# Patient Record
Sex: MALE | Race: OTHER | Employment: OTHER | URBAN - METROPOLITAN AREA
[De-identification: names, ages, dates, MRNs, and addresses within clinical notes are randomized per-mention and may not be internally consistent; named-entity substitution may affect disease eponyms.]

---

## 2017-01-24 ENCOUNTER — HOSPITAL ENCOUNTER (OUTPATIENT)
Dept: LAB | Age: 67
Discharge: HOME | End: 2017-01-24
Payer: COMMERCIAL

## 2017-01-24 DIAGNOSIS — Z00.00 ROUTINE GENERAL MEDICAL EXAMINATION AT A HEALTH CARE FACILITY: ICD-10-CM

## 2017-01-24 DIAGNOSIS — E55.9 HYPOVITAMINOSIS D: ICD-10-CM

## 2017-01-24 DIAGNOSIS — Z11.59 NEED FOR HEPATITIS C SCREENING TEST: ICD-10-CM

## 2017-01-24 DIAGNOSIS — Z12.5 SPECIAL SCREENING FOR MALIGNANT NEOPLASM OF PROSTATE: ICD-10-CM

## 2017-01-24 DIAGNOSIS — E78.5 DYSLIPIDEMIA: ICD-10-CM

## 2017-01-24 LAB
ALBUMIN SERPL BCP-MCNC: 4.1 G/DL (ref 3.4–5)
ALBUMIN/GLOB SERPL: 1.7 {RATIO} (ref 0.8–1.7)
ALP SERPL-CCNC: 75 U/L (ref 45–117)
ALT SERPL-CCNC: 36 U/L (ref 16–61)
ANION GAP BLD CALC-SCNC: 6 MMOL/L (ref 3–18)
AST SERPL W P-5'-P-CCNC: 27 U/L (ref 15–37)
BASOPHILS # BLD AUTO: 0 K/UL (ref 0–0.06)
BASOPHILS # BLD: 0 % (ref 0–2)
BILIRUB SERPL-MCNC: 0.5 MG/DL (ref 0.2–1)
BUN SERPL-MCNC: 25 MG/DL (ref 7–18)
BUN/CREAT SERPL: 24 (ref 12–20)
CALCIUM SERPL-MCNC: 8.3 MG/DL (ref 8.5–10.1)
CHLORIDE SERPL-SCNC: 106 MMOL/L (ref 100–108)
CHOLEST SERPL-MCNC: 144 MG/DL
CO2 SERPL-SCNC: 28 MMOL/L (ref 21–32)
CREAT SERPL-MCNC: 1.04 MG/DL (ref 0.6–1.3)
DIFFERENTIAL METHOD BLD: NORMAL
EOSINOPHIL # BLD: 0.2 K/UL (ref 0–0.4)
EOSINOPHIL NFR BLD: 3 % (ref 0–5)
ERYTHROCYTE [DISTWIDTH] IN BLOOD BY AUTOMATED COUNT: 13.5 % (ref 11.6–14.5)
EST. AVERAGE GLUCOSE BLD GHB EST-MCNC: 117 MG/DL
GLOBULIN SER CALC-MCNC: 2.4 G/DL (ref 2–4)
GLUCOSE SERPL-MCNC: 85 MG/DL (ref 74–99)
HBA1C MFR BLD: 5.7 % (ref 4.2–5.6)
HCT VFR BLD AUTO: 44.7 % (ref 36–48)
HDLC SERPL-MCNC: 56 MG/DL (ref 40–60)
HDLC SERPL: 2.6 {RATIO} (ref 0–5)
HGB BLD-MCNC: 14.7 G/DL (ref 13–16)
LDLC SERPL CALC-MCNC: 74.2 MG/DL (ref 0–100)
LIPID PROFILE,FLP: NORMAL
LYMPHOCYTES # BLD AUTO: 34 % (ref 21–52)
LYMPHOCYTES # BLD: 1.7 K/UL (ref 0.9–3.6)
MCH RBC QN AUTO: 30.5 PG (ref 24–34)
MCHC RBC AUTO-ENTMCNC: 32.9 G/DL (ref 31–37)
MCV RBC AUTO: 92.7 FL (ref 74–97)
MONOCYTES # BLD: 0.5 K/UL (ref 0.05–1.2)
MONOCYTES NFR BLD AUTO: 10 % (ref 3–10)
NEUTS SEG # BLD: 2.6 K/UL (ref 1.8–8)
NEUTS SEG NFR BLD AUTO: 53 % (ref 40–73)
PLATELET # BLD AUTO: 212 K/UL (ref 135–420)
PMV BLD AUTO: 9.9 FL (ref 9.2–11.8)
POTASSIUM SERPL-SCNC: 4.3 MMOL/L (ref 3.5–5.5)
PROT SERPL-MCNC: 6.5 G/DL (ref 6.4–8.2)
PSA SERPL-MCNC: 1.6 NG/ML (ref 0–4)
RBC # BLD AUTO: 4.82 M/UL (ref 4.7–5.5)
SODIUM SERPL-SCNC: 140 MMOL/L (ref 136–145)
TRIGL SERPL-MCNC: 69 MG/DL (ref ?–150)
VLDLC SERPL CALC-MCNC: 13.8 MG/DL
WBC # BLD AUTO: 4.9 K/UL (ref 4.6–13.2)

## 2017-01-24 PROCEDURE — 36415 COLL VENOUS BLD VENIPUNCTURE: CPT | Performed by: INTERNAL MEDICINE

## 2017-01-24 PROCEDURE — 80061 LIPID PANEL: CPT | Performed by: INTERNAL MEDICINE

## 2017-01-24 PROCEDURE — 86803 HEPATITIS C AB TEST: CPT | Performed by: INTERNAL MEDICINE

## 2017-01-24 PROCEDURE — 82306 VITAMIN D 25 HYDROXY: CPT | Performed by: INTERNAL MEDICINE

## 2017-01-24 PROCEDURE — 85025 COMPLETE CBC W/AUTO DIFF WBC: CPT | Performed by: INTERNAL MEDICINE

## 2017-01-24 PROCEDURE — 84153 ASSAY OF PSA TOTAL: CPT | Performed by: INTERNAL MEDICINE

## 2017-01-24 PROCEDURE — 83036 HEMOGLOBIN GLYCOSYLATED A1C: CPT | Performed by: INTERNAL MEDICINE

## 2017-01-24 PROCEDURE — 80053 COMPREHEN METABOLIC PANEL: CPT | Performed by: INTERNAL MEDICINE

## 2017-01-25 LAB
25(OH)D3 SERPL-MCNC: 44.2 NG/ML (ref 30–100)
HCV AB SER IA-ACNC: 0.02 INDEX
HCV AB SERPL QL IA: NEGATIVE
HCV COMMENT,HCGAC: NORMAL

## 2017-01-27 NOTE — PROGRESS NOTES
74 y.o. WHITE OR  male who presents for RPE    He seems to be doing very well. He has not been going to the [facility] as much as previously as he is now doing 3 full days of work weekly. He goes 2x/week doing 40 min on the treadmill or elliptical along with cybex machines. No cardiovascular complaints. No gi issues to report     No urinary complaints. He's been having more ED of late and previously did not respond much to viagra so using homeopathic things from the local drugstore and they seem to work    He continues to see Dr. Madison Barrios regarding the peripheral neuropathy.   He could not tolerate the cpap so he's going to Dr Katy Pérez for an oral appliance    The lexapro and higher dose of wellbutrin has done well for him since we made the adjustment in Nov    Past Medical History   Diagnosis Date    Allergic rhinitis     Basal cell carcinoma 2013     Dr. Lawson Session s/p resection 2 spots    BPH (benign prostatic hyperplasia)      Dr. Hi Marin Depression      anxiety    Dyslipidemia     Erectile dysfunction     FHx: heart disease     GERD (gastroesophageal reflux disease)      s/p dilation 2003 Dr. Paul Hernandez; egd 2015    Heel spur      Dr Connie Gruber Hypovitaminosis D     Normal cardiac stress test 08/12/2009     Neg maximal exercise stress test.  Ex time 15:00.    Peripheral neuropathy (HCC)      and B CTS on EMG Dr. Roshan Gomez 2014    Sleep apnea      intol cpap Dr Madison Barrios 2015; using oral appliance Dr [place]     Past Surgical History   Procedure Laterality Date    Pr cardiac surg procedure unlist  8/09     ETT negative    Hx colonoscopy       Dr. Paul Hernandez mild diverticulosis 2000, 6/12    Hx orthopaedic       B CT release 2007, 2009    Hx orthopaedic       DEXA t score 4.2 spine, 0.2 hip (2012)     Social History     Social History    Marital status:      Spouse name: N/A    Number of children: 2    Years of education: N/A     Occupational History    ret [employer] Social History Main Topics    Smoking status: Never Smoker    Smokeless tobacco: Never Used    Alcohol use Yes      Comment: social    Drug use: No    Sexual activity: Yes     Partners: Female     Birth control/ protection: None     Other Topics Concern    Not on file     Social History Narrative     Family History   Problem Relation Age of Onset    Cancer Mother      leukemia    Heart Disease Father     Psychiatric Disorder Daughter      depression     Immunization History   Administered Date(s) Administered    Influenza Vaccine (Quad) PF 11/02/2015, 11/11/2016    Influenza Vaccine PF 11/05/2013, 10/30/2014    Influenza Vaccine Split 10/21/2011, 10/31/2012    Influenza Vaccine Whole 11/05/2010    Pneumococcal Polysaccharide (PPSV-23) 08/07/2013    TD Vaccine 07/22/2009    Tdap 11/11/2016    Zoster 05/07/2012     Current Outpatient Prescriptions on File Prior to Visit   Medication Sig Dispense Refill    naproxen (NAPROSYN) 375 mg tablet TAKE 1 TABLET BY MOUTH TWICE DAILY WITH MEALS 180 Tab 0    buPROPion SR (WELLBUTRIN SR) 150 mg SR tablet TAKE 1 TABLET BY MOUTH THREE TIMES DAILY 270 Tab 11    simvastatin (ZOCOR) 40 mg tablet TAKE 1 TABLET BY MOUTH EVERY NIGHT AT BEDTIME 90 Tab 2    finasteride (PROSCAR) 5 mg tablet TAKE 1 TABLET DAILY 90 Tab 3    escitalopram oxalate (LEXAPRO) 20 mg tablet TAKE 1 TABLET DAILY 10 Tab 0    fluticasone (FLONASE) 50 mcg/actuation nasal spray USE 2 SPRAYS IN EACH       NOSTRIL DAILY 48 g 3    NEXIUM 40 mg capsule TAKE 1 CAPSULE DAILY 90 Cap 3    PROAIR HFA 90 mcg/actuation inhaler INHALE 2 PUFFS BY MOUTH EVERY 4 HOURS AS NEEDED FOR WHEEZING 1 Inhaler 12    ginkgo biloba 120 mg Tab Take  by mouth.  gabapentin (NEURONTIN) 100 mg capsule Take 300 mg by mouth three (3) times daily.  ascorbic acid (VITAMIN C) 500 mg tablet Take  by mouth.  cyanocobalamin (VITAMIN B-12) 1,000 mcg tablet Take 1,000 mcg by mouth daily.         aspirin delayed-release 81 mg tablet Take  by mouth daily.  cholecalciferol, vitamin d3, (VITAMIN D3) 1,000 unit tablet Take  by mouth daily.  ginseng 100 mg Tab Take  by mouth.  coenzyme q10-vitamin e (COQ10 ) 100-100 mg-unit Cap Take  by mouth.  Gluc-Scar-MSM#9-M-Iypd-Mike-Bor (OSTEO BI-FLEX) 750-625-30 mg Tab Take  by mouth daily.  OMEGA-3 FATTY ACIDS (OMEGA 3 PO) Take  by mouth two (2) times a day. 3 QD       No current facility-administered medications on file prior to visit. Allergies   Allergen Reactions    Cefdinir Nausea and Vomiting    Niacin Other (comments)     Flushing       REVIEW OF SYSTEMS: colo 6/12 Dr Paul Hernandez  Ophtho - no vision change or eye pain  Oral - no mouth pain, tongue or tooth problems  Ears - no hearing loss, ear pain, fullness, no swallowing problems  Cardiac - no CP, PND, orthopnea, edema, palpitations or syncope  Chest - no breast masses  Resp - no wheezing, chronic coughing, dyspnea  GI - no heartburn, nausea, vomiting, change in bowel habits, bleeding, hemorrhoids  Urinary - no dysuria, hematuria, flank pain, urgency, frequency  Genitals - no genital lesions, discharge, masses, ulceration, warts  Ortho - no swelling, dec ROM, myalgias  Derm - no nail abnormalities, rashes, lesions of note, hair loss  Psych - denies any anxiety or depression symptoms, no hallucinations or violent ideation  Constitutional - no wt loss, night sweats, unexplained fevers  Neuro - no focal weakness, incoordination, ataxia, involuntary movements  Endo - no polyuria, polydipsia, nocturia, hot flashes    Visit Vitals    /82 (BP 1 Location: Left arm, BP Patient Position: Sitting)    Pulse 76    Temp 98 °F (36.7 °C) (Oral)    Resp 20    Ht 6' 2.25\" (1.886 m)    Wt 195 lb (88.5 kg)    SpO2 98%    BMI 24.87 kg/m2   Affect is appropriate.   Mood stable  No apparent distress  HEENT --Anicteric sclerae, tympanic membranes normal,  ear canals normal.  PERRL, EOMI, conjunctiva and lids normal.  Disks were sharp  Sinuses were nontender, turbinates normal, hearing normal.  Oropharynx without  erythema, normal tongue, oral mucosa and tonsils. No thyromegaly, JVD, or bruits. Lungs --Clear to auscultation, normal percussion. Heart --Regular rate and rhythm, no murmurs, rubs, gallops, or clicks. Chest wall --Nontender to palpation. PMI normal.  Abdomen -- Soft and nontender, no hepatosplenomegaly or masses.   -- normal penis, no scrotal masses, testicular tenderness or hernias  Prostate  -- no asymmetry, nodularity, tenderness, about 25 gm prostate  Rectal  -- normal tone, guiaiac negative brown stool  Extremities -- Without cyanosis, clubbing, edema. 2+ pulses equally and bilaterally.     LABS  From 11/10 showed gluc 91, cr 1.10,             alt 25, chol 146, tg 74,   hdl 26, ldl-c 95, wbc 6.2, hb 14.3, plt 191, psa 0.60  From 4/12 showed                           alt 26, chol 138, tg 105, hdl 42, ldl-c 75  From 5/12 showed                           vit d 57.9  From 7/13 showed                 alt 25, chol 135, tg 70,   hdl 44, ldl-c 77  From 7/13 showed   gluc 88, cr 1.02, gfr 79,  alt 10,                wbc 5.0, hb 14.3, plt 182, psa 0.60  From 9/14 showed   gluc 95, cr 0.99, gfr>60, alt 13, chol 138, tg 81,   hdl 41, ldl-c 81, wbc 5.6, hb 13.9, plt 187, psa 1.00  From 11/15 showed gluc 84, cr 1.01, gfr>60, alt 12, chol 148, tg 72,   hdl 44, ldl-c 90, wbc 4.8, hb 14.9, plt 193    Results for orders placed or performed during the hospital encounter of 01/24/17   PROSTATE SPECIFIC AG (PSA)   Result Value Ref Range    Prostate Specific Ag 1.6 0.0 - 4.0 ng/mL   CBC WITH AUTOMATED DIFF   Result Value Ref Range    WBC 4.9 4.6 - 13.2 K/uL    RBC 4.82 4.70 - 5.50 M/uL    HGB 14.7 13.0 - 16.0 g/dL    HCT 44.7 36.0 - 48.0 %    MCV 92.7 74.0 - 97.0 FL    MCH 30.5 24.0 - 34.0 PG    MCHC 32.9 31.0 - 37.0 g/dL    RDW 13.5 11.6 - 14.5 %    PLATELET 685 387 - 564 K/uL    MPV 9.9 9.2 - 11.8 FL    NEUTROPHILS 53 40 - 73 %    LYMPHOCYTES 34 21 - 52 %    MONOCYTES 10 3 - 10 %    EOSINOPHILS 3 0 - 5 %    BASOPHILS 0 0 - 2 %    ABS. NEUTROPHILS 2.6 1.8 - 8.0 K/UL    ABS. LYMPHOCYTES 1.7 0.9 - 3.6 K/UL    ABS. MONOCYTES 0.5 0.05 - 1.2 K/UL    ABS. EOSINOPHILS 0.2 0.0 - 0.4 K/UL    ABS. BASOPHILS 0.0 0.0 - 0.06 K/UL    DF AUTOMATED     METABOLIC PANEL, COMPREHENSIVE   Result Value Ref Range    Sodium 140 136 - 145 mmol/L    Potassium 4.3 3.5 - 5.5 mmol/L    Chloride 106 100 - 108 mmol/L    CO2 28 21 - 32 mmol/L    Anion gap 6 3.0 - 18 mmol/L    Glucose 85 74 - 99 mg/dL    BUN 25 (H) 7.0 - 18 MG/DL    Creatinine 1.04 0.6 - 1.3 MG/DL    BUN/Creatinine ratio 24 (H) 12 - 20      GFR est AA >60 >60 ml/min/1.73m2    GFR est non-AA >60 >60 ml/min/1.73m2    Calcium 8.3 (L) 8.5 - 10.1 MG/DL    Bilirubin, total 0.5 0.2 - 1.0 MG/DL    ALT 36 16 - 61 U/L    AST 27 15 - 37 U/L    Alk. phosphatase 75 45 - 117 U/L    Protein, total 6.5 6.4 - 8.2 g/dL    Albumin 4.1 3.4 - 5.0 g/dL    Globulin 2.4 2.0 - 4.0 g/dL    A-G Ratio 1.7 0.8 - 1.7     LIPID PANEL   Result Value Ref Range    LIPID PROFILE          Cholesterol, total 144 <200 MG/DL    Triglyceride 69 <150 MG/DL    HDL Cholesterol 56 40 - 60 MG/DL    LDL, calculated 74.2 0 - 100 MG/DL    VLDL, calculated 13.8 MG/DL    CHOL/HDL Ratio 2.6 0 - 5.0     HEMOGLOBIN A1C WITH EAG   Result Value Ref Range    Hemoglobin A1c 5.7 (H) 4.2 - 5.6 %    Est. average glucose 117 mg/dL   HEPATITIS C AB   Result Value Ref Range    Hepatitis C virus Ab 0.02 <0.80 Index    Hep C  virus Ab Interp.  NEGATIVE  NEG      Hep C  virus Ab comment         VITAMIN D, 25 HYDROXY   Result Value Ref Range    Vitamin D 25-Hydroxy 44.2 30 - 100 ng/mL     Patient Active Problem List   Diagnosis Code    Dyslipidemia E78.5    Hypovitaminosis D E55.9    Depression F32.9    BPH (benign prostatic hyperplasia) Dr. Rosezena Meckel N40.0    Neuropathy Dr. Tracy Sanders G62.9    Erectile dysfunction N52.9    Gastroesophageal reflux disease without esophagitis K21.9    Diverticulosis of large intestine without hemorrhage Dr Gabrielle Reid K57.30    TARUN (obstructive sleep apnea) Dr Mague Bobo using oral appliance G47.33     Assessment and plan:  1. FH CHD. Continue lipid mgmt  2. Dyslipidemia. As above  3. Allergic rhinitis. Continue current  4. GERD. Continue current  5. ED. Prn meds although we did talk about cialis and [place] pharmacies being cheaper option  6. Neuropathy. F/U Dr. Bowser Generous as directed  7. Depression. Continue current regimen  8. Hypovit D. Follow   9. TARUN. Long discussion about long term repercussions of untreated tarun, he is trying to get the oral appliance apparently  10. Prevnar given        RTC 1/18     Above conditions discussed at length and patient vocalized understanding.   All questions answered to patient satifaction

## 2017-01-30 RX ORDER — NAPROXEN 375 MG/1
TABLET ORAL
Qty: 180 TAB | Refills: 0 | Status: SHIPPED | OUTPATIENT
Start: 2017-01-30 | End: 2017-05-06 | Stop reason: SDUPTHER

## 2017-01-31 ENCOUNTER — OFFICE VISIT (OUTPATIENT)
Dept: INTERNAL MEDICINE CLINIC | Age: 67
End: 2017-01-31

## 2017-01-31 VITALS
HEART RATE: 76 BPM | BODY MASS INDEX: 25.03 KG/M2 | DIASTOLIC BLOOD PRESSURE: 82 MMHG | SYSTOLIC BLOOD PRESSURE: 120 MMHG | HEIGHT: 74 IN | WEIGHT: 195 LBS | RESPIRATION RATE: 20 BRPM | OXYGEN SATURATION: 98 % | TEMPERATURE: 98 F

## 2017-01-31 DIAGNOSIS — K21.9 GASTROESOPHAGEAL REFLUX DISEASE WITHOUT ESOPHAGITIS: ICD-10-CM

## 2017-01-31 DIAGNOSIS — Z00.00 PHYSICAL EXAM: Primary | ICD-10-CM

## 2017-01-31 DIAGNOSIS — E78.5 DYSLIPIDEMIA: ICD-10-CM

## 2017-01-31 DIAGNOSIS — F32.A DEPRESSION, UNSPECIFIED DEPRESSION TYPE: ICD-10-CM

## 2017-01-31 DIAGNOSIS — G47.33 OSA (OBSTRUCTIVE SLEEP APNEA): ICD-10-CM

## 2017-01-31 DIAGNOSIS — Z23 ENCOUNTER FOR IMMUNIZATION: ICD-10-CM

## 2017-01-31 DIAGNOSIS — N52.9 ERECTILE DYSFUNCTION, UNSPECIFIED ERECTILE DYSFUNCTION TYPE: ICD-10-CM

## 2017-01-31 NOTE — MR AVS SNAPSHOT
Visit Information Date & Time Provider Department Dept. Phone Encounter #  
 1/31/2017 10:30 AM Clement Gastelum MD [facility]ist of [address] [id number] Your Appointments 7/7/2017  8:00 AM  
Follow Up with Ramandeep Hodges MD  
[facility] ([facility]) Appt Note: 1 year with an ekg [place] [id number] [address] [id number] [phone] [address] [place] [address] [address] Upcoming Health Maintenance Date Due  
 GLAUCOMA SCREENING Q2Y 1/8/2017 Pneumococcal 65+ Low/Medium Risk (1 of 2 - PCV13) 1/8/2017 MEDICARE YEARLY EXAM 1/8/2017 COLONOSCOPY 6/14/2022 DTaP/Tdap/Td series (2 - Td) 11/11/2026 Allergies as of 1/31/2017  Review Complete On: 1/31/2017 By: Aram Zaragoza LPN Severity Noted Reaction Type Reactions Cefdinir  09/12/2011    Nausea and Vomiting Niacin  08/07/2013    Other (comments) [place] Current Immunizations  Reviewed on 11/2/2015 Name Date Influenza Vaccine (Quad) PF 11/11/2016, 11/2/2015 Influenza Vaccine PF 10/30/2014, 11/5/2013 Influenza Vaccine Split 10/31/2012, 10/21/2011 Influenza Vaccine Whole 11/5/2010 Pneumococcal Polysaccharide (PPSV-23) 8/7/2013 TD Vaccine 7/22/2009 Tdap 11/11/2016 Zoster 5/7/2012 Not reviewed this visit Vitals BP Pulse Temp Resp Height(growth percentile) Weight(growth percentile) 120/82 (BP 1 Location: Left arm, BP Patient Position: Sitting) 76 98 °F (36.7 °C) (Oral) 20 6' 2.25\" (1.886 m) 195 lb (88.5 kg) SpO2 BMI Smoking Status 98% 24.87 kg/m2 Never Smoker Vitals History BMI and BSA Data Body Mass Index Body Surface Area  
 24.87 kg/m 2 2.15 m 2 Preferred Pharmacy Pharmacy Name Phone [facility] [address] [place] [address] AT [facility] [phone] Your Updated Medication List  
  
   
This list is accurate as of: 1/31/17 11:08 AM.  Always use your most recent med list.  
  
  
  
  
 aspirin delayed-release 81 mg tablet Take  by mouth daily. buPROPion  mg SR tablet Commonly known as:  WELLBUTRIN SR  
TAKE 1 TABLET BY MOUTH THREE TIMES DAILY  
  
 COQ10  100-100 mg-unit Cap Generic drug:  coenzyme q10-vitamin e Take  by mouth.  
  
 escitalopram oxalate 20 mg tablet Commonly known as:  Sung Hands TAKE 1 TABLET DAILY  
  
 finasteride 5 mg tablet Commonly known as:  PROSCAR  
TAKE 1 TABLET DAILY  
  
 fluticasone 50 mcg/actuation nasal spray Commonly known as:  FLONASE  
USE 2 SPRAYS IN EACH       NOSTRIL DAILY  
  
 gabapentin 100 mg capsule Commonly known as:  NEURONTIN Take 300 mg by mouth three (3) times daily. ginkgo biloba 120 mg Tab Take  by mouth.  
  
 ginseng 100 mg Tab Take  by mouth. naproxen 375 mg tablet Commonly known as:  NAPROSYN  
TAKE 1 TABLET BY MOUTH TWICE DAILY WITH MEALS NexIUM 40 mg capsule Generic drug:  esomeprazole TAKE 1 CAPSULE DAILY  
  
 OMEGA 3 PO Take  by mouth two (2) times a day. 3 QD  
  
 OSTEO BI-FLEX TRIPLE STRENGTH 750-625-30 mg Tab Generic drug:  Gluc-Scar-MSM#4-U-Wbgt-Mike-Bor Take  by mouth daily. PROAIR HFA 90 mcg/actuation inhaler Generic drug:  albuterol INHALE 2 PUFFS BY MOUTH EVERY 4 HOURS AS NEEDED FOR WHEEZING  
  
 simvastatin 40 mg tablet Commonly known as:  ZOCOR  
TAKE 1 TABLET BY MOUTH EVERY NIGHT AT BEDTIME  
  
 VITAMIN B-12 1,000 mcg tablet Generic drug:  cyanocobalamin Take 1,000 mcg by mouth daily. VITAMIN C 500 mg tablet Generic drug:  ascorbic acid (vitamin C) Take  by mouth. VITAMIN D3 1,000 unit tablet Generic drug:  cholecalciferol Take  by mouth daily. Introducing [facility]! Dear Hakeem Mascorro: Thank you for requesting a [employer] account. Our records indicate that you already have an active [employer] account. You can access your account anytime at [email or web address] Did you know that you can access your hospital and ER discharge instructions at any time in [employer]? You can also review all of your test results from your hospital stay or ER visit. Additional Information If you have questions, please visit the Frequently Asked Questions section of the [employer] website at [email or web address] Remember, [employer] is NOT to be used for urgent needs. For medical emergencies, dial 911. Now available from your iPhone and Android! Please provide this summary of care documentation to your next provider. Your primary care clinician is listed as Connor Frye. If you have any questions after today's visit, please call [phone].

## 2017-01-31 NOTE — PROGRESS NOTES
1. Have you been to the ER, urgent care clinic or hospitalized since your last visit? NO.     2. Have you seen or consulted any other health care providers outside of the [address] [place] [address] since your last visit (Include any pap smears or colon screening)? YES       Do you have an Advanced Directive? YES    Would you like information on Advanced Directives?  NO

## 2017-04-14 RX ORDER — ESCITALOPRAM OXALATE 20 MG/1
TABLET ORAL
Qty: 10 TAB | Refills: 0 | Status: CANCELLED | OUTPATIENT
Start: 2017-04-14

## 2017-04-14 RX ORDER — ESCITALOPRAM OXALATE 20 MG/1
20 TABLET ORAL DAILY
Qty: 90 TAB | Refills: 3 | Status: SHIPPED | OUTPATIENT
Start: 2017-04-14 | End: 2018-04-09 | Stop reason: SDUPTHER

## 2017-04-17 RX ORDER — SILDENAFIL 100 MG/1
100 TABLET, FILM COATED ORAL AS NEEDED
Qty: 10 TAB | Status: SHIPPED | OUTPATIENT
Start: 2017-04-17 | End: 2018-08-31 | Stop reason: SDUPTHER

## 2017-04-18 ENCOUNTER — TELEPHONE (OUTPATIENT)
Dept: INTERNAL MEDICINE CLINIC | Age: 67
End: 2017-04-18

## 2017-05-08 RX ORDER — FINASTERIDE 5 MG/1
TABLET, FILM COATED ORAL
Qty: 90 TAB | Refills: 3 | Status: SHIPPED | OUTPATIENT
Start: 2017-05-08 | End: 2018-05-05 | Stop reason: SDUPTHER

## 2017-05-08 RX ORDER — NAPROXEN 375 MG/1
TABLET ORAL
Qty: 180 TAB | Refills: 0 | Status: SHIPPED | OUTPATIENT
Start: 2017-05-08 | End: 2017-08-15 | Stop reason: SDUPTHER

## 2017-06-28 RX ORDER — SIMVASTATIN 40 MG/1
TABLET, FILM COATED ORAL
Qty: 90 TAB | Refills: 2 | Status: SHIPPED | OUTPATIENT
Start: 2017-06-28 | End: 2017-10-16 | Stop reason: SDUPTHER

## 2017-07-07 ENCOUNTER — OFFICE VISIT (OUTPATIENT)
Dept: CARDIOLOGY CLINIC | Age: 67
End: 2017-07-07

## 2017-07-07 VITALS
WEIGHT: 196 LBS | HEART RATE: 66 BPM | OXYGEN SATURATION: 97 % | DIASTOLIC BLOOD PRESSURE: 72 MMHG | BODY MASS INDEX: 25.15 KG/M2 | SYSTOLIC BLOOD PRESSURE: 124 MMHG | HEIGHT: 74 IN

## 2017-07-07 DIAGNOSIS — E78.5 DYSLIPIDEMIA: Primary | ICD-10-CM

## 2017-07-07 DIAGNOSIS — G47.33 OSA (OBSTRUCTIVE SLEEP APNEA): ICD-10-CM

## 2017-07-07 NOTE — MR AVS SNAPSHOT
Visit Information Date & Time Provider Department Dept. Phone Encounter #  
 7/7/2017  8:00 AM Pollo Llanes MD [facility] [address] [id number] Upcoming Health Maintenance Date Due  
 MEDICARE YEARLY EXAM 1/8/2017 INFLUENZA AGE 9 TO ADULT 8/1/2017 Pneumococcal 65+ Low/Medium Risk (2 of 2 - PPSV23) 8/7/2018 GLAUCOMA SCREENING Q2Y 1/31/2019 COLONOSCOPY 6/14/2022 DTaP/Tdap/Td series (2 - Td) 11/11/2026 Allergies as of 7/7/2017  Review Complete On: 1/31/2017 By: Annalisa Arroyo MD  
  
 Severity Noted Reaction Type Reactions Cefdinir  09/12/2011    Nausea and Vomiting Niacin  08/07/2013    Other (comments) [place] Current Immunizations  Reviewed on 1/31/2017 Name Date Influenza Vaccine (Quad) PF 11/11/2016, 11/2/2015 Influenza Vaccine PF 10/30/2014, 11/5/2013 Influenza Vaccine Split 10/31/2012, 10/21/2011 Influenza Vaccine Whole 11/5/2010 Pneumococcal Conjugate (PCV-13) 1/31/2017 Pneumococcal Polysaccharide (PPSV-23) 8/7/2013 TD Vaccine 7/22/2009 Tdap 11/11/2016 Zoster 5/7/2012 Not reviewed this visit You Were Diagnosed With   
  
 Codes Comments Dyslipidemia    -  Primary ICD-10-CM: E78.5 ICD-9-CM: 272.4 Vitals BP Pulse Height(growth percentile) Weight(growth percentile) SpO2 BMI  
 124/72 66 6' 2.25\" (1.886 m) 196 lb (88.9 kg) 97% 25 kg/m2 Smoking Status Never Smoker Vitals History BMI and BSA Data Body Mass Index Body Surface Area  
 25 kg/m 2 2.16 m 2 Preferred Pharmacy Pharmacy Name Phone  [facility] [phone] Your Updated Medication List  
  
   
This list is accurate as of: 7/7/17  8:44 AM.  Always use your most recent med list.  
  
  
  
  
 aspirin delayed-release 81 mg tablet Take  by mouth daily. buPROPion  mg SR tablet Commonly known as:  WELLBUTRIN SR  
TAKE 1 TABLET BY MOUTH THREE TIMES DAILY  
  
 COQ10  100-100 mg-unit Cap Generic drug:  coenzyme q10-vitamin e Take  by mouth. * escitalopram oxalate 20 mg tablet Commonly known as:  Shauna Bread TAKE 1 TABLET DAILY  
  
 * escitalopram oxalate 20 mg tablet Commonly known as:  Shauna Bread Take 1 Tab by mouth daily. finasteride 5 mg tablet Commonly known as:  PROSCAR  
TAKE 1 TABLET DAILY  
  
 fluticasone 50 mcg/actuation nasal spray Commonly known as:  FLONASE  
USE 2 SPRAYS IN EACH       NOSTRIL DAILY  
  
 gabapentin 100 mg capsule Commonly known as:  NEURONTIN Take 300 mg by mouth three (3) times daily. ginkgo biloba 120 mg Tab Take  by mouth.  
  
 ginseng 100 mg Tab Take  by mouth. naproxen 375 mg tablet Commonly known as:  NAPROSYN  
TAKE 1 TABLET BY MOUTH TWICE DAILY WITH MEALS NexIUM 40 mg capsule Generic drug:  esomeprazole TAKE 1 CAPSULE DAILY  
  
 OMEGA 3 PO Take  by mouth two (2) times a day. 3 QD  
  
 OSTEO BI-FLEX TRIPLE STRENGTH 750-625-30 mg Tab Generic drug:  Gluc-Scar-MSM#2-P-Vjtb-Mike-Bor Take  by mouth daily. PROAIR HFA 90 mcg/actuation inhaler Generic drug:  albuterol INHALE 2 PUFFS BY MOUTH EVERY 4 HOURS AS NEEDED FOR WHEEZING  
  
 sildenafil citrate 100 mg tablet Commonly known as:  VIAGRA Take 1 Tab by mouth as needed. simvastatin 40 mg tablet Commonly known as:  ZOCOR  
TAKE 1 TABLET BY MOUTH EVERY NIGHT AT BEDTIME  
  
 VITAMIN B-12 1,000 mcg tablet Generic drug:  cyanocobalamin Take 1,000 mcg by mouth daily. VITAMIN C 500 mg tablet Generic drug:  ascorbic acid (vitamin C) Take  by mouth. VITAMIN D3 1,000 unit tablet Generic drug:  cholecalciferol Take  by mouth daily. * Notice: This list has 2 medication(s) that are the same as other medications prescribed for you. Read the directions carefully, and ask your doctor or other care provider to review them with you. We Performed the Following AMB POC EKG ROUTINE W/ 12 LEADS, INTER & REP [63728 CPT(R)] Introducing [facility]! Dear Beverly Fabian: Thank you for requesting a [employer] account. Our records indicate that you already have an active [employer] account. You can access your account anytime at [email or web address] Did you know that you can access your hospital and ER discharge instructions at any time in [employer]? You can also review all of your test results from your hospital stay or ER visit. Additional Information If you have questions, please visit the Frequently Asked Questions section of the [employer] website at [email or web address] Remember, [employer] is NOT to be used for urgent needs. For medical emergencies, dial 911. Now available from your iPhone and Android! Please provide this summary of care documentation to your next provider. Your primary care clinician is listed as Liliana Mora. If you have any questions after today's visit, please call [phone].

## 2017-07-07 NOTE — PROGRESS NOTES
HISTORY OF PRESENT ILLNESS  Beba Hodges is a 74 y.o. male. Cholesterol Problem   Pertinent negatives include no chest pain, no abdominal pain, no headaches and no shortness of breath. Patient presents for a scheduled followup visit. Patient has a past medical history significant for dyslipidemia and a very strong family for early coronary disease. Patient had been taking simvastatin and fish oil for his lipids. His last stress test was in August 2009, where he exercised for a total of 15 minutes, without symptoms or EKG abnormalities. His lipids have been historically followed by his PCP. The patient was last seen in the office 1 year ago. Since last visit, he continues to feel well. He still is exercising 2 times a week at the [facility] doing 20 minutes of cardiovascular exercise and 20 minutes of light weight lifting. He has not noted any change in his activity tolerance. No new chest pain or shortness of breath, no leg swelling, orthopnea, PND. No palpitations, dizziness nor syncope.   He states he lost about 5 pounds in weight since last year which was intentional.    Past Medical History:   Diagnosis Date    Allergic rhinitis     Basal cell carcinoma 2013    Dr. Kyle South s/p resection 2 spots    BPH (benign prostatic hyperplasia)     Dr. Stacy Simpson Cardiac stress test 08/12/2009    Neg maximal exercise stress test.  Ex time 15:00.    Depression     anxiety    Dyslipidemia     Erectile dysfunction     FHx: heart disease     GERD (gastroesophageal reflux disease)     s/p dilation 2003 Dr. Lonne Kussmaul; egd 2015    Heel spur     Dr Mary Ram    Hypovitaminosis D     Peripheral neuropathy ([address]     and B CTS on EMG Dr. Socorro Mayers 2014    Sleep apnea     intol cpap Dr Radha Simon 2015; using oral appliance Dr Oscar Walton      Current Outpatient Prescriptions   Medication Sig Dispense Refill    simvastatin (ZOCOR) 40 mg tablet TAKE 1 TABLET BY MOUTH EVERY NIGHT AT BEDTIME 90 Tab 2  naproxen (NAPROSYN) 375 mg tablet TAKE 1 TABLET BY MOUTH TWICE DAILY WITH MEALS 180 Tab 0    finasteride (PROSCAR) 5 mg tablet TAKE 1 TABLET DAILY 90 Tab 3    sildenafil citrate (VIAGRA) 100 mg tablet Take 1 Tab by mouth as needed. 10 Tab 1Patient    escitalopram oxalate (LEXAPRO) 20 mg tablet Take 1 Tab by mouth daily. 90 Tab 3    buPROPion SR (WELLBUTRIN SR) 150 mg SR tablet TAKE 1 TABLET BY MOUTH THREE TIMES DAILY 270 Tab 11    escitalopram oxalate (LEXAPRO) 20 mg tablet TAKE 1 TABLET DAILY 10 Tab 0    fluticasone (FLONASE) 50 mcg/actuation nasal spray USE 2 SPRAYS IN EACH       NOSTRIL DAILY 48 g 3    NEXIUM 40 mg capsule TAKE 1 CAPSULE DAILY 90 Cap 3    PROAIR HFA 90 mcg/actuation inhaler INHALE 2 PUFFS BY MOUTH EVERY 4 HOURS AS NEEDED FOR WHEEZING 1 Inhaler 12    ginkgo biloba 120 mg Tab Take  by mouth.  gabapentin (NEURONTIN) 100 mg capsule Take 300 mg by mouth three (3) times daily.  ascorbic acid (VITAMIN C) 500 mg tablet Take  by mouth.  cyanocobalamin (VITAMIN B-12) 1,000 mcg tablet Take 1,000 mcg by mouth daily.  aspirin delayed-release 81 mg tablet Take  by mouth daily.  cholecalciferol, vitamin d3, (VITAMIN D3) 1,000 unit tablet Take  by mouth daily.  ginseng 100 mg Tab Take  by mouth.  coenzyme q10-vitamin e (COQ10 ) 100-100 mg-unit Cap Take  by mouth.  Gluc-Scar-MSM#8-I-Ennk-Mike-Bor (OSTEO BI-FLEX) 750-625-30 mg Tab Take  by mouth daily.  OMEGA-3 FATTY ACIDS (OMEGA 3 PO) Take  by mouth two (2) times a day. 3 QD         Allergies   Allergen Reactions    Cefdinir Nausea and Vomiting    Niacin Other (comments)     Flushing        Review of Systems   Constitutional: Negative for chills, fever and weight loss. HENT: Negative for nosebleeds. Eyes: Negative for blurred vision and double vision. Respiratory: Negative for cough, shortness of breath and wheezing.     Cardiovascular: Negative for chest pain, palpitations, orthopnea, claudication, leg swelling and PND. Gastrointestinal: Negative for abdominal pain, heartburn, nausea and vomiting. Genitourinary: Negative for dysuria and hematuria. Musculoskeletal: Negative for falls and myalgias. Skin: Negative for rash. Neurological: Negative for dizziness, focal weakness and headaches. Endo/Heme/Allergies: Does not bruise/bleed easily. Psychiatric/Behavioral: Negative for substance abuse. Visit Vitals    /72    Pulse 66    Ht 6' 2.25\" (1.886 m)    Wt 88.9 kg (196 lb)    SpO2 97%    BMI 25 kg/m2      Physical Exam   Constitutional: He is oriented to person, place, and time. He appears well-developed and well-nourished. HENT:   Head: Normocephalic and atraumatic. Eyes: Conjunctivae are normal.   Neck: Neck supple. No JVD present. Carotid bruit is not present. Cardiovascular: Normal rate, regular rhythm, S1 normal, S2 normal and normal pulses. Exam reveals no gallop and no S3. No murmur heard. Pulmonary/Chest: Breath sounds normal. He has no wheezes. He has no rales. Abdominal: Soft. Bowel sounds are normal. There is no tenderness. Musculoskeletal: He exhibits no edema. Neurological: He is alert and oriented to person, place, and time. Skin: Skin is warm and dry. EKG:  Normal sinus rhythm. Normal axis, normal QTc interval.  No ST/T wave abnormalities. Normal ECG. No change compared to previous EKG      ASSESSMENT and PLAN    Dyslipidemia. The patient is currently managed with simvastatin 40 mg daily and fish oil supplements. His most recent lipid panel from January 2017 was excellent. This is being followed by his PCP. Family history for coronary disease: Because of this we have discussed the importance of risk factor modification. Patient again, was encouraged to exercise much as possible. Obstructive sleep apnea.   Unknown severity, however, patient has declined CPAP in the past.  He does not have any overly concerning symptoms of daytime somnolence. Followup in 12 months, sooner if needed.

## 2017-07-07 NOTE — PROGRESS NOTES
1. Have you been to the ER, urgent care clinic since your last visit? Hospitalized since your last visit? No     2. Have you seen or consulted any other health care providers outside of the [address] [place] [address] since your last visit? Include any pap smears or colon screening.  No

## 2017-08-16 RX ORDER — NAPROXEN 375 MG/1
TABLET ORAL
Qty: 180 TAB | Refills: 0 | Status: SHIPPED | OUTPATIENT
Start: 2017-08-16 | End: 2017-11-19 | Stop reason: SDUPTHER

## 2017-10-16 RX ORDER — SIMVASTATIN 40 MG/1
TABLET, FILM COATED ORAL
Qty: 90 TAB | Refills: 2 | Status: SHIPPED | OUTPATIENT
Start: 2017-10-16 | End: 2018-07-23 | Stop reason: SDUPTHER

## 2017-10-17 ENCOUNTER — OFFICE VISIT (OUTPATIENT)
Dept: INTERNAL MEDICINE CLINIC | Age: 67
End: 2017-10-17

## 2017-10-17 VITALS
HEART RATE: 75 BPM | BODY MASS INDEX: 25.18 KG/M2 | TEMPERATURE: 98.2 F | WEIGHT: 196.2 LBS | SYSTOLIC BLOOD PRESSURE: 130 MMHG | OXYGEN SATURATION: 96 % | RESPIRATION RATE: 14 BRPM | DIASTOLIC BLOOD PRESSURE: 82 MMHG | HEIGHT: 74 IN

## 2017-10-17 DIAGNOSIS — M54.16 LUMBAR RADICULOPATHY: Primary | ICD-10-CM

## 2017-10-17 RX ORDER — METHYLPREDNISOLONE 4 MG/1
TABLET ORAL
Qty: 1 DOSE PACK | Refills: 0 | Status: SHIPPED | OUTPATIENT
Start: 2017-10-17 | End: 2017-11-06 | Stop reason: ALTCHOICE

## 2017-10-17 RX ORDER — HYDROCODONE BITARTRATE AND ACETAMINOPHEN 5; 325 MG/1; MG/1
1 TABLET ORAL
Qty: 50 TAB | Refills: 0 | Status: SHIPPED | OUTPATIENT
Start: 2017-10-17 | End: 2018-02-12

## 2017-10-17 RX ORDER — HYDROCODONE BITARTRATE AND ACETAMINOPHEN 5; 325 MG/1; MG/1
TABLET ORAL
COMMUNITY
End: 2017-10-17 | Stop reason: ALTCHOICE

## 2017-10-17 NOTE — PROGRESS NOTES
1. Have you been to the ER, urgent care clinic or hospitalized since your last visit? YES. [facility] 10/15/17    2. Have you seen or consulted any other health care providers outside of the [address] [place] [address] since your last visit (Include any pap smears or colon screening)? NO      Do you have an Advanced Directive? YES    Would you like information on Advanced Directives?  NO

## 2017-10-17 NOTE — MR AVS SNAPSHOT
Visit Information Date & Time Provider Department Dept. Phone Encounter #  
 10/17/2017 10:00 AM He Moore MD [facility] [address] [place] [address] Road [phone] [id number] Your Appointments 7/13/2018  8:00 AM  
Follow Up with Sofya Villalba MD  
[facility] ([address]) Appt Note: 1 year follow up [facility] [address] [address] [address] [phone] [address] [place] [address] [address] Upcoming Health Maintenance Date Due  
 MEDICARE YEARLY EXAM 1/8/2017 INFLUENZA AGE 9 TO ADULT 8/1/2017 Pneumococcal 65+ Low/Medium Risk (2 of 2 - PPSV23) 8/7/2018 GLAUCOMA SCREENING Q2Y 1/31/2019 COLONOSCOPY 6/14/2022 DTaP/Tdap/Td series (2 - Td) 11/11/2026 Allergies as of 10/17/2017  Review Complete On: 10/17/2017 By: Jennifer Harley Severity Noted Reaction Type Reactions Cefdinir  09/12/2011    Nausea and Vomiting Niacin  08/07/2013    Other (comments) [place]ushing Current Immunizations  Reviewed on 1/31/2017 Name Date Influenza Vaccine (Quad) PF 11/11/2016, 11/2/2015 Influenza Vaccine PF 10/30/2014, 11/5/2013 Influenza Vaccine Split 10/31/2012, 10/21/2011 Influenza Vaccine Whole 11/5/2010 Pneumococcal Conjugate (PCV-13) 1/31/2017 Pneumococcal Polysaccharide (PPSV-23) 8/7/2013 TD Vaccine 7/22/2009 Tdap 11/11/2016 Zoster 5/7/2012 Not reviewed this visit You Were Diagnosed With   
  
 Codes Comments Lumbar radiculopathy    -  Primary ICD-10-CM: M54.16 
ICD-9-CM: 724.4 Vitals BP Pulse Temp Resp Height(growth percentile) Weight(growth percentile) 130/82 (BP 1 Location: Left arm, BP Patient Position: Sitting) 75 98.2 °F (36.8 °C) (Oral) 14 6' 2.25\" (1.886 m) 196 lb 3.2 oz (89 kg) SpO2 BMI Smoking Status 96% 25.02 kg/m2 Never Smoker Vitals History BMI and BSA Data Body Mass Index Body Surface Area 25.02 kg/m 2 2.16 m 2 Preferred Pharmacy Pharmacy Name Phone [facility] [address] [place] [address], [facility] [phone] Your Updated Medication List  
  
   
This list is accurate as of: 10/17/17 11:37 AM.  Always use your most recent med list.  
  
  
  
  
 aspirin delayed-release 81 mg tablet Take  by mouth daily. buPROPion  mg SR tablet Commonly known as:  WELLBUTRIN SR  
TAKE 1 TABLET BY MOUTH THREE TIMES DAILY  
  
 COQ10  100-100 mg-unit Cap Generic drug:  coenzyme q10-vitamin e Take  by mouth. cyclobenzaprine 5 mg tablet Commonly known as:  FLEXERIL Take 1 Tab by mouth two (2) times daily as needed for Muscle Spasm(s) for up to 5 days. * escitalopram oxalate 20 mg tablet Commonly known as:  Gely Nicole TAKE 1 TABLET DAILY  
  
 * escitalopram oxalate 20 mg tablet Commonly known as:  Gely Nicloe Take 1 Tab by mouth daily. finasteride 5 mg tablet Commonly known as:  PROSCAR  
TAKE 1 TABLET DAILY  
  
 fluticasone 50 mcg/actuation nasal spray Commonly known as:  FLONASE  
USE 2 SPRAYS IN EACH       NOSTRIL DAILY  
  
 gabapentin 100 mg capsule Commonly known as:  NEURONTIN Take 300 mg by mouth three (3) times daily. ginkgo biloba 120 mg Tab Take  by mouth.  
  
 ginseng 100 mg Tab Take  by mouth. HYDROcodone-acetaminophen 5-325 mg per tablet Commonly known as:  Mariah Du Take 1 Tab by mouth every six (6) hours as needed for Pain. Max Daily Amount: 4 Tabs. methylPREDNISolone 4 mg tablet Commonly known as:  Wale Gull Take as directed  
  
 naproxen 375 mg tablet Commonly known as:  NAPROSYN  
TAKE 1 TABLET BY MOUTH TWICE DAILY WITH MEALS NexIUM 40 mg capsule Generic drug:  esomeprazole TAKE 1 CAPSULE DAILY  
  
 OMEGA 3 PO Take  by mouth two (2) times a day.  3 QD  
  
 OSTEO BI-FLEX TRIPLE STRENGTH 750-625-30 mg Tab Generic drug:  Gluc-Scar-MSM#0-U-Athm-Mike-Bor Take  by mouth daily. PROAIR HFA 90 mcg/actuation inhaler Generic drug:  albuterol INHALE 2 PUFFS BY MOUTH EVERY 4 HOURS AS NEEDED FOR WHEEZING  
  
 sildenafil citrate 100 mg tablet Commonly known as:  VIAGRA Take 1 Tab by mouth as needed. simvastatin 40 mg tablet Commonly known as:  ZOCOR  
TAKE 1 TABLET BY MOUTH EVERY NIGHT AT BEDTIME  
  
 VITAMIN B-12 1,000 mcg tablet Generic drug:  cyanocobalamin Take 1,000 mcg by mouth daily. VITAMIN C 500 mg tablet Generic drug:  ascorbic acid (vitamin C) Take  by mouth. VITAMIN D3 1,000 unit tablet Generic drug:  cholecalciferol Take  by mouth daily. * Notice: This list has 2 medication(s) that are the same as other medications prescribed for you. Read the directions carefully, and ask your doctor or other care provider to review them with you. Prescriptions Printed Refills  
 methylPREDNISolone (MEDROL DOSEPACK) 4 mg tablet 0 Sig: Take as directed Class: Print HYDROcodone-acetaminophen (NORCO) 5-325 mg per tablet 0 Sig: Take 1 Tab by mouth every six (6) hours as needed for Pain. Max Daily Amount: 4 Tabs. Class: Print Route: Oral  
  
Introducing [facility]! Dear Tay Cramer: Thank you for requesting a Savv[employer] account. Our records indicate that you already have an active Savvify account. You can access your account anytime at [email or web address] Did you know that you can access your hospital and ER discharge instructions at any time in Sav[employer]ify? You can also review all of your test results from your hospital stay or ER visit. Additional Information If you have questions, please visit the Frequently Asked Questions section of the [employer] website at [email or web address] Remember, [employer] is NOT to be used for urgent needs. For medical emergencies, dial 911. Now available from your iPhone and Android! Please provide this summary of care documentation to your next provider. Your primary care clinician is listed as Felicia Karie. If you have any questions after today's visit, please call [phone].
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
## 2017-10-23 ENCOUNTER — HOSPITAL ENCOUNTER (OUTPATIENT)
Age: 67
Discharge: HOME | End: 2017-10-23
Attending: INTERNAL MEDICINE
Payer: COMMERCIAL

## 2017-10-23 DIAGNOSIS — M54.16 LUMBAR RADICULOPATHY: ICD-10-CM

## 2017-10-23 PROCEDURE — 72148 MRI LUMBAR SPINE W/O DYE: CPT

## 2017-10-25 ENCOUNTER — TELEPHONE (OUTPATIENT)
Dept: INTERNAL MEDICINE CLINIC | Age: 67
End: 2017-10-25

## 2017-10-26 NOTE — TELEPHONE ENCOUNTER
pls call    IMPRESSION:      Scoliosis and advanced multilevel degenerative changes. Severe central canal  stenosis at L3-L4. Degenerative changes have progressed in comparison to MR  lumbar spine August 2014.     F/u spine as scheduled

## 2017-11-06 ENCOUNTER — OFFICE VISIT (OUTPATIENT)
Dept: ORTHOPEDIC SURGERY | Age: 67
End: 2017-11-06

## 2017-11-06 VITALS
HEIGHT: 74 IN | RESPIRATION RATE: 16 BRPM | DIASTOLIC BLOOD PRESSURE: 73 MMHG | SYSTOLIC BLOOD PRESSURE: 105 MMHG | HEART RATE: 70 BPM | WEIGHT: 192.8 LBS | BODY MASS INDEX: 24.74 KG/M2

## 2017-11-06 DIAGNOSIS — M51.36 OTHER INTERVERTEBRAL DISC DEGENERATION, LUMBAR REGION: ICD-10-CM

## 2017-11-06 DIAGNOSIS — M47.816 SPONDYLOSIS OF LUMBAR REGION WITHOUT MYELOPATHY OR RADICULOPATHY: ICD-10-CM

## 2017-11-06 DIAGNOSIS — M48.062 SPINAL STENOSIS, LUMBAR REGION WITH NEUROGENIC CLAUDICATION: Primary | ICD-10-CM

## 2017-11-06 DIAGNOSIS — M54.16 LUMBAR RADICULOPATHY: ICD-10-CM

## 2017-11-06 RX ORDER — GABAPENTIN 600 MG/1
600 TABLET ORAL 3 TIMES DAILY
Qty: 90 TAB | Refills: 1 | Status: SHIPPED | OUTPATIENT
Start: 2017-11-06 | End: 2018-03-05 | Stop reason: SDUPTHER

## 2017-11-06 RX ORDER — MONTELUKAST SODIUM 10 MG/1
TABLET ORAL
Refills: 3 | COMMUNITY
Start: 2017-10-26 | End: 2018-05-16 | Stop reason: SDUPTHER

## 2017-11-06 NOTE — MR AVS SNAPSHOT
Visit Information Date & Time Provider Department Dept. Phone Encounter #  
 11/6/2017 10:00 AM Jessee Childers MD [facility] [place] [phone] [id number] Follow-up Instructions Return in about 4 weeks (around 12/4/2017). Your Appointments 7/13/2018  8:00 AM  
Follow Up with Kaitlin Ogden MD  
[facility] ([address]) Appt Note: 1 year follow up [place] [address] [address] [id number] [phone] [address] Upcoming Health Maintenance Date Due  
 MEDICARE YEARLY EXAM 1/8/2017 INFLUENZA AGE 9 TO ADULT 8/1/2017 Pneumococcal 65+ Low/Medium Risk (2 of 2 - PPSV23) 8/7/2018 GLAUCOMA SCREENING Q2Y 1/31/2019 COLONOSCOPY 6/14/2022 DTaP/Tdap/Td series (2 - Td) 11/11/2026 Allergies as of 11/6/2017  Review Complete On: 11/6/2017 By: Jesese Childers MD  
  
 Severity Noted Reaction Type Reactions Cefdinir  09/12/2011    Nausea and Vomiting Niacin  08/07/2013    Other (comments) [place] Current Immunizations  Reviewed on 1/31/2017 Name Date Influenza Vaccine (Quad) PF 11/11/2016, 11/2/2015 Influenza Vaccine PF 10/30/2014, 11/5/2013 Influenza Vaccine Split 10/31/2012, 10/21/2011 Influenza Vaccine Whole 11/5/2010 Pneumococcal Conjugate (PCV-13) 1/31/2017 Pneumococcal Polysaccharide (PPSV-23) 8/7/2013 TD Vaccine 7/22/2009 Tdap 11/11/2016 Zoster 5/7/2012 Not reviewed this visit You Were Diagnosed With   
  
 Codes Comments Spinal stenosis, lumbar region with neurogenic claudication    -  Primary ICD-10-CM: Z46.024 
ICD-9-CM: 724.03 Spondylosis of lumbar region without myelopathy or radiculopathy     ICD-10-CM: M47.816 ICD-9-CM: 721.3 Lumbar radiculopathy     ICD-10-CM: M54.16 
ICD-9-CM: 724.4 Other intervertebral disc degeneration, lumbar region     ICD-10-CM: M51.36 
ICD-9-CM: 722.52 Vitals BP Pulse Resp Height(growth percentile) Weight(growth percentile) BMI  
 105/73 70 16 6' 2\" (1.88 m) 192 lb 12.8 oz (87.5 kg) 24.75 kg/m2 Smoking Status Never Smoker Vitals History BMI and BSA Data Body Mass Index Body Surface Area 24.75 kg/m 2 2.14 m 2 Preferred Pharmacy Pharmacy Name Phone [facility] [address] [place] [address] AT [facility] [phone] Your Updated Medication List  
  
   
This list is accurate as of: 11/6/17 12:28 PM.  Always use your most recent med list.  
  
  
  
  
 aspirin delayed-release 81 mg tablet Take  by mouth daily. buPROPion  mg SR tablet Commonly known as:  WELLBUTRIN SR  
TAKE 1 TABLET BY MOUTH THREE TIMES DAILY  
  
 COQ10  100-100 mg-unit Cap Generic drug:  coenzyme q10-vitamin e Take  by mouth.  
  
 escitalopram oxalate 20 mg tablet Commonly known as:  Christophe Legacy Take 1 Tab by mouth daily. finasteride 5 mg tablet Commonly known as:  PROSCAR  
TAKE 1 TABLET DAILY  
  
 fluticasone 50 mcg/actuation nasal spray Commonly known as:  FLONASE  
USE 2 SPRAYS IN EACH       NOSTRIL DAILY * gabapentin 600 mg tablet Commonly known as:  NEURONTIN Take 1 Tab by mouth three (3) times daily. * gabapentin 100 mg capsule Commonly known as:  NEURONTIN Take 300 mg by mouth three (3) times daily. ginkgo biloba 120 mg Tab Take  by mouth.  
  
 ginseng 100 mg Tab Take  by mouth. HYDROcodone-acetaminophen 5-325 mg per tablet Commonly known as:  Julaine Kava Take 1 Tab by mouth every six (6) hours as needed for Pain. Max Daily Amount: 4 Tabs. montelukast 10 mg tablet Commonly known as:  SINGULAIR TK 1 T PO QPM FOR ALLERGIES  
  
 naproxen 375 mg tablet Commonly known as:  NAPROSYN  
 TAKE 1 TABLET BY MOUTH TWICE DAILY WITH MEALS NexIUM 40 mg capsule Generic drug:  esomeprazole TAKE 1 CAPSULE DAILY  
  
 OMEGA 3 PO Take  by mouth two (2) times a day. 3 QD  
  
 OSTEO BI-FLEX TRIPLE STRENGTH 750-625-30 mg Tab Generic drug:  Gluc-Scar-MSM#6-R-Slru-Mike-Bor Take  by mouth daily. PROAIR HFA 90 mcg/actuation inhaler Generic drug:  albuterol INHALE 2 PUFFS BY MOUTH EVERY 4 HOURS AS NEEDED FOR WHEEZING  
  
 sildenafil citrate 100 mg tablet Commonly known as:  VIAGRA Take 1 Tab by mouth as needed. simvastatin 40 mg tablet Commonly known as:  ZOCOR  
TAKE 1 TABLET BY MOUTH EVERY NIGHT AT BEDTIME  
  
 VITAMIN B-12 1,000 mcg tablet Generic drug:  cyanocobalamin Take 1,000 mcg by mouth daily. VITAMIN C 500 mg tablet Generic drug:  ascorbic acid (vitamin C) Take  by mouth. VITAMIN D3 1,000 unit tablet Generic drug:  cholecalciferol Take  by mouth daily. * Notice: This list has 2 medication(s) that are the same as other medications prescribed for you. Read the directions carefully, and ask your doctor or other care provider to review them with you. Prescriptions Sent to Pharmacy Refills  
 gabapentin (NEURONTIN) 600 mg tablet 1 Sig: Take 1 Tab by mouth three (3) times daily. Class: Normal  
 [facility] [address] [place] [address] #: [phone] Route: Oral  
  
We Performed the Following REFERRAL TO PHYSICAL THERAPY [UWU85 Custom] Comments:  
 DX:LBP to RLE 
HEP 
LOCATION:[facility] 
2-3 visits/ 2-3 weeks Follow-up Instructions Return in about 4 weeks (around 12/4/2017). Referral Information Referral ID Referred By Referred To  
  
 [id number] KRISTINE CASILLAS III Not Available Visits Status Start Date End Date 1 New Request 11/6/17 11/6/18 If your referral has a status of pending review or denied, additional information will be sent to support the outcome of this decision. Introducing \[facility]\"" & [facility] SERVICES! Dear Shiloh Lopes: Thank you for requesting a [employer] account. Our records indicate that you already have an active [employer] account. You can access your account anytime at [email or web address] Did you know that you can access your hospital and ER discharge instructions at any time in [employer]? You can also review all of your test results from your hospital stay or ER visit. Additional Information If you have questions, please visit the Frequently Asked Questions section of the [employer] website at [email or web address] Remember, [employer] is NOT to be used for urgent needs. For medical emergencies, dial 911. Now available from your iPhone and Android! Please provide this summary of care documentation to your next provider. Your primary care clinician is listed as Ramila Ferrell. If you have any questions after today's visit, please call [phone].
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
## 2017-11-17 ENCOUNTER — CLINICAL SUPPORT (OUTPATIENT)
Dept: INTERNAL MEDICINE CLINIC | Age: 67
End: 2017-11-17

## 2017-11-17 DIAGNOSIS — Z23 ENCOUNTER FOR IMMUNIZATION: ICD-10-CM

## 2017-11-17 NOTE — PROGRESS NOTES
Patient denies any symptoms , reactions or allergies that would exclude them from being immunized today. Risks and adverse reactions were discussed and the VIS was given to them. All questions were addressed. She was observed for 15 min post injection. There were no reactions observed.

## 2017-11-19 RX ORDER — NAPROXEN 375 MG/1
TABLET ORAL
Qty: 180 TAB | Refills: 0 | Status: SHIPPED | OUTPATIENT
Start: 2017-11-19 | End: 2018-03-12 | Stop reason: SDUPTHER

## 2017-11-20 DIAGNOSIS — F33.8 SEASONAL AFFECTIVE DISORDER (HCC): ICD-10-CM

## 2017-11-20 DIAGNOSIS — F32.A DEPRESSION, UNSPECIFIED DEPRESSION TYPE: ICD-10-CM

## 2017-11-20 RX ORDER — BUPROPION HYDROCHLORIDE 150 MG/1
TABLET, EXTENDED RELEASE ORAL
Qty: 270 TAB | Refills: 0 | Status: SHIPPED | OUTPATIENT
Start: 2017-11-20 | End: 2018-02-17 | Stop reason: SDUPTHER

## 2017-12-04 ENCOUNTER — OFFICE VISIT (OUTPATIENT)
Dept: ORTHOPEDIC SURGERY | Age: 67
End: 2017-12-04

## 2017-12-04 VITALS
WEIGHT: 199 LBS | BODY MASS INDEX: 25.54 KG/M2 | HEART RATE: 69 BPM | SYSTOLIC BLOOD PRESSURE: 123 MMHG | HEIGHT: 74 IN | DIASTOLIC BLOOD PRESSURE: 78 MMHG

## 2017-12-04 DIAGNOSIS — M48.062 SPINAL STENOSIS, LUMBAR REGION WITH NEUROGENIC CLAUDICATION: Primary | ICD-10-CM

## 2017-12-04 DIAGNOSIS — M51.36 OTHER INTERVERTEBRAL DISC DEGENERATION, LUMBAR REGION: ICD-10-CM

## 2017-12-04 DIAGNOSIS — M54.16 LUMBAR RADICULOPATHY: ICD-10-CM

## 2017-12-04 DIAGNOSIS — M47.816 SPONDYLOSIS OF LUMBAR REGION WITHOUT MYELOPATHY OR RADICULOPATHY: ICD-10-CM

## 2017-12-04 RX ORDER — GABAPENTIN 600 MG/1
600 TABLET ORAL 3 TIMES DAILY
Qty: 270 TAB | Refills: 0 | Status: SHIPPED | OUTPATIENT
Start: 2017-12-04 | End: 2018-02-12 | Stop reason: SDUPTHER
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
## 2017-12-04 NOTE — MR AVS SNAPSHOT
Visit Information Date & Time Provider Department Dept. Phone Encounter #  
 12/4/2017  1:05 PM Jarad Quiñones MD [facility] [place] [id number] Follow-up Instructions Return in about 3 months (around 3/4/2018). Your Appointments 7/13/2018  8:00 AM  
Follow Up with Karyle Fairly, MD  
[facility] ([facility]) Appt Note: 1 year follow up [place] [address] [address] [id number] [phone] [address] [place] [address] [address] Upcoming Health Maintenance Date Due  
 MEDICARE YEARLY EXAM 1/8/2017 Pneumococcal 65+ Low/Medium Risk (2 of 2 - PPSV23) 8/7/2018 GLAUCOMA SCREENING Q2Y 1/31/2019 COLONOSCOPY 6/14/2022 DTaP/Tdap/Td series (2 - Td) 11/11/2026 Allergies as of 12/4/2017  Review Complete On: 12/4/2017 By: Jarad Quiñones MD  
  
 Severity Noted Reaction Type Reactions Cefdinir  09/12/2011    Nausea and Vomiting Niacin  08/07/2013    Other (comments) [place] Current Immunizations  Reviewed on 1/31/2017 Name Date Influenza High Dose Vaccine PF 11/17/2017 Influenza Vaccine (Quad) PF 11/11/2016, 11/2/2015 Influenza Vaccine PF 10/30/2014, 11/5/2013 Influenza Vaccine Split 10/31/2012, 10/21/2011 Influenza Vaccine Whole 11/5/2010 Pneumococcal Conjugate (PCV-13) 1/31/2017 Pneumococcal Polysaccharide (PPSV-23) 8/7/2013 TD Vaccine 7/22/2009 Tdap 11/11/2016 Zoster 5/7/2012 Not reviewed this visit You Were Diagnosed With   
  
 Codes Comments Spinal stenosis, lumbar region with neurogenic claudication    -  Primary ICD-10-CM: B26.448 
ICD-9-CM: 724.03 Spondylosis of lumbar region without myelopathy or radiculopathy     ICD-10-CM: M47.816 ICD-9-CM: 721.3 Lumbar radiculopathy     ICD-10-CM: M54.16 
ICD-9-CM: 724.4 Other intervertebral disc degeneration, lumbar region     ICD-10-CM: M51.36 
ICD-9-CM: 722.52 Vitals BP Pulse Height(growth percentile) Weight(growth percentile) BMI Smoking Status 123/78 69 6' 2\" (1.88 m) 199 lb (90.3 kg) 25.55 kg/m2 Never Smoker Vitals History BMI and BSA Data Body Mass Index Body Surface Area 25.55 kg/m 2 2.17 m 2 Preferred Pharmacy Pharmacy Name Phone [facility] [address] [place] [address] AT [facility] [phone] Your Updated Medication List  
  
   
This list is accurate as of: 12/4/17  2:14 PM.  Always use your most recent med list.  
  
  
  
  
 aspirin delayed-release 81 mg tablet Take  by mouth daily. POSADAS'S YEAST PO Take  by mouth. buPROPion  mg SR tablet Commonly known as:  WELLBUTRIN SR  
TAKE 1 TABLET BY MOUTH THREE TIMES DAILY  
  
 COQ10  100-100 mg-unit Cap Generic drug:  coenzyme q10-vitamin e Take  by mouth.  
  
 escitalopram oxalate 20 mg tablet Commonly known as:  Darcus Skillern Take 1 Tab by mouth daily. finasteride 5 mg tablet Commonly known as:  PROSCAR  
TAKE 1 TABLET DAILY  
  
 fluticasone 50 mcg/actuation nasal spray Commonly known as:  FLONASE  
USE 2 SPRAYS IN EACH       NOSTRIL DAILY * gabapentin 600 mg tablet Commonly known as:  NEURONTIN Take 1 Tab by mouth three (3) times daily. * gabapentin 600 mg tablet Commonly known as:  NEURONTIN Take 1 Tab by mouth three (3) times daily. ginkgo biloba 120 mg Tab Take  by mouth.  
  
 ginseng 100 mg Tab Take  by mouth. HYDROcodone-acetaminophen 5-325 mg per tablet Commonly known as:  March Castles Take 1 Tab by mouth every six (6) hours as needed for Pain. Max Daily Amount: 4 Tabs. montelukast 10 mg tablet Commonly known as:  SINGULAIR TK 1 T PO QPM FOR ALLERGIES  
  
 naproxen 375 mg tablet Commonly known as:  NAPROSYN  
 TAKE 1 TABLET BY MOUTH TWICE DAILY WITH MEALS NexIUM 40 mg capsule Generic drug:  esomeprazole TAKE 1 CAPSULE DAILY  
  
 OMEGA 3 PO Take  by mouth two (2) times a day. 3 QD  
  
 OSTEO BI-FLEX TRIPLE STRENGTH 750-625-30 mg Tab Generic drug:  Gluc-Scar-MSM#6-G-Iiyf-Mike-Bor Take  by mouth daily. PROAIR HFA 90 mcg/actuation inhaler Generic drug:  albuterol INHALE 2 PUFFS BY MOUTH EVERY 4 HOURS AS NEEDED FOR WHEEZING  
  
 sildenafil citrate 100 mg tablet Commonly known as:  VIAGRA Take 1 Tab by mouth as needed. simvastatin 40 mg tablet Commonly known as:  ZOCOR  
TAKE 1 TABLET BY MOUTH EVERY NIGHT AT BEDTIME  
  
 VITAMIN B-12 1,000 mcg tablet Generic drug:  cyanocobalamin Take 1,000 mcg by mouth daily. VITAMIN C 500 mg tablet Generic drug:  ascorbic acid (vitamin C) Take  by mouth. VITAMIN D3 1,000 unit tablet Generic drug:  cholecalciferol Take  by mouth daily. * Notice: This list has 2 medication(s) that are the same as other medications prescribed for you. Read the directions carefully, and ask your doctor or other care provider to review them with you. Prescriptions Sent to Pharmacy Refills  
 gabapentin (NEURONTIN) 600 mg tablet 0 Sig: Take 1 Tab by mouth three (3) times daily. Class: Normal  
 Pharmacy: [facility] [address] [place] [address] [facility]  #: [phone] Route: Oral  
  
Follow-up Instructions Return in about 3 months (around 3/4/2018). Introducing [facility] & HEALTH SERVICES! Dear Dwight Ramirez: Thank you for requesting a Ready [employer] account. Our records indicate that you already have an active [employer] account. You can access your account anytime at [email or web address] outdoors Did you know that you can access your hospital and ER discharge instructions at any time in [email or web address]? You can also review all of your test results from your hospital stay or ER visit. Additional Information If you have questions, please visit the Frequently Asked Questions section of the [email or web address] website at [email or web address] Remember, [email or web address] is NOT to be used for urgent needs. For medical emergencies, dial 911. Now available from your iPhone and Android! Please provide this summary of care documentation to your next provider. Your primary care clinician is listed as Charles Faye. If you have any questions after today's visit, please call [phone].

## 2018-02-05 ENCOUNTER — TELEPHONE (OUTPATIENT)
Dept: INTERNAL MEDICINE CLINIC | Age: 68
End: 2018-02-05

## 2018-02-05 ENCOUNTER — HOSPITAL ENCOUNTER (OUTPATIENT)
Dept: LAB | Age: 68
Discharge: HOME | End: 2018-02-05
Payer: COMMERCIAL

## 2018-02-05 DIAGNOSIS — Z00.00 ROUTINE GENERAL MEDICAL EXAMINATION AT A HEALTH CARE FACILITY: Primary | ICD-10-CM

## 2018-02-05 DIAGNOSIS — Z00.00 ROUTINE GENERAL MEDICAL EXAMINATION AT A HEALTH CARE FACILITY: ICD-10-CM

## 2018-02-05 LAB
ALBUMIN SERPL-MCNC: 3.7 G/DL (ref 3.4–5)
ALBUMIN/GLOB SERPL: 1.4 {RATIO} (ref 0.8–1.7)
ALP SERPL-CCNC: 65 U/L (ref 45–117)
ALT SERPL-CCNC: 35 U/L (ref 16–61)
ANION GAP SERPL CALC-SCNC: 8 MMOL/L (ref 3–18)
AST SERPL-CCNC: 26 U/L (ref 15–37)
BILIRUB SERPL-MCNC: 0.4 MG/DL (ref 0.2–1)
BUN SERPL-MCNC: 25 MG/DL (ref 7–18)
BUN/CREAT SERPL: 24 (ref 12–20)
CALCIUM SERPL-MCNC: 8.5 MG/DL (ref 8.5–10.1)
CHLORIDE SERPL-SCNC: 106 MMOL/L (ref 100–108)
CHOLEST SERPL-MCNC: 142 MG/DL
CO2 SERPL-SCNC: 30 MMOL/L (ref 21–32)
CREAT SERPL-MCNC: 1.04 MG/DL (ref 0.6–1.3)
ERYTHROCYTE [DISTWIDTH] IN BLOOD BY AUTOMATED COUNT: 12.8 % (ref 11.6–14.5)
GLOBULIN SER CALC-MCNC: 2.6 G/DL (ref 2–4)
GLUCOSE SERPL-MCNC: 89 MG/DL (ref 74–99)
HCT VFR BLD AUTO: 42.4 % (ref 36–48)
HDLC SERPL-MCNC: 53 MG/DL (ref 40–60)
HDLC SERPL: 2.7 {RATIO} (ref 0–5)
HGB BLD-MCNC: 14.5 G/DL (ref 13–16)
LDLC SERPL CALC-MCNC: 76.2 MG/DL (ref 0–100)
LIPID PROFILE,FLP: NORMAL
MCH RBC QN AUTO: 30.8 PG (ref 24–34)
MCHC RBC AUTO-ENTMCNC: 34.2 G/DL (ref 31–37)
MCV RBC AUTO: 90 FL (ref 74–97)
PLATELET # BLD AUTO: 208 K/UL (ref 135–420)
PMV BLD AUTO: 10 FL (ref 9.2–11.8)
POTASSIUM SERPL-SCNC: 4.9 MMOL/L (ref 3.5–5.5)
PROT SERPL-MCNC: 6.3 G/DL (ref 6.4–8.2)
RBC # BLD AUTO: 4.71 M/UL (ref 4.7–5.5)
SODIUM SERPL-SCNC: 144 MMOL/L (ref 136–145)
TRIGL SERPL-MCNC: 64 MG/DL (ref ?–150)
VLDLC SERPL CALC-MCNC: 12.8 MG/DL
WBC # BLD AUTO: 4.5 K/UL (ref 4.6–13.2)

## 2018-02-05 PROCEDURE — 80053 COMPREHEN METABOLIC PANEL: CPT | Performed by: INTERNAL MEDICINE

## 2018-02-05 PROCEDURE — 36415 COLL VENOUS BLD VENIPUNCTURE: CPT | Performed by: INTERNAL MEDICINE

## 2018-02-05 PROCEDURE — 85027 COMPLETE CBC AUTOMATED: CPT | Performed by: INTERNAL MEDICINE

## 2018-02-05 PROCEDURE — 80061 LIPID PANEL: CPT | Performed by: INTERNAL MEDICINE

## 2018-02-08 NOTE — PROGRESS NOTES
79 y.o. WHITE OR  male who presents for RPE    He had that episode of spinal stenosis flaring up late last year but that seems to be much better. He's 'back to what he was' from a year ago. Trying to get back to the [facility] around 2x/week doing 40 min on the treadmill or elliptical along with cybex machines. No cardiovascular complaints. He's not sure if the inc dose of gabapentin is interfering with his depression meds. He's been having intermittent feeling of just feeling down, no energy, etc.  No VI or hallucinations, they come in discrete episodes. Been on treatment for a long time and the current regimen was working well until the last few weeks/months    No gi issues to report     No urinary complaints. He's using the viagra full time now but it seems to work very well    He continues to see Dr. Maria M Yarbrough regarding the peripheral neuropathy.   Using the oral appliance as intol cpap    LAST MEDICARE WELLNESS EXAM: never as not medicare b    Past Medical History:   Diagnosis Date    Allergic rhinitis     Basal cell carcinoma 2013    Dr. London Coppola s/p resection 2 spots    BPH (benign prostatic hyperplasia)     Dr. Cyndi Gan Cardiac stress test 08/12/2009    Neg maximal exercise stress test.  Ex time 15:00.    Depression     anxiety    Dyslipidemia     Erectile dysfunction     FHx: heart disease     GERD (gastroesophageal reflux disease)     s/p dilation 2003 Dr. Ariel Mann; egd 2015    Heel spur     Dr Oscar Monson    Hypovitaminosis D     Overweight (BMI 25.0-29.9) 2/12/2018    Peripheral neuropathy     and B CTS on EMG Dr. Lyn Higuera 2014    Sleep apnea     intol cpap Dr Maria M Yarbrough 2015; using oral appliance Dr Tim Baron; AHI 17.8 min desats 83    Spinal stenosis 03/2014    MRI (3/14) showed scoliosis w multilevel degen findings, mod L3-4, L4-5 central stenosis, mod L5-S1 foraminal stenosis;  (10/17) severe L3-4 central stenosis     Past Surgical History:   Procedure Laterality Date  CARDIAC SURG PROCEDURE UNLIST  8/09    ETT negative    HX COLONOSCOPY      Dr. Hans Treadwell mild diverticulosis 2000, 6/12    HX ORTHOPAEDIC      B CT release 2007, 2009    HX ORTHOPAEDIC      DEXA t score 4.2 spine, 0.2 hip (2012)     Social History     Social History    Marital status:      Spouse name: N/A    Number of children: 2    Years of education: N/A     Occupational History    ret [employer]      Social History Main Topics    Smoking status: Never Smoker    Smokeless tobacco: Never Used    Alcohol use Yes      Comment: social    Drug use: No    Sexual activity: Yes     Partners: Female     Birth control/ protection: None     Other Topics Concern    Not on file     Social History Narrative     Family History   Problem Relation Age of Onset    Cancer Mother      leukemia    Heart Disease Father     Psychiatric Disorder Daughter      depression     Immunization History   Administered Date(s) Administered    Influenza High Dose Vaccine PF 11/17/2017    Influenza Vaccine (Quad) PF 11/02/2015, 11/11/2016    Influenza Vaccine PF 11/05/2013, 10/30/2014    Influenza Vaccine Split 10/21/2011, 10/31/2012    Influenza Vaccine Whole 11/05/2010    Pneumococcal Conjugate (PCV-13) 01/31/2017    Pneumococcal Polysaccharide (PPSV-23) 08/07/2013    TD Vaccine 07/22/2009    Tdap 11/11/2016    Zoster 05/07/2012     Current Outpatient Prescriptions   Medication Sig    YEAST,DRIED, S. CEREVISIAE, (POSADAS'S YEAST PO) Take  by mouth.  buPROPion SR (WELLBUTRIN SR) 150 mg SR tablet TAKE 1 TABLET BY MOUTH THREE TIMES DAILY    naproxen (NAPROSYN) 375 mg tablet TAKE 1 TABLET BY MOUTH TWICE DAILY WITH MEALS    montelukast (SINGULAIR) 10 mg tablet TK 1 T PO QPM FOR ALLERGIES    gabapentin (NEURONTIN) 600 mg tablet Take 1 Tab by mouth three (3) times daily.     simvastatin (ZOCOR) 40 mg tablet TAKE 1 TABLET BY MOUTH EVERY NIGHT AT BEDTIME    finasteride (PROSCAR) 5 mg tablet TAKE 1 TABLET DAILY    sildenafil citrate (VIAGRA) 100 mg tablet Take 1 Tab by mouth as needed.  escitalopram oxalate (LEXAPRO) 20 mg tablet Take 1 Tab by mouth daily.  fluticasone (FLONASE) 50 mcg/actuation nasal spray USE 2 SPRAYS IN EACH       NOSTRIL DAILY    NEXIUM 40 mg capsule TAKE 1 CAPSULE DAILY    PROAIR HFA 90 mcg/actuation inhaler INHALE 2 PUFFS BY MOUTH EVERY 4 HOURS AS NEEDED FOR WHEEZING    ginkgo biloba 120 mg Tab Take  by mouth.  ascorbic acid (VITAMIN C) 500 mg tablet Take  by mouth.  cyanocobalamin (VITAMIN B-12) 1,000 mcg tablet Take 1,000 mcg by mouth daily.  aspirin delayed-release 81 mg tablet Take  by mouth daily.  cholecalciferol, vitamin d3, (VITAMIN D3) 1,000 unit tablet Take  by mouth daily.  ginseng 100 mg Tab Take  by mouth.  coenzyme q10-vitamin e (COQ10 ) 100-100 mg-unit Cap Take  by mouth.  Gluc-Scar-MSM#5-U-Lmui-Mike-Bor (OSTEO BI-FLEX) 750-625-30 mg Tab Take  by mouth daily.  OMEGA-3 FATTY ACIDS (OMEGA 3 PO) Take  by mouth two (2) times a day. 3 QD     No current facility-administered medications for this visit.       Allergies   Allergen Reactions    Cefdinir Nausea and Vomiting    Niacin Other (comments)     Flushing       REVIEW OF SYSTEMS: colo 6/12 Dr Stacy Devine  Ophtho  no vision change or eye pain  Oral  no mouth pain, tongue or tooth problems  Ears  no hearing loss, ear pain, fullness, no swallowing problems  Cardiac  no CP, PND, orthopnea, edema, palpitations or syncope  Chest  no breast masses  Resp  no wheezing, chronic coughing, dyspnea  GI  no heartburn, nausea, vomiting, change in bowel habits, bleeding, hemorrhoids  Urinary  no dysuria, hematuria, flank pain, urgency, frequency  Genitals  no genital lesions, discharge, masses, ulceration, warts  Ortho  no swelling, dec ROM, myalgias  Derm  no nail abnormalities, rashes, lesions of note, hair loss  Psych  denies any anxiety or depression symptoms, no hallucinations or violent ideation  Constitutional  no wt loss, night sweats, unexplained fevers  Neuro  no focal weakness, incoordination, ataxia, involuntary movements  Endo - no polyuria, polydipsia, nocturia, hot flashes    Visit Vitals    /76 (BP 1 Location: Left arm, BP Patient Position: Sitting)    Pulse 89    Temp 98.1 °F (36.7 °C) (Oral)    Resp 14    Ht 6' 2\" (1.88 m)    Wt 195 lb (88.5 kg)    SpO2 97%    BMI 25.04 kg/m2   Affect is appropriate. Mood stable  No apparent distress  HEENT --Anicteric sclerae, tympanic membranes normal,  ear canals normal.  PERRL, EOMI, conjunctiva and lids normal.  Disks were sharp  Sinuses were nontender, turbinates normal, hearing normal.  Oropharynx without  erythema, normal tongue, oral mucosa and tonsils. No thyromegaly, JVD, or bruits. Lungs --Clear to auscultation, normal percussion. Heart --Regular rate and rhythm, no murmurs, rubs, gallops, or clicks. Chest wall --Nontender to palpation. PMI normal.  Abdomen -- Soft and nontender, no hepatosplenomegaly or masses. Prostate  -- no asymmetry, nodularity, tenderness, about 25 gm prostate  Rectal  -- normal tone, guiaiac negative brown stool  Extremities -- Without cyanosis, clubbing, edema. 2+ pulses equally and bilaterally.     LABS  From 11/10 showed gluc 91, cr 1.10,             alt 25, chol 146, tg 74,   hdl 26, ldl-c 95, wbc 6.2, hb 14.3, plt 191, psa 0.60  From 4/12 showed                           alt 26, chol 138, tg 105, hdl 42, ldl-c 75  From 5/12 showed                                      vit d 57.9  From 7/13 showed                 alt 25, chol 135, tg 70,   hdl 44, ldl-c 77  From 7/13 showed   gluc 88, cr 1.02, gfr 79,  alt 10,                wbc 5.0, hb 14.3, plt 182, psa 0.60  From 9/14 showed   gluc 95, cr 0.99, gfr>60, alt 13, chol 138, tg 81,   hdl 41, ldl-c 81, wbc 5.6, hb 13.9, plt 187, psa 1.00  From 11/15 showed gluc 84, cr 1.01, gfr>60, alt 12, chol 148, tg 72,   hdl 44, ldl-c 90, wbc 4.8, hb 14.9, plt 193  From 1/17 showed   gluc 85, cr 1.05, gfr>60, alt 36, chol 144, tg 69,   hdl 56, ldl-c 74, wbc 4.9, hb 14.7, plt 212, psa 1.60, hba1c 5.7, vit d 44.2, hep c neg    Results for orders placed or performed during the hospital encounter of 02/05/18   LIPID PANEL   Result Value Ref Range    LIPID PROFILE          Cholesterol, total 142 <200 MG/DL    Triglyceride 64 <150 MG/DL    HDL Cholesterol 53 40 - 60 MG/DL    LDL, calculated 76.2 0 - 100 MG/DL    VLDL, calculated 12.8 MG/DL    CHOL/HDL Ratio 2.7 0 - 5.0     METABOLIC PANEL, COMPREHENSIVE   Result Value Ref Range    Sodium 144 136 - 145 mmol/L    Potassium 4.9 3.5 - 5.5 mmol/L    Chloride 106 100 - 108 mmol/L    CO2 30 21 - 32 mmol/L    Anion gap 8 3.0 - 18 mmol/L    Glucose 89 74 - 99 mg/dL    BUN 25 (H) 7.0 - 18 MG/DL    Creatinine 1.04 0.6 - 1.3 MG/DL    BUN/Creatinine ratio 24 (H) 12 - 20      GFR est AA >60 >60 ml/min/1.73m2    GFR est non-AA >60 >60 ml/min/1.73m2    Calcium 8.5 8.5 - 10.1 MG/DL    Bilirubin, total 0.4 0.2 - 1.0 MG/DL    ALT (SGPT) 35 16 - 61 U/L    AST (SGOT) 26 15 - 37 U/L    Alk.  phosphatase 65 45 - 117 U/L    Protein, total 6.3 (L) 6.4 - 8.2 g/dL    Albumin 3.7 3.4 - 5.0 g/dL    Globulin 2.6 2.0 - 4.0 g/dL    A-G Ratio 1.4 0.8 - 1.7     CBC W/O DIFF   Result Value Ref Range    WBC 4.5 (L) 4.6 - 13.2 K/uL    RBC 4.71 4.70 - 5.50 M/uL    HGB 14.5 13.0 - 16.0 g/dL    HCT 42.4 36.0 - 48.0 %    MCV 90.0 74.0 - 97.0 FL    MCH 30.8 24.0 - 34.0 PG    MCHC 34.2 31.0 - 37.0 g/dL    RDW 12.8 11.6 - 14.5 %    PLATELET 534 743 - 215 K/uL    MPV 10.0 9.2 - 11.8 FL     Patient Active Problem List   Diagnosis Code    Dyslipidemia E78.5    Hypovitaminosis D E55.9    BPH (benign prostatic hyperplasia) Dr. Eder Garcia N40.0    Neuropathy Dr. [place] G62.9    Erectile dysfunction N52.9    Gastroesophageal reflux disease without esophagitis K21.9    Diverticulosis of large intestine without hemorrhage Dr Jacinda Beltran K57.30    GALILEA (obstructive sleep apnea)  Evaristo using oral appliance AHI 17.8 G47.33    Spinal stenosis, lumbar region with neurogenic claudication M48.062    Spondylosis of lumbar region without myelopathy or radiculopathy M47.816    Lumbar radiculopathy M54.16    Other intervertebral disc degeneration, lumbar region M51.36    Moderate episode of recurrent major depressive disorder (HCC) F33.1    Overweight (BMI 25.0-29. 9) E66.3     Assessment and plan:  1. FH CHD. Continue lipid mgmt  2. Dyslipidemia. As above  3. Allergic rhinitis. Continue current  4. GERD. Continue current  5. ED. Prn viagra   6. Neuropathy. F/U Dr. Malachi Del Real as directed  7. Depression. Long discussion  Will check thyroid and b12 levels. suggested he go see psych for opinion, will set up with Dr Sharon Fowler  8. Hypovit D. Follow   9. GALILEA. Per neurology  10. Spinal stenosis. Will try to cut dosing to bid and see how it affects him        RTC 1/18     Above conditions discussed at length and patient vocalized understanding.   All questions answered to patient satifaction

## 2018-02-12 ENCOUNTER — HOSPITAL ENCOUNTER (OUTPATIENT)
Dept: LAB | Age: 68
Discharge: HOME | End: 2018-02-12
Payer: COMMERCIAL

## 2018-02-12 ENCOUNTER — OFFICE VISIT (OUTPATIENT)
Dept: INTERNAL MEDICINE CLINIC | Age: 68
End: 2018-02-12

## 2018-02-12 VITALS
OXYGEN SATURATION: 97 % | HEART RATE: 89 BPM | WEIGHT: 195 LBS | TEMPERATURE: 98.1 F | BODY MASS INDEX: 25.03 KG/M2 | DIASTOLIC BLOOD PRESSURE: 76 MMHG | RESPIRATION RATE: 14 BRPM | HEIGHT: 74 IN | SYSTOLIC BLOOD PRESSURE: 124 MMHG

## 2018-02-12 DIAGNOSIS — E55.9 HYPOVITAMINOSIS D: ICD-10-CM

## 2018-02-12 DIAGNOSIS — E78.5 DYSLIPIDEMIA: ICD-10-CM

## 2018-02-12 DIAGNOSIS — N40.0 BENIGN PROSTATIC HYPERPLASIA, UNSPECIFIED WHETHER LOWER URINARY TRACT SYMPTOMS PRESENT: ICD-10-CM

## 2018-02-12 DIAGNOSIS — K21.9 GASTROESOPHAGEAL REFLUX DISEASE WITHOUT ESOPHAGITIS: ICD-10-CM

## 2018-02-12 DIAGNOSIS — R73.09 ABNORMAL GLUCOSE: ICD-10-CM

## 2018-02-12 DIAGNOSIS — Z12.5 SCREENING FOR MALIGNANT NEOPLASM OF PROSTATE: ICD-10-CM

## 2018-02-12 DIAGNOSIS — Z00.00 ROUTINE GENERAL MEDICAL EXAMINATION AT A HEALTH CARE FACILITY: Primary | ICD-10-CM

## 2018-02-12 DIAGNOSIS — G47.33 OSA (OBSTRUCTIVE SLEEP APNEA): ICD-10-CM

## 2018-02-12 DIAGNOSIS — K57.30 DIVERTICULOSIS OF LARGE INTESTINE WITHOUT HEMORRHAGE: ICD-10-CM

## 2018-02-12 DIAGNOSIS — M48.062 SPINAL STENOSIS, LUMBAR REGION WITH NEUROGENIC CLAUDICATION: ICD-10-CM

## 2018-02-12 DIAGNOSIS — Z00.00 ROUTINE GENERAL MEDICAL EXAMINATION AT A HEALTH CARE FACILITY: ICD-10-CM

## 2018-02-12 DIAGNOSIS — E66.3 OVERWEIGHT (BMI 25.0-29.9): ICD-10-CM

## 2018-02-12 DIAGNOSIS — G62.9 NEUROPATHY: ICD-10-CM

## 2018-02-12 DIAGNOSIS — F33.1 MODERATE EPISODE OF RECURRENT MAJOR DEPRESSIVE DISORDER (HCC): ICD-10-CM

## 2018-02-12 DIAGNOSIS — N52.9 ERECTILE DYSFUNCTION, UNSPECIFIED ERECTILE DYSFUNCTION TYPE: ICD-10-CM

## 2018-02-12 LAB — T4 FREE SERPL-MCNC: 0.9 NG/DL (ref 0.7–1.5)

## 2018-02-12 PROCEDURE — 36415 COLL VENOUS BLD VENIPUNCTURE: CPT | Performed by: INTERNAL MEDICINE

## 2018-02-12 PROCEDURE — 84439 ASSAY OF FREE THYROXINE: CPT | Performed by: INTERNAL MEDICINE

## 2018-02-12 PROCEDURE — 82607 VITAMIN B-12: CPT | Performed by: INTERNAL MEDICINE

## 2018-02-12 PROCEDURE — 84443 ASSAY THYROID STIM HORMONE: CPT | Performed by: INTERNAL MEDICINE

## 2018-02-12 PROCEDURE — 83921 ORGANIC ACID SINGLE QUANT: CPT | Performed by: INTERNAL MEDICINE

## 2018-02-12 PROCEDURE — 83036 HEMOGLOBIN GLYCOSYLATED A1C: CPT | Performed by: INTERNAL MEDICINE

## 2018-02-12 PROCEDURE — 84153 ASSAY OF PSA TOTAL: CPT | Performed by: INTERNAL MEDICINE

## 2018-02-12 NOTE — PROGRESS NOTES
1. Have you been to the ER, urgent care clinic or hospitalized since your last visit? NO.     2. Have you seen or consulted any other health care providers outside of the [address] [place] [address] since your last visit (Include any pap smears or colon screening)? NO      Do you have an Advanced Directive?  YES

## 2018-02-12 NOTE — MR AVS SNAPSHOT
[address] [facility] 
 
 
 [address] [facility], [facility] [address] [facility] 
[phone] Patient: Trena Gibbs MRN: [id number] Visit Information Date & Time Provider Department Dept. Phone Encounter #  
 2/12/2018  3:00 PM Barbara Sandy MD [facility] [phone] Your Appointments 3/5/2018 10:30 AM  
Follow Up with Clary Jeffery NP  
[facility] Appt Note: lumbar 3 mo fu  
 [address] [place] [address]  
[address] 7/13/2018  8:00 AM  
Follow Up with Nico Gómez MD  
[facility] ([facility] Appt Note: 1 year follow up [facility] [address] [address] [address] [phone] [address]  
  
    
 2/18/2019  8:20 AM  
PHYSICAL with Barbara Sandy MD  
[facility] Appt Note: physical labs at [facility] [address] [facility], [address] [place] [address] [address] [place]  
  
   
 [address], [address] Upcoming Health Maintenance Date Due  
 MEDICARE YEARLY EXAM 1/8/2017 Pneumococcal 65+ Low/Medium Risk (2 of 2 - PPSV23) 8/7/2018 GLAUCOMA SCREENING Q2Y 1/31/2019 COLONOSCOPY 6/14/2022 DTaP/Tdap/Td series (2 - Td) 11/11/2026 Allergies as of 2/12/2018  Review Complete On: 2/12/2018 By: Arthur Raphael Severity Noted Reaction Type Reactions Cefdinir  09/12/2011    Nausea and Vomiting Niacin  08/07/2013    Other (comments) [place] Current Immunizations  Reviewed on 1/31/2017 Name Date Influenza High Dose Vaccine PF 11/17/2017 Influenza Vaccine (Quad) PF 11/11/2016, 11/2/2015 Influenza Vaccine PF 10/30/2014, 11/5/2013 Influenza Vaccine Split 10/31/2012, 10/21/2011 Influenza Vaccine Whole 11/5/2010 Pneumococcal Conjugate (PCV-13) 1/31/2017 Pneumococcal Polysaccharide (PPSV-23) 8/7/2013 TD Vaccine 7/22/2009 Tdap 11/11/2016 Zoster 5/7/2012 Not reviewed this visit You Were Diagnosed With   
  
 Codes Comments Routine general medical examination at a health care facility    -  Primary ICD-10-CM: Z00.00 ICD-9-CM: V70.0 Benign prostatic hyperplasia, unspecified whether lower urinary tract symptoms present     ICD-10-CM: N40.0 ICD-9-CM: 600.00 Dyslipidemia     ICD-10-CM: E78.5 ICD-9-CM: 272.4 Abnormal glucose     ICD-10-CM: R73.09 
ICD-9-CM: 790.29 Screening for malignant neoplasm of prostate     ICD-10-CM: Z12.5 ICD-9-CM: V76.44 Vitals BP Pulse Temp Resp Height(growth percentile) Weight(growth percentile) 124/76 (BP 1 Location: Left arm, BP Patient Position: Sitting) 89 98.1 °F (36.7 °C) (Oral) 14 6' 2\" (1.88 m) 195 lb (88.5 kg) SpO2 BMI Smoking Status 97% 25.04 kg/m2 Never Smoker Vitals History BMI and BSA Data Body Mass Index Body Surface Area 25.04 kg/m 2 2.15 m 2 Preferred Pharmacy Pharmacy Name Phone [facility] [address] [place] [address] [phone] Your Updated Medication List  
  
   
This list is accurate as of: 2/12/18  4:29 PM.  Always use your most recent med list.  
  
  
  
  
 aspirin delayed-release 81 mg tablet Take  by mouth daily. POSADAS'S YEAST PO Take  by mouth. buPROPion  mg SR tablet Commonly known as:  WELLBUTRIN SR  
TAKE 1 TABLET BY MOUTH THREE TIMES DAILY  
  
 COQ10  100-100 mg-unit Cap Generic drug:  coenzyme q10-vitamin e Take  by mouth.  
  
 escitalopram oxalate 20 mg tablet Commonly known as:  [place] Clap Take 1 Tab by mouth daily. finasteride 5 mg tablet Commonly known as:  PROSCAR  
 TAKE 1 TABLET DAILY  
  
 fluticasone 50 mcg/actuation nasal spray Commonly known as:  FLONASE  
USE 2 SPRAYS IN EACH       NOSTRIL DAILY  
  
 gabapentin 600 mg tablet Commonly known as:  NEURONTIN Take 1 Tab by mouth three (3) times daily. ginkgo biloba 120 mg Tab Take  by mouth.  
  
 ginseng 100 mg Tab Take  by mouth. HYDROcodone-acetaminophen 5-325 mg per tablet Commonly known as:  [place] Take 1 Tab by mouth every six (6) hours as needed for Pain. Max Daily Amount: 4 Tabs. montelukast 10 mg tablet Commonly known as:  SINGULAIR TK 1 T PO QPM FOR ALLERGIES  
  
 naproxen 375 mg tablet Commonly known as:  NAPROSYN  
TAKE 1 TABLET BY MOUTH TWICE DAILY WITH MEALS NexIUM 40 mg capsule Generic drug:  esomeprazole TAKE 1 CAPSULE DAILY  
  
 OMEGA 3 PO Take  by mouth two (2) times a day. 3 QD  
  
 OSTEO BI-FLEX TRIPLE STRENGTH 750-625-30 mg Tab Generic drug:  Gluc-Scar-MSM#6-R-Fyrb-Mike-Bor Take  by mouth daily. PROAIR HFA 90 mcg/actuation inhaler Generic drug:  albuterol INHALE 2 PUFFS BY MOUTH EVERY 4 HOURS AS NEEDED FOR WHEEZING  
  
 sildenafil citrate 100 mg tablet Commonly known as:  VIAGRA Take 1 Tab by mouth as needed. simvastatin 40 mg tablet Commonly known as:  ZOCOR  
TAKE 1 TABLET BY MOUTH EVERY NIGHT AT BEDTIME  
  
 VITAMIN B-12 1,000 mcg tablet Generic drug:  cyanocobalamin Take 1,000 mcg by mouth daily. VITAMIN C 500 mg tablet Generic drug:  ascorbic acid (vitamin C) Take  by mouth. VITAMIN D3 1,000 unit tablet Generic drug:  cholecalciferol Take  by mouth daily. Introducing hospitals & HEALTH SERVICES! Dear Truong Scripture: Thank you for requesting a [employer] account. Our records indicate that you already have an active [employer] account. You can access your account anytime at [email or web address] Did you know that you can access your hospital and ER discharge instructions at any time in Amigos y Amigos? You can also review all of your test results from your hospital stay or ER visit. Additional Information If you have questions, please visit the Frequently Asked Questions section of the [employer] website at [email or web address] Remember, Amigos [employer] Amigos is NOT to be used for urgent needs. For medical emergencies, dial 911. Now available from your iPhone and Android! Please provide this summary of care documentation to your next provider. Your primary care clinician is listed as Anais Cartagena. If you have any questions after today's visit, please call [phone].

## 2018-02-13 ENCOUNTER — TELEPHONE (OUTPATIENT)
Dept: INTERNAL MEDICINE CLINIC | Age: 68
End: 2018-02-13

## 2018-02-13 LAB
EST. AVERAGE GLUCOSE BLD GHB EST-MCNC: 114 MG/DL
HBA1C MFR BLD: 5.6 % (ref 4.2–5.6)
PSA SERPL-MCNC: 2.3 NG/ML (ref 0–4)
TSH SERPL DL<=0.05 MIU/L-ACNC: 1.78 UIU/ML (ref 0.36–3.74)
VIT B12 SERPL-MCNC: 967 PG/ML (ref 211–911)

## 2018-02-14 NOTE — TELEPHONE ENCOUNTER
pls call    Results for orders placed or performed during the hospital encounter of 02/12/18   VITAMIN B12   Result Value Ref Range    Vitamin B12 967 (H) 211 - 911 pg/mL   TSH 3RD GENERATION   Result Value Ref Range    TSH 1.78 0.36 - 3.74 uIU/mL   T4, FREE   Result Value Ref Range    T4, Free 0.9 0.7 - 1.5 NG/DL   PSA, DIAGNOSTIC (PROSTATE SPECIFIC AG)   Result Value Ref Range    Prostate Specific Ag 2.3 0.0 - 4.0 ng/mL   HEMOGLOBIN A1C WITH EAG   Result Value Ref Range    Hemoglobin A1c 5.6 4.2 - 5.6 %    Est. average glucose 114 mg/dL     Labs ok

## 2018-02-15 LAB — METHYLMALONATE SERPL-SCNC: 88 NMOL/L (ref 0–378)

## 2018-02-19 ENCOUNTER — TELEPHONE (OUTPATIENT)
Dept: INTERNAL MEDICINE CLINIC | Age: 68
End: 2018-02-19

## 2018-02-19 NOTE — TELEPHONE ENCOUNTER
I have been prescribed ProAir HFA (occasional situational asthma), Montelukast 10 mg tablet (daily), and Fluticasone Propionate (daily) by Dr. Beatriz Stover since 2012 for allergies and recurrent post nasal drip.  Dr. Breanna Milton has left the [facility] An[facility]ell Zarate continue prescribing these for me as needed, or does he want me to go to a new ENT to do so?  If the latter, who does he recommend? Eduardo Davies

## 2018-03-05 ENCOUNTER — OFFICE VISIT (OUTPATIENT)
Dept: ORTHOPEDIC SURGERY | Age: 68
End: 2018-03-05

## 2018-03-05 VITALS
HEART RATE: 69 BPM | WEIGHT: 196 LBS | BODY MASS INDEX: 25.15 KG/M2 | SYSTOLIC BLOOD PRESSURE: 126 MMHG | DIASTOLIC BLOOD PRESSURE: 85 MMHG | HEIGHT: 74 IN | OXYGEN SATURATION: 97 %

## 2018-03-05 DIAGNOSIS — M54.16 LUMBAR RADICULOPATHY: ICD-10-CM

## 2018-03-05 DIAGNOSIS — M54.2 CERVICAL PAIN: ICD-10-CM

## 2018-03-05 DIAGNOSIS — M48.062 SPINAL STENOSIS, LUMBAR REGION WITH NEUROGENIC CLAUDICATION: Primary | ICD-10-CM

## 2018-03-05 RX ORDER — GABAPENTIN 600 MG/1
600 TABLET ORAL 3 TIMES DAILY
Qty: 90 TAB | Refills: 2 | Status: SHIPPED | OUTPATIENT
Start: 2018-03-05 | End: 2018-06-06 | Stop reason: SDUPTHER

## 2018-03-05 NOTE — MR AVS SNAPSHOT
[address] [facility] 
 
 
 [address] [facility] 
[phone] Patient: Daphne Ogden MRN: [id number] Visit Information Date & Time Provider Department Dept. Phone Encounter #  
 3/5/2018 10:30 AM Layla Ramírez NP [facility] [place] [phone] [id number] Follow-up Instructions Return in about 3 months (around 6/5/2018) for ofelia . Follow-up and Disposition History Your Appointments 4/5/2018  8:30 AM  
CONSULT with Charlie Recinos MD  
[address] [facility] ([address]) Appt Note: depressive disorder [address], [facility] [address] [address]  
[phone]  
  
   
 [address], [address] [address]  
  
    
 6/6/2018  9:30 AM  
Follow Up with Kristine Agee NP  
[facility] [place] ([facility]) Appt Note: 3 mo follow up for lower back and neck 2012 [place] [facility] [address]  
[address] 7/13/2018  8:00 AM  
Follow Up with Saintclair Hires, MD  
[facility] ([address]) Appt Note: 1 year follow up [facility] [address] [address] [address] [phone] [address]  
  
    
 2/18/2019  8:20 AM  
PHYSICAL with Anne Powers MD  
[facility] [address]) Appt Note: physical labs at [facility] [address], [address] [place] [address] [address] [place]  
  
   
 [address], [address] Upcoming Health Maintenance Date Due Pneumococcal 65+ Low/Medium Risk (2 of 2 - PPSV23) 8/7/2018 GLAUCOMA SCREENING Q2Y 1/31/2019 MEDICARE YEARLY EXAM 2/13/2019 COLONOSCOPY 6/14/2022 DTaP/Tdap/Td series (2 - Td) 11/11/2026 Allergies as of 3/5/2018  Review Complete On: 3/5/2018 By: Igor Amador Severity Noted Reaction Type Reactions Cefdinir  09/12/2011    Nausea and Vomiting Niacin  08/07/2013    Other (comments) [place] Current Immunizations  Reviewed on 1/31/2017 Name Date Influenza High Dose Vaccine PF 11/17/2017 Influenza Vaccine (Quad) PF 11/11/2016, 11/2/2015 Influenza Vaccine PF 10/30/2014, 11/5/2013 Influenza Vaccine Split 10/31/2012, 10/21/2011 Influenza Vaccine Whole 11/5/2010 Pneumococcal Conjugate (PCV-13) 1/31/2017 Pneumococcal Polysaccharide (PPSV-23) 8/7/2013 TD Vaccine 7/22/2009 Tdap 11/11/2016 Zoster 5/7/2012 Not reviewed this visit You Were Diagnosed With   
  
 Codes Comments Spinal stenosis, lumbar region with neurogenic claudication    -  Primary ICD-10-CM: V36.300 
ICD-9-CM: 724.03 Lumbar radiculopathy     ICD-10-CM: M54.16 
ICD-9-CM: 724.4 Cervical pain     ICD-10-CM: M54.2 ICD-9-CM: 723.1 Vitals BP Pulse Height(growth percentile) Weight(growth percentile) SpO2 BMI  
 126/85 69 6' 2\" (1.88 m) 196 lb (88.9 kg) 97% 25.16 kg/m2 Smoking Status Never Smoker BMI and BSA Data Body Mass Index Body Surface Area  
 25.16 kg/m 2 2.15 m 2 Preferred Pharmacy Pharmacy Name Phone [facility] [address] [place] [address] [phone] Your Updated Medication List  
  
   
This list is accurate as of 3/5/18 10:59 AM.  Always use your most recent med list.  
  
  
  
  
 aspirin delayed-release 81 mg tablet Take  by mouth daily. POSADAS'S YEAST PO Take  by mouth. buPROPion  mg SR tablet Commonly known as:  WELLBUTRIN SR  
TAKE 1 TABLET BY MOUTH THREE TIMES DAILY  
  
 COQ10  100-100 mg-unit Cap Generic drug:  coenzyme q10-vitamin e Take  by mouth.  
  
 escitalopram oxalate 20 mg tablet Commonly known as:  Sergio Talia Take 1 Tab by mouth daily. finasteride 5 mg tablet Commonly known as:  PROSCAR  
TAKE 1 TABLET DAILY  
  
 fluticasone 50 mcg/actuation nasal spray Commonly known as:  FLONASE  
USE 2 SPRAYS IN EACH       NOSTRIL DAILY  
  
 gabapentin 600 mg tablet Commonly known as:  NEURONTIN Take 1 Tab by mouth three (3) times daily. Indications: NEUROPATHIC PAIN  
  
 ginkgo biloba 120 mg Tab Take  by mouth.  
  
 ginseng 100 mg Tab Take  by mouth.  
  
 montelukast 10 mg tablet Commonly known as:  SINGULAIR TK 1 T PO QPM FOR ALLERGIES  
  
 naproxen 375 mg tablet Commonly known as:  NAPROSYN  
TAKE 1 TABLET BY MOUTH TWICE DAILY WITH MEALS NexIUM 40 mg capsule Generic drug:  esomeprazole TAKE 1 CAPSULE DAILY  
  
 OMEGA 3 PO Take  by mouth two (2) times a day. 3 QD  
  
 OSTEO BI-FLEX TRIPLE STRENGTH 750-625-30 mg Tab Generic drug:  Gluc-Scar-MSM#6-J-Drqc-Mike-Bor Take  by mouth daily. PROAIR HFA 90 mcg/actuation inhaler Generic drug:  albuterol INHALE 2 PUFFS BY MOUTH EVERY 4 HOURS AS NEEDED FOR WHEEZING  
  
 sildenafil citrate 100 mg tablet Commonly known as:  VIAGRA Take 1 Tab by mouth as needed. simvastatin 40 mg tablet Commonly known as:  ZOCOR  
TAKE 1 TABLET BY MOUTH EVERY NIGHT AT BEDTIME  
  
 VITAMIN B-12 1,000 mcg tablet Generic drug:  cyanocobalamin Take 1,000 mcg by mouth daily. VITAMIN C 500 mg tablet Generic drug:  ascorbic acid (vitamin C) Take  by mouth. VITAMIN D3 1,000 unit tablet Generic drug:  cholecalciferol Take  by mouth daily. Prescriptions Sent to Pharmacy Refills  
 gabapentin (NEURONTIN) 600 mg tablet 2 Sig: Take 1 Tab by mouth three (3) times daily. Indications: NEUROPATHIC PAIN Class: Normal  
 Pharmacy: [facility] [address] [place] [address] [address] Ph #: [phone]  Route: Oral  
  
 Follow-up Instructions Return in about 3 months (around 6/5/2018) for ofelia . Patient Instructions Neck: Exercises Your Care Instructions Here are some examples of typical rehabilitation exercises for your condition. Start each exercise slowly. Ease off the exercise if you start to have pain. Your doctor or physical therapist will tell you when you can start these exercises and which ones will work best for you. How to do the exercises Neck stretch 1. This stretch works best if you keep your shoulder down as you lean away from it. To help you remember to do this, start by relaxing your shoulders and lightly holding on to your thighs or your chair. 2. Tilt your head toward your shoulder and hold for 15 to 30 seconds. Let the weight of your head stretch your muscles. 3. If you would like a little added stretch, use your hand to gently and steadily pull your head toward your shoulder. For example, keeping your right shoulder down, lean your head to the left. 4. Repeat 2 to 4 times toward each shoulder. Diagonal neck stretch 1. Turn your head slightly toward the direction you will be stretching, and tilt your head diagonally toward your chest and hold for 15 to 30 seconds. 2. If you would like a little added stretch, use your hand to gently and steadily pull your head forward on the diagonal. 
3. Repeat 2 to 4 times toward each side. Dorsal glide stretch The dorsal glide stretches the back of the neck. If you feel pain, do not glide so far back. Some people find this exercise easier to do while lying on their backs with an ice pack on the neck. 1. Sit or stand tall and look straight ahead. 2. Slowly tuck your chin as you glide your head backward over your body 3. Hold for a count of 6, and then relax for up to 10 seconds. 4. Repeat 8 to 12 times. Chest and shoulder stretch 1. Sit or stand tall and glide your head backward as in the dorsal glide stretch. 2. Raise both arms so that your hands are next to your ears. 3. Take a deep breath, and as you breathe out, lower your elbows down and behind your back. You will feel your shoulder blades slide down and together, and at the same time you will feel a stretch across your chest and the front of your shoulders. 4. Hold for about 6 seconds, and then relax for up to 10 seconds. 5. Repeat 8 to 12 times. Strengthening: Hands on head 1. Move your head backward, forward, and side to side against gentle pressure from your hands, holding each position for about 6 seconds. 2. Repeat 8 to 12 times. Follow-up care is a key part of your treatment and safety. Be sure to make and go to all appointments, and call your doctor if you are having problems. It's also a good idea to know your test results and keep a list of the medicines you take. Where can you learn more? Go to http://estephanie-haile.info/. Enter P975 in the search box to learn more about \"Neck: Exercises. \" Current as of: March 21, 2017 Content Version: 11.4 © 6408-3380 [employer]. Care instructions adapted under license by [employer] (which disclaims liability or warranty for this information). If you have questions about a medical condition or this instruction, always ask your healthcare professional. [address] any warranty or liability for your use of this information. Introducing [facility]! Dear Santino Vásquez: Thank you for requesting a [email or web address] account. Our records indicate that you already have an active [email or web address] account. You can access your account anytime at [email or web address] Fire Did you know that you can access your hospital and ER discharge instructions at any time in [email or web address]? You can also review all of your test results from your hospital stay or ER visit. Additional Information If you have questions, please visit the Frequently Asked Questions section of the Medical Depothart website at [email or web address] Remember, [employer] is NOT to be used for urgent needs. For medical emergencies, dial 911. Now available from your iPhone and Android! Please provide this summary of care documentation to your next provider. Your primary care clinician is listed as Ammy Rowan. If you have any questions after today's visit, please call [phone].

## 2018-03-05 NOTE — PROGRESS NOTES
[address]  [place]  Phone: [phone]  Fax: [phone]    Deaxavier Pineda  : 1950  PCP: Sheryle Constant, MD    PROGRESS NOTE    HISTORY OF PRESENT ILLNESS:  Chief Complaint   Patient presents with    Back Pain     lower back pain     Jeyson Prado is a 79 y.o.  male with history of low back pain extending into the BLE with paraesthesias. he has limitations with standing and walking. He takes Neurontin 600 mg TID with benefit. He is not a candidate for Cymbalta. He has had lumbar blocks in the past with benefit. No history of surgical Sx. Today, he is doing well. The Neurontin manage his pain well. He does have some lumbar pain which will radiate down his posterior thigh. He says the only new pain he has is \"soreness\" to his left hip. [facility] and [facility] new exercises. has noticed his right foot will occasionally catch while he is walking. He states his entire left arm will ache intermittently. He  He has noticed his balance may not be as steady as it once was. Denies bladder/bowel dysfunction, saddle paresthesia, weakness, gait disturbance, or other neurological deficit. Pt at this time desires to continue with current care/proceed with medication evaluation. Hx of CTS and CT releases bilaterally. Scoliosis. He has a left trigger middle finger. Peripheral neuropathy. Lumbar spine MRI dated 10/23/17 Per report, scoliosis and advanced multilevel degenerative changes. Severe central canal stenosis at L3-L4. Degenerative changes have progressed in comparison to MRI lumbar spine 2014. ASSESSMENT  79 y.o. male with lumbar and cervical.  Diagnoses and all orders for this visit:    1. Spinal stenosis, lumbar region with neurogenic claudication  -     gabapentin (NEURONTIN) 600 mg tablet; Take 1 Tab by mouth three (3) times daily.  Indications: NEUROPATHIC PAIN    2. Lumbar radiculopathy  -     gabapentin (NEURONTIN) 600 mg tablet; Take 1 Tab by mouth three (3) times daily. Indications: NEUROPATHIC PAIN    3. Cervical pain  -     gabapentin (NEURONTIN) 600 mg tablet; Take 1 Tab by mouth three (3) times daily. Indications: NEUROPATHIC PAIN       IMPRESSION/PLAN    1) Pt was given information on cervical exercises. He will start  A HEP cervical routine. We may consider an C MRI at the following visit for increased cervical and left arm pain. 2) Continue gabapentin 600 mg TID. 3) continue HEP for lumbar spine. 4) Mr. Vincent Nesbitt has a reminder for a \"due or due soon\" health maintenance. I have asked that he contact his primary care provider, Bre Russo MD, for follow-up on this health maintenance. 5) We have informed patient to notify us for immediate appointment if he has any worsening neurogical symptoms or if an emergency situation presents, then call 911  6) Pt will follow-up in 3 months for med fu. No pain behaviors. Denies thoughts of harming self or others. Pt has a good risk to benefit ratio which allows the pt to function in a home environment without side effects.          PAST MEDICAL HISTORY  Past Medical History:   Diagnosis Date    Allergic rhinitis     Basal cell carcinoma 2013    Dr. Tristian Reardon s/p resection 2 spots    BPH (benign prostatic hyperplasia)     Dr. Meza Pump Cardiac stress test 08/12/2009    Neg maximal exercise stress test.  Ex time 15:00.    Depression     anxiety    Dyslipidemia     Erectile dysfunction     FHx: heart disease     GERD (gastroesophageal reflux disease)     s/p dilation 2003 Dr. Mcarthur Bosworth; egd 2015    Heel spur     Dr Svetlana Richards    Hypovitaminosis D     Overweight (BMI 25.0-29.9) 2/12/2018    Peripheral neuropathy     and B CTS on EMG Dr. Johnathan Rice 2014    Sleep apnea     intol cpap Dr Vignesh Styles 2015; using oral appliance Dr Ramy Oconnor; AHI 17.8 min desats 83    Spinal stenosis 03/2014 MRI (3/14) showed scoliosis w multilevel degen findings, mod L3-4, L4-5 central stenosis, mod L5-S1 foraminal stenosis;  (10/17) severe L3-4 central stenosis        MEDICATIONS  Current Outpatient Prescriptions   Medication Sig Dispense Refill    gabapentin (NEURONTIN) 600 mg tablet Take 1 Tab by mouth three (3) times daily. Indications: NEUROPATHIC PAIN 90 Tab 2    buPROPion SR (WELLBUTRIN SR) 150 mg SR tablet TAKE 1 TABLET BY MOUTH THREE TIMES DAILY 270 Tab 3    YEAST,DRIED, S. CEREVISIAE, (POSADAS'S YEAST PO) Take  by mouth.  naproxen (NAPROSYN) 375 mg tablet TAKE 1 TABLET BY MOUTH TWICE DAILY WITH MEALS 180 Tab 0    montelukast (SINGULAIR) 10 mg tablet TK 1 T PO QPM FOR ALLERGIES  3    simvastatin (ZOCOR) 40 mg tablet TAKE 1 TABLET BY MOUTH EVERY NIGHT AT BEDTIME 90 Tab 2    finasteride (PROSCAR) 5 mg tablet TAKE 1 TABLET DAILY 90 Tab 3    sildenafil citrate (VIAGRA) 100 mg tablet Take 1 Tab by mouth as needed. 10 Tab 1Patient    escitalopram oxalate (LEXAPRO) 20 mg tablet Take 1 Tab by mouth daily. 90 Tab 3    fluticasone (FLONASE) 50 mcg/actuation nasal spray USE 2 SPRAYS IN EACH       NOSTRIL DAILY 48 g 3    NEXIUM 40 mg capsule TAKE 1 CAPSULE DAILY 90 Cap 3    PROAIR HFA 90 mcg/actuation inhaler INHALE 2 PUFFS BY MOUTH EVERY 4 HOURS AS NEEDED FOR WHEEZING 1 Inhaler 12    ginkgo biloba 120 mg Tab Take  by mouth.  ascorbic acid (VITAMIN C) 500 mg tablet Take  by mouth.  cyanocobalamin (VITAMIN B-12) 1,000 mcg tablet Take 1,000 mcg by mouth daily.  aspirin delayed-release 81 mg tablet Take  by mouth daily.  cholecalciferol, vitamin d3, (VITAMIN D3) 1,000 unit tablet Take  by mouth daily.  ginseng 100 mg Tab Take  by mouth.  coenzyme q10-vitamin e (COQ10 ) 100-100 mg-unit Cap Take  by mouth.  Gluc-Scar-MSM#6-X-Sjvs-Mike-Bor (OSTEO BI-FLEX) 750-625-30 mg Tab Take  by mouth daily.       OMEGA-3 FATTY ACIDS (OMEGA 3 PO) Take  by mouth two (2) times a day. 3 QD         ALLERGIES  Allergies   Allergen Reactions    Cefdinir Nausea and Vomiting    Niacin Other (comments)     Flushing         SOCIAL HISTORY    Social History     Social History    Marital status:      Spouse name: N/A    Number of children: 2    Years of education: N/A     Occupational History    ret [employer]      Social History Main Topics    Smoking status: Never Smoker    Smokeless tobacco: Never Used    Alcohol use Yes      Comment: social    Drug use: No    Sexual activity: Yes     Partners: Female     Birth control/ protection: None     Other Topics Concern    Not on file     Social History Narrative       SUBJECTIVE    Work should be retiring in the next few months       Pain Scale: /10    Pain Assessment  3/5/2018   Location of Pain Back   Location Modifiers -   Severity of Pain 4   Quality of Pain Aching   Quality of Pain Comment -   Duration of Pain Persistent   Frequency of Pain Constant   Aggravating Factors Bending;Walking   Aggravating Factors Comment -   Limiting Behavior Some   Relieving Factors Exercises;NSAID   Relieving Factors Comment -   Result of Injury No       Accompanied by self. REVIEW OF SYSTEMS  ROS    Constitutional: Negative for fever, chills, or weight change. Respiratory: Negative for cough or shortness of breath. Cardiovascular: Negative for chest pain or palpitations. Gastrointestinal: Negative for acid reflux, change in bowel habits, or constipation. Genitourinary: Negative for incontinence, dysuria and flank pain. Musculoskeletal: Positive for lumbar and cervical pain. Skin: Negative for rash. Neurological: Negative for headaches, dizziness, or numbness. Endo/Heme/Allergies: Negative . Psychiatric/Behavioral: Negative.        PHYSICAL EXAMINATION  Visit Vitals    /85    Pulse 69    Ht 6' 2\" (1.88 m)    Wt 196 lb (88.9 kg)    SpO2 97%    BMI 25.16 kg/m2       Constitutional: Well developed,  well nourished,  awake, alert, and in no acute distress. Neurological:  Sensation to light touch is intact. Psychiatric: Affect and mood are appropriate. Integumentary: No rashes or abrasions noted on exposed areas,  warm, dry and intact. Cardiovascular/Peripheral Vascular:  No peripheral edema is noted. Lymphatic:  No evidence of lymphedema. No cervical lymphadenopathy. SPINE/MUSCULOSKELETAL EXAM    Cervical spine:  Neck is midline. Normal muscle tone. No focal atrophy is noted. Shoulder ROM intact. No Tenderness to palpation. Negative Spurling's sign. Negative Tinel's sign. Negative Prabhakar's sign. Lumbar spine:  No rash, ecchymosis, or gross obliquity. No fasciculations. No focal atrophy is noted. Range of motion is intact. No Tenderness to palpation . SI joints non-tender. Trochanters non tender. Musculoskeletal:  No pain with extension, axial loading, or forward flexion. No pain with internal or external rotation of his hips. MOTOR    Biceps  Triceps Deltoids Wrist Ext Wrist Flex Hand Intrin   Right +4/5 +4/5 +4/5 +4/5 +4/5 +4/5   Left +4/5 +4/5 +4/5 +4/5 +4/5 +4/5      Hip Flex  Quads Hamstrings Ankle DF EHL Ankle PF   Right +4/5 +4/5 +4/5 +4/5 +4/5 +4/5   Left +4/5 +4/5 +4/5 +4/5 +4/5 +4/5        Straight Leg raise negative bilaterally. normal gait and station, except POOR tandem gait. Ambulation without assistive device. full weight bearing, non-antalgic gait.     Krupa Stratton NP

## 2018-03-05 NOTE — PATIENT INSTRUCTIONS

## 2018-03-12 RX ORDER — NAPROXEN 375 MG/1
375 TABLET ORAL 2 TIMES DAILY WITH MEALS
Qty: 180 TAB | Refills: 0 | Status: SHIPPED | OUTPATIENT
Start: 2018-03-12 | End: 2018-06-16 | Stop reason: SDUPTHER

## 2018-03-12 RX ORDER — NAPROXEN 375 MG/1
TABLET ORAL
Qty: 180 TAB | Refills: 0 | Status: SHIPPED | OUTPATIENT
Start: 2018-03-12 | End: 2018-05-08

## 2018-03-12 NOTE — TELEPHONE ENCOUNTER
Last Visit: 02/12/2018 with MD Padilla Macdonald    Next Appointment: 02/18/2019 with MD Padilla Macdonald   Previous Refill Encounters: 11/19/2017 per MD Padilla Guardian #[id number]     Requested Prescriptions     Pending Prescriptions Disp Refills    naproxen (NAPROSYN) 375 mg tablet 180 Tab 0     Sig: Take 1 Tab by mouth two (2) times daily (with meals).

## 2018-04-05 ENCOUNTER — OFFICE VISIT (OUTPATIENT)
Dept: BEHAVIORAL/MENTAL HEALTH CLINIC | Age: 68
End: 2018-04-05

## 2018-04-05 VITALS
HEART RATE: 79 BPM | RESPIRATION RATE: 18 BRPM | BODY MASS INDEX: 25.93 KG/M2 | SYSTOLIC BLOOD PRESSURE: 119 MMHG | WEIGHT: 202 LBS | TEMPERATURE: 96.7 F | DIASTOLIC BLOOD PRESSURE: 71 MMHG | HEIGHT: 74 IN | OXYGEN SATURATION: 95 %

## 2018-04-05 DIAGNOSIS — Z86.59 HISTORY OF OCD (OBSESSIVE COMPULSIVE DISORDER): ICD-10-CM

## 2018-04-05 DIAGNOSIS — F33.0 MDD (MAJOR DEPRESSIVE DISORDER), RECURRENT EPISODE, MILD (HCC): Primary | ICD-10-CM

## 2018-04-05 DIAGNOSIS — F41.1 GAD (GENERALIZED ANXIETY DISORDER): ICD-10-CM

## 2018-04-05 PROBLEM — F33.1 MODERATE EPISODE OF RECURRENT MAJOR DEPRESSIVE DISORDER (HCC): Status: RESOLVED | Noted: 2018-02-12 | Resolved: 2018-04-05

## 2018-04-05 RX ORDER — BUPROPION HYDROCHLORIDE 300 MG/1
300 TABLET ORAL
Qty: 30 TAB | Refills: 1 | Status: SHIPPED | OUTPATIENT
Start: 2018-04-05 | End: 2018-04-05 | Stop reason: SDUPTHER

## 2018-04-05 NOTE — MR AVS SNAPSHOT
[facility] 
 
 
 [address] [facility] [place] [address] [address] [address] 
[phone] Patient: Everardo Truong MRN: [id number] CZZ:[id number] Visit Information Date & Time Provider Department Dept. Phone Encounter #  
 4/5/2018  8:30 AM Betty Dennis  [address] [id number] Follow-up Instructions Return in about 3 weeks (around 4/26/2018), or if symptoms worsen or fail to improve, for MOOD, ANXIETY. Your Appointments 6/6/2018  9:30 AM  
Follow Up with Shey Lynne NP  
[facility] Appt Note: 3 mo follow up for lower back and neck 2012 SPECIALTY [facility] [id number]  
[address] [facility] 7/13/2018  8:00 AM  
Follow Up with Skylar Singh MD  
[facility] Appt Note: 1 year follow up [place] [id number]  
[phone] [address]  
  
    
 2/18/2019  8:20 AM  
PHYSICAL with John Paul Upton MD  
[facility] [address] [place] [address]) Appt Note: physical labs at [facility] [address], [address] [place] [place] [address] [place]  
  
   
 [address], [address] Upcoming Health Maintenance Date Due Pneumococcal 65+ Low/Medium Risk (2 of 2 - PPSV23) 8/7/2018 GLAUCOMA SCREENING Q2Y 1/31/2019 MEDICARE YEARLY EXAM 2/13/2019 COLONOSCOPY 6/14/2022 DTaP/Tdap/Td series (2 - Td) 11/11/2026 Allergies as of 4/5/2018  Review Complete On: 4/5/2018 By: Betty Dennis MD  
  
 Severity Noted Reaction Type Reactions Cefdinir  09/12/2011    Nausea and Vomiting Niacin  08/07/2013    Other (comments) [place] Current Immunizations  Reviewed on 1/31/2017 Name Date Influenza High Dose Vaccine PF 11/17/2017 Influenza Vaccine (Quad) PF 11/11/2016, 11/2/2015 Influenza Vaccine PF 10/30/2014, 11/5/2013 Influenza Vaccine Split 10/31/2012, 10/21/2011 Influenza Vaccine Whole 11/5/2010 Pneumococcal Conjugate (PCV-13) 1/31/2017 Pneumococcal Polysaccharide (PPSV-23) 8/7/2013 TD Vaccine 7/22/2009 Tdap 11/11/2016 Zoster 5/7/2012 Not reviewed this visit You Were Diagnosed With   
  
 Codes Comments MDD (major depressive disorder), recurrent episode, mild (RUSTca 75.)    -  Primary ICD-10-CM: F33.0 ICD-9-CM: 296.31   
 CHANCE (generalized anxiety disorder)     ICD-10-CM: F41.1 ICD-9-CM: 300.02 History of OCD (obsessive compulsive disorder)     ICD-10-CM: Z86.59 
ICD-9-CM: V11.2 Vitals BP Pulse Temp Resp Height(growth percentile) Weight(growth percentile) 119/71 79 96.7 °F (35.9 °C) (Oral) 18 6' 2\" (1.88 m) 202 lb (91.6 kg) SpO2 BMI Smoking Status 95% 25.94 kg/m2 Never Smoker BMI and BSA Data Body Mass Index Body Surface Area  
 25.94 kg/m 2 2.19 m 2 Preferred Pharmacy Pharmacy Name Phone [facility] [address] [place] [address] AT [facility] [phone] Your Updated Medication List  
  
   
This list is accurate as of 4/5/18  9:44 AM.  Always use your most recent med list.  
  
  
  
  
 aspirin delayed-release 81 mg tablet Take  by mouth daily. POSADAS'S YEAST PO Take  by mouth. buPROPion  mg XL tablet Commonly known as:  Dixon Maldonado Take 1 Tab by mouth every morning for 60 days. COQ10  100-100 mg-unit Cap Generic drug:  coenzyme q10-vitamin e Take  by mouth.  
  
 escitalopram oxalate 20 mg tablet Commonly known as:  Ella Bors Take 1 Tab by mouth daily. finasteride 5 mg tablet Commonly known as:  PROSCAR  
TAKE 1 TABLET DAILY  
  
 fluticasone 50 mcg/actuation nasal spray Commonly known as:  FLONASE  
USE 2 SPRAYS IN EACH       NOSTRIL DAILY  
  
 gabapentin 600 mg tablet Commonly known as:  NEURONTIN Take 1 Tab by mouth three (3) times daily. Indications: NEUROPATHIC PAIN  
  
 ginkgo biloba 120 mg Tab Take  by mouth.  
  
 ginseng 100 mg Tab Take  by mouth.  
  
 montelukast 10 mg tablet Commonly known as:  SINGULAIR TK 1 T PO QPM FOR ALLERGIES  
  
 * naproxen 375 mg tablet Commonly known as:  NAPROSYN Take 1 Tab by mouth two (2) times daily (with meals). * naproxen 375 mg tablet Commonly known as:  NAPROSYN  
TAKE 1 TABLET BY MOUTH TWICE DAILY WITH MEALS NexIUM 40 mg capsule Generic drug:  esomeprazole TAKE 1 CAPSULE DAILY  
  
 OMEGA 3 PO Take  by mouth two (2) times a day. 3 QD  
  
 OSTEO BI-FLEX TRIPLE STRENGTH 750-625-30 mg Tab Generic drug:  Gluc-Scar-MSM#5-O-Rslu-Mike-Bor Take  by mouth daily. PROAIR HFA 90 mcg/actuation inhaler Generic drug:  albuterol INHALE 2 PUFFS BY MOUTH EVERY 4 HOURS AS NEEDED FOR WHEEZING  
  
 sildenafil citrate 100 mg tablet Commonly known as:  VIAGRA Take 1 Tab by mouth as needed. simvastatin 40 mg tablet Commonly known as:  ZOCOR  
TAKE 1 TABLET BY MOUTH EVERY NIGHT AT BEDTIME  
  
 VITAMIN B-12 1,000 mcg tablet Generic drug:  cyanocobalamin Take 1,000 mcg by mouth daily. VITAMIN C 500 mg tablet Generic drug:  ascorbic acid (vitamin C) Take  by mouth. VITAMIN D3 1,000 unit tablet Generic drug:  cholecalciferol Take  by mouth daily. * Notice: This list has 2 medication(s) that are the same as other medications prescribed for you. Read the directions carefully, and ask your doctor or other care provider to review them with you. Prescriptions Sent to Pharmacy Refills buPROPion XL (WELLBUTRIN XL) 300 mg XL tablet 1 Sig: Take 1 Tab by mouth every morning for 60 days.   
 Class: Normal  
 Pharmacy: [facility] [address] #: [phone] Route: Oral  
  
Follow-up Instructions Return in about 3 weeks (around 4/26/2018), or if symptoms worsen or fail to improve, for MOOD, ANXIETY. Patient Instructions MEDICATIONS: 
Start taking wellbutrin  mg qAM, stop wellbutrin SR 150mg Continue lexapro 20mg daily BEHAVIOR: 
CONTINUE EATING THREE MEALS A DAY STARTING WITH BREAKFAST 
START DRINKING 2 LITERS OF WATER PER DAY (65 OUNCES) EXERCISE 30 MINUTES 4-5 DAYS PER WEEK 
WORK ON MEDITATION BEFORE BED, OUTLINED BELOW 
GET BOOK \"FEELING GOOD\" BY DR. FIELD AS INTRODUCTION TO COGNITIVE BEHAVIORAL THERAPY If you are having thoughts of harming yourself or others please report to local hospital for evaluation or call suicide hotline [phone] RETURN IN 3 WEEKS FOR FOLLOW UP Anxiety Disorder: Care Instructions Your Care Instructions Anxiety is a normal reaction to stress. Difficult situations can cause you to have symptoms such as sweaty palms and a nervous feeling. In an anxiety disorder, the symptoms are far more severe. Constant worry, muscle tension, trouble sleeping, nausea and diarrhea, and other symptoms can make normal daily activities difficult or impossible. These symptoms may occur for no reason, and they can affect your work, school, or social life. Medicines, counseling, and self-care can all help. Follow-up care is a key part of your treatment and safety. Be sure to make and go to all appointments, and call your doctor if you are having problems. It's also a good idea to know your test results and keep a list of the medicines you take. How can you care for yourself at home? · Take medicines exactly as directed. Call your doctor if you think you are having a problem with your medicine. · Go to your counseling sessions and follow-up appointments. · Recognize and accept your anxiety. Then, when you are in a situation that makes you anxious, say to yourself, \"This is not an emergency. I feel uncomfortable, but I am not in danger. I can keep going even if I feel anxious. \" · Be kind to your body: ¨ Relieve tension with exercise or a massage. ¨ Get enough rest. 
¨ Avoid alcohol, caffeine, nicotine, and illegal drugs. They can increase your anxiety level and cause sleep problems. ¨ Learn and do relaxation techniques. See below for more about these techniques. · Engage your mind. Get out and do something you enjoy. Go to a vpod.tv movie, or take a walk or hike. Plan your day. Having too much or too little to do can make you anxious. · Keep a record of your symptoms. Discuss your fears with a good friend or family member, or join a support group for people with similar problems. Talking to others sometimes relieves stress. · Get involved in social groups, or volunteer to help others. Being alone sometimes makes things seem worse than they are. · Get at least 30 minutes of exercise on most days of the week to relieve stress. Walking is a good choice. You also may want to do other activities, such as running, swimming, cycling, or playing tennis or team sports. Relaxation techniques Do relaxation exercises 10 to 20 minutes a day. You can play soothing, relaxing music while you do them, if you wish. · Tell others in your house that you are going to do your relaxation exercises. Ask them not to disturb you. · Find a comfortable place, away from all distractions and noise. · Lie down on your back, or sit with your back straight. · Focus on your breathing. Make it slow and steady. · Breathe in through your nose. Breathe out through either your nose or mouth. · Breathe deeply, filling up the area between your navel and your rib cage. Breathe so that your belly goes up and down. · Do not hold your breath. · Breathe like this for 5 to 10 minutes. Notice the feeling of calmness throughout your whole body. As you continue to breathe slowly and deeply, relax by doing the following for another 5 to 10 minutes: · Tighten and relax each muscle group in your body. You can begin at your toes and work your way up to your head. · Imagine your muscle groups relaxing and becoming heavy. · Empty your mind of all thoughts. · Let yourself relax more and more deeply. · Become aware of the state of calmness that surrounds you. · When your relaxation time is over, you can bring yourself back to alertness by moving your fingers and toes and then your hands and feet and then stretching and moving your entire body. Sometimes people fall asleep during relaxation, but they usually wake up shortly afterward. · Always give yourself time to return to full alertness before you drive a car or do anything that might cause an accident if you are not fully alert. Never play a relaxation tape while you drive a car. When should you call for help? Call 911 anytime you think you may need emergency care. For example, call if: 
? · You feel you cannot stop from hurting yourself or someone else. ? Keep the numbers for these national suicide hotlines: 1-498-192-TALK (2-909.442.5228) and 6-517-OSEWSRS (6-627.537.9200). If you or someone you know talks about suicide or feeling hopeless, get help right away. ? Watch closely for changes in your health, and be sure to contact your doctor if: 
? · You have anxiety or fear that affects your life. ? · You have symptoms of anxiety that are new or different from those you had before. Where can you learn more? Go to http://estephanie-hiale.info/. Enter P754 in the search box to learn more about \"Anxiety Disorder: Care Instructions. \" Current as of: May 12, 2017 Content Version: 11.4 © 3481-8537 [employer]wise[employer]. Care instructions adapted under license by [employer] (which disclaims liability or warranty for this information). If you have questions about a medical condition or this instruction, always ask your healthcare professional. [address] any warranty or liability for your use of this information. Introducing [facility]! Dear Max Patiño: Thank you for requesting a [employer] account. Our records indicate that you already have an active [employer] account. You can access your account anytime at [email or web address]. [email or web address] Did you know that you can access your hospital and ER discharge instructions at any time in [employer]? You can also review all of your test results from your hospital stay or ER visit. Additional Information If you have questions, please visit the Frequently Asked Questions section of the [employer] website at [email or web address] Remember, [employer] is NOT to be used for urgent needs. For medical emergencies, dial 911. Now available from your iPhone and Android! Please provide this summary of care documentation to your next provider. Your primary care clinician is listed as [facility]. If you have any questions after today's visit, please call [phone].

## 2018-04-05 NOTE — PROGRESS NOTES
Adult Psychiatry Initial Evaluation    Assessment, and Plan:      ASSESSMENT:   Mr. Alyssa Cervantes is a 78 y/o man with medical history of GALILEA, Chronic pain, neuropathy who presents with sx consistent with MDD recurrent mild, CHANCE r/o OCD. Risk factors include h/o chronic depression relapsing, limited social supports, history of loss at early age (mother passed when patient <12years of age) and family genetics. Protective factors include use of social supports, positive coping skills, help seeking and engagement in treatment. Will target depressive sx with wellbutrin XL 300mg, continue lexapro 20mg daily. Discussed r/b/se of medications proposed. Referral for talk therapy recommended, patient declines. Discussed healthy diet, exercise and meditation. Low risk for SI, see assessment in HPI. Will follow closely. PLAN:   Problem Diagnosis: MDD recurrent mild, CHANCE r/o OCD  Prognosis: fair  Goals: decrease sx to improve social functioning  Biologic: N/A  Medication: Start wellbutrin XL 300mg, continue lexapro 20mg daily. Psychosocial: encouraged increase social supports  Therapy: defer pt preference, encouraged to obtain \"feeling good\" by Dr. Yani Alonzo as intro to CBT  Behavioral: diet, exercise, meditation  Follow up: 3-4 weeks    Note follows below  Today's Date:  4/5/2018      Patient Identifying Information     Patel Guerra is a 79 y.o. male presenting with anxiety and depression. Patient came to psychiatry on a voluntarily basis. Chief Complaint:  Other and Depression      Stated Chief Complaint: \"I asked Dr. David Velázquez if there was anything that he could do that would help the depression more\"    History of Presenting Illness:      Paxil for 10 years and SE of sexual dysfunction. Lexapro started 2011-13 SE sexual dysfunction/ED. Started wellbutrin ED resolved. 2016 fall worsening depression, lexapro \"low feeling\" \"constant ache\" started \"third wellbutrin\" 150 MG SR. Fall 2017 worsening depression again. Typically in fall/winter has cycles of worsening depression including when he was in college. Current symptoms of depression for past few months during part of a day, for a few days, \"bad feeling in chest\" and will \"lift some\" later in the day and goes a way a few days a week, recurrent \"low feeling. \" Sometimes finds it hard to \"think anything positive. \" No current social stressors. No worsening of anxiety after increase in wellbutrin. Chronic worry \"all my life. \" For past 2 weeks reports nearly daily feeling nervous, not being able to control worry, worrying too much, several days of feeling afraid, anhedonia, feeling down, sleeping too much, feeling tired, overeating, feeling bad about self. Eating 3 meals per day, water intake 3 glasses per day, exercise 2 x per week. Feels \"vulnerable\" about \"how many bad things can happen to you. \" Working part time Tuesday, Wednesday, Thursday. \"Deep level of suicide is a sin\" and \"wouldn't want to leave my wife. \"    Obsessive with \"certain things wants to do a certain number of time\" and \"re-arrange desk\" impulsively. Feels able to control now. In past stepping on cracks. Touching a heater. ROS: Denies fausto, PSTD, OCD sx currently, ADHD, Panic, Psychosis AVH/PD    Psychosocial Impact: Relationships, employment, Spiritual       Past Psychiatric History:     Previous diagnoses: Anxiety, Depression, OCD as younger  Previous medication trials: Vallium in college. Ativan at 45 y/o. Valerian root was helpful in past. Paxil for 10 years and SE of sexual dysfunction. Lexapro started 2011-13 SE sexual dysfunction/ED. Started wellbutrin ED resolved. 2016 fall worsening depression, lexapro \"low feeling\" \"constant ache\" started \"third wellbutrin\" unknown dose. Fall 2017 worsening depression. Previous therapy: positive in college weekly for a few months  Previous psychiatric hospitalizations: No  Currently in treatment with: PCP.   Other pertinent history: No h/o self harm to release stress    Violence History/Risk:     History of violence: No  Current access to firearm: Yes  Recent or current violent ideation: No    Suicidal Ideation and Behavior History/Risk:     Current suicidal thoughts: No  Previous suicide attempts: No  Previous self-injurious behavior/risky behavior: No  Previous suicidal ideation: No  Access to stockpile pills: No  Impulsivity: No    Substance Abuse History   Recreational Drugs: No  Use of Alcohol: once a month  Tobacco Use: No  Abuse of medications: No  Legal Consequences of chemical use: No    History    Past Medical Hx, Surgical Hx, Family Medical Hx: Reviewed and updated in EMR as appropriate:    Family Psych HX:  Son with intermittent explosive disorder, OCD personality disorder, MDD    Medications and Allergies: Reviewed and updated in EMR as appropriate    Current Outpatient Prescriptions   Medication Sig    naproxen (NAPROSYN) 375 mg tablet Take 1 Tab by mouth two (2) times daily (with meals).  naproxen (NAPROSYN) 375 mg tablet TAKE 1 TABLET BY MOUTH TWICE DAILY WITH MEALS    gabapentin (NEURONTIN) 600 mg tablet Take 1 Tab by mouth three (3) times daily. Indications: NEUROPATHIC PAIN    buPROPion SR (WELLBUTRIN SR) 150 mg SR tablet TAKE 1 TABLET BY MOUTH THREE TIMES DAILY    YEAST,DRIED, S. CEREVISIAE, (POSADAS'S YEAST PO) Take  by mouth.  montelukast (SINGULAIR) 10 mg tablet TK 1 T PO QPM FOR ALLERGIES    simvastatin (ZOCOR) 40 mg tablet TAKE 1 TABLET BY MOUTH EVERY NIGHT AT BEDTIME    finasteride (PROSCAR) 5 mg tablet TAKE 1 TABLET DAILY    sildenafil citrate (VIAGRA) 100 mg tablet Take 1 Tab by mouth as needed.  escitalopram oxalate (LEXAPRO) 20 mg tablet Take 1 Tab by mouth daily.     fluticasone (FLONASE) 50 mcg/actuation nasal spray USE 2 SPRAYS IN EACH       NOSTRIL DAILY    NEXIUM 40 mg capsule TAKE 1 CAPSULE DAILY    PROAIR HFA 90 mcg/actuation inhaler INHALE 2 PUFFS BY MOUTH EVERY 4 HOURS AS NEEDED FOR WHEEZING    ginkgo biloba 120 mg Tab Take  by mouth.  ascorbic acid (VITAMIN C) 500 mg tablet Take  by mouth.  cyanocobalamin (VITAMIN B-12) 1,000 mcg tablet Take 1,000 mcg by mouth daily.  aspirin delayed-release 81 mg tablet Take  by mouth daily.  cholecalciferol, vitamin d3, (VITAMIN D3) 1,000 unit tablet Take  by mouth daily.  ginseng 100 mg Tab Take  by mouth.  coenzyme q10-vitamin e (COQ10 ) 100-100 mg-unit Cap Take  by mouth.  Gluc-Scar-MSM#5-N-Uomx-Mike-Bor (OSTEO BI-FLEX) 750-625-30 mg Tab Take  by mouth daily.  OMEGA-3 FATTY ACIDS (OMEGA 3 PO) Take  by mouth two (2) times a day. 3 QD     No current facility-administered medications for this visit. Most Recent Vitals:       Visit Vitals    /71    Pulse 79    Temp 96.7 °F (35.9 °C) (Oral)    Resp 18    Ht 6' 2\" (1.88 m)    Wt 202 lb (91.6 kg)    SpO2 95%    BMI 25.94 kg/m2       Current Active Medical Issues     Patient Active Problem List   Diagnosis Code    Dyslipidemia E78.5    Hypovitaminosis D E55.9    BPH (benign prostatic hyperplasia) Dr. Lazaro Sarah N40.0    Neuropathy Dr. Jerald Washington G62.9    Erectile dysfunction N52.9    Gastroesophageal reflux disease without esophagitis K21.9    Diverticulosis of large intestine without hemorrhage Dr Myriam Girgsby K57.30    GALILEA (obstructive sleep apnea) Dr Jerald Washington using oral appliance AHI 17.8 G47.33    Spinal stenosis, lumbar region with neurogenic claudication M48.062    Spondylosis of lumbar region without myelopathy or radiculopathy M47.816    Lumbar radiculopathy M54.16    Other intervertebral disc degeneration, lumbar region M51.36    Moderate episode of recurrent major depressive disorder (HCC) F33.1    Overweight (BMI 25.0-29. 9) E66.3         Pertinent Labs and Studies     Results for Nehemias Urena (MRN [id number]) as of 4/5/2018 13:00   Ref.  Range 2/5/2018 10:10 2/12/2018 16:28   WBC Latest Ref Range: 4.6 - 13.2 K/uL 4.5 (L)    RBC Latest Ref Range: 4.70 - 5.50 M/uL 4.71    HGB Latest Ref Range: 13.0 - 16.0 g/dL 14.5    HCT Latest Ref Range: 36.0 - 48.0 % 42.4    MCV Latest Ref Range: 74.0 - 97.0 FL 90.0    MCH Latest Ref Range: 24.0 - 34.0 PG 30.8    MCHC Latest Ref Range: 31.0 - 37.0 g/dL 34.2    RDW Latest Ref Range: 11.6 - 14.5 % 12.8    PLATELET Latest Ref Range: 135 - 420 K/uL 208    MPV Latest Ref Range: 9.2 - 11.8 FL 10.0    Sodium Latest Ref Range: 136 - 145 mmol/L 144    Potassium Latest Ref Range: 3.5 - 5.5 mmol/L 4.9    Chloride Latest Ref Range: 100 - 108 mmol/L 106    CO2 Latest Ref Range: 21 - 32 mmol/L 30    Anion gap Latest Ref Range: 3.0 - 18 mmol/L 8    Glucose Latest Ref Range: 74 - 99 mg/dL 89    BUN Latest Ref Range: 7.0 - 18 MG/DL 25 (H)    Creatinine Latest Ref Range: 0.6 - 1.3 MG/DL 1.04    BUN/Creatinine ratio Latest Ref Range: 12 - 20   24 (H)    Calcium Latest Ref Range: 8.5 - 10.1 MG/DL 8.5    GFR est non-AA Latest Ref Range: >60 ml/min/1.73m2 >60    GFR est AA Latest Ref Range: >60 ml/min/1.73m2 >60    Bilirubin, total Latest Ref Range: 0.2 - 1.0 MG/DL 0.4    Protein, total Latest Ref Range: 6.4 - 8.2 g/dL 6.3 (L)    Albumin Latest Ref Range: 3.4 - 5.0 g/dL 3.7    Globulin Latest Ref Range: 2.0 - 4.0 g/dL 2.6    A-G Ratio Latest Ref Range: 0.8 - 1.7   1.4    ALT (SGPT) Latest Ref Range: 16 - 61 U/L 35    AST Latest Ref Range: 15 - 37 U/L 26    Alk.  phosphatase Latest Ref Range: 45 - 117 U/L 65    Triglyceride Latest Ref Range: <150 MG/DL 64    Cholesterol, total Latest Ref Range: <200 MG/    HDL Cholesterol Latest Ref Range: 40 - 60 MG/DL 53    CHOL/HDL Ratio Latest Ref Range: 0 - 5.0   2.7    LDL, calculated Latest Ref Range: 0 - 100 MG/DL 76.2    VLDL, calculated Latest Units: MG/DL 12.8    Vitamin B12 Latest Ref Range: 211 - 911 pg/mL  967 (H)   Hemoglobin A1c, (calculated) Latest Ref Range: 4.2 - 5.6 %  5.6   Est. average glucose Latest Units: mg/dL  114   T4, Free Latest Ref Range: 0.7 - 1.5 NG/DL  0.9   TSH Latest Ref Range: 0.36 - 3.74 uIU/mL 1.78       Psychosocial History (Legal, Education, Occupation, Living Situation, etc.):     Education level: Bachelors  Developmental Milestones: No delays  Childhood described as \"worrying\"  Relationship with family members: close with wife, son and grandchildren seeing every day, son in [place]-strained relation with wife of son, step mother great relationships  Significant other relationships:   Children: 2 sons  Support system: Wife,   Employment history: [employer], past 11 years 1/2 time part time same job   Legal history: No  H/o abuse: no  Hobbies/Coping: \"likes reading, in my head, plays instruments\" reaching out to wife. Mental Status Evaluation:     Appearance Elderly medium build man dressed business casually, good GH, good EC   Attitude Cooperative, engaged   Behavior:  No PMA/R   Speech:  Normal, spontaneous   Mood:  Euthymic with underlying anxiety   Affect:  Congruent and reactive   Thought Process:  circumstantial   Thought Content:  As per [facility]   Fund of Knowledge Good   SI/HI/Psychosis Denies   Sensorium:  Alert   Cognition:  Grossly intact, not formally assessed   Insight:  fair   Judgment: fair                                  Impulse Control:  good     4/5/2018 PHQ-9: 6, not difficult at all  4/5/2018 CHANCE-7: 10, somewhat difficult    Acute risk for:    Danger to self:  low  Danger to others: low  Grave disability:  low  Diagnosis:     Axis I: MDD recurrent mild, CAHNCE r/o OCD  Axis II: deferred  Medication Reconciliation:   Medication reconciliation completed: yes  Includes discharge medication reconciliation: yes          More than 50% of this 45 min visit was spent face to face counseling the patient about the etiology and treatment options for the above health conditions outlined in assessment and plan    Ofelia Grey M.D.   [address], [address] [place] [address] [phone]  [place] [phone]

## 2018-04-05 NOTE — PATIENT INSTRUCTIONS
MEDICATIONS:  Start taking wellbutrin  mg qAM, stop wellbutrin SR 150mg   Continue lexapro 20mg daily    BEHAVIOR:  CONTINUE EATING THREE MEALS A DAY STARTING WITH BREAKFAST  START DRINKING 2 LITERS OF WATER PER DAY (65 OUNCES)  EXERCISE 30 MINUTES 4-5 DAYS PER WEEK  WORK ON MEDITATION BEFORE BED, OUTLINED BELOW  GET BOOK \"FEELING GOOD\" BY DR. FIELD AS INTRODUCTION TO COGNITIVE BEHAVIORAL THERAPY  If you are having thoughts of harming yourself or others please report to local hospital for evaluation or call suicide hotline 1     RETURN IN 3 WEEKS FOR FOLLOW UP       Anxiety Disorder: Care Instructions  Your Care Instructions    Anxiety is a normal reaction to stress. Difficult situations can cause you to have symptoms such as sweaty palms and a nervous feeling. In an anxiety disorder, the symptoms are far more severe. Constant worry, muscle tension, trouble sleeping, nausea and diarrhea, and other symptoms can make normal daily activities difficult or impossible. These symptoms may occur for no reason, and they can affect your work, school, or social life. Medicines, counseling, and self-care can all help. Follow-up care is a key part of your treatment and safety. Be sure to make and go to all appointments, and call your doctor if you are having problems. It's also a good idea to know your test results and keep a list of the medicines you take. How can you care for yourself at home? · Take medicines exactly as directed. Call your doctor if you think you are having a problem with your medicine. · Go to your counseling sessions and follow-up appointments. · Recognize and accept your anxiety. Then, when you are in a situation that makes you anxious, say to yourself, \"This is not an emergency. I feel uncomfortable, but I am not in danger. I can keep going even if I feel anxious. \"  · Be kind to your body:  ¨ Relieve tension with exercise or a massage.   ¨ Get enough rest.  ¨ Avoid alcohol, caffeine, nicotine, and illegal drugs. They can increase your anxiety level and cause sleep problems. ¨ Learn and do relaxation techniques. See below for more about these techniques. · Engage your mind. Get out and do something you enjoy. Go to a funny movie, or take a walk or hike. Plan your day. Having too much or too little to do can make you anxious. · Keep a record of your symptoms. Discuss your fears with a good friend or family member, or join a support group for people with similar problems. Talking to others sometimes relieves stress. · Get involved in social groups, or volunteer to help others. Being alone sometimes makes things seem worse than they are. · Get at least 30 minutes of exercise on most days of the week to relieve stress. Walking is a good choice. You also may want to do other activities, such as running, swimming, cycling, or playing tennis or team sports. Relaxation techniques  Do relaxation exercises 10 to 20 minutes a day. You can play soothing, relaxing music while you do them, if you wish. · Tell others in your house that you are going to do your relaxation exercises. Ask them not to disturb you. · Find a comfortable place, away from all distractions and noise. · Lie down on your back, or sit with your back straight. · Focus on your breathing. Make it slow and steady. · Breathe in through your nose. Breathe out through either your nose or mouth. · Breathe deeply, filling up the area between your navel and your rib cage. Breathe so that your belly goes up and down. · Do not hold your breath. · Breathe like this for 5 to 10 minutes. Notice the feeling of calmness throughout your whole body. As you continue to breathe slowly and deeply, relax by doing the following for another 5 to 10 minutes:  · Tighten and relax each muscle group in your body. You can begin at your toes and work your way up to your head. · Imagine your muscle groups relaxing and becoming heavy.   · Empty your mind of all thoughts. · Let yourself relax more and more deeply. · Become aware of the state of calmness that surrounds you. · When your relaxation time is over, you can bring yourself back to alertness by moving your fingers and toes and then your hands and feet and then stretching and moving your entire body. Sometimes people fall asleep during relaxation, but they usually wake up shortly afterward. · Always give yourself time to return to full alertness before you drive a car or do anything that might cause an accident if you are not fully alert. Never play a relaxation tape while you drive a car. When should you call for help? Call 911 anytime you think you may need emergency care. For example, call if:  ? · You feel you cannot stop from hurting yourself or someone else. ? Keep the numbers for these national suicide hotlines: 9-019-513-TALK (7-100.262.5685) and 9-239-IMFYEDU (3-820.113.5301). If you or someone you know talks about suicide or feeling hopeless, get help right away. ? Watch closely for changes in your health, and be sure to contact your doctor if:  ? · You have anxiety or fear that affects your life. ? · You have symptoms of anxiety that are new or different from those you had before. Where can you learn more? Go to http://estephanie-haile.info/. Enter P754 in the search box to learn more about \"Anxiety Disorder: Care Instructions. \"  Current as of: May 12, 2017  Content Version: 11.4  © 1483-1889 [employer]. Care instructions adapted under license by "[employer]" (which disclaims liability or warranty for this information). If you have questions about a medical condition or this instruction, always ask your healthcare professional. [address] any warranty or liability for your use of this information.

## 2018-04-05 NOTE — PROGRESS NOTES
Chief Complaint   Patient presents with    Other    Depression     1. Have you been to the ER, urgent care clinic since your last visit? Hospitalized since your last visit? No    2. Have you seen or consulted any other health care providers outside of the [facility] since your last visit? Include any pap smears or colon screening.  No

## 2018-04-05 NOTE — Clinical Note
Thank you for allowing me to contribute to the care of this patient. Please see attached note. Will follow closely.

## 2018-04-11 RX ORDER — ESCITALOPRAM OXALATE 20 MG/1
TABLET ORAL
Qty: 90 TAB | Refills: 0 | Status: SHIPPED | OUTPATIENT
Start: 2018-04-11 | End: 2018-05-08 | Stop reason: SDUPTHER

## 2018-05-01 RX ORDER — FLUTICASONE PROPIONATE 50 MCG
SPRAY, SUSPENSION (ML) NASAL
Qty: 48 G | Refills: 3 | Status: SHIPPED | OUTPATIENT
Start: 2018-05-01 | End: 2019-07-23 | Stop reason: SDUPTHER

## 2018-05-03 ENCOUNTER — TELEPHONE (OUTPATIENT)
Dept: CARDIOLOGY CLINIC | Age: 68
End: 2018-05-03

## 2018-05-03 NOTE — TELEPHONE ENCOUNTER
[facility] sent over records, stating patient is scheduled for colonoscopy. Verbal order and read back per Piyush Shah MD  [facility] for procedure from a cardiac standpoint in case we need to clear him.    No medications to hold

## 2018-05-07 RX ORDER — FINASTERIDE 5 MG/1
TABLET, FILM COATED ORAL
Qty: 90 TAB | Refills: 3 | Status: SHIPPED | OUTPATIENT
Start: 2018-05-07 | End: 2019-05-15 | Stop reason: SDUPTHER

## 2018-05-08 ENCOUNTER — OFFICE VISIT (OUTPATIENT)
Dept: BEHAVIORAL/MENTAL HEALTH CLINIC | Age: 68
End: 2018-05-08

## 2018-05-08 VITALS
OXYGEN SATURATION: 100 % | HEIGHT: 74 IN | SYSTOLIC BLOOD PRESSURE: 119 MMHG | WEIGHT: 198 LBS | RESPIRATION RATE: 18 BRPM | TEMPERATURE: 97 F | HEART RATE: 64 BPM | BODY MASS INDEX: 25.41 KG/M2 | DIASTOLIC BLOOD PRESSURE: 74 MMHG

## 2018-05-08 DIAGNOSIS — F41.1 GAD (GENERALIZED ANXIETY DISORDER): ICD-10-CM

## 2018-05-08 DIAGNOSIS — Z86.59 HISTORY OF OCD (OBSESSIVE COMPULSIVE DISORDER): ICD-10-CM

## 2018-05-08 DIAGNOSIS — F33.0 MDD (MAJOR DEPRESSIVE DISORDER), RECURRENT EPISODE, MILD (HCC): Primary | ICD-10-CM

## 2018-05-08 RX ORDER — BUPROPION HYDROCHLORIDE 300 MG/1
300 TABLET ORAL
Qty: 90 TAB | Refills: 1 | Status: SHIPPED | OUTPATIENT
Start: 2018-05-08 | End: 2018-11-04

## 2018-05-08 RX ORDER — ESCITALOPRAM OXALATE 20 MG/1
20 TABLET ORAL DAILY
Qty: 90 TAB | Refills: 1 | Status: SHIPPED | OUTPATIENT
Start: 2018-05-08 | End: 2018-11-04

## 2018-05-08 NOTE — PATIENT INSTRUCTIONS
MEDICATIONS:   Continue Wellbutrin  mg qAM and lexapro 20mg daily     BEHAVIOR:  CONTINUE EATING THREE MEALS A DAY STARTING WITH BREAKFAST  START DRINKING 2 LITERS OF WATER PER DAY (65 OUNCES)  EXERCISE 30 MINUTES 4-5 DAYS PER WEEK  WORK ON MEDITATION BEFORE BED, OUTLINED BELOW    HOW TO MEDITATE: SIMPLE MEDITATION FOR BEGINNERS  This meditation exercise is an excellent introduction to meditation techniques. Sit comfortably. You may even want to invest in a meditation chair. Close your eyes. Make no effort to control the breath; simply breathe naturally. Focus your attention on the breath  (think \"im breathing in\" when breathing in, think \"im breathing out\" when breathing out) and on how the body moves with each inhalation and exhalation. Notice the movement of your body as you breathe. Observe your chest, shoulders, rib cage, and belly. Simply focus your attention on your breath without controlling its pace or intensity. If your mind wanders, return your focus back to your breath. Focus on breathing in as you take in a breath and focus on breathing out as you exhale  Maintain this meditation practice for two to three minutes to start, and then try it for longer periods gradually extended to 5 to 10 minutes daily. Bright Light Therapy --   The parameters that are used to describe light therapy include intensity (lux), wavelength, time of day for exposure, and duration of daily exposure [12]. The effect of bright light therapy is probably mediated through the eyes rather than the skin [85]. Administration -- Bright light therapy is administered on a daily basis according to the following protocol   ? Device - The standard and best studied devices for administering bright light therapy are 10,000 lux light boxes that use fluorescent bulbs emitting white light.  (Incandescent light poses risks to the cornea and retina.) Although light boxes emitting less than 10,000 lux can be used, longer exposures are required. Commercially available fixtures are recommended over homemade devices, due to difficulty in measuring light intensity, and to reduce electrical hazards and other risks (eg, corneal and eyelid burns) associated with poor-quality construction. In addition, patients are advised to seek light boxes designed to protect the eyes with features such as light dispersion and screens that filter out ultraviolet rays. Ultraviolet light is not necessary for the therapeutic effect of bright light therapy and should be avoided to reduce potential risks to the skin or eyes.      ?Positioning and distance - The light box should be positioned at a distance that enables patients to receive 10,000 lux while seated and facing the box, with the light projected downward to minimize aversive glare. Commercial light box instructions should give the distance at which 10,000 lux is achieved, which is typically approximately 40 to 80 cm (16 to 31 inches). The distance from the eyes to the light box is important because light intensity follows the inverse square law; if the distance between the eye and the light source is doubled, the intensity of light that is received drops to one-quarter of the original intensity. ?Time of day - Bright light therapy generally commences in the early morning, soon after awakening (eg, 7:00 AM). Patients should administer light therapy at approximately the same time each day, including weekends, holidays, and vacations. Most light therapy studies required a regular time for light treatment to start and thus stipulated a regular wake-up time for the subjects. A regular wake-up time may be important for optimal effectiveness of the bright light treatment. The effectiveness of variable timing of the bright light therapy is unknown. If morning bright light treatment alone is not fully effective after two to four weeks of treatment, adding evening (eg, 8:00 PM) bright light treatment may be helpful. A minority of patients with SAD may benefit from bright light in the evening rather than morning bright light [81]. ?Duration of exposure - The duration of early morning exposure to standard light boxes emitting 10,000 lux is generally 30 minutes/day, but some studies have used 45 or 60 minutes per session. However, no head-to-head trials have compared the efficacy of different lengths of exposure. For patients who do not respond to initial treatment with 30 minutes/day, some studies have increased the duration of exposure to 45 minutes/day, and if nonresponse persists, to 60 minutes/day. However, no studies have compared the efficacy of a fixed dose of 30 minutes/day with an increasing dose, and the potential benefit of increasing exposure must be weighed against the added time burden. If evening bright light therapy is added, the duration of evening exposure is generally 30 to 60 minutes. Patients who switch from morning to evening exposure continue the same length of exposure. Bright light boxes emitting light that is less than 10,000 lux require longer exposures. As an example, morning light therapy with a 2500 lux light box requires an exposure of two hours to achieve the same benefit of 10,000 lux for 30 minutes. ? Looking at the device - The eyes are open during bright light therapy, with light visible at least in the peripheral vision. Patients can glance at the box but should avoid staring directly at the light. ?Patient activity - During bright light therapy, patients can engage in any activity, such as reading, eating, watching television, or working on a computer. Although patients are typically seated, it is reasonable to place the light box on a stand so that patients can engage in other activities, such as riding a stationary bicycle.

## 2018-05-08 NOTE — MR AVS SNAPSHOT
Concha Tamez 
 
 
 [address] [address] [address] [facility] [address] 
[phone] Patient: Mario Jones MRN: [id number]:[id number] Visit Information Date & Time Provider Department Dept. Phone Encounter #  
 5/8/2018  8:30 AM Hugo Germain MD [facility] [address] [id number] Your Appointments 6/6/2018  9:30 AM  
Follow Up with Thomas Arriaza NP  
[facility] - SPECIALTY [facility] Appt Note: 3 mo follow up for lower back and neck 2012 SPECIALTY [facility] [address]  
[address] [facility] 7/13/2018  8:00 AM  
Follow Up with Margot Vaughan MD  
[facility] [place] ([facility] [place]) Appt Note: 1 year follow up [facility] [address] [address] [address] [phone] [facility] [address]  
  
    
 2/18/2019  8:20 AM  
PHYSICAL with Tonia Lance MD  
[facility]) Appt Note: physical labs at [facility] [address] [facility], [address] [place] [address] [address] [place]  
  
   
 [address], [address] Upcoming Health Maintenance Date Due Influenza Age 5 to Adult 8/1/2018 Pneumococcal 65+ Low/Medium Risk (2 of 2 - PPSV23) 8/7/2018 GLAUCOMA SCREENING Q2Y 1/31/2019 MEDICARE YEARLY EXAM 2/13/2019 COLONOSCOPY 6/14/2022 DTaP/Tdap/Td series (2 - Td) 11/11/2026 Allergies as of 5/8/2018  Review Complete On: 4/5/2018 By: Hugo Germain MD  
  
 Severity Noted Reaction Type Reactions Cefdinir  09/12/2011    Nausea and Vomiting Niacin  08/07/2013    Other (comments) [place] Current Immunizations  Reviewed on 1/31/2017 Name Date Influenza High Dose Vaccine PF 11/17/2017 Influenza Vaccine (Quad) PF 11/11/2016, 11/2/2015 Influenza Vaccine PF 10/30/2014, 11/5/2013 Influenza Vaccine Split 10/31/2012, 10/21/2011 Influenza Vaccine Whole 11/5/2010 Pneumococcal Conjugate (PCV-13) 1/31/2017 Pneumococcal Polysaccharide (PPSV-23) 8/7/2013 TD Vaccine 7/22/2009 Tdap 11/11/2016 Zoster 5/7/2012 Not reviewed this visit You Were Diagnosed With   
  
 Codes Comments MDD (major depressive disorder), recurrent episode, mild ([facility]    -  Primary ICD-10-CM: F33.0 ICD-9-CM: 296.31   
 CHANCE (generalized anxiety disorder)     ICD-10-CM: F41.1 ICD-9-CM: 300.02 History of OCD (obsessive compulsive disorder)     ICD-10-CM: Z86.59 
ICD-9-CM: V11.2 Vitals BP Pulse Temp Resp Height(growth percentile) Weight(growth percentile) 119/74 64 97 °F (36.1 °C) (Oral) 18 6' 2\" (1.88 m) 198 lb (89.8 kg) SpO2 BMI Smoking Status 100% 25.42 kg/m2 Never Smoker BMI and BSA Data Body Mass Index Body Surface Area  
 25.42 kg/m 2 2.17 m 2 Preferred Pharmacy Pharmacy Name Phone  [facility] [phone] Your Updated Medication List  
  
   
This list is accurate as of 5/8/18  9:22 AM.  Always use your most recent med list.  
  
  
  
  
 aspirin delayed-release 81 mg tablet Take  by mouth daily. POSADAS'S YEAST PO Take  by mouth. buPROPion  mg XL tablet Commonly known as:  Vernestine Salk Take 1 Tab by mouth every morning for 180 days. COQ10  100-100 mg-unit Cap Generic drug:  coenzyme q10-vitamin e Take  by mouth.  
  
 escitalopram oxalate 20 mg tablet Commonly known as:  Franklin Snow Take 1 Tab by mouth daily for 180 days. finasteride 5 mg tablet Commonly known as:  PROSCAR  
TAKE 1 TABLET DAILY  
  
 fluticasone 50 mcg/actuation nasal spray Commonly known as:  FLONASE  
USE 2 SPRAYS IN EACH       NOSTRIL DAILY  
  
 gabapentin 600 mg tablet Commonly known as:  NEURONTIN  
 Take 1 Tab by mouth three (3) times daily. Indications: NEUROPATHIC PAIN  
  
 ginkgo biloba 120 mg Tab Take  by mouth.  
  
 ginseng 100 mg Tab Take  by mouth.  
  
 montelukast 10 mg tablet Commonly known as:  SINGULAIR TK 1 T PO QPM FOR ALLERGIES  
  
 naproxen 375 mg tablet Commonly known as:  NAPROSYN Take 1 Tab by mouth two (2) times daily (with meals). NexIUM 40 mg capsule Generic drug:  esomeprazole TAKE 1 CAPSULE DAILY  
  
 OMEGA 3 PO Take  by mouth two (2) times a day. 3 QD  
  
 OSTEO BI-FLEX TRIPLE STRENGTH 750-625-30 mg Tab Generic drug:  Gluc-Scar-MSM#1-B-Rcth-Mike-Bor Take  by mouth daily. PROAIR HFA 90 mcg/actuation inhaler Generic drug:  albuterol INHALE 2 PUFFS BY MOUTH EVERY 4 HOURS AS NEEDED FOR WHEEZING  
  
 sildenafil citrate 100 mg tablet Commonly known as:  VIAGRA Take 1 Tab by mouth as needed. simvastatin 40 mg tablet Commonly known as:  ZOCOR  
TAKE 1 TABLET BY MOUTH EVERY NIGHT AT BEDTIME  
  
 VITAMIN B-12 1,000 mcg tablet Generic drug:  cyanocobalamin Take 1,000 mcg by mouth daily. VITAMIN C 500 mg tablet Generic drug:  ascorbic acid (vitamin C) Take  by mouth. VITAMIN D3 1,000 unit tablet Generic drug:  cholecalciferol Take  by mouth daily. Prescriptions Sent to Pharmacy Refills buPROPion XL (WELLBUTRIN XL) 300 mg XL tablet 1 Sig: Take 1 Tab by mouth every morning for 180 days. Class: Normal  
 Pharmacy: [place] [address] Ph #: [phone] Route: Oral  
 escitalopram oxalate (LEXAPRO) 20 mg tablet 1 Sig: Take 1 Tab by mouth daily for 180 days. Class: Normal  
 Pharmacy: [place] [address] Ph #: [phone] Route: Oral  
  
Patient Instructions MEDICATIONS:  
Continue Wellbutrin  mg qAM and lexapro 20mg daily 
  
BEHAVIOR: 
CONTINUE EATING THREE MEALS A DAY STARTING WITH BREAKFAST START DRINKING 2 LITERS OF WATER PER DAY (65 OUNCES) EXERCISE 30 MINUTES 4-5 DAYS PER WEEK 
WORK ON MEDITATION BEFORE BED, OUTLINED BELOW 
 
HOW TO MEDITATE: SIMPLE MEDITATION FOR BEGINNERS This meditation exercise is an excellent introduction to meditation techniques. Sit comfortably. You may even want to invest in a meditation chair. Close your eyes. Make no effort to control the breath; simply breathe naturally. Focus your attention on the breath  (think \"im breathing in\" when breathing in, think \"im breathing out\" when breathing out) and on how the body moves with each inhalation and exhalation. Notice the movement of your body as you breathe. Observe your chest, shoulders, rib cage, and belly. Simply focus your attention on your breath without controlling its pace or intensity. If your mind wanders, return your focus back to your breath. Focus on breathing in as you take in a breath and focus on breathing out as you exhale Maintain this meditation practice for two to three minutes to start, and then try it for longer periods gradually extended to 5 to 10 minutes daily. Bright Light Therapy   
The parameters that are used to describe light therapy include intensity (lux), wavelength, time of day for exposure, and duration of daily exposure [12]. The effect of bright light therapy is probably mediated through the eyes rather than the skin [85]. Administration  Bright light therapy is administered on a daily basis according to the following protocol ?Device  The standard and best studied devices for administering bright light therapy are 10,000 lux light boxes that use fluorescent bulbs emitting white light. (Incandescent light poses risks to the cornea and retina.) Although light boxes emitting less than 10,000 lux can be used, longer exposures are required. Commercially available fixtures are recommended over homemade devices, due to difficulty in measuring light intensity, and to reduce electrical hazards and other risks (eg, corneal and eyelid burns) associated with poor-quality construction. In addition, patients are advised to seek light boxes designed to protect the eyes with features such as light dispersion and screens that filter out ultraviolet rays. Ultraviolet light is not necessary for the therapeutic effect of bright light therapy and should be avoided to reduce potential risks to the skin or eyes.     ?Positioning and distance  The light box should be positioned at a distance that enables patients to receive 10,000 lux while seated and facing the box, with the light projected downward to minimize aversive glare. Commercial light box instructions should give the distance at which 10,000 lux is achieved, which is typically approximately 40 to 80 cm (16 to 31 inches). The distance from the eyes to the light box is important because light intensity follows the inverse square law; if the distance between the eye and the light source is doubled, the intensity of light that is received drops to one-quarter of the original intensity. ?Time of day  Bright light therapy generally commences in the early morning, soon after awakening (eg, 7:00 AM). Patients should administer light therapy at approximately the same time each day, including weekends, holidays, and vacations. Most light therapy studies required a regular time for light treatment to start and thus stipulated a regular wake-up time for the subjects. A regular wake-up time may be important for optimal effectiveness of the bright light treatment. The effectiveness of variable timing of the bright light therapy is unknown. If morning bright light treatment alone is not fully effective after two to four weeks of treatment, adding evening (eg, 8:00 PM) bright light treatment may be helpful. A minority of patients with SAD may benefit from bright light in the evening rather than morning bright light [81]. ?Duration of exposure  The duration of early morning exposure to standard light boxes emitting 10,000 lux is generally 30 minutes/day, but some studies have used 45 or 60 minutes per session. However, no head-to-head trials have compared the efficacy of different lengths of exposure. For patients who do not respond to initial treatment with 30 minutes/day, some studies have increased the duration of exposure to 45 minutes/day, and if nonresponse persists, to 60 minutes/day. However, no studies have compared the efficacy of a fixed dose of 30 minutes/day with an increasing dose, and the potential benefit of increasing exposure must be weighed against the added time burden. If evening bright light therapy is added, the duration of evening exposure is generally 30 to 60 minutes. Patients who switch from morning to evening exposure continue the same length of exposure. Bright light boxes emitting light that is less than 10,000 lux require longer exposures. As an example, morning light therapy with a 2500 lux light box requires an exposure of two hours to achieve the same benefit of 10,000 lux for 30 minutes. ? Looking at the device  The eyes are open during bright light therapy, with light visible at least in the peripheral vision. Patients can glance at the box but should avoid staring directly at the light. ?Patient activity  During bright light therapy, patients can engage in any activity, such as reading, eating, watching television, or working on a computer. Although patients are typically seated, it is reasonable to place the light box on a stand so that patients can engage in other activities, such as riding a stationary bicycle. Introducing [facility] SERVICES! Dear Kristine Medel: Thank you for requesting a [employer] account. Our records indicate that you already have an active [employer] account. You can access your account anytime at [email or web address] Did you know that you can access your hospital and ER discharge instructions at any time in [employer]? You can also review all of your test results from your hospital stay or ER visit. Additional Information If you have questions, please visit the Frequently Asked Questions section of the [employer] website at [email or web address] Remember, [employer] is NOT to be used for urgent needs. For medical emergencies, dial 911. Now available from your iPhone and Android! Please provide this summary of care documentation to your next provider. Your primary care clinician is listed as Buck Cruz. If you have any questions after today's visit, please call [phone].

## 2018-05-08 NOTE — PROGRESS NOTES
Adult Psychiatry Progress Note    Assessment, and Plan:      ASSESSMENT:   Mr. Nunu Santo is a 80 y/o man sx consistent with MDD recurrent mild, CHANCE r/o OCD r/o seasonal affective disorder here for med mgmt follow up. Currently reports doing about the same to slightly better, reports sx improvement in anxiety since last visit. Continues to take wellbutrin  mg and lexapro 20 mg, some effect, no SE. Discussed r/b/se of med proposed. Still not interested in talk therapy. Recent life stressors include full shelter transition about 1 week prior. Will continue to encourage healthy life style changes and behavioral mgmt for mood including exercise, healthy diet, increased water and meditation, given info on light therapy. Recommend consideration of volunteer work. Discussed writers transition out of psychiatry practice. Pt voiced desire to follow up with PCP for continued med mgmt but will re-visit new psychiatry referral should symptoms worsen. Will provide 6 months supply of medicine during transition. Pt voiced understanding of plan. Low risk for SI, see assessment in HPI. PLAN:   Problem Diagnosis:MDD recurrent mild, CHANCE r/o OCD r/o seasonal affective disorder  Prognosis: fair  Goals: decrease sx to improve social functioning  Biologic: N/A  Medication: Continue wellbutrin XL 300mg, continue lexapro 20mg daily. Psychosocial: encouraged increase social supports  Therapy: defer pt preference  Behavioral: diet, exercise, meditation, light therapy  Follow up: N/A    Assessment and plan outlined above discussed with patient who voiced understanding and agreement. Author: Li Aceves MD as of: 5/8/2018 at 8:39 AM.    Note follows below:  Patient Delaney Acosta is a 79 y.o. male presenting with anxiety and depression. Patient came to psychiatry on a voluntarily basis.     Chief Complaint:  Other (mood)      Stated Chief Complaint: follow up    History of Presenting Illness:      Last seen 4/4/2018 for initial consult. First week or 2 felt better, lately questioning that. Since then reports depressive sx are \"the same maybe a little better\" and anxiety sx are \"varys day to day. \" Reports feeling \"okay\" today. Started taking wellbutril  mg with possible \"help\" no SE. Continues lexapro 20 mg daily. Eating 2-3 meals per day, drinking more water, feel off the past couple of days, does feel better with more water. Exercise not much recently. Has not tried meditation. Finished working part time 4/26/2018, fully retired. Since then reports first week \"was a lot of up\" and then second week \"less. \" Yesterday had more anxiety and was feeling down, feeling better today, some physical symptoms including stomach discomfort and dizziness. ROS: Denies Lou, SI/HI, Psychosis    Therapy: not interested    Violence History/Risk:      History of violence: No  Current access to firearm: Yes  Recent or current violent ideation: No     Suicidal Ideation and Behavior History/Risk:      Current suicidal thoughts: No  Previous suicide attempts: No  Previous self-injurious behavior/risky behavior: No  Previous suicidal ideation: No  Access to stockpile pills: No  Impulsivity: No     Substance Abuse History   Recreational Drugs: No  Use of Alcohol: once a month  Tobacco Use: No  Abuse of medications: No  Legal Consequences of chemical use: No    Past Psychiatric and Medical History, Social History, Past Surgical History and Family History: reviewed and updated in EMR as appropriate. Medications: Reviewed and updated in EMR as appropriate. Allergies: Reviewed and updated in EMR as appropriate.      Current Outpatient Prescriptions   Medication Sig    finasteride (PROSCAR) 5 mg tablet TAKE 1 TABLET DAILY    fluticasone (FLONASE) 50 mcg/actuation nasal spray USE 2 SPRAYS IN EACH       NOSTRIL DAILY    escitalopram oxalate (LEXAPRO) 20 mg tablet TAKE 1 TABLET BY MOUTH DAILY    buPROPion XL (WELLBUTRIN XL) 300 mg XL tablet TAKE 1 TABLET BY MOUTH EVERY MORNING    naproxen (NAPROSYN) 375 mg tablet Take 1 Tab by mouth two (2) times daily (with meals).  naproxen (NAPROSYN) 375 mg tablet TAKE 1 TABLET BY MOUTH TWICE DAILY WITH MEALS    gabapentin (NEURONTIN) 600 mg tablet Take 1 Tab by mouth three (3) times daily. Indications: NEUROPATHIC PAIN    YEAST,DRIED, S. CEREVISIAE, (POSADAS'S YEAST PO) Take  by mouth.  montelukast (SINGULAIR) 10 mg tablet TK 1 T PO QPM FOR ALLERGIES    simvastatin (ZOCOR) 40 mg tablet TAKE 1 TABLET BY MOUTH EVERY NIGHT AT BEDTIME    sildenafil citrate (VIAGRA) 100 mg tablet Take 1 Tab by mouth as needed.  NEXIUM 40 mg capsule TAKE 1 CAPSULE DAILY    PROAIR HFA 90 mcg/actuation inhaler INHALE 2 PUFFS BY MOUTH EVERY 4 HOURS AS NEEDED FOR WHEEZING    ginkgo biloba 120 mg Tab Take  by mouth.  ascorbic acid (VITAMIN C) 500 mg tablet Take  by mouth.  cyanocobalamin (VITAMIN B-12) 1,000 mcg tablet Take 1,000 mcg by mouth daily.  aspirin delayed-release 81 mg tablet Take  by mouth daily.  cholecalciferol, vitamin d3, (VITAMIN D3) 1,000 unit tablet Take  by mouth daily.  ginseng 100 mg Tab Take  by mouth.  coenzyme q10-vitamin e (COQ10 ) 100-100 mg-unit Cap Take  by mouth.  Gluc-Scar-MSM#2-K-Yhcf-Mike-Bor (OSTEO BI-FLEX) 750-625-30 mg Tab Take  by mouth daily.  OMEGA-3 FATTY ACIDS (OMEGA 3 PO) Take  by mouth two (2) times a day. 3 QD     No current facility-administered medications for this visit.           Objective/Exam:       Visit Vitals    /74    Pulse 64    Temp 97 °F (36.1 °C) (Oral)    Resp 18    Ht 6' 2\" (1.88 m)    Wt 198 lb (89.8 kg)    SpO2 100%    BMI 25.42 kg/m2       General: sitting comfortably in no acute distress    Mental Status Evaluation:      Appearance Elderly medium build man dressed business casually, good GH, good EC   Attitude Cooperative, engaged   Behavior:  No PMA/R   Speech:  Normal, spontaneous   Mood:  Euthymic with underlying anxiety   Affect:  Congruent and reactive   Thought Process:  Less circumstantial   Thought Content:  As per HPI   Fund of Knowledge Good   SI/HI/Psychosis Denies   Sensorium:  Alert   Cognition:  Grossly intact, not formally assessed   Insight:  fair   Judgment: fair                                  Impulse Control:  good        PHQ-9: 6, somewhat diffiucult  CHANCE-7: 5, somewhat difficult    4/5/2018 PHQ-9: 6, not difficult at all  4/5/2018 CHANCE-7: 10, somewhat difficult      Pertinent Labs and Studies   N/A    Acute risk for:    Danger to self:  low  Danger to others: low  Grave disability:  low  Diagnosis:     Axis I: MDD recurrent mild, CHANCE r/o OCD r/o seasonal affective disorder  Axis II: deferred  Medication Reconciliation:   Medication reconciliation completed: yes  Includes discharge medication reconciliation: yes        More than 50% of this 25 min visit was spent face to face counseling the patient about the etiology and treatment options for the above health conditions outlined in assessment and plan      Jose Jiménez M.D.   [id number] [place], [place] [id number] [phone]

## 2018-05-08 NOTE — PROGRESS NOTES
3 week f/u for mood. 1. Have you been to the ER, urgent care clinic since your last visit? Hospitalized since your last visit?no    2. Have you seen or consulted any other health care providers outside of the [address] [place] [address] since your last visit? Include any pap smears or colon screening.  no

## 2018-05-16 ENCOUNTER — TELEPHONE (OUTPATIENT)
Dept: INTERNAL MEDICINE CLINIC | Age: 68
End: 2018-05-16

## 2018-05-16 NOTE — TELEPHONE ENCOUNTER
Please advise. Last Visit: 02/12/2018 with MD Dar Townsend    Next Appointment: 02/18/2019 with MD Dar Townsend     Requested Prescriptions     Pending Prescriptions Disp Refills    montelukast (SINGULAIR) 10 mg tablet 90 Tab 3     Sig: Take 1 Tab by mouth every evening. Indications:  Allergic Rhinitis

## 2018-05-17 RX ORDER — MONTELUKAST SODIUM 10 MG/1
10 TABLET ORAL EVERY EVENING
Qty: 90 TAB | Refills: 3 | Status: SHIPPED | OUTPATIENT
Start: 2018-05-17 | End: 2019-07-03

## 2018-06-01 NOTE — TELEPHONE ENCOUNTER
Insurance requires a minimum fill for 90 days. If appropriate, please sign pended medication order. If not, please notify me. Last Visit: 02/12/2018 with MD Guero Amaro    Next Appointment: 02/18/2018 with MD Guero Amaro     Requested Prescriptions     Pending Prescriptions Disp Refills    albuterol (PROAIR HFA) 90 mcg/actuation inhaler 3 Inhaler 3     Sig: Take 2 Puffs by inhalation every four (4) hours as needed for Wheezing.

## 2018-06-03 RX ORDER — ALBUTEROL SULFATE 90 UG/1
2 AEROSOL, METERED RESPIRATORY (INHALATION)
Qty: 3 INHALER | Refills: 3 | Status: SHIPPED | OUTPATIENT
Start: 2018-06-03

## 2018-06-06 ENCOUNTER — OFFICE VISIT (OUTPATIENT)
Dept: ORTHOPEDIC SURGERY | Age: 68
End: 2018-06-06

## 2018-06-06 VITALS
WEIGHT: 198 LBS | DIASTOLIC BLOOD PRESSURE: 80 MMHG | RESPIRATION RATE: 16 BRPM | HEART RATE: 62 BPM | HEIGHT: 74 IN | SYSTOLIC BLOOD PRESSURE: 122 MMHG | BODY MASS INDEX: 25.41 KG/M2

## 2018-06-06 DIAGNOSIS — M47.816 SPONDYLOSIS OF LUMBAR REGION WITHOUT MYELOPATHY OR RADICULOPATHY: ICD-10-CM

## 2018-06-06 DIAGNOSIS — M48.062 SPINAL STENOSIS, LUMBAR REGION WITH NEUROGENIC CLAUDICATION: Primary | ICD-10-CM

## 2018-06-06 RX ORDER — SODIUM PICOSULFATE, MAGNESIUM OXIDE, AND ANHYDROUS CITRIC ACID 10; 3.5; 12 MG/160ML; G/160ML; G/160ML
LIQUID ORAL
Refills: 0 | COMMUNITY
Start: 2018-04-26 | End: 2018-06-06 | Stop reason: ALTCHOICE

## 2018-06-06 RX ORDER — DOXYCYCLINE 100 MG/1
CAPSULE ORAL
Refills: 0 | COMMUNITY
Start: 2018-05-29 | End: 2018-08-06 | Stop reason: ALTCHOICE

## 2018-06-06 RX ORDER — BROMPHENIRAMINE MALEATE, PSEUDOEPHEDRINE HYDROCHLORIDE, AND DEXTROMETHORPHAN HYDROBROMIDE 2; 30; 10 MG/5ML; MG/5ML; MG/5ML
SYRUP ORAL
Refills: 0 | COMMUNITY
Start: 2018-05-29 | End: 2018-08-06 | Stop reason: ALTCHOICE

## 2018-06-06 RX ORDER — GABAPENTIN 600 MG/1
600 TABLET ORAL 3 TIMES DAILY
Qty: 90 TAB | Refills: 5 | Status: SHIPPED | OUTPATIENT
Start: 2018-06-06 | End: 2018-12-19 | Stop reason: SDUPTHER

## 2018-06-06 RX ORDER — LATANOPROST 50 UG/ML
SOLUTION/ DROPS OPHTHALMIC
Refills: 3 | COMMUNITY
Start: 2018-05-25

## 2018-06-06 NOTE — PATIENT INSTRUCTIONS
Learning About How to Have a Healthy Back  What causes back pain? Back pain is often caused by overuse, strain, or injury. For example, people often hurt their backs playing sports or working in the yard, being jolted in a car accident, or lifting something too heavy. Aging plays a part too. Your bones and muscles tend to lose strength as you age, which makes injury more likely. The spongy discs between the bones of the spine (vertebrae) may suffer from wear and tear and no longer provide enough cushion between the bones. A disc that bulges or breaks open (herniated disc) can press on nerves, causing back pain. In some people, back pain is the result of arthritis, broken vertebrae caused by bone loss (osteoporosis), illness, or a spine problem. Although most people have back pain at one time or another, there are steps you can take to make it less likely. How can you have a healthy back? Reduce stress on your back through good posture  Slumping or slouching alone may not cause low back pain. But after the back has been strained or injured, bad posture can make pain worse. · Sleep in a position that maintains your back's normal curves and on a mattress that feels comfortable. Sleep on your side with a pillow between your knees, or sleep on your back with a pillow under your knees. These positions can reduce strain on your back. · Stand and sit up straight. \"Good posture\" generally means your ears, shoulders, and hips are in a straight line. · If you must stand for a long time, put one foot on a stool, ledge, or box. Switch feet every now and then. · Sit in a chair that is low enough to let you place both feet flat on the floor with both knees nearly level with your hips. If your chair or desk is too high, use a footrest to raise your knees. Place a small pillow, a rolled-up towel, or a lumbar roll in the curve of your back if you need extra support.   · Try a kneeling chair, which helps tilt your hips forward. This takes pressure off your lower back. · Try sitting on an exercise ball. It can rock from side to side, which helps keep your back loose. · When driving, keep your knees nearly level with your hips. Sit straight, and drive with both hands on the steering wheel. Your arms should be in a slightly bent position. Reduce stress on your back through careful lifting  · Squat down, bending at the hips and knees only. If you need to, put one knee to the floor and extend your other knee in front of you, bent at a right angle (half kneeling). · Press your chest straight forward. This helps keep your upper back straight while keeping a slight arch in your low back. · Hold the load as close to your body as possible, at the level of your belly button (navel). · Use your feet to change direction, taking small steps. · Lead with your hips as you change direction. Keep your shoulders in line with your hips as you move. · Set down your load carefully, squatting with your knees and hips only. Exercise and stretch your back  · Do some exercise on most days of the week, if your doctor says it is okay. You can walk, run, swim, or cycle. · Stretch your back muscles. Here are a few exercises to try:  Blima Naegeli on your back, and gently pull one bent knee to your chest. Put that foot back on the floor, and then pull the other knee to your chest.  ¨ Do pelvic tilts. Lie on your back with your knees bent. Tighten your stomach muscles. Pull your belly button (navel) in and up toward your ribs. You should feel like your back is pressing to the floor and your hips and pelvis are slightly lifting off the floor. Hold for 6 seconds while breathing smoothly. ¨ Sit with your back flat against a wall. · Keep your core muscles strong. The muscles of your back, belly (abdomen), and buttocks support your spine. ¨ Pull in your belly and imagine pulling your navel toward your spine. Hold this for 6 seconds, then relax.  Remember to keep breathing normally as you tense your muscles. ¨ Do curl-ups. Always do them with your knees bent. Keep your low back on the floor, and curl your shoulders toward your knees using a smooth, slow motion. Keep your arms folded across your chest. If this bothers your neck, try putting your hands behind your neck (not your head), with your elbows spread apart. ¨ Lie on your back with your knees bent and your feet flat on the floor. Tighten your belly muscles, and then push with your feet and raise your buttocks up a few inches. Hold this position 6 seconds as you continue to breathe normally, then lower yourself slowly to the floor. Repeat 8 to 12 times. ¨ If you like group exercise, try Pilates or yoga. These classes have poses that strengthen the core muscles. Lead a healthy lifestyle  · Stay at a healthy weight to avoid strain on your back. · Do not smoke. Smoking increases the risk of osteoporosis, which weakens the spine. If you need help quitting, talk to your doctor about stop-smoking programs and medicines. These can increase your chances of quitting for good. Where can you learn more? Go to http://estephanie-haile.info/. Enter L315 in the search box to learn more about \"Learning About How to Have a Healthy Back. \"  Current as of: March 21, 2017  Content Version: 11.4  © 4475-8021 Healthwise, [employer]. Care instructions adapted under license by [employer] (which disclaims liability or warranty for this information). If you have questions about a medical condition or this instruction, always ask your healthcare professional. [place] [address] any warranty or liability for your use of this information.

## 2018-06-06 NOTE — PROGRESS NOTES
Chief complaint/History of Present Illness:  Chief Complaint   Patient presents with    Back Pain     3 month follow up and med refill    Leg Pain     BLE     SAQIB Dockery is a  79 y.o.  male      HISTORY OF PRESENT ILLNESS:  The patient comes in today for follow-up of his low back pain. He states he does not have any radiating leg pain. He did initially, but Neurontin 600 mg t.i.d does seem to help. He does not have side effects. He has always had some back pain but in 10/2017 he thought he had a kidney stone, went to the ER. They did a CT scan which showed spinal stenosis. He went to his PCP who referred him to Dr. Corin Diamond. He did physical therapy, increased his gabapentin to 600 mg t.i.d. Additionally,  he takes naproxen b.i.d through his PCP. Since then he has done well. He has had, in the past, some right SI joint dysfunction. He had injections by Dr. Olman Kellogg and that really does not bother him very much at all now. He does work out at [employer]. He denies any new medical issues. He is retired and he is a nonsmoker. PHYSICAL EXAMINATION:  The patient is a 32-year-old male. He is alert and oriented, normal mood and affect. He has 5/5 strength in bilateral upper extremities, negative Gwendolyn's. Strength, 4/5, bilateral lower extremities. Negative straight raise. Full weightbearing, nonantalgic gait. No assistive device. A mildly poor tandem gait. ASSESSMENT/PLAN:  This is a patient with lumbar spinal stenosis/spondylosis, some  sacroiliac (SI) joint dysfunction. Last visit, he had complained of some neck pain with a little bit of left arm issues. He states that is pretty much resolved. It only happens if he drives a long distance or has been on the computer a lot, so that has settled down. We will give him a refill of his Neurontin and we will see him back in 6 months, sooner if needed.           Review of systems:    Past Medical History:   Diagnosis Date    Allergic rhinitis     Basal cell carcinoma 2013    Dr. Conrado Chapman s/p resection 2 spots    BPH (benign prostatic hyperplasia)     Dr. Idania Porter Cardiac stress test 08/12/2009    Neg maximal exercise stress test.  Ex time 15:00.    Depression     anxiety    Dyslipidemia     Erectile dysfunction     FHx: heart disease     GERD (gastroesophageal reflux disease)     s/p dilation 2003 Dr. Ursula Wells; egd 2015    Heel spur     Dr Verito Heck Hypovitaminosis D     Overweight (BMI 25.0-29.9) 2/12/2018    Peripheral neuropathy     and B CTS on EMG Dr. Jessie Guevara 2014    Sleep apnea     intol cpap Dr Chris Singh 2015; using oral appliance Dr Meredith Byrd; AHI 17.8 min desats 83    Spinal stenosis 03/2014    MRI (3/14) showed scoliosis w multilevel degen findings, mod L3-4, L4-5 central stenosis, mod L5-S1 foraminal stenosis;  (10/17) severe L3-4 central stenosis     Past Surgical History:   Procedure Laterality Date    CARDIAC SURG PROCEDURE UNLIST  8/09    ETT negative    HX COLONOSCOPY      Dr. Ursula Wells mild diverticulosis 2000, 6/12    HX ORTHOPAEDIC      B CT release 2007, 2009    HX ORTHOPAEDIC      DEXA t score 4.2 spine, 0.2 hip (2012)     Social History     Social History    Marital status:      Spouse name: N/A    Number of children: 2    Years of education: N/A     Occupational History    ret [employer]      Social History Main Topics    Smoking status: Never Smoker    Smokeless tobacco: Never Used    Alcohol use Yes      Comment: social    Drug use: No    Sexual activity: Yes     Partners: Female     Birth control/ protection: None     Other Topics Concern    Not on file     Social History Narrative     Family History   Problem Relation Age of Onset    Cancer Mother      leukemia    Heart Disease Father     Psychiatric Disorder Daughter      depression       Physical Exam:  Visit Vitals    /80    Pulse 62    Resp 16    Ht 6' 2\" (1.88 m)    Wt 198 lb (89.8 kg)    BMI 25.42 kg/m2     Pain Scale: 0 - No pain/10       has been . reviewed and is appropriate          Diagnoses and all orders for this visit:    1. Spinal stenosis, lumbar region with neurogenic claudication  -     gabapentin (NEURONTIN) 600 mg tablet; Take 1 Tab by mouth three (3) times daily. Indications: NEUROPATHIC PAIN    2. Spondylosis of lumbar region without myelopathy or radiculopathy            Follow-up Disposition:  Return in about 6 months (around 12/6/2018) for with NP.         We have informed Ryne Long to notify us for immediate appointment if he has any worsening neurogical symptoms or if an emergency situation presents, then call [phone]

## 2018-06-18 RX ORDER — NAPROXEN 375 MG/1
TABLET ORAL
Qty: 180 TAB | Refills: 0 | Status: SHIPPED | OUTPATIENT
Start: 2018-06-18 | End: 2018-09-19 | Stop reason: SDUPTHER

## 2018-07-05 ENCOUNTER — TELEPHONE (OUTPATIENT)
Dept: INTERNAL MEDICINE CLINIC | Age: 68
End: 2018-07-05

## 2018-07-05 ENCOUNTER — OFFICE VISIT (OUTPATIENT)
Dept: INTERNAL MEDICINE CLINIC | Age: 68
End: 2018-07-05

## 2018-07-05 VITALS
BODY MASS INDEX: 25.28 KG/M2 | SYSTOLIC BLOOD PRESSURE: 116 MMHG | HEIGHT: 74 IN | RESPIRATION RATE: 14 BRPM | TEMPERATURE: 98.3 F | HEART RATE: 69 BPM | OXYGEN SATURATION: 98 % | DIASTOLIC BLOOD PRESSURE: 84 MMHG | WEIGHT: 197 LBS

## 2018-07-05 DIAGNOSIS — R05.3 CHRONIC COUGH: ICD-10-CM

## 2018-07-05 DIAGNOSIS — J98.01 BRONCHOSPASM: Primary | ICD-10-CM

## 2018-07-05 RX ORDER — PREDNISONE 20 MG/1
TABLET ORAL
COMMUNITY
End: 2018-08-06 | Stop reason: ALTCHOICE

## 2018-07-05 RX ORDER — BUDESONIDE AND FORMOTEROL FUMARATE DIHYDRATE 160; 4.5 UG/1; UG/1
2 AEROSOL RESPIRATORY (INHALATION) 2 TIMES DAILY
Qty: 1 INHALER | Refills: 12 | Status: SHIPPED | OUTPATIENT
Start: 2018-07-05 | End: 2019-03-22 | Stop reason: SDUPTHER

## 2018-07-05 NOTE — PROGRESS NOTES
1. Have you been to the ER, urgent care clinic or hospitalized since your last visit? YES. Urgent Care in [place] on June 30,2018    2. Have you seen or consulted any other health care providers outside of the [facility] since your last visit (Include any pap smears or colon screening)? NO        Do you have an Advanced Directive? NO    Would you like information on Advanced Directives? NO    Chief Complaint   Patient presents with    Breathing Problem     [facility] read that he has possible copd.  pt has had difficulty breathing last few months

## 2018-07-05 NOTE — MR AVS SNAPSHOT
[address] [facility] 
 
 
 [address], [address] [facility] 
[phone] Patient: Lina Moore MRN: [id number] DYY:[birth year] Visit Information Date & Time Provider Department Dept. Phone Encounter #  
 7/5/2018 10:20 AM Candelario Hernandez MD [facility] [phone] Your Appointments 12/5/2018  9:10 AM  
Follow Up with Josef Arthur NP  
[facility] Appt Note: neck/lower back 6 mo fu  
 [address]  
  
    
 2/18/2019  8:20 AM  
PHYSICAL with Candelario Hernandez MD  
[facility] Appt Note: physical labs at [facility] [address], [address] [address] [address] [place]  
  
   
 [address], [address] Upcoming Health Maintenance Date Due Influenza Age 5 to Adult 8/1/2018 Pneumococcal 65+ Low/Medium Risk (2 of 2 - PPSV23) 8/7/2018 GLAUCOMA SCREENING Q2Y 1/31/2019 COLONOSCOPY 6/14/2022 DTaP/Tdap/Td series (2 - Td) 11/11/2026 Allergies as of 7/5/2018  Review Complete On: 7/5/2018 By: Pipo Sexton LPN Severity Noted Reaction Type Reactions Cefdinir  09/12/2011    Nausea and Vomiting Niacin  08/07/2013    Other (comments) [place] Current Immunizations  Reviewed on 1/31/2017 Name Date Influenza High Dose Vaccine PF 11/17/2017 Influenza Vaccine (Quad) PF 11/11/2016, 11/2/2015 Influenza Vaccine PF 10/30/2014, 11/5/2013 Influenza Vaccine Split 10/31/2012, 10/21/2011 Influenza Vaccine Whole 11/5/2010 Pneumococcal Conjugate (PCV-13) 1/31/2017 Pneumococcal Polysaccharide (PPSV-23) 8/7/2013 TD Vaccine 7/22/2009 Tdap 11/11/2016 Zoster 5/7/2012 Not reviewed this visit Vitals BP Pulse Temp Resp Height(growth percentile) Weight(growth percentile) 116/84 69 98.3 °F (36.8 °C) (Oral) 14 6' 2\" (1.88 m) 197 lb (89.4 kg) SpO2 BMI Smoking Status 98% 25.29 kg/m2 Never Smoker Vitals History BMI and BSA Data Body Mass Index Body Surface Area  
 25.29 kg/m 2 2.16 m 2 Preferred Pharmacy Pharmacy Name Phone [facility] [address] [place] [address] AT [facility] [phone] Your Updated Medication List  
  
   
This list is accurate as of 7/5/18 11:16 AM.  Always use your most recent med list.  
  
  
  
  
 albuterol 90 mcg/actuation inhaler Commonly known as:  PROAIR HFA Take 2 Puffs by inhalation every four (4) hours as needed for Wheezing. aspirin delayed-release 81 mg tablet Take  by mouth daily. POSADAS'S YEAST PO Take  by mouth. Brompheniramine-Pseudoeph-DM 2-30-10 mg/5 mL syrup Commonly known as:  Tauna Gopi TK 1 TO 2 TEA  PO Q 4 TO 6 HOURS PRF  COUGH AND SINUS CONGESTION  
  
 buPROPion  mg XL tablet Commonly known as:  Chelsi Bannister Take 1 Tab by mouth every morning for 180 days. COQ10  100-100 mg-unit Cap Generic drug:  coenzyme q10-vitamin e Take  by mouth. doxycycline 100 mg capsule Commonly known as:  VIBRAMYCIN  
TK 1 C PO BID WF FOR 10 DAYS  
  
 escitalopram oxalate 20 mg tablet Commonly known as:  Zalma Araujo Take 1 Tab by mouth daily for 180 days. finasteride 5 mg tablet Commonly known as:  PROSCAR  
TAKE 1 TABLET DAILY  
  
 fluticasone 50 mcg/actuation nasal spray Commonly known as:  FLONASE  
USE 2 SPRAYS IN EACH       NOSTRIL DAILY  
  
 gabapentin 600 mg tablet Commonly known as:  NEURONTIN Take 1 Tab by mouth three (3) times daily. Indications: NEUROPATHIC PAIN  
  
 ginkgo biloba 120 mg Tab Take  by mouth.  
  
 ginseng 100 mg Tab Take  by mouth.  
  
 latanoprost 0.005 % ophthalmic solution Commonly known as:  XALATAN  
OMAR 1 GTT INTO RIGHT EYE HS  
  
 montelukast 10 mg tablet Commonly known as:  SINGULAIR Take 1 Tab by mouth every evening. Indications: Allergic Rhinitis  
  
 naproxen 375 mg tablet Commonly known as:  NAPROSYN  
TAKE 1 TABLET BY MOUTH TWICE DAILY WITH MEALS NexIUM 40 mg capsule Generic drug:  esomeprazole TAKE 1 CAPSULE DAILY  
  
 OMEGA 3 PO Take  by mouth two (2) times a day. 3 QD  
  
 OSTEO BI-FLEX TRIPLE STRENGTH 750-625-30 mg Tab Generic drug:  Gluc-Scar-MSM#4-Q-Lthj-Mike-Bor Take  by mouth daily. predniSONE 20 mg tablet Commonly known as:  Eric Razor Take  by mouth daily (with breakfast). sildenafil citrate 100 mg tablet Commonly known as:  VIAGRA Take 1 Tab by mouth as needed. simvastatin 40 mg tablet Commonly known as:  ZOCOR  
TAKE 1 TABLET BY MOUTH EVERY NIGHT AT BEDTIME  
  
 VITAMIN B-12 1,000 mcg tablet Generic drug:  cyanocobalamin Take 1,000 mcg by mouth daily. VITAMIN C 500 mg tablet Generic drug:  ascorbic acid (vitamin C) Take  by mouth. VITAMIN D3 1,000 unit tablet Generic drug:  cholecalciferol Take  by mouth daily. Introducing \[facility]\"" & HEALTH SERVICES! Dear Kinsey Fierro: Thank you for requesting a Sidewalk account. Our records indicate that you already have an active Sidewalk account. You can access your account anytime at [email or web address] Did you know that you can access your hospital and ER discharge instructions at any time in Sidewalk? You can also review all of your test results from your hospital stay or ER visit. Additional Information If you have questions, please visit the Frequently Asked Questions section of the Sidewalk website at [email or web address] Remember, Sidewalk is NOT to be used for urgent needs. For medical emergencies, dial 911. Now available from your iPhone and Android! Please provide this summary of care documentation to your next provider. Your primary care clinician is listed as Shawnee Gates. If you have any questions after today's visit, please call [phone].

## 2018-07-05 NOTE — PROGRESS NOTES
79 y.o. WHITE OR  male who presents for evaluation. He is here to f/u after his urgent care visit. In early may, he went there and was dx'ed w bronchitis. cxr was initially told to him to be ok. He went back a couple weeks ago for similar sx and they told him then that the initial xray was officially read by radiology as being c/w copd. He has been having some mild sob, chronic coughing, occasional wheezing. He has alb hfa initially given to him some years back by Dr Sivan Nagel. It does help with the resp sx. He continues to treat the allergies a flonase, singulair, antihistamines.   He is able to do his exercise program without issues, no cp, pnd, orthopnea, edema, unilat swelling or pleurisy    Past Medical History:   Diagnosis Date    Allergic rhinitis     Dr Sivan Nagel; never took immunotherapy    Basal cell carcinoma 2013    Dr. Lawson Session s/p resection 2 spots    BPH (benign prostatic hyperplasia)     Dr. Hi Marin Cardiac stress test 08/12/2009    Neg maximal exercise stress test.  Ex time 15:00.    Depression     anxiety    Dyslipidemia     Erectile dysfunction     FHx: heart disease     GERD (gastroesophageal reflux disease)     s/p dilation 2003 Dr. Paul Hernandez; egd 2015    Heel spur     Dr Connie Gruber Hypovitaminosis D     Overweight (BMI 25.0-29.9) 2/12/2018    Peripheral neuropathy     and B CTS on EMG Dr. Roshan Gomez 2014    Sleep apnea     intol cpap Dr Madison Barrios 2015; using oral appliance Dr Mignon Sommer; AHI 17.8 min desats 83    Spinal stenosis 03/2014    MRI (3/14) showed scoliosis w multilevel degen findings, mod L3-4, L4-5 central stenosis, mod L5-S1 foraminal stenosis;  (10/17) severe L3-4 central stenosis     Past Surgical History:   Procedure Laterality Date    CARDIAC SURG PROCEDURE UNLIST  8/09    ETT negative    HX COLONOSCOPY      Dr. Paul Hernandez mild diverticulosis 2000, 6/12    HX ORTHOPAEDIC      B CT release 2007, 2009    HX ORTHOPAEDIC      DEXA t score 4.2 spine, 0.2 hip (2012)     Social History     Social History    Marital status:      Spouse name: N/A    Number of children: 2    Years of education: N/A     Occupational History    ret [employer]      Social History Main Topics    Smoking status: Never Smoker    Smokeless tobacco: Never Used    Alcohol use Yes      Comment: social    Drug use: No    Sexual activity: Yes     Partners: Female     Birth control/ protection: None     Other Topics Concern    Not on file     Social History Narrative     Current Outpatient Prescriptions   Medication Sig    predniSONE (DELTASONE) 20 mg tablet Take  by mouth daily (with breakfast).  budesonide-formoterol (SYMBICORT) 160-4.5 mcg/actuation HFAA Take 2 Puffs by inhalation two (2) times a day.  naproxen (NAPROSYN) 375 mg tablet TAKE 1 TABLET BY MOUTH TWICE DAILY WITH MEALS    doxycycline (VIBRAMYCIN) 100 mg capsule TK 1 C PO BID WF FOR 10 DAYS    Brompheniramine-Pseudoeph-DM (DIMETAPP) 2-30-10 mg/5 mL syrup TK 1 TO 2 TEA  PO Q 4 TO 6 HOURS PRF  COUGH AND SINUS CONGESTION    latanoprost (XALATAN) 0.005 % ophthalmic solution OMAR 1 GTT INTO RIGHT EYE HS    gabapentin (NEURONTIN) 600 mg tablet Take 1 Tab by mouth three (3) times daily. Indications: NEUROPATHIC PAIN    albuterol (PROAIR HFA) 90 mcg/actuation inhaler Take 2 Puffs by inhalation every four (4) hours as needed for Wheezing.  montelukast (SINGULAIR) 10 mg tablet Take 1 Tab by mouth every evening. Indications: Allergic Rhinitis    buPROPion XL (WELLBUTRIN XL) 300 mg XL tablet Take 1 Tab by mouth every morning for 180 days.  escitalopram oxalate (LEXAPRO) 20 mg tablet Take 1 Tab by mouth daily for 180 days.  finasteride (PROSCAR) 5 mg tablet TAKE 1 TABLET DAILY    fluticasone (FLONASE) 50 mcg/actuation nasal spray USE 2 SPRAYS IN EACH       NOSTRIL DAILY    YEAST,DRIED, S. CEREVISIAE, (POSADAS'S YEAST PO) Take  by mouth.     simvastatin (ZOCOR) 40 mg tablet TAKE 1 TABLET BY MOUTH EVERY NIGHT AT BEDTIME    sildenafil citrate (VIAGRA) 100 mg tablet Take 1 Tab by mouth as needed.  NEXIUM 40 mg capsule TAKE 1 CAPSULE DAILY    ginkgo biloba 120 mg Tab Take  by mouth.  ascorbic acid (VITAMIN C) 500 mg tablet Take  by mouth.  cyanocobalamin (VITAMIN B-12) 1,000 mcg tablet Take 1,000 mcg by mouth daily.  aspirin delayed-release 81 mg tablet Take  by mouth daily.  cholecalciferol, vitamin d3, (VITAMIN D3) 1,000 unit tablet Take  by mouth daily.  ginseng 100 mg Tab Take  by mouth.  coenzyme q10-vitamin e (COQ10 ) 100-100 mg-unit Cap Take  by mouth.  Gluc-Scar-MSM#6-H-Fwab-Mike-Bor (OSTEO BI-FLEX) 750-625-30 mg Tab Take  by mouth daily.  OMEGA-3 FATTY ACIDS (OMEGA 3 PO) Take  by mouth two (2) times a day. 3 QD     No current facility-administered medications for this visit. Allergies   Allergen Reactions    Cefdinir Nausea and Vomiting    Niacin Other (comments)     [place]       REVIEW OF SYSTEMS:   Ophtho  no vision change or eye pain  Oral  no mouth pain, tongue or tooth problems  Ears  no hearing loss, ear pain, fullness, no swallowing problems  Cardiac  no CP, PND, orthopnea, edema, palpitations or syncope  Chest  no breast masses  GI  no heartburn, nausea, vomiting, change in bowel habits, bleeding, hemorrhoids  Urinary  no dysuria, hematuria, flank pain, urgency, frequency  Genitals  no genital lesions, discharge, masses, ulceration, warts  Ortho  no swelling, dec ROM, myalgias    Visit Vitals    /84    Pulse 69    Temp 98.3 °F (36.8 °C) (Oral)    Resp 14    Ht 6' 2\" (1.88 m)    Wt 197 lb (89.4 kg)    SpO2 98%    BMI 25.29 kg/m2   A&O x3  Affect is appropriate. Mood stable  No apparent distress  Anicteric, no JVD, adenopathy or thyromegaly. No carotid bruits or radiated murmur  Lungs clear to auscultation, no wheezes or rales  Heart showed regular rate and rhythm.  No murmur, rubs, gallops  Abdomen soft nontender, no hepatosplenomegaly or masses. Extremities without edema. Pulses 1-2+ symmetrically    Assessment and plan:  1. R/O asthma or copd. Will schedule pfts w volumes, trial of symbicort. sfx discussed and he will gargle postinhalation. Call w update  2. Allergies. Continue current regimen. Above conditions discussed at length and patient vocalized understanding.   All questions answered to patient satisfaction

## 2018-07-13 ENCOUNTER — HOSPITAL ENCOUNTER (OUTPATIENT)
Dept: RESPIRATORY THERAPY | Age: 68
Discharge: HOME | End: 2018-07-13
Attending: INTERNAL MEDICINE
Payer: MEDICARE

## 2018-07-13 DIAGNOSIS — R05.3 CHRONIC COUGH: ICD-10-CM

## 2018-07-13 DIAGNOSIS — J98.01 BRONCHOSPASM: ICD-10-CM

## 2018-07-13 PROCEDURE — 94729 DIFFUSING CAPACITY: CPT

## 2018-07-13 PROCEDURE — 94060 EVALUATION OF WHEEZING: CPT

## 2018-07-13 PROCEDURE — 94726 PLETHYSMOGRAPHY LUNG VOLUMES: CPT

## 2018-07-18 ENCOUNTER — TELEPHONE (OUTPATIENT)
Dept: INTERNAL MEDICINE CLINIC | Age: 68
End: 2018-07-18

## 2018-07-18 NOTE — TELEPHONE ENCOUNTER
pls call    Impression:  Spirometry, lung volumes, and diffusion capacity are all normal.  No significant bronchodilator response, however failure to respond to inhaled bronchodilator should not preclude a clinical trial of therapy    No overt copd on pfts  Is he improved on symbicort so far?

## 2018-07-19 NOTE — TELEPHONE ENCOUNTER
Pt returned call, he said he wanted to ask if he should continue taking the symbicort since the PFT's are normal? If so, he wanted to know the diagnosis he would be taking it for

## 2018-07-19 NOTE — TELEPHONE ENCOUNTER
You can have bronchospasm/ reversible airways disease and have normal pfts  If the inhaler is helping, then would continue and we can talk about dec dosing/weaning off in 1-2 visits (several months from now)

## 2018-07-23 RX ORDER — SIMVASTATIN 40 MG/1
TABLET, FILM COATED ORAL
Qty: 90 TAB | Refills: 3 | Status: SHIPPED | OUTPATIENT
Start: 2018-07-23 | End: 2019-02-22 | Stop reason: ALTCHOICE

## 2018-08-06 ENCOUNTER — OFFICE VISIT (OUTPATIENT)
Dept: CARDIOLOGY CLINIC | Age: 68
End: 2018-08-06

## 2018-08-06 VITALS
OXYGEN SATURATION: 97 % | HEIGHT: 74 IN | WEIGHT: 193 LBS | BODY MASS INDEX: 24.77 KG/M2 | DIASTOLIC BLOOD PRESSURE: 76 MMHG | SYSTOLIC BLOOD PRESSURE: 110 MMHG | HEART RATE: 71 BPM

## 2018-08-06 DIAGNOSIS — G47.33 OSA (OBSTRUCTIVE SLEEP APNEA): ICD-10-CM

## 2018-08-06 DIAGNOSIS — J40 BRONCHITIS: ICD-10-CM

## 2018-08-06 DIAGNOSIS — E78.5 DYSLIPIDEMIA: Primary | ICD-10-CM

## 2018-08-06 NOTE — PROGRESS NOTES
HISTORY OF PRESENT ILLNESS  Theleonidas Summers is a 79 y.o. male. Cholesterol Problem   Associated symptoms include shortness of breath. Pertinent negatives include no chest pain, no abdominal pain and no headaches. Shortness of Breath   Pertinent negatives include no fever, no headaches, no cough, no wheezing, no PND, no orthopnea, no chest pain, no vomiting, no abdominal pain, no rash, no leg swelling and no claudication. Patient presents for a scheduled followup visit. Patient has a past medical history significant for dyslipidemia and a very strong family for early coronary disease. Patient had been taking simvastatin and fish oil for his lipids. His last stress test was in August 2009, where he exercised for a total of 15 minutes, without symptoms or EKG abnormalities. His lipids have been historically followed by his PCP. The patient was last seen in the office 1 year ago. Since last visit, he states his been feeling well. He did suffer a bad bout of acute bronchitis earlier this winter and as result has been needing to use an inhaler. He states his shortness of breath has slowly improved to baseline. He denies any exertional chest pain. He has not been as active as he had been in the past because of the bout of bronchitis. He denies any palpitations, dizziness or syncope.     Past Medical History:   Diagnosis Date    Allergic rhinitis     Dr Kacey Hooper; never took immunotherapy    Basal cell carcinoma 2013    Dr. Rylee Goyal s/p resection 2 spots    BPH (benign prostatic hyperplasia)     Dr. Elda Alexander Cardiac stress test 08/12/2009    Neg maximal exercise stress test.  Ex time 15:00.    Depression     anxiety    Dyslipidemia     Erectile dysfunction     FHx: heart disease     GERD (gastroesophageal reflux disease)     s/p dilation 2003 Dr. Mindy Zabala; egd 2015    Heel spur     Dr Geraldo Charles Hypovitaminosis D     Overweight (BMI 25.0-29.9) 2/12/2018    Peripheral neuropathy     and B CTS on EMG Dr. Twyla Schroeder 2014    Sleep apnea     intol cpap Dr Gilma Marx 2015; using oral appliance Dr Renuka Faustin; AHI 17.8 min desats 83    Spinal stenosis 03/2014    MRI (3/14) showed scoliosis w multilevel degen findings, mod L3-4, L4-5 central stenosis, mod L5-S1 foraminal stenosis;  (10/17) severe L3-4 central stenosis      Current Outpatient Prescriptions   Medication Sig Dispense Refill    simvastatin (ZOCOR) 40 mg tablet TAKE 1 TABLET BY MOUTH EVERY NIGHT AT BEDTIME 90 Tab 3    budesonide-formoterol (SYMBICORT) 160-4.5 mcg/actuation HFAA Take 2 Puffs by inhalation two (2) times a day. 1 Inhaler 12    naproxen (NAPROSYN) 375 mg tablet TAKE 1 TABLET BY MOUTH TWICE DAILY WITH MEALS 180 Tab 0    latanoprost (XALATAN) 0.005 % ophthalmic solution OMAR 1 GTT INTO RIGHT EYE HS  3    gabapentin (NEURONTIN) 600 mg tablet Take 1 Tab by mouth three (3) times daily. Indications: NEUROPATHIC PAIN 90 Tab 5    albuterol (PROAIR HFA) 90 mcg/actuation inhaler Take 2 Puffs by inhalation every four (4) hours as needed for Wheezing. 3 Inhaler 3    montelukast (SINGULAIR) 10 mg tablet Take 1 Tab by mouth every evening. Indications: Allergic Rhinitis 90 Tab 3    buPROPion XL (WELLBUTRIN XL) 300 mg XL tablet Take 1 Tab by mouth every morning for 180 days. 90 Tab 1    escitalopram oxalate (LEXAPRO) 20 mg tablet Take 1 Tab by mouth daily for 180 days. 90 Tab 1    finasteride (PROSCAR) 5 mg tablet TAKE 1 TABLET DAILY 90 Tab 3    fluticasone (FLONASE) 50 mcg/actuation nasal spray USE 2 SPRAYS IN EACH       NOSTRIL DAILY 48 g 3    YEAST,DRIED, S. CEREVISIAE, (POSADAS'S YEAST PO) Take  by mouth.  sildenafil citrate (VIAGRA) 100 mg tablet Take 1 Tab by mouth as needed. 10 Tab 1Patient    NEXIUM 40 mg capsule TAKE 1 CAPSULE DAILY 90 Cap 3    ginkgo biloba 120 mg Tab Take  by mouth.  ascorbic acid (VITAMIN C) 500 mg tablet Take  by mouth.         cyanocobalamin (VITAMIN B-12) 1,000 mcg tablet Take 1,000 mcg by mouth daily.  aspirin delayed-release 81 mg tablet Take  by mouth daily.  cholecalciferol, vitamin d3, (VITAMIN D3) 1,000 unit tablet Take  by mouth daily.  ginseng 100 mg Tab Take  by mouth.  coenzyme q10-vitamin e (COQ10 ) 100-100 mg-unit Cap Take  by mouth.  Gluc-Scar-MSM#6-M-Mzry-Mkie-Bor (OSTEO BI-FLEX) 750-625-30 mg Tab Take  by mouth daily.  OMEGA-3 FATTY ACIDS (OMEGA 3 PO) Take  by mouth two (2) times a day. 3 QD         Allergies   Allergen Reactions    Cefdinir Nausea and Vomiting    Niacin Other (comments)     Flushing        Review of Systems   Constitutional: Negative for chills, fever and weight loss. HENT: Negative for nosebleeds. Eyes: Negative for blurred vision and double vision. Respiratory: Positive for shortness of breath. Negative for cough and wheezing. Cardiovascular: Negative for chest pain, palpitations, orthopnea, claudication, leg swelling and PND. Gastrointestinal: Negative for abdominal pain, heartburn, nausea and vomiting. Genitourinary: Negative for dysuria and hematuria. Musculoskeletal: Negative for falls and myalgias. Skin: Negative for rash. Neurological: Negative for dizziness, focal weakness and headaches. Endo/Heme/Allergies: Does not bruise/bleed easily. Psychiatric/Behavioral: Negative for substance abuse. Visit Vitals    /76    Pulse 71    Ht 6' 2\" (1.88 m)    Wt 87.5 kg (193 lb)    SpO2 97%    BMI 24.78 kg/m2      Physical Exam   Constitutional: He is oriented to person, place, and time. He appears well-developed and well-nourished. HENT:   Head: Normocephalic and atraumatic. Eyes: Conjunctivae are normal.   Neck: Neck supple. No JVD present. Carotid bruit is not present. Cardiovascular: Normal rate, regular rhythm, S1 normal, S2 normal and normal pulses. Exam reveals no gallop and no S3. No murmur heard. Pulmonary/Chest: Breath sounds normal. He has no wheezes.  He has no rales.   Abdominal: Soft. Bowel sounds are normal. There is no tenderness. Musculoskeletal: He exhibits no edema. Neurological: He is alert and oriented to person, place, and time. Skin: Skin is warm and dry. EKG:  Normal sinus rhythm. Normal axis, normal QTc interval.  No ST/T wave abnormalities. Normal ECG. No change compared to previous EKG      ASSESSMENT and PLAN    Shortness of breath. This is likely due to his bad bout of bronchitis which occurred earlier this year. He is now taking an inhaler and states his breathing is getting close to baseline. Dyslipidemia. The patient is currently managed with simvastatin 40 mg daily and fish oil supplements. His most recent lipid panel from February 2018 was excellent. This is being followed by his PCP. Family history for coronary disease: Because of this we have discussed the importance of risk factor modification. Obstructive sleep apnea. Unknown severity, however, patient has declined CPAP in the past.  He does not have any overly concerning symptoms of daytime somnolence. Followup in 12 months, sooner if needed.

## 2018-08-06 NOTE — PROGRESS NOTES
1. Have you been to the ER, urgent care clinic since your last visit? Hospitalized since your last visit? No     2. Have you seen or consulted any other health care providers outside of the [facility] since your last visit? Include any pap smears or colon screening.  No

## 2018-08-06 NOTE — MR AVS SNAPSHOT
[address] [place] [address]  [place] [address] 
[phone] Patient: Wesly Katz MRN: [id number] MITCH:7[birth year]0284 Visit Information Date & Time Provider Department Dept. Phone Encounter #  
 8/6/2018  4:20 PM Fabian Cruz MD [facility] [id number] Your Appointments 12/5/2018  9:10 AM  
Follow Up with Scott Wells NP  
[facility] Appt Note: neck/lower back 6 mo fu  
 [facility] [address] [facility]  
  
    
 2/18/2019  8:20 AM  
PHYSICAL with Edilson Melgar MD  
[facility] Appt Note: physical labs at [facility] [address] [facility], [facility]  [place] [address] [facility]  
  
   
 [address] [facility], [facility] Upcoming Health Maintenance Date Due Influenza Age 5 to Adult 8/1/2018 Pneumococcal 65+ Low/Medium Risk (2 of 2 - PPSV23) 8/7/2018 GLAUCOMA SCREENING Q2Y 1/31/2019 MEDICARE YEARLY EXAM 2/13/2019 COLONOSCOPY 6/14/2022 DTaP/Tdap/Td series (2 - Td) 11/11/2026 Allergies as of 8/6/2018  Review Complete On: 7/5/2018 By: Edilson Melgar MD  
  
 Severity Noted Reaction Type Reactions Cefdinir  09/12/2011    Nausea and Vomiting Niacin  08/07/2013    Other (comments) Flushing Current Immunizations  Reviewed on 1/31/2017 Name Date Influenza High Dose Vaccine PF 11/17/2017 Influenza Vaccine (Quad) PF 11/11/2016, 11/2/2015 Influenza Vaccine PF 10/30/2014, 11/5/2013 Influenza Vaccine Split 10/31/2012, 10/21/2011 Influenza Vaccine Whole 11/5/2010 Pneumococcal Conjugate (PCV-13) 1/31/2017 Pneumococcal Polysaccharide (PPSV-23) 8/7/2013 TD Vaccine 7/22/2009 Tdap 11/11/2016 Zoster 5/7/2012 Not reviewed this visit You Were Diagnosed With   
  
 Codes Comments Dyslipidemia    -  Primary ICD-10-CM: E78.5 ICD-9-CM: 272.4 Vitals BP Pulse Height(growth percentile) Weight(growth percentile) SpO2 BMI  
 110/76 71 6' 2\" (1.88 m) 193 lb (87.5 kg) 97% 24.78 kg/m2 Smoking Status Never Smoker Vitals History BMI and BSA Data Body Mass Index Body Surface Area 24.78 kg/m 2 2.14 m 2 Preferred Pharmacy Pharmacy Name Phone [facility] [address] [place] [address] AT [facility] [phone] Your Updated Medication List  
  
   
This list is accurate as of 8/6/18  4:26 PM.  Always use your most recent med list.  
  
  
  
  
 albuterol 90 mcg/actuation inhaler Commonly known as:  PROAIR HFA Take 2 Puffs by inhalation every four (4) hours as needed for Wheezing. aspirin delayed-release 81 mg tablet Take  by mouth daily. POSADAS'S YEAST PO Take  by mouth.  
  
 budesonide-formoterol 160-4.5 mcg/actuation Hfaa Commonly known as:  SYMBICORT Take 2 Puffs by inhalation two (2) times a day. buPROPion  mg XL tablet Commonly known as:  Leela Sizer Take 1 Tab by mouth every morning for 180 days. COQ10  100-100 mg-unit Cap Generic drug:  coenzyme q10-vitamin e Take  by mouth.  
  
 escitalopram oxalate 20 mg tablet Commonly known as:  Shabbir Meng Take 1 Tab by mouth daily for 180 days. finasteride 5 mg tablet Commonly known as:  PROSCAR  
TAKE 1 TABLET DAILY  
  
 fluticasone 50 mcg/actuation nasal spray Commonly known as:  FLONASE  
USE 2 SPRAYS IN EACH       NOSTRIL DAILY  
  
 gabapentin 600 mg tablet Commonly known as:  NEURONTIN Take 1 Tab by mouth three (3) times daily. Indications: NEUROPATHIC PAIN  
  
 ginkgo biloba 120 mg Tab Take  by mouth.  
  
 ginseng 100 mg Tab Take  by mouth. latanoprost 0.005 % ophthalmic solution Commonly known as:  XALATAN  
OMAR 1 GTT INTO RIGHT EYE HS  
  
 montelukast 10 mg tablet Commonly known as:  SINGULAIR Take 1 Tab by mouth every evening. Indications: Allergic Rhinitis  
  
 naproxen 375 mg tablet Commonly known as:  NAPROSYN  
TAKE 1 TABLET BY MOUTH TWICE DAILY WITH MEALS NexIUM 40 mg capsule Generic drug:  esomeprazole TAKE 1 CAPSULE DAILY  
  
 OMEGA 3 PO Take  by mouth two (2) times a day. 3 QD  
  
 OSTEO BI-FLEX TRIPLE STRENGTH 750-625-30 mg Tab Generic drug:  Gluc-Scar-MSM#1-X-Vqpc-Mike-Bor Take  by mouth daily. sildenafil citrate 100 mg tablet Commonly known as:  VIAGRA Take 1 Tab by mouth as needed. simvastatin 40 mg tablet Commonly known as:  ZOCOR  
TAKE 1 TABLET BY MOUTH EVERY NIGHT AT BEDTIME  
  
 VITAMIN B-12 1,000 mcg tablet Generic drug:  cyanocobalamin Take 1,000 mcg by mouth daily. VITAMIN C 500 mg tablet Generic drug:  ascorbic acid (vitamin C) Take  by mouth. VITAMIN D3 1,000 unit tablet Generic drug:  cholecalciferol Take  by mouth daily. We Performed the Following AMB POC EKG ROUTINE W/ 12 LEADS, INTER & REP [29206 CPT(R)] Introducing [facility]! Dear Madyson Tomlinson: Thank you for requesting a [employer] account. Our records indicate that you already have an active [employer]ine account. You can access your account anytime at [email or web address] Did you know that you can access your hospital and ER discharge instructions at any time in [employer]ine? You can also review all of your test results from your hospital stay or ER visit. Additional Information If you have questions, please visit the Frequently Asked Questions section of the Immun[employer]ine website at [email or web address] Remember, Immunovaccine is NOT to be used for urgent needs. For medical emergencies, dial 911. Now available from your iPhone and Android! Please provide this summary of care documentation to your next provider. Your primary care clinician is listed as Tarik Ripple. If you have any questions after today's visit, please call [phone].

## 2018-08-14 ENCOUNTER — TELEPHONE (OUTPATIENT)
Dept: INTERNAL MEDICINE CLINIC | Age: 68
End: 2018-08-14

## 2018-08-14 NOTE — TELEPHONE ENCOUNTER
Request guidance:  current medications of Bupropium  and Lexipro generic are not sufficiently effective  no RX has had long term positive effects since Paxil years ago -- Recommendation?  Should I stop taking these?  If so, how do I go about that?  KAMILLEX/R Sergo Guerrero

## 2018-08-16 NOTE — TELEPHONE ENCOUNTER
As of the last note by Dr Mireille Prieto (psych), she said he was doing ok on laxapro and bupropion  Suggestion at this point would be to try effexor in place of the lexapro.    If interested, we can call in

## 2018-08-21 ENCOUNTER — TELEPHONE (OUTPATIENT)
Dept: INTERNAL MEDICINE CLINIC | Age: 68
End: 2018-08-21

## 2018-08-21 NOTE — TELEPHONE ENCOUNTER
----- Message from Ronnie Pittman sent at 8/20/2018 11:29 PM EDT -----  Regarding: RE:(No subject)  Contact: [phone]  I see there is no generic for Effexor --- do you have an idea of the cost?  Also, if I took it, would I still take he bupropion with it? Thank you/PASQUALE Solis Senters  ----- Message -----  From: Hazel Terrell LPN  Sent: 5/98/4615  9:01 AM EDT  To: Ronnie Pittman  Subject: (No subject)    Thanks for your response Mr. Carlos Hsu! Dr Dino Shultz suggests the following:    As of the last note by Dr Jazmin Merchant (psych), she said he was doing ok on lexapro and bupropion  Suggestion at this point would be to try effexor in place of the lexapro. If interested, we can call in      Are you interested in trying Effexor? If so, we would replace the lexapro with the Effexor.     Thanks    Tico Portillo negative

## 2018-08-23 ENCOUNTER — TELEPHONE (OUTPATIENT)
Dept: INTERNAL MEDICINE CLINIC | Age: 68
End: 2018-08-23

## 2018-08-23 NOTE — TELEPHONE ENCOUNTER
After reading over the side effects, I believe I would not fare well wit Effexor -- also, my wife took it years ago and had some side effects . if there is no other medication option Dr. Britt Carmichael recommends (?), I will stick with lexipro and continue the light treatment which seems to have a positive effect so far . Suni Francois .. Amador hogan/R Terressa Goodell

## 2018-08-23 NOTE — TELEPHONE ENCOUNTER
There are always options    zoloft  prozac  cymbalta  remeron    Ask pt to look up the above and see if he's willing to try them

## 2018-08-31 ENCOUNTER — TELEPHONE (OUTPATIENT)
Dept: INTERNAL MEDICINE CLINIC | Age: 68
End: 2018-08-31

## 2018-09-04 ENCOUNTER — TELEPHONE (OUTPATIENT)
Dept: INTERNAL MEDICINE CLINIC | Age: 68
End: 2018-09-04

## 2018-09-04 RX ORDER — SILDENAFIL 100 MG/1
100 TABLET, FILM COATED ORAL AS NEEDED
Qty: 10 TAB | Refills: 1 | Status: SHIPPED | OUTPATIENT
Start: 2018-09-04 | End: 2020-01-23 | Stop reason: ALTCHOICE

## 2018-09-04 NOTE — TELEPHONE ENCOUNTER
----- Message from Ruby Orta sent at 9/3/2018  6:20 PM EDT -----  Regarding: RE:Medication  Contact: [phone]  I am not comfortable selecting my own medications  I will just stick with what I have and rely on light therapy for improvement. R/Martin Blanco  ----- Message -----  From: Anahy Norman LPN  Sent: 6/89/9882  8:16 AM EDT  To: Ruby Orta  Subject: Medication    Good morning     I have a message for you from [address]. As follows:     There are always options     zoloft  prozac  cymbalta  remeron     Ask pt to look up the above and see if he's willing to try them

## 2018-09-04 NOTE — TELEPHONE ENCOUNTER
The following message from [address]    I am not comfortable selecting my own medications  I will just stick with what I have and rely on light therapy for improvement.  R/Martin Ramirez

## 2018-09-05 NOTE — TELEPHONE ENCOUNTER
Pt called again re: viagra- it was to be sent to  [facility] #[phone] phone- its a new pharmacy- please resend- was not supposed to go to [facility]

## 2018-09-19 RX ORDER — NAPROXEN 375 MG/1
TABLET ORAL
Qty: 180 TAB | Refills: 0 | Status: SHIPPED | OUTPATIENT
Start: 2018-09-19 | End: 2018-12-22 | Stop reason: SDUPTHER

## 2018-10-26 ENCOUNTER — CLINICAL SUPPORT (OUTPATIENT)
Dept: INTERNAL MEDICINE CLINIC | Age: 68
End: 2018-10-26

## 2018-10-26 DIAGNOSIS — Z23 ENCOUNTER FOR IMMUNIZATION: Primary | ICD-10-CM

## 2018-11-15 ENCOUNTER — TELEPHONE (OUTPATIENT)
Dept: INTERNAL MEDICINE CLINIC | Age: 68
End: 2018-11-15

## 2018-11-15 DIAGNOSIS — F33.0 MDD (MAJOR DEPRESSIVE DISORDER), RECURRENT EPISODE, MILD (HCC): ICD-10-CM

## 2018-11-15 DIAGNOSIS — F33.0 MDD (MAJOR DEPRESSIVE DISORDER), RECURRENT EPISODE, MILD (HCC): Primary | ICD-10-CM

## 2018-11-15 RX ORDER — FLUOXETINE HYDROCHLORIDE 20 MG/1
20 CAPSULE ORAL DAILY
Qty: 30 CAP | Refills: 11 | Status: SHIPPED | OUTPATIENT
Start: 2018-11-15 | End: 2018-11-15 | Stop reason: SDUPTHER

## 2018-11-15 NOTE — TELEPHONE ENCOUNTER
Months back I noted diminished effectiveness of Lexipro generic and Wellbutrin generic --- nurse responded with names of 5 or so medications Dr. Millie Villagran could consider, asking which I  would prefer --- I did not pursue as I did/do not feel qualified to make such a call  however, regular cycles of depression - as tangible to me as a headache - in morning with some easing off mid afternoon have increased - I am willing to try one of the other meds if the doctor would designate which.  Pleases advise. Sarah Kittredge you Jenelle Ho

## 2018-11-16 RX ORDER — FLUOXETINE HYDROCHLORIDE 20 MG/1
CAPSULE ORAL
Qty: 90 CAP | Refills: 11 | Status: SHIPPED | OUTPATIENT
Start: 2018-11-16 | End: 2019-01-03 | Stop reason: ALTCHOICE

## 2018-12-11 ENCOUNTER — OFFICE VISIT (OUTPATIENT)
Dept: INTERNAL MEDICINE CLINIC | Age: 68
End: 2018-12-11

## 2018-12-11 VITALS
DIASTOLIC BLOOD PRESSURE: 86 MMHG | TEMPERATURE: 98.1 F | HEART RATE: 72 BPM | SYSTOLIC BLOOD PRESSURE: 130 MMHG | WEIGHT: 193 LBS | OXYGEN SATURATION: 98 % | BODY MASS INDEX: 24.77 KG/M2 | HEIGHT: 74 IN | RESPIRATION RATE: 14 BRPM

## 2018-12-11 DIAGNOSIS — R06.2 WHEEZING: Primary | ICD-10-CM

## 2018-12-11 NOTE — PROGRESS NOTES
1. Have you been to the ER, urgent care clinic or hospitalized since your last visit? NO.  
 
2. Have you seen or consulted any other health care providers outside of the [address] [place] [address] since your last visit (Include any pap smears or colon screening)? NO Do you have an Advanced Directive? YES Chief Complaint Patient presents with  
 Other  
  pt requesting a referral to pulmonary

## 2018-12-12 ENCOUNTER — TELEPHONE (OUTPATIENT)
Dept: INTERNAL MEDICINE CLINIC | Age: 68
End: 2018-12-12

## 2018-12-12 NOTE — TELEPHONE ENCOUNTER
Needs copy of pulmonary test graphs and charts.     Faxed to [phone] - this fax not working faxed to the second number of [phone] 2:08 pm

## 2018-12-19 ENCOUNTER — OFFICE VISIT (OUTPATIENT)
Dept: ORTHOPEDIC SURGERY | Age: 68
End: 2018-12-19

## 2018-12-19 VITALS
TEMPERATURE: 96.6 F | HEART RATE: 65 BPM | HEIGHT: 74 IN | SYSTOLIC BLOOD PRESSURE: 143 MMHG | OXYGEN SATURATION: 97 % | WEIGHT: 192.6 LBS | RESPIRATION RATE: 14 BRPM | DIASTOLIC BLOOD PRESSURE: 94 MMHG | BODY MASS INDEX: 24.72 KG/M2

## 2018-12-19 DIAGNOSIS — M54.16 LUMBAR RADICULOPATHY: ICD-10-CM

## 2018-12-19 DIAGNOSIS — M48.062 SPINAL STENOSIS, LUMBAR REGION WITH NEUROGENIC CLAUDICATION: Primary | ICD-10-CM

## 2018-12-19 DIAGNOSIS — M47.816 SPONDYLOSIS OF LUMBAR REGION WITHOUT MYELOPATHY OR RADICULOPATHY: ICD-10-CM

## 2018-12-19 RX ORDER — BUPROPION HYDROCHLORIDE 150 MG/1
300 TABLET, EXTENDED RELEASE ORAL DAILY
COMMUNITY
End: 2019-01-04 | Stop reason: SDUPTHER

## 2018-12-19 RX ORDER — GABAPENTIN 600 MG/1
600 TABLET ORAL 3 TIMES DAILY
Qty: 90 TAB | Refills: 5 | Status: SHIPPED | OUTPATIENT
Start: 2018-12-19 | End: 2019-06-26 | Stop reason: SDUPTHER

## 2018-12-19 NOTE — PATIENT INSTRUCTIONS
Back Pain: Care Instructions  Your Care Instructions    Back pain has many possible causes. It is often related to problems with muscles and ligaments of the back. It may also be related to problems with the nerves, discs, or bones of the back. Moving, lifting, standing, sitting, or sleeping in an awkward way can strain the back. Sometimes you don't notice the injury until later. Arthritis is another common cause of back pain. Although it may hurt a lot, back pain usually improves on its own within several weeks. Most people recover in 12 weeks or less. Using good home treatment and being careful not to stress your back can help you feel better sooner. Follow-up care is a key part of your treatment and safety. Be sure to make and go to all appointments, and call your doctor if you are having problems. It's also a good idea to know your test results and keep a list of the medicines you take. How can you care for yourself at home? · Sit or lie in positions that are most comfortable and reduce your pain. Try one of these positions when you lie down:  ? Lie on your back with your knees bent and supported by large pillows. ? Lie on the floor with your legs on the seat of a sofa or chair. ? Lie on your side with your knees and hips bent and a pillow between your legs. ? Lie on your stomach if it does not make pain worse. · Do not sit up in bed, and avoid soft couches and twisted positions. Bed rest can help relieve pain at first, but it delays healing. Avoid bed rest after the first day of back pain. · Change positions every 30 minutes. If you must sit for long periods of time, take breaks from sitting. Get up and walk around, or lie in a comfortable position. · Try using a heating pad on a low or medium setting for 15 to 20 minutes every 2 or 3 hours. Try a warm shower in place of one session with the heating pad. · You can also try an ice pack for 10 to 15 minutes every 2 to 3 hours.  Put a thin cloth between the ice pack and your skin. · Take pain medicines exactly as directed. ? If the doctor gave you a prescription medicine for pain, take it as prescribed. ? If you are not taking a prescription pain medicine, ask your doctor if you can take an over-the-counter medicine. · Take short walks several times a day. You can start with 5 to 10 minutes, 3 or 4 times a day, and work up to longer walks. Walk on level surfaces and avoid hills and stairs until your back is better. · Return to work and other activities as soon as you can. Continued rest without activity is usually not good for your back. · To prevent future back pain, do exercises to stretch and strengthen your back and stomach. Learn how to use good posture, safe lifting techniques, and proper body mechanics. When should you call for help? Call your doctor now or seek immediate medical care if:    · You have new or worsening numbness in your legs.     · You have new or worsening weakness in your legs. (This could make it hard to stand up.)     · You lose control of your bladder or bowels.    Watch closely for changes in your health, and be sure to contact your doctor if:    · You have a fever, lose weight, or don't feel well.     · You do not get better as expected. Where can you learn more? Go to http://estephanie-haile.info/. Enter K732 in the search box to learn more about \"Back Pain: Care Instructions. \"  Current as of: November 29, 2017  Content Version: 11.8  © 6480-8419 [employer]. Care instructions adapted under license by [employer] (which disclaims liability or warranty for this information). If you have questions about a medical condition or this instruction, always ask your healthcare professional. [place] [address] any warranty or liability for your use of this information.

## 2018-12-19 NOTE — PROGRESS NOTES
Chief complaint/History of Present Illness:  Chief Complaint   Patient presents with    Back Pain     6 month follow up     Leg Pain     LEONIDES Saunders is a  79 y.o.  male      HISTORY OF PRESENT ILLNESS:  The patient comes in today for follow-up of his neck pain and back pain. He has greater back pain than neck pain without any leg pain. He takes Neurontin 600 mg t.i.d. He does continue to work out at [employer] 3 to 4 times a week. About 3 weeks ago, he hurt his left shoulder doing that. Since we last saw him, he had a bout of bronchitis and went to the  [facility]. He had some x-rays that showed some COPD in which his PCP had pulmonary functions tests. He is now on Symbicort. He is still having some issues with his lungs. He is going to see Dr. Sheyla Benitez tomorrow, pulmonologist.  He also changed his Lexapro to Prozac. He does have bilateral carpal tunnel syndrome that has gotten better since he has retired. He is not on computers as much. He is a nonsmoker. He still gets left-sided joint pain from time to time. PHYSICAL EXAMINATION:  Mr. Estephanie Dickinson is a 61-year-old male. He is alert and oriented with normal mood and affect. He has a full weightbearing nonantalgic gait. No assistive device. He has 5/5 strength bilateral lower extremities. Negative straight leg raise. He has pain in that left shoulder at about 90 degrees. ASSESSMENT/PLAN:  This is a patient with lumbar spinal stenosis and spondylosis. Some SI joint dysfunction. He has some left shoulder pain with a positive impingement sign. He wants to give it a little more time for his shoulder to recover. If it is not better in a month, he will call up here and we will have him see him Dr. Ansley Carvalho. I have refilled his Neurontin 600 mg t.i.d. which works well for him. We will see him back in 6 months, sooner if needed.           Review of systems:    Past Medical History:   Diagnosis Date    Allergic rhinitis      Marilyna; never took immunotherapy    Basal cell carcinoma 2013    Dr. Randel Spurling s/p resection 2 spots    BPH (benign prostatic hyperplasia)     Dr. Marco Graham Cardiac stress test 08/12/2009    Neg maximal exercise stress test.  Ex time 15:00.    Depression     anxiety    Dyslipidemia     Erectile dysfunction     FHx: heart disease     GERD (gastroesophageal reflux disease)     s/p dilation 2003 Dr. Lola Beltran; egd 2015    Heel spur     Dr Eliseo Fernandes Hypovitaminosis D     Overweight (BMI 25.0-29.9) 2/12/2018    Peripheral neuropathy     and B CTS on EMG Dr. Jessie Habermann Dr Loree Pro 2014    Sleep apnea     intol cpap Dr Yuan Cotter 2015; using oral appliance Dr Best Herbert; AHI 17.8 min desats 83    Spinal stenosis 03/2014    MRI (3/14) showed scoliosis w multilevel degen findings, mod L3-4, L4-5 central stenosis, mod L5-S1 foraminal stenosis;  (10/17) severe L3-4 central stenosis     Past Surgical History:   Procedure Laterality Date    CARDIAC SURG PROCEDURE UNLIST  8/09    ETT negative    CHEST SURGERY PROCEDURE UNLISTED  07/2018    Dr Shaina Rubio; FEV1 96%, 5% improvement postbd, ratio 104, , , DLCO 82    HX COLONOSCOPY      Dr. Lola Beltran mild diverticulosis 2000, 6/12    HX ORTHOPAEDIC      B CT release 2007, 2009    HX ORTHOPAEDIC      DEXA t score 4.2 spine, 0.2 hip (2012)     Social History     Socioeconomic History    Marital status:      Spouse name: Not on file    Number of children: 2    Years of education: Not on file    Highest education level: Not on file   Social Needs    Financial resource strain: Not on file    Food insecurity - worry: Not on file    Food insecurity - inability: Not on file   [employer] needs - medical: Not on file   [employer] needs - non-medical: Not on file   Occupational History    Occupation: ret [employer]   Tobacco Use    Smoking status: Never Smoker    Smokeless tobacco: Never Used   Substance and Sexual Activity    Alcohol use: Yes     Comment: social    Drug use: No    Sexual activity: Yes     Partners: Female     Birth control/protection: None   Other Topics Concern    Not on file   Social History Narrative    Not on file     Family History   Problem Relation Age of Onset    Cancer Mother         leukemia    Heart Disease Father     Psychiatric Disorder Daughter         depression       Physical Exam:  Visit Vitals  BP (!) 143/94   Pulse 65   Temp 96.6 °F (35.9 °C) (Oral)   Resp 14   Ht 6' 2\" (1.88 m)   Wt 192 lb 9.6 oz (87.4 kg)   SpO2 97%   BMI 24.73 kg/m²     Pain Scale: 1/10       has been . reviewed and is appropriate          Diagnoses and all orders for this visit:    1. Spinal stenosis, lumbar region with neurogenic claudication  -     gabapentin (NEURONTIN) 600 mg tablet; Take 1 Tab by mouth three (3) times daily. 2. Spondylosis of lumbar region without myelopathy or radiculopathy    3. Lumbar radiculopathy            Follow-up Disposition:  Return in about 6 months (around 6/19/2019) for with NP.         We have informed Daria Theodore to notify us for immediate appointment if he has any worsening neurogical symptoms or if an emergency situation presents, then call [phone]

## 2018-12-20 ENCOUNTER — TELEPHONE (OUTPATIENT)
Dept: INTERNAL MEDICINE CLINIC | Age: 68
End: 2018-12-20

## 2018-12-20 NOTE — TELEPHONE ENCOUNTER
Please schedule with Geronimo Nelson or Nika Wells tomorrow. His BP was 143/94 at Ortho yesterday and we can't treat over the phone and Dr. Donato Jaimes is out of the office and doesn't have any appts soon. We will need to see him before adjusting any medications.   He has not had an OV here since 7/5/18

## 2018-12-20 NOTE — TELEPHONE ENCOUNTER
Ptt recently changed meds 3 weeks ago and has noticed his BP elevated and not feeling himself please advise

## 2018-12-21 ENCOUNTER — OFFICE VISIT (OUTPATIENT)
Dept: INTERNAL MEDICINE CLINIC | Age: 68
End: 2018-12-21

## 2018-12-21 VITALS
OXYGEN SATURATION: 98 % | RESPIRATION RATE: 14 BRPM | SYSTOLIC BLOOD PRESSURE: 128 MMHG | TEMPERATURE: 97.6 F | HEART RATE: 72 BPM | BODY MASS INDEX: 24.56 KG/M2 | DIASTOLIC BLOOD PRESSURE: 84 MMHG | WEIGHT: 191.4 LBS | HEIGHT: 74 IN

## 2018-12-21 DIAGNOSIS — F41.1 GAD (GENERALIZED ANXIETY DISORDER): ICD-10-CM

## 2018-12-21 DIAGNOSIS — F33.0 MDD (MAJOR DEPRESSIVE DISORDER), RECURRENT EPISODE, MILD (HCC): ICD-10-CM

## 2018-12-21 DIAGNOSIS — Z86.59 HISTORY OF OCD (OBSESSIVE COMPULSIVE DISORDER): ICD-10-CM

## 2018-12-21 DIAGNOSIS — R11.0 NAUSEA: ICD-10-CM

## 2018-12-21 DIAGNOSIS — R03.0 ELEVATED BLOOD PRESSURE READING: Primary | ICD-10-CM

## 2018-12-21 NOTE — PROGRESS NOTES
HPI/History  Renetta Drake is a 79 y.o.  male who presents for evaluation. Accompanied by wife. Pt has noticed about 4 elevated BP readings (relative to his baseline) over the last few weeks. BP usually acceptable. 2 readings were within preHTN range, 1 with 726J systolic, and 1 with 445T systolic. No associated cardiopulmonary sxs. Potential factors include switching from lexapro to prozac ~11/25, use of prn albuterol, use of prn OTC meds, intermittent nausea, and other possible transient/self-limited causes/contributors. Nausea could be due to change to prozac or from the underlying mood disorders. No other GI sxs. No other sxs/complaints. Patient Active Problem List   Diagnosis Code    Dyslipidemia E78.5    Hypovitaminosis D E55.9    BPH (benign prostatic hyperplasia) Dr. Jorge Horton N40.0    Neuropathy Dr. James Newell G62.9    Erectile dysfunction N52.9    Gastroesophageal reflux disease without esophagitis K21.9    Diverticulosis of large intestine without hemorrhage Dr Aleksandra Antunez K57.30    GALILEA (obstructive sleep apnea) Dr James Newell using oral appliance AHI 17.8 G47.33    Spinal stenosis, lumbar region with neurogenic claudication M48.062    Spondylosis of lumbar region without myelopathy or radiculopathy M47.816    Lumbar radiculopathy M54.16    Other intervertebral disc degeneration, lumbar region M51.36    Overweight (BMI 25.0-29. 9) E66.3    MDD (major depressive disorder), recurrent episode, mild (HCC) F33.0    CHANCE (generalized anxiety disorder) F41.1    History of OCD (obsessive compulsive disorder) Z86.59     Past Medical History:   Diagnosis Date    Allergic rhinitis     Dr Margaux Agrawal; never took immunotherapy    Basal cell carcinoma 2013    Dr. Demi Rogel s/p resection 2 spots    BPH (benign prostatic hyperplasia)     Dr. Timoteo Marin Cardiac stress test 08/12/2009    Neg maximal exercise stress test.  Ex time 15:00.    Depression     anxiety    Dyslipidemia     Erectile dysfunction     FHx: heart disease     GERD (gastroesophageal reflux disease)     s/p dilation 2003 Dr. Brielle Love; egd 2015    Heel spur     Dr Judy Quintana Hypovitaminosis D     Overweight (BMI 25.0-29.9) 2/12/2018    Peripheral neuropathy     and B CTS on EMG Dr. Cristy Veloz 2014    Sleep apnea     intol cpap Dr Alfredo Green 2015; using oral appliance Dr Flor Samuels; AHI 17.8 min desats 83    Spinal stenosis 03/2014    MRI (3/14) showed scoliosis w multilevel degen findings, mod L3-4, L4-5 central stenosis, mod L5-S1 foraminal stenosis;  (10/17) severe L3-4 central stenosis     Past Surgical History:   Procedure Laterality Date    CARDIAC SURG PROCEDURE UNLIST  8/09    ETT negative    CHEST SURGERY PROCEDURE UNLISTED  07/2018    Dr Mindi Gipson; FEV1 96%, 5% improvement postbd, ratio 104, , , DLCO 82    HX COLONOSCOPY      Dr. Brielle Love mild diverticulosis 2000, 6/12    HX ORTHOPAEDIC      B CT release 2007, 2009    HX ORTHOPAEDIC      DEXA t score 4.2 spine, 0.2 hip (2012)     Social History     Socioeconomic History    Marital status:      Spouse name: Not on file    Number of children: 2    Years of education: Not on file    Highest education level: Not on file   Social Needs    Financial resource strain: Not on file    Food insecurity - worry: Not on file    Food insecurity - inability: Not on file   [employer] needs - medical: Not on file   [employer] needs - non-medical: Not on file   Occupational History    Occupation: ret [employer]   Tobacco Use    Smoking status: Never Smoker    Smokeless tobacco: Never Used   Substance and Sexual Activity    Alcohol use: Yes     Comment: social    Drug use: No    Sexual activity: Yes     Partners: Female     Birth control/protection: None   Other Topics Concern    Not on file   Social History Narrative    Not on file     Family History   Problem Relation Age of Onset    Cancer Mother         leukemia    Heart Disease Father    Luis Padilla Psychiatric Disorder Daughter         depression     Current Outpatient Medications   Medication Sig    buPROPion SR (WELLBUTRIN SR) 150 mg SR tablet 300 mg daily.  gabapentin (NEURONTIN) 600 mg tablet Take 1 Tab by mouth three (3) times daily.  FLUoxetine (PROZAC) 20 mg capsule TAKE 1 CAPSULE BY MOUTH DAILY    naproxen (NAPROSYN) 375 mg tablet TAKE 1 TABLET BY MOUTH TWICE DAILY WITH MEALS    sildenafil citrate (VIAGRA) 100 mg tablet Take 1 Tab by mouth as needed.  simvastatin (ZOCOR) 40 mg tablet TAKE 1 TABLET BY MOUTH EVERY NIGHT AT BEDTIME    budesonide-formoterol (SYMBICORT) 160-4.5 mcg/actuation HFAA Take 2 Puffs by inhalation two (2) times a day.  latanoprost (XALATAN) 0.005 % ophthalmic solution OMAR 1 GTT INTO RIGHT EYE HS    albuterol (PROAIR HFA) 90 mcg/actuation inhaler Take 2 Puffs by inhalation every four (4) hours as needed for Wheezing.  montelukast (SINGULAIR) 10 mg tablet Take 1 Tab by mouth every evening. Indications: Allergic Rhinitis    finasteride (PROSCAR) 5 mg tablet TAKE 1 TABLET DAILY    fluticasone (FLONASE) 50 mcg/actuation nasal spray USE 2 SPRAYS IN EACH       NOSTRIL DAILY    NEXIUM 40 mg capsule TAKE 1 CAPSULE DAILY    ginkgo biloba 120 mg Tab Take  by mouth.  ascorbic acid (VITAMIN C) 500 mg tablet Take  by mouth.  cyanocobalamin (VITAMIN B-12) 1,000 mcg tablet Take 1,000 mcg by mouth daily.  aspirin delayed-release 81 mg tablet Take  by mouth daily.  cholecalciferol, vitamin d3, (VITAMIN D3) 1,000 unit tablet Take  by mouth daily.  ginseng 100 mg Tab Take  by mouth.  coenzyme q10-vitamin e (COQ10 ) 100-100 mg-unit Cap Take  by mouth.  Gluc-Scar-MSM#1-O-Ehar-Mike-Bor (OSTEO BI-FLEX) 750-625-30 mg Tab Take  by mouth daily.  OMEGA-3 FATTY ACIDS (OMEGA 3 PO) Take  by mouth two (2) times a day. 3 QD    YEAST,DRIED, S. CEREVISIAE, (POSADAS'S YEAST PO) Take  by mouth.      No current facility-administered medications for this visit. Allergies   Allergen Reactions    Cefdinir Nausea and Vomiting    Niacin Other (comments)     Flushing         Review of Systems  Aside from those included in HPI, remainder of complete ROS negative. Physical Examination  Visit Vitals  /84 (BP 1 Location: Right arm, BP Patient Position: Sitting)   Pulse 72   Temp 97.6 °F (36.4 °C) (Oral)   Resp 14   Ht 6' 2\" (1.88 m)   Wt 191 lb 6.4 oz (86.8 kg)   SpO2 98%   BMI 24.57 kg/m²       General - Alert and in no acute distress. Pt appears well, comfortable, and in good spirits. Pleasant, engaging. Nontoxic. Not anxious, non-diaphoretic. Mental status - Appropriate mood, behavior, speech content, dress, and thought processes. Eyes - Pupils equal and reactive, extraocular movements intact. No erythema or discharge. Pulm - No tachypnea, retractions, or cyanosis. Good respiratory effort. Clear to auscultation bilat. Cardiovascular - Normal rate, regular rhythm. No appreciable murmurs or gallops. Assessment and Plan  1. Few elevated BP readings (see HPI for details) - Discussed potential causes/contributing factors at length. Strong possibility of transient/self-limited nature and potentially medication related (prozac vs prn/OTC vs other). We opted to observe for a while longer while keeping a BP log and also a diary of the nausea episodes. Will continue current med regimen for now. Pt will return/call if needed. Any changes in regimen (particularly antihypertensives if needed or adjusting psych regimen) pending his progression or at Dr. Kylie Coronado discretion. Next appt currently in February. Further planning as warranted. Pt and wife happily agree with plan. More than 40 mins spent during visit with more than 50% discussing above issues, potential causes/contributing factors, eval/tx, plan, and questions. PLEASE NOTE:   This document has been produced using voice recognition software.  Unrecognized errors in transcription may be present.     Valeria Tapia [employer] [facility]  [phone]  12/21/2018

## 2018-12-21 NOTE — PROGRESS NOTES
1. Have you been to the ER, urgent care clinic or hospitalized since your last visit? NO.     2. Have you seen or consulted any other health care providers outside of the [address] [place] [address] since your last visit (Include any pap smears or colon screening)? Yes [facility]      Do you have an Advanced Directive? YES

## 2018-12-23 RX ORDER — NAPROXEN 375 MG/1
TABLET ORAL
Qty: 180 TAB | Refills: 0 | Status: SHIPPED | OUTPATIENT
Start: 2018-12-23 | End: 2019-06-26 | Stop reason: ALTCHOICE

## 2018-12-26 ENCOUNTER — TELEPHONE (OUTPATIENT)
Dept: INTERNAL MEDICINE CLINIC | Age: 68
End: 2018-12-26

## 2018-12-26 DIAGNOSIS — J45.20 MILD INTERMITTENT ASTHMA WITHOUT COMPLICATION: ICD-10-CM

## 2018-12-26 NOTE — TELEPHONE ENCOUNTER
NEED TO STOP MEDICATION. Apologize for being such a 'pest' - Visit Friday 12/21 to Mr. Yovani Elizabeth (commendably conscientious) after blood pressure peaked at 150/83 on 12/20 and nausea/dizziness issues - He recommended I track blood pressure and nausea/dizziness a while longer -- However, though yesterday's blood pressure was less (137/83), continued and increased nausea/dizziness lead me to belief I must stop Prozac now (started taking 16 November) - So questions: (1) can I just stop taking Prozac \"cold\"? (I did take it this morning, after which strong wave of nausea/dizziness followed); and (2) what can I substitute now?    Thank you for all you do - R/Martin Spring

## 2018-12-27 NOTE — TELEPHONE ENCOUNTER
Regarding: Prescription Question  Contact: [phone]  ----- Message from [address] [place] [address] 951, Generic sent at 12/27/2018  2:02 PM EST -----    Tuesday I emailed 2 questions re problems with Prozac side effects - 1st question was whether this could be stopped \"cold\" - the second had to do with what a substitute would be --- the first question is moot: I stopped taking Prozac after Tuesday morning - so only the 2nd question remains.  R/Martin Sarkar

## 2019-01-03 ENCOUNTER — OFFICE VISIT (OUTPATIENT)
Dept: INTERNAL MEDICINE CLINIC | Age: 69
End: 2019-01-03

## 2019-01-03 VITALS
TEMPERATURE: 98.3 F | HEART RATE: 80 BPM | RESPIRATION RATE: 14 BRPM | HEIGHT: 74 IN | WEIGHT: 194 LBS | DIASTOLIC BLOOD PRESSURE: 78 MMHG | SYSTOLIC BLOOD PRESSURE: 122 MMHG | OXYGEN SATURATION: 99 % | BODY MASS INDEX: 24.9 KG/M2

## 2019-01-03 DIAGNOSIS — F41.1 GAD (GENERALIZED ANXIETY DISORDER): Primary | ICD-10-CM

## 2019-01-03 DIAGNOSIS — I10 ELEVATED BLOOD PRESSURE READING WITH DIAGNOSIS OF HYPERTENSION: ICD-10-CM

## 2019-01-03 DIAGNOSIS — F33.0 MDD (MAJOR DEPRESSIVE DISORDER), RECURRENT EPISODE, MILD (HCC): ICD-10-CM

## 2019-01-03 RX ORDER — PAROXETINE HYDROCHLORIDE 20 MG/1
20 TABLET, FILM COATED ORAL DAILY
Qty: 30 TAB | Refills: 11 | Status: SHIPPED | OUTPATIENT
Start: 2019-01-03 | End: 2019-01-04 | Stop reason: SDUPTHER

## 2019-01-03 NOTE — PROGRESS NOTES
1. Have you been to the ER, urgent care clinic or hospitalized since your last visit? NO.  
 
2. Have you seen or consulted any other health care providers outside of the [address] [place] [address] since your last visit (Include any pap smears or colon screening)? NO Do you have an Advanced Directive? YES Chief Complaint Patient presents with  Medication Evaluation  
  prozac. pt has been experceing nausea,vomiting and elvated BP reading

## 2019-01-04 DIAGNOSIS — F33.0 MDD (MAJOR DEPRESSIVE DISORDER), RECURRENT EPISODE, MILD (HCC): ICD-10-CM

## 2019-01-04 DIAGNOSIS — F41.1 GAD (GENERALIZED ANXIETY DISORDER): ICD-10-CM

## 2019-01-04 RX ORDER — PAROXETINE HYDROCHLORIDE 20 MG/1
TABLET, FILM COATED ORAL
Qty: 90 TAB | Refills: 11 | Status: SHIPPED | OUTPATIENT
Start: 2019-01-04 | End: 2019-09-11 | Stop reason: ALTCHOICE

## 2019-01-04 RX ORDER — BUPROPION HYDROCHLORIDE 150 MG/1
150 TABLET, EXTENDED RELEASE ORAL 2 TIMES DAILY
Qty: 180 TAB | Refills: 3 | Status: SHIPPED | OUTPATIENT
Start: 2019-01-04 | End: 2022-04-14

## 2019-01-04 NOTE — TELEPHONE ENCOUNTER
Pt says he had gotten it from Dr. Lopez in the past but needs RD to call in to [facility] on [address] because he is out of the med. Dr. Gregg Hernandez was giving 150 mg 3 times a day but if RD wants him to be on 300 mg a day that is fine with him. What ever RD decides.

## 2019-01-04 NOTE — PROGRESS NOTES
79 y.o. WHITE OR  male who presents for evaluation. He wants to discuss his meds. He's had depression and anxiety almost his entire adult life. He was not started on ssri until around 1998, paxil, which apparently was a revelation to him in terms of controlling the sx. Around 2010 or so, he started having some sexual issues and the depression was slightly worse and we decided to add wellbutrin. About 2 years later, the paxil was changed to lexapro. Late 2017, he started having worsening sx again and we ended up sending him to Dr Damon Santa mid 2018. She suggested cognitive behavioral therapy, asked him to read some books. He was more interested in changing meds so in November. He start prozac in place of the lexapro and he had 'tremendous' response with the depression sx. However, a few weeks later, his bp was noted to be elev when he saw 2 diff specialists for unrelated reasons. Of note, he is followed by cardiology and has not been noted to be elev. He was also having some nausea although this seemed to start a month after changing, and it dec the appetite with resulting roughly 10 lb wt loss on his scale. The ED issues have responded well to pde5i. He stopped the prozac a couple weeks ago but remains on wellbutrin Past Medical History:  
Diagnosis Date  Allergic rhinitis Dr Clare Camara; never took immunotherapy  Anxiety   
 saw Dr Damon Santa 9848  Asthma Dr [address] Found; pfts show no obstruction but high RV  Basal cell carcinoma 2013 Dr. J Carlos Keith s/p resection 2 spots  BPH (benign prostatic hyperplasia) Dr. Karson Antunez  Cardiac stress test 08/12/2009 Neg maximal exercise stress test.  Ex time 15:00.  
 Depression   
 and anxiety; paxil 2008? lexapro and wellbutrin til 2018; tried prozac  Dyslipidemia  Erectile dysfunction  FHx: heart disease  GERD (gastroesophageal reflux disease) s/p dilation 2003 Dr. Norbert Ferguson; egd 2015  Heel spur Dr Albert Patterson  Hypovitaminosis D   
 Overweight (BMI 25.0-29.9) 2/12/2018  Peripheral neuropathy   
 and B CTS on EMG Dr. Glen Haider  Scoliosis Dr Lenora Alvarez 2014  Sleep apnea   
 intol cpap Dr Glen Haider 2015; using oral appliance Dr Raghav Adames; AHI 17.8 min desats 83  Spinal stenosis 03/2014 MRI (3/14) showed scoliosis w multilevel degen findings, mod L3-4, L4-5 central stenosis, mod L5-S1 foraminal stenosis;  (10/17) severe L3-4 central stenosis Current Outpatient Medications Medication Sig  PARoxetine (PAXIL) 20 mg tablet Take 1 Tab by mouth daily.  naproxen (NAPROSYN) 375 mg tablet TAKE 1 TABLET BY MOUTH TWICE DAILY WITH MEALS  
 buPROPion SR (WELLBUTRIN SR) 150 mg SR tablet 300 mg daily.  gabapentin (NEURONTIN) 600 mg tablet Take 1 Tab by mouth three (3) times daily.  sildenafil citrate (VIAGRA) 100 mg tablet Take 1 Tab by mouth as needed.  simvastatin (ZOCOR) 40 mg tablet TAKE 1 TABLET BY MOUTH EVERY NIGHT AT BEDTIME  budesonide-formoterol (SYMBICORT) 160-4.5 mcg/actuation HFAA Take 2 Puffs by inhalation two (2) times a day.  latanoprost (XALATAN) 0.005 % ophthalmic solution OMAR 1 GTT INTO RIGHT EYE HS  
 albuterol (PROAIR HFA) 90 mcg/actuation inhaler Take 2 Puffs by inhalation every four (4) hours as needed for Wheezing.  montelukast (SINGULAIR) 10 mg tablet Take 1 Tab by mouth every evening. Indications: Allergic Rhinitis  finasteride (PROSCAR) 5 mg tablet TAKE 1 TABLET DAILY  fluticasone (FLONASE) 50 mcg/actuation nasal spray USE 2 SPRAYS IN EACH       NOSTRIL DAILY  NEXIUM 40 mg capsule TAKE 1 CAPSULE DAILY  ginkgo biloba 120 mg Tab Take  by mouth.  ascorbic acid (VITAMIN C) 500 mg tablet Take  by mouth.  cyanocobalamin (VITAMIN B-12) 1,000 mcg tablet Take 1,000 mcg by mouth daily.  aspirin delayed-release 81 mg tablet Take  by mouth daily.  cholecalciferol, vitamin d3, (VITAMIN D3) 1,000 unit tablet Take  by mouth daily.  ginseng 100 mg Tab Take  by mouth.  coenzyme q10-vitamin e (COQ10 ) 100-100 mg-unit Cap Take  by mouth.  Gluc-Scar-MSM#3-W-Ywvu-Mike-Bor (OSTEO BI-FLEX) 750-625-30 mg Tab Take  by mouth daily.  OMEGA-3 FATTY ACIDS (OMEGA 3 PO) Take  by mouth two (2) times a day. 3 QD  
 YEAST,DRIED, S. CEREVISIAE, (POSADAS'S YEAST PO) Take  by mouth. No current facility-administered medications for this visit. Allergies Allergen Reactions  Cefdinir Nausea and Vomiting  Niacin Other (comments) Flushing Visit Vitals /78 Pulse 80 Temp 98.3 °F (36.8 °C) (Oral) Resp 14 Ht 6' 2\" (1.88 m) Wt 194 lb (88 kg) SpO2 99% BMI 24.91 kg/m² A&O x3 Affect is appropriate. Mood stable No apparent distress Anicteric, no JVD, adenopathy or thyromegaly. No carotid bruits or radiated murmur Lungs clear to auscultation, no wheezes or rales Heart showed regular rate and rhythm. No murmur, rubs, gallops Abdomen soft nontender, no hepatosplenomegaly or masses. Extremities without edema. Pulses 1-2+ symmetrically Assessment and plan: 1. Psychiatry. Very long discussion. Suggested psych consult and declined. Reluctant to restart prozac due to his concerns. Suggested trying the paxil again as it seemed to work well in the past 
2. Elev bp. Will schedule ambulatory monitoring to try to curtis definitive answer 3. ED. Prn viagra Above conditions discussed at length and patient vocalized understanding. All questions answered to patient satisfaction

## 2019-01-11 ENCOUNTER — TELEPHONE (OUTPATIENT)
Dept: INTERNAL MEDICINE CLINIC | Age: 69
End: 2019-01-11

## 2019-01-11 DIAGNOSIS — I10 ELEVATED BLOOD PRESSURE READING WITH DIAGNOSIS OF HYPERTENSION: Primary | ICD-10-CM

## 2019-01-11 NOTE — TELEPHONE ENCOUNTER
Pt calling, says he saw RD and he was going to contact his cardiologist and order a 24 hour blood pressure check. Says he really didn't understand what RD was talking about but he hasn't heard anything from cardiology. Please call.

## 2019-01-15 NOTE — TELEPHONE ENCOUNTER
Cardiology does not do this  I am not sure where he can have this done  Bruce Sorrel do you know where he can do this by chance?

## 2019-01-15 NOTE — TELEPHONE ENCOUNTER
I called [facility] office but they said they do not do these.  (I thought they did as well)  I will try to speak with one of their nurses tomorrow if [facility] does not have any insight on them

## 2019-01-16 ENCOUNTER — PATIENT OUTREACH (OUTPATIENT)
Dept: INTERNAL MEDICINE CLINIC | Age: 69
End: 2019-01-16

## 2019-01-16 NOTE — PROGRESS NOTES
Spoke with [facility] is the only office in [place] that offers this service. Spoke with David Swann at [facility] ([phone]; [place] office). Requested order and diagnosis code be faxed to [phone] and they will contact patient to schedule. Writer provided 2 patient identifiers. David Swann verified they have patient's insurance information. Will notify PCP.

## 2019-01-16 NOTE — TELEPHONE ENCOUNTER
[facility] at [phone] (along with OV, demographics) per Camila Smith RN note  They are the only place in this area in [place] that does it

## 2019-01-20 NOTE — PROGRESS NOTES
Nicki Sood presents today for evaluation of complaints of high blood pressure and headaches. He reports that he saw his PCP recently for the same complaints and that he (Luli Walker) requested 24 hour ambulatory blood pressure monitoring which unfortunately, our office no longer performs. According to conversation threads in his chart, arrangements have been made with [facility] in [place] for the monitoring. He was unaware of this. He is a 81year old male with history of dyslipidemia, GERD, obstructive sleep apnea (declined CPAP), and strong family history of CAD. He was last seen by Dr. Kendra Mark on 87/6/18. His last stress test was done in Feb. 2009 during which he exercised 15 minutes without symptoms or EKG changes. He reports occasional dizziness, nausea, and headache occurring since early December 2018. At around the same time, he noticed higher blood pressure readings (above his normal 014 systolic). At first, he thought it was related to Prozac, which he has been taking for some time. He was then switched to Paxil. He has still noticed blood pressures in the 442'M to 782 systolic. The symptom complex of mild dizziness, nausea, and headache reminds him of the feeling he would get if he \"got off of an amusement park ride. \"  He has experienced \"motion sickness\" in the past. 
 
He was seen by orthopedics and pulmonology before [place]. Denies chest pain, tightness, heaviness, and palpitations. Denies shortness of breath at rest, dyspnea on exertion, orthopnea and PND. Denies abdominal bloating. Denies lightheadedness, dizziness, and syncope. Denies lower extremity edema and claudication. Denies nausea, vomiting, diarrhea, melena, hematochezia. Denies hematuria, urgency, frequency. Denies fever, chills. PMH: 
Past Medical History:  
Diagnosis Date  Allergic rhinitis Dr Katey Fan; never took immunotherapy  Anxiety   
 saw Dr Conrado Weston [id number]  Asthma Dr Simón Orta; pfts show no obstruction but high RV  Basal cell carcinoma 2013 Dr. Zuleyma Johnston s/p resection 2 spots  BPH (benign prostatic hyperplasia) Dr. Salinas Blind  Cardiac stress test 08/12/2009 Neg maximal exercise stress test.  Ex time 15:00.  
 Depression   
 and anxiety; paxil 2008? lexapro and wellbutrin til 2018; tried prozac  Dyslipidemia  Erectile dysfunction  FHx: heart disease  GERD (gastroesophageal reflux disease) s/p dilation 2003 Dr. Kristy Puentes; egd 2015  Heel spur Dr Morgan Nuñez  Hypovitaminosis D   
 Overweight (BMI 25.0-29.9) 2/12/2018  Peripheral neuropathy   
 and B CTS on EMG Dr. Wyona Holter  Scoliosis Dr Rosa Walters 2014  Sleep apnea   
 intol cpap Dr Wyona Holter 2015; using oral appliance Dr Laura Dean; AHI 17.8 min desats 83  Spinal stenosis 03/2014 MRI (3/14) showed scoliosis w multilevel degen findings, mod L3-4, L4-5 central stenosis, mod L5-S1 foraminal stenosis;  (10/17) severe L3-4 central stenosis PSH: 
Past Surgical History:  
Procedure Laterality Date  CARDIAC SURG PROCEDURE UNLIST  8/09 ETT negative  CHEST SURGERY PROCEDURE UNLISTED  07/2018 Dr Erickson Rios; FEV1 96%, 5% improvement postbd, ratio 104, , , DLCO 82  
 HX COLONOSCOPY Dr. Kristy Puentes mild diverticulosis 2000, 6/12  HX ORTHOPAEDIC B CT release 2007, 2009  HX ORTHOPAEDIC    
 DEXA t score 4.2 spine, 0.2 hip (2012) MEDS: 
Current Outpatient Medications Medication Sig  PARoxetine (PAXIL) 20 mg tablet TAKE 1 TABLET BY MOUTH DAILY  buPROPion SR (WELLBUTRIN SR) 150 mg SR tablet Take 1 Tab by mouth two (2) times a day.  naproxen (NAPROSYN) 375 mg tablet TAKE 1 TABLET BY MOUTH TWICE DAILY WITH MEALS  gabapentin (NEURONTIN) 600 mg tablet Take 1 Tab by mouth three (3) times daily.  sildenafil citrate (VIAGRA) 100 mg tablet Take 1 Tab by mouth as needed.  simvastatin (ZOCOR) 40 mg tablet TAKE 1 TABLET BY MOUTH EVERY NIGHT AT BEDTIME  budesonide-formoterol (SYMBICORT) 160-4.5 mcg/actuation HFAA Take 2 Puffs by inhalation two (2) times a day.  latanoprost (XALATAN) 0.005 % ophthalmic solution OMAR 1 GTT INTO RIGHT EYE HS  
 albuterol (PROAIR HFA) 90 mcg/actuation inhaler Take 2 Puffs by inhalation every four (4) hours as needed for Wheezing.  montelukast (SINGULAIR) 10 mg tablet Take 1 Tab by mouth every evening. Indications: Allergic Rhinitis  finasteride (PROSCAR) 5 mg tablet TAKE 1 TABLET DAILY  fluticasone (FLONASE) 50 mcg/actuation nasal spray USE 2 SPRAYS IN EACH       NOSTRIL DAILY  YEAST,DRIED, S. CEREVISIAE, (POSADAS'S YEAST PO) Take  by mouth.  NEXIUM 40 mg capsule TAKE 1 CAPSULE DAILY  ginkgo biloba 120 mg Tab Take  by mouth.  ascorbic acid (VITAMIN C) 500 mg tablet Take  by mouth.  cyanocobalamin (VITAMIN B-12) 1,000 mcg tablet Take 1,000 mcg by mouth daily.  aspirin delayed-release 81 mg tablet Take  by mouth daily.  cholecalciferol, vitamin d3, (VITAMIN D3) 1,000 unit tablet Take  by mouth daily.  ginseng 100 mg Tab Take  by mouth.  coenzyme q10-vitamin e (COQ10 ) 100-100 mg-unit Cap Take  by mouth.  Gluc-Scar-MSM#6-O-Hhgd-Mike-Bor (OSTEO BI-FLEX) 750-625-30 mg Tab Take  by mouth daily.  OMEGA-3 FATTY ACIDS (OMEGA 3 PO) Take  by mouth two (2) times a day. 3 QD No current facility-administered medications for this visit. Allergies and Sensitivities: 
Allergies Allergen Reactions  Cefdinir Nausea and Vomiting  Niacin Other (comments) [place] Family History: 
Family History Problem Relation Age of Onset  Cancer Mother   
     leukemia  Heart Disease Father  Psychiatric Disorder Daughter   
     depression Social History: He  reports that  has never smoked. he has never used smokeless tobacco.  He  reports that he drinks alcohol. Physical: Visit Vitals /80 Pulse 68 Ht 6' 2\" (1.88 m) Wt 88 kg (194 lb) SpO2 98% BMI 24.91 kg/m² Exam: 
Neck:  Supple, no JVD, no carotid bruits CV:  Normal S1 and  S2, no murmurs, rubs, or gallops noted Lungs:  Clear to ausculation throughout, no wheezes or rales Abd:  Soft, non-tender, non-distended with good bowel sounds. No hepatosplenomegaly Extremities:  No edema Data: 
EKG:   Read by Ayanna Miller MD - Sinus rhythm.  Within normal limits LABS: 
Lab Results Component Value Date/Time Sodium 144 02/05/2018 10:10 AM  
 Potassium 4.9 02/05/2018 10:10 AM  
 Chloride 106 02/05/2018 10:10 AM  
 CO2 30 02/05/2018 10:10 AM  
 Glucose 89 02/05/2018 10:10 AM  
 BUN 25 (H) 02/05/2018 10:10 AM  
 Creatinine 1.04 02/05/2018 10:10 AM  
 
Lab Results Component Value Date/Time Cholesterol, total 142 02/05/2018 10:10 AM  
 HDL Cholesterol 53 02/05/2018 10:10 AM  
 LDL, calculated 76.2 02/05/2018 10:10 AM  
 Triglyceride 64 02/05/2018 10:10 AM  
 CHOL/HDL Ratio 2.7 02/05/2018 10:10 AM  
 
Lab Results Component Value Date/Time ALT (SGPT) 35 02/05/2018 10:10 AM  
 
 
 
Impression/Plan: 1. Dyslipidemia, on simvastatin 40mg 2. Elevated blood pressure, above his \"normal 110mm Hg\" 3. GALILEA, declined CPAP 4. Strong family history of CAD 5.  ? vertigo Mr. Ronna Spring was seen today for evaluation of complaints of elevated blood pressure, occasional dizziness with nausea and headaches. He has been evaluated by his PCP and a 24 hour blood pressure monitor was ordered. Because our office no longer performs this service, Dr. Belle Haile office has made arrangements with [facility]) for Mr. Ronna Spring to wear the 24 hour ambulatory blood pressure monitor. They have faxed over pertinent information. He was unaware that this was set up with them so I made him aware. He states that he will call them to confirm. He reports that his symptom complex of mild dizziness, headache, and nausea occurs intermittently. He describes it as similar to the sensation he gets after getting \"off of an amusement park ride. \"  He has experienced motion sickness in the past. 
 
His blood pressure today is 140/80 and slightly higher than during his previous office visit. He offers no complaints of chest pain, pressure, palpitations, or shortness of breath. His symptoms do not sound anginal in nature. He is concerned about the occasional headaches as his daughter-in-law had surgery for a brain tumor and he is worried about this as being a possible cause. He has a neurologist who sees him for his neuropathy but he states that he will request follow-up with him to report these symptoms. He has also been evaluated by ENT in the past, but not recently. He will try to get established with them again. He will follow-up with Dr. Treva Teresa as scheduled and as needed. Teetee Thorpe MSN, FNP-BC Please note:  Portions of this chart were created with Dragon medical speech to text program.  Unrecognized errors may be present.

## 2019-01-22 ENCOUNTER — HOSPITAL ENCOUNTER (OUTPATIENT)
Dept: LAB | Age: 69
Discharge: HOME | End: 2019-01-22
Payer: MEDICARE

## 2019-01-22 ENCOUNTER — OFFICE VISIT (OUTPATIENT)
Dept: CARDIOLOGY CLINIC | Age: 69
End: 2019-01-22

## 2019-01-22 VITALS
BODY MASS INDEX: 24.9 KG/M2 | WEIGHT: 194 LBS | HEIGHT: 74 IN | DIASTOLIC BLOOD PRESSURE: 80 MMHG | OXYGEN SATURATION: 98 % | HEART RATE: 68 BPM | SYSTOLIC BLOOD PRESSURE: 140 MMHG

## 2019-01-22 DIAGNOSIS — E78.5 DYSLIPIDEMIA: ICD-10-CM

## 2019-01-22 DIAGNOSIS — R00.2 PALPITATION: Primary | ICD-10-CM

## 2019-01-22 DIAGNOSIS — R03.0 ELEVATED BLOOD PRESSURE READING: ICD-10-CM

## 2019-01-22 LAB
ERYTHROCYTE [SEDIMENTATION RATE] IN BLOOD: 4 MM/HR (ref 0–20)
FERRITIN SERPL-MCNC: 58 NG/ML (ref 8–388)
FOLATE SERPL-MCNC: >20 NG/ML (ref 3.1–17.5)
T4 FREE SERPL-MCNC: 0.8 NG/DL (ref 0.7–1.5)
TSH SERPL DL<=0.05 MIU/L-ACNC: 1.1 UIU/ML (ref 0.36–3.74)
VIT B12 SERPL-MCNC: 1006 PG/ML (ref 211–911)

## 2019-01-22 PROCEDURE — 84439 ASSAY OF FREE THYROXINE: CPT

## 2019-01-22 PROCEDURE — 84207 ASSAY OF VITAMIN B-6: CPT

## 2019-01-22 PROCEDURE — 36415 COLL VENOUS BLD VENIPUNCTURE: CPT

## 2019-01-22 PROCEDURE — 86038 ANTINUCLEAR ANTIBODIES: CPT

## 2019-01-22 PROCEDURE — 82525 ASSAY OF COPPER: CPT

## 2019-01-22 PROCEDURE — 82607 VITAMIN B-12: CPT

## 2019-01-22 PROCEDURE — 85652 RBC SED RATE AUTOMATED: CPT

## 2019-01-22 PROCEDURE — 82728 ASSAY OF FERRITIN: CPT

## 2019-01-23 ENCOUNTER — TELEPHONE (OUTPATIENT)
Dept: ORTHOPEDIC SURGERY | Age: 69
End: 2019-01-23

## 2019-01-23 LAB — ANA TITR SER IF: NEGATIVE {TITER}

## 2019-01-24 LAB
COPPER SERPL-MCNC: 88 UG/DL (ref 72–166)
VIT B6 SERPL-MCNC: 6.6 UG/L (ref 5.3–46.7)

## 2019-01-25 NOTE — TELEPHONE ENCOUNTER
Patient called in states that he did not receive his refill for his gabapentin (NEURONTIN) 600 mg tablet     Please advise patient what happened at [phone].

## 2019-01-25 NOTE — TELEPHONE ENCOUNTER
Looks like Aisha sent a Rx to [facility] back in December w/ 5 refills.  Call pharmacy to see if they got it, if not ok to give verbal.

## 2019-01-31 NOTE — TELEPHONE ENCOUNTER
Spoke with pharmacy technician and she informed me that patient has picked up Rx. No further action needed at this time.

## 2019-02-07 ENCOUNTER — LAB ONLY (OUTPATIENT)
Dept: INTERNAL MEDICINE CLINIC | Age: 69
End: 2019-02-07

## 2019-02-07 ENCOUNTER — HOSPITAL ENCOUNTER (OUTPATIENT)
Dept: LAB | Age: 69
Discharge: HOME | End: 2019-02-07
Payer: COMMERCIAL

## 2019-02-07 DIAGNOSIS — Z00.00 ROUTINE GENERAL MEDICAL EXAMINATION AT A HEALTH CARE FACILITY: ICD-10-CM

## 2019-02-07 DIAGNOSIS — Z12.5 SCREENING FOR MALIGNANT NEOPLASM OF PROSTATE: ICD-10-CM

## 2019-02-07 DIAGNOSIS — E78.5 DYSLIPIDEMIA: ICD-10-CM

## 2019-02-07 DIAGNOSIS — Z00.00 ROUTINE GENERAL MEDICAL EXAMINATION AT A HEALTH CARE FACILITY: Primary | ICD-10-CM

## 2019-02-07 LAB
ALBUMIN SERPL-MCNC: 4 G/DL (ref 3.4–5)
ALBUMIN/GLOB SERPL: 1.6 {RATIO} (ref 0.8–1.7)
ALP SERPL-CCNC: 79 U/L (ref 45–117)
ALT SERPL-CCNC: 32 U/L (ref 16–61)
ANION GAP SERPL CALC-SCNC: 7 MMOL/L (ref 3–18)
AST SERPL-CCNC: 28 U/L (ref 15–37)
BASOPHILS # BLD: 0 K/UL (ref 0–0.1)
BASOPHILS NFR BLD: 1 % (ref 0–2)
BILIRUB SERPL-MCNC: 0.4 MG/DL (ref 0.2–1)
BUN SERPL-MCNC: 28 MG/DL (ref 7–18)
BUN/CREAT SERPL: 21 (ref 12–20)
CALCIUM SERPL-MCNC: 9.3 MG/DL (ref 8.5–10.1)
CHLORIDE SERPL-SCNC: 105 MMOL/L (ref 100–108)
CHOLEST SERPL-MCNC: 151 MG/DL
CO2 SERPL-SCNC: 29 MMOL/L (ref 21–32)
CREAT SERPL-MCNC: 1.35 MG/DL (ref 0.6–1.3)
DIFFERENTIAL METHOD BLD: ABNORMAL
EOSINOPHIL # BLD: 0.2 K/UL (ref 0–0.4)
EOSINOPHIL NFR BLD: 4 % (ref 0–5)
ERYTHROCYTE [DISTWIDTH] IN BLOOD BY AUTOMATED COUNT: 13.4 % (ref 11.6–14.5)
GLOBULIN SER CALC-MCNC: 2.5 G/DL (ref 2–4)
GLUCOSE SERPL-MCNC: 95 MG/DL (ref 74–99)
HCT VFR BLD AUTO: 43.6 % (ref 36–48)
HDLC SERPL-MCNC: 46 MG/DL (ref 40–60)
HDLC SERPL: 3.3 {RATIO} (ref 0–5)
HGB BLD-MCNC: 14.1 G/DL (ref 13–16)
LDLC SERPL CALC-MCNC: 78.6 MG/DL (ref 0–100)
LIPID PROFILE,FLP: NORMAL
LYMPHOCYTES # BLD: 1.7 K/UL (ref 0.9–3.6)
LYMPHOCYTES NFR BLD: 30 % (ref 21–52)
MCH RBC QN AUTO: 30.9 PG (ref 24–34)
MCHC RBC AUTO-ENTMCNC: 32.3 G/DL (ref 31–37)
MCV RBC AUTO: 95.4 FL (ref 74–97)
MONOCYTES # BLD: 0.6 K/UL (ref 0.05–1.2)
MONOCYTES NFR BLD: 10 % (ref 3–10)
NEUTS SEG # BLD: 3.1 K/UL (ref 1.8–8)
NEUTS SEG NFR BLD: 55 % (ref 40–73)
PLATELET # BLD AUTO: 194 K/UL (ref 135–420)
PMV BLD AUTO: 9.5 FL (ref 9.2–11.8)
POTASSIUM SERPL-SCNC: 4.5 MMOL/L (ref 3.5–5.5)
PROT SERPL-MCNC: 6.5 G/DL (ref 6.4–8.2)
PSA SERPL-MCNC: 2.8 NG/ML (ref 0–4)
RBC # BLD AUTO: 4.57 M/UL (ref 4.7–5.5)
SODIUM SERPL-SCNC: 141 MMOL/L (ref 136–145)
TRIGL SERPL-MCNC: 132 MG/DL (ref ?–150)
VLDLC SERPL CALC-MCNC: 26.4 MG/DL
WBC # BLD AUTO: 5.7 K/UL (ref 4.6–13.2)

## 2019-02-07 PROCEDURE — 84153 ASSAY OF PSA TOTAL: CPT

## 2019-02-07 PROCEDURE — 80061 LIPID PANEL: CPT

## 2019-02-07 PROCEDURE — 85025 COMPLETE CBC W/AUTO DIFF WBC: CPT

## 2019-02-07 PROCEDURE — 36415 COLL VENOUS BLD VENIPUNCTURE: CPT

## 2019-02-07 PROCEDURE — 80053 COMPREHEN METABOLIC PANEL: CPT

## 2019-02-14 NOTE — PROGRESS NOTES
81 y.o. WHITE OR  male who presents for RPE He continues to see Dr Siddhartha Moran for the spinal stenosis issues. He continues to go to the [facility] around 2x/week doing 40 min on the treadmill or elliptical along with cybex machines when the back is not flaring. No cardiovascular complaints. His bp has been up and down, he recently finished ambulatory [facility] but no results available for review as yet. Measurements in our office and cardiology show normal readings The depression is better since he started back on the paxil. The prozac did better but the nausea showed up along with variable bp so he decided to come off that although in retrospect, not clear if related at this time He has been having recurring nausea not helped by increasing  the nexium to bid dosing. No vomiting, diarrhea, bleeding No urinary complaints. He's using the viagra full time. He is not seeing urology He continues to see Dr. Domenica Davies regarding the peripheral neuropathy which is apparently progressing. Having some gait issues now, he stubs his right toe periodically. Also, he saw ENT for opinion regarding inner ear issues causing the imbalance but that w/u was negative. As above, he is also spine and they are considering possibility of contribution from that also Using the oral appliance as intol cpap LAST MEDICARE WELLNESS EXAM: never as not medicare b Past Medical History:  
Diagnosis Date  Allergic rhinitis Dr Roth Smoke; never took immunotherapy  Anxiety   
 saw Dr Dionicio Ramey 9841  Asthma Dr Tim Roman; pfts show no obstruction but high RV  Basal cell carcinoma 2013 Dr. Justino Boas s/p resection 2 spots  BPH (benign prostatic hyperplasia) Dr. Griselda Pickett  Cardiac stress test 08/12/2009 Neg maximal exercise stress test.  Ex time 15:00.  
 Depression   
 and anxiety; paxil 2008? lexapro and wellbutrin til 2018; tried prozac  Dyslipidemia  Erectile dysfunction  FHx: heart disease  GERD (gastroesophageal reflux disease) s/p dilation 2003 Dr. Anay Cohen; egd 2015  Heel spur Dr Michael Jesus  Hypovitaminosis D   
 Overweight (BMI 25.0-29.9) 2/12/2018  Peripheral neuropathy   
 and B CTS on EMG Dr. Blanco Parson  Scoliosis Dr Chela Blanco 2014  Sleep apnea   
 intol cpap Dr Blanco Parson 2015; using oral appliance Dr Sonia Beaver; AHI 17.8 min desats 83  Spinal stenosis 03/2014 MRI (3/14) showed scoliosis w multilevel degen findings, mod L3-4, L4-5 central stenosis, mod L5-S1 foraminal stenosis;  (10/17) severe L3-4 central stenosis Past Surgical History:  
Procedure Laterality Date  CARDIAC SURG PROCEDURE UNLIST  8/09 ETT negative  CHEST SURGERY PROCEDURE UNLISTED  07/2018 Dr Howard Douglas; FEV1 96%, 5% improvement postbd, ratio 104, , , DLCO 82  
 HX COLONOSCOPY Dr. Anay Cohen mild diverticulosis 2000, 6/12  HX ORTHOPAEDIC B CT release 2007, 2009  HX ORTHOPAEDIC    
 DEXA t score 4.2 spine, 0.2 hip (2012) Social History Socioeconomic History  Marital status:  Spouse name: Not on file  Number of children: 2  
 Years of education: Not on file  Highest education level: Not on file Social Needs  Financial resource strain: Not on file  Food insecurity - worry: Not on file  Food insecurity - inability: Not on file  Transportation needs - medical: Not on file  Transportation needs - non-medical: Not on file Occupational History  Occupation: ret [employer] [address] Tobacco Use  Smoking status: Former Smoker  Smokeless tobacco: Never Used  Tobacco comment: teenager Substance and Sexual Activity  Alcohol use: Yes Alcohol/week: 0.6 oz Types: 1 Glasses of wine per week Comment: 1-2 glasses of wine a month  Drug use: No  
 Sexual activity: Yes  
  Partners: Female Birth control/protection: None Other Topics Concern  Not on file Social History Narrative  Not on file Family History Problem Relation Age of Onset  Cancer Mother   
     leukemia  Heart Disease Father  Psychiatric Disorder Daughter   
     depression Immunization History Administered Date(s) Administered  Influenza High Dose Vaccine PF 11/17/2017  Influenza Vaccine (Quad) PF 11/02/2015, 11/11/2016  Influenza Vaccine (Tri) Adjuvanted 10/26/2018  Influenza Vaccine PF 11/05/2013, 10/30/2014  Influenza Vaccine Split 10/21/2011, 10/31/2012  Influenza Vaccine Whole 11/05/2010  Pneumococcal Conjugate (PCV-13) 01/31/2017  Pneumococcal Polysaccharide (PPSV-23) 08/07/2013, 02/18/2019  TD Vaccine 07/22/2009  Tdap 11/11/2016  Zoster 05/07/2012 Current Outpatient Medications Medication Sig  
 magnesium 250 mg tab Take 250 mg by mouth two (2) times a day.  PARoxetine (PAXIL) 20 mg tablet TAKE 1 TABLET BY MOUTH DAILY  buPROPion SR (WELLBUTRIN SR) 150 mg SR tablet Take 1 Tab by mouth two (2) times a day.  naproxen (NAPROSYN) 375 mg tablet TAKE 1 TABLET BY MOUTH TWICE DAILY WITH MEALS  gabapentin (NEURONTIN) 600 mg tablet Take 1 Tab by mouth three (3) times daily.  sildenafil citrate (VIAGRA) 100 mg tablet Take 1 Tab by mouth as needed.  simvastatin (ZOCOR) 40 mg tablet TAKE 1 TABLET BY MOUTH EVERY NIGHT AT BEDTIME  budesonide-formoterol (SYMBICORT) 160-4.5 mcg/actuation HFAA Take 2 Puffs by inhalation two (2) times a day.  latanoprost (XALATAN) 0.005 % ophthalmic solution OMAR 1 GTT INTO RIGHT EYE HS  
 albuterol (PROAIR HFA) 90 mcg/actuation inhaler Take 2 Puffs by inhalation every four (4) hours as needed for Wheezing.  montelukast (SINGULAIR) 10 mg tablet Take 1 Tab by mouth every evening. Indications: Allergic Rhinitis  finasteride (PROSCAR) 5 mg tablet TAKE 1 TABLET DAILY  fluticasone (FLONASE) 50 mcg/actuation nasal spray USE 2 SPRAYS IN EACH       NOSTRIL DAILY  NEXIUM 40 mg capsule TAKE 1 CAPSULE DAILY  ascorbic acid (VITAMIN C) 500 mg tablet Take  by mouth.  cyanocobalamin (VITAMIN B-12) 1,000 mcg tablet Take 1,000 mcg by mouth daily.  aspirin delayed-release 81 mg tablet Take  by mouth daily.  cholecalciferol, vitamin d3, (VITAMIN D3) 1,000 unit tablet Take  by mouth daily.  ginseng 100 mg Tab Take  by mouth.  coenzyme q10-vitamin e (COQ10 ) 100-100 mg-unit Cap Take  by mouth.  Gluc-Scar-MSM#9-X-Jomb-Mike-Bor (OSTEO BI-FLEX) 750-625-30 mg Tab Take  by mouth daily.  OMEGA-3 FATTY ACIDS (OMEGA 3 PO) Take  by mouth two (2) times a day. 3 QD No current facility-administered medications for this visit. Allergies Allergen Reactions  Cefdinir Nausea and Vomiting  Niacin Other (comments) [place] REVIEW OF SYSTEMS: colo 6/12 Dr Holly Alston Ophtho  no vision change or eye pain Oral  no mouth pain, tongue or tooth problems Ears  no hearing loss, ear pain, fullness, no swallowing problems Cardiac  no CP, PND, orthopnea, edema, palpitations or syncope Chest  no breast masses Resp  no wheezing, chronic coughing, dyspnea GI  no heartburn, nausea, vomiting, change in bowel habits, bleeding, hemorrhoids Urinary  no dysuria, hematuria, flank pain, urgency, frequency Genitals  no genital lesions, discharge, masses, ulceration, warts Ortho  no swelling, dec ROM, myalgias Derm  no nail abnormalities, rashes, lesions of note, hair loss Visit Vitals /62 (BP 1 Location: Left arm, BP Patient Position: Sitting) Pulse 78 Temp 98.2 °F (36.8 °C) (Oral) Resp 14 Ht 6' 1.5\" (1.867 m) Wt 195 lb (88.5 kg) SpO2 98% BMI 25.38 kg/m² I manually got 116/72 Affect is appropriate. Mood stable No apparent distress HEENT --Anicteric sclerae, tympanic membranes normal,  ear canals normal. 
PERRL, EOMI, conjunctiva and lids normal.  Disks were sharp Sinuses were nontender, turbinates normal, hearing normal.  Oropharynx without 
erythema, normal tongue, oral mucosa and tonsils. No thyromegaly, JVD, or bruits. Lungs --Clear to auscultation, normal percussion. Heart --Regular rate and rhythm, no murmurs, rubs, gallops, or clicks. Chest wall --Nontender to palpation. PMI normal. 
Abdomen -- Soft and nontender, no hepatosplenomegaly or masses. Prostate  -- no asymmetry, nodularity, tenderness, about 30 gm prostate Rectal  -- normal tone, guaiac negative brown stool Extremities -- Without cyanosis, clubbing, edema. 2+ pulses equally and bilaterally. LABS From 11/10 showed gluc 91, cr 1.10,             alt 25, chol 146, tg 74,   hdl 26, ldl-c 95, wbc 6.2, hb 14.3, plt 191, psa 0.60 From 4/12 showed                           alt 26, chol 138, tg 105, hdl 42, ldl-c 75 From 5/12 showed                                      vit d 57.9 From 7/13 showed                 alt 25, chol 135, tg 70,   hdl 44, ldl-c 77 From 7/13 showed   gluc 88, cr 1.02, gfr 79,  alt 10,                wbc 5.0, hb 14.3, plt 182, psa 0.60 From 9/14 showed   gluc 95, cr 0.99, gfr>60, alt 13, chol 138, tg 81,   hdl 41, ldl-c 81, wbc 5.6, hb 13.9, plt 187, psa 1.00 From 11/15 showed gluc 84, cr 1.01, gfr>60, alt 12, chol 148, tg 72,   hdl 44, ldl-c 90, wbc 4.8, hb 14.9, plt 193 From 1/17 showed   gluc 85, cr 1.05, gfr>60, alt 36, chol 144, tg 69,   hdl 56, ldl-c 74, wbc 4.9, hb 14.7, plt 212, psa 1.60, hba1c 5.7, vit d 44.2, hep c neg From 2/18 showed   gluc 89, cr 1.04, gfr>60, alt 35, chol 142, tg 64,   hdl 53, ldl-c 76, wbc 4.5, hb 14.5, plt 208, psa 2.30 From 1/19 showed                            tsh 1.10, ft4 0.80, b12 1006, fol>20, b6 nl, lorraine neg, ferritin 58 Results for orders placed or performed during the hospital encounter of 02/07/19 CBC WITH AUTOMATED DIFF Result Value Ref Range WBC 5.7 4.6 - 13.2 K/uL  
 RBC 4.57 (L) 4.70 - 5.50 M/uL HGB 14.1 13.0 - 16.0 g/dL HCT 43.6 36.0 - 48.0 % MCV 95.4 74.0 - 97.0 FL  
 MCH 30.9 24.0 - 34.0 PG  
 MCHC 32.3 31.0 - 37.0 g/dL  
 RDW 13.4 11.6 - 14.5 % PLATELET 300 670 - 974 K/uL MPV 9.5 9.2 - 11.8 FL  
 NEUTROPHILS 55 40 - 73 % LYMPHOCYTES 30 21 - 52 % MONOCYTES 10 3 - 10 % EOSINOPHILS 4 0 - 5 % BASOPHILS 1 0 - 2 %  
 ABS. NEUTROPHILS 3.1 1.8 - 8.0 K/UL  
 ABS. LYMPHOCYTES 1.7 0.9 - 3.6 K/UL  
 ABS. MONOCYTES 0.6 0.05 - 1.2 K/UL  
 ABS. EOSINOPHILS 0.2 0.0 - 0.4 K/UL  
 ABS. BASOPHILS 0.0 0.0 - 0.1 K/UL  
 DF AUTOMATED METABOLIC PANEL, COMPREHENSIVE Result Value Ref Range Sodium 141 136 - 145 mmol/L Potassium 4.5 3.5 - 5.5 mmol/L Chloride 105 100 - 108 mmol/L  
 CO2 29 21 - 32 mmol/L Anion gap 7 3.0 - 18 mmol/L Glucose 95 74 - 99 mg/dL BUN 28 (H) 7.0 - 18 MG/DL Creatinine 1.35 (H) 0.6 - 1.3 MG/DL  
 BUN/Creatinine ratio 21 (H) 12 - 20 GFR est AA >60 >60 ml/min/1.73m2 GFR est non-AA 53 (L) >60 ml/min/1.73m2 Calcium 9.3 8.5 - 10.1 MG/DL Bilirubin, total 0.4 0.2 - 1.0 MG/DL  
 ALT (SGPT) 32 16 - 61 U/L  
 AST (SGOT) 28 15 - 37 U/L Alk. phosphatase 79 45 - 117 U/L Protein, total 6.5 6.4 - 8.2 g/dL Albumin 4.0 3.4 - 5.0 g/dL Globulin 2.5 2.0 - 4.0 g/dL A-G Ratio 1.6 0.8 - 1.7 LIPID PANEL Result Value Ref Range LIPID PROFILE Cholesterol, total 151 <200 MG/DL Triglyceride 132 <150 MG/DL  
 HDL Cholesterol 46 40 - 60 MG/DL  
 LDL, calculated 78.6 0 - 100 MG/DL VLDL, calculated 26.4 MG/DL  
 CHOL/HDL Ratio 3.3 0 - 5.0    
PSA, DIAGNOSTIC (PROSTATE SPECIFIC AG) Result Value Ref Range Prostate Specific Ag 2.8 0.0 - 4.0 ng/mL Patient Active Problem List  
Diagnosis Code  Dyslipidemia E78.5  Hypovitaminosis D E55.9  BPH (benign prostatic hyperplasia) Dr. Ceasar Harman N40.0  Neuropathy Dr. Ibrahima Disla G62.9  
 Erectile dysfunction N52.9  Gastroesophageal reflux disease without esophagitis K21.9  Diverticulosis of large intestine without hemorrhage Dr Ordoñez Na K57.30  
 GALILEA (obstructive sleep apnea) Dr Laine Scott using oral appliance AHI 17.8 G47.33  Spinal stenosis, lumbar region with neurogenic claudication M48.062  Spondylosis of lumbar region without myelopathy or radiculopathy M47.816  Lumbar radiculopathy M54.16  
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  
 Overweight (BMI 25.0-29. 9) E66.3  MDD (major depressive disorder), recurrent episode, mild (HCC) F33.0  
 CHANCE (generalized anxiety disorder) F41.1  History of OCD (obsessive compulsive disorder) Z86.59  
 Mild intermittent asthma without complication I14.21 Assessment and plan: 1. Elevated creat. Hold naproxen and recheck in 2 weeks 2. Dyslipidemia. Continue current regimen. 3.  Allergic rhinitis. Continue current 4. GERD. Continue current. He is scheduled to see gi for opinion regarding the nausea above 5. ED. Prn viagra 6. Neuropathy. F/U Dr. Laine Scott as directed 7. Depression. Continue current regimen. 8.  Hypovit D. Follow 9. GALILEA. Per neurology 10. Spinal stenosis. Continue gabapentin and f/u Dr Shubham Tillman 11. Persistent nausea. Hold nsaid and call w update 12. PVX 23 given; he will sched for shingrix at pharmacy RTC 6/19 Above conditions discussed at length and patient vocalized understanding. All questions answered to patient satifaction

## 2019-02-18 ENCOUNTER — OFFICE VISIT (OUTPATIENT)
Dept: INTERNAL MEDICINE CLINIC | Age: 69
End: 2019-02-18

## 2019-02-18 VITALS
DIASTOLIC BLOOD PRESSURE: 62 MMHG | SYSTOLIC BLOOD PRESSURE: 106 MMHG | BODY MASS INDEX: 25.03 KG/M2 | WEIGHT: 195 LBS | HEIGHT: 74 IN | RESPIRATION RATE: 14 BRPM | HEART RATE: 78 BPM | OXYGEN SATURATION: 98 % | TEMPERATURE: 98.2 F

## 2019-02-18 DIAGNOSIS — Z86.59 HISTORY OF OCD (OBSESSIVE COMPULSIVE DISORDER): ICD-10-CM

## 2019-02-18 DIAGNOSIS — M47.816 SPONDYLOSIS OF LUMBAR REGION WITHOUT MYELOPATHY OR RADICULOPATHY: ICD-10-CM

## 2019-02-18 DIAGNOSIS — J45.20 MILD INTERMITTENT ASTHMA WITHOUT COMPLICATION: ICD-10-CM

## 2019-02-18 DIAGNOSIS — K21.9 GASTROESOPHAGEAL REFLUX DISEASE WITHOUT ESOPHAGITIS: ICD-10-CM

## 2019-02-18 DIAGNOSIS — N40.1 BENIGN PROSTATIC HYPERPLASIA WITH LOWER URINARY TRACT SYMPTOMS, SYMPTOM DETAILS UNSPECIFIED: ICD-10-CM

## 2019-02-18 DIAGNOSIS — G47.33 OSA (OBSTRUCTIVE SLEEP APNEA): ICD-10-CM

## 2019-02-18 DIAGNOSIS — E66.3 OVERWEIGHT (BMI 25.0-29.9): ICD-10-CM

## 2019-02-18 DIAGNOSIS — Z23 ENCOUNTER FOR IMMUNIZATION: ICD-10-CM

## 2019-02-18 DIAGNOSIS — Z00.00 PHYSICAL EXAM: Primary | ICD-10-CM

## 2019-02-18 DIAGNOSIS — K57.30 DIVERTICULOSIS OF LARGE INTESTINE WITHOUT HEMORRHAGE: ICD-10-CM

## 2019-02-18 DIAGNOSIS — R79.89 ELEVATED SERUM CREATININE: ICD-10-CM

## 2019-02-18 DIAGNOSIS — M48.062 SPINAL STENOSIS, LUMBAR REGION WITH NEUROGENIC CLAUDICATION: ICD-10-CM

## 2019-02-18 DIAGNOSIS — E78.5 DYSLIPIDEMIA: ICD-10-CM

## 2019-02-18 DIAGNOSIS — G62.9 NEUROPATHY: ICD-10-CM

## 2019-02-18 DIAGNOSIS — F33.0 MDD (MAJOR DEPRESSIVE DISORDER), RECURRENT EPISODE, MILD (HCC): ICD-10-CM

## 2019-02-18 DIAGNOSIS — F41.1 GAD (GENERALIZED ANXIETY DISORDER): ICD-10-CM

## 2019-02-18 RX ORDER — ZINC GLUCONATE 10 MG
250 LOZENGE ORAL 2 TIMES DAILY
COMMUNITY
End: 2019-06-26 | Stop reason: ALTCHOICE

## 2019-02-18 NOTE — PATIENT INSTRUCTIONS
Vaccine Information Statement Pneumococcal Polysaccharide Vaccine: What You Need to Know Many Vaccine Information Statements are available in Persian and other languages. See www.immunize.org/vis. Hojas de información Sobre Vacunas están disponibles en español y en muchos otros idiomas. Visite Stacey.si. 1. Why get vaccinated? Vaccination can protect older adults (and some children and younger adults) from pneumococcal disease. Pneumococcal disease is caused by bacteria that can spread from person to person through close contact. It can cause ear infections, and it can also lead to more serious infections of the: 
 Lungs (pneumonia),  Blood (bacteremia), and 
 Covering of the brain and spinal cord (meningitis). Meningitis can cause deafness and brain damage, and it can be fatal.   
 
Anyone can get pneumococcal disease, but children under 3years of age, people with certain medical conditions, adults over 72years of age, and cigarette smokers are at the highest risk. About 18,000 older adults die each year from pneumococcal disease in the [place]. Treatment of pneumococcal infections with penicillin and other drugs used to be more effective. But some strains of the disease have become resistant to these drugs. This makes prevention of the disease, through vaccination, even more important. 2. Pneumococcal polysaccharide vaccine (PPSV23) Pneumococcal polysaccharide vaccine (PPSV23) protects against 23 types of pneumococcal bacteria. It will not prevent all pneumococcal disease. PPSV23 is recommended for:  All adults 72years of age and older,  Anyone 2 through 59years of age with certain long-term health problems, 
 Anyone 2 through 59years of age with a weakened immune system, 
 Adults 23 through 59years of age who smoke cigarettes or have asthma. Most people need only one dose of PPSV. A second dose is recommended for certain high-risk groups. People 72 and older should get a dose even if they have gotten one or more doses of the vaccine before they turned 65. Your healthcare provider can give you more information about these recommendations. Most healthy adults develop protection within 2 to 3 weeks of getting the shot. 3. Some people should not get this vaccine  Anyone who has had a life-threatening allergic reaction to PPSV should not get another dose.  Anyone who has a severe allergy to any component of PPSV should not receive it. Tell your provider if you have any severe allergies.  Anyone who is moderately or severely ill when the shot is scheduled may be asked to wait until they recover before getting the vaccine. Someone with a mild illness can usually be vaccinated.  Children less than 3years of age should not receive this vaccine.  There is no evidence that PPSV is harmful to either a pregnant woman or to her fetus. However, as a precaution, women who need the vaccine should be vaccinated before becoming pregnant, if possible. 4. Risks of a vaccine reaction With any medicine, including vaccines, there is a chance of side effects. These are usually mild and go away on their own, but serious reactions are also possible. About half of people who get PPSV have mild side effects, such as redness or pain where the shot is given, which go away within about two days. Less than 1 out of 100 people develop a fever, muscle aches, or more severe local reactions. Problems that could happen after any vaccine:  People sometimes faint after a medical procedure, including vaccination. Sitting or lying down for about 15 minutes can help prevent fainting, and injuries caused by a fall. Tell your doctor if you feel dizzy, or have vision changes or ringing in the ears.  Some people get severe pain in the shoulder and have difficulty moving the arm where a shot was given. This happens very rarely.  Any medication can cause a severe allergic reaction. Such reactions from a vaccine are very rare, estimated at about 1 in a million doses, and would happen within a few minutes to a few hours after the vaccination. As with any medicine, there is a very remote chance of a vaccine causing a serious injury or death. The safety of vaccines is always being monitored. For more information, visit: www.cdc.gov/vaccinesafety/  
 
5. What if there is a serious reaction? What should I look for? Look for anything that concerns you, such as signs of a severe allergic reaction, very high fever, or unusual behavior. Signs of a severe allergic reaction can include hives, swelling of the face and throat, difficulty breathing, a fast heartbeat, dizziness, and weakness. These would usually start a few minutes to a few hours after the vaccination. What should I do? If you think it is a severe allergic reaction or other emergency that cant wait, call 9-1-1 or get to the nearest hospital. Otherwise, call your doctor. Afterward, the reaction should be reported to the Vaccine Adverse Event Reporting System (VAERS). Your doctor might file this report, or you can do it yourself through the VAERS web site at www.vaers. [facility].gov, or by calling 9-942.714.2264. VAERS does not give medical advice. 6. How can I learn more?  Ask your doctor. He or she can give you the vaccine package insert or suggest other sources of information.  Call your local or state health department.  Contact the Centers for Disease Control and Prevention (CDC): 
- Call 5-607.665.5507 (1-800-CDC-INFO) or 
- Visit CDCs website at www.cdc.gov/vaccines Vaccine Information Statement PPSV  
04/24/2015 Department of Health and Useful at Night Centers for Disease Control and Prevention Office Use Only

## 2019-02-18 NOTE — PROGRESS NOTES
Chief Complaint Patient presents with  Depression Yearly medicare wellness visit with lab results. Health Maintenance Due Topic Date Due  Shingrix Vaccine Age 50> (1 of 2) 01/08/2002  Pneumococcal 65+ Low/Medium Risk (2 of 2 - PPSV23) 08/07/2018  GLAUCOMA SCREENING Q2Y  01/31/2019  MEDICARE YEARLY EXAM  02/13/2019 Patient given pneumococcal vaccine, Pneumovax 23, in left deltoid, per verbal order from Dr. Erika Harris with read back. 2 person medication verification completed with FLASH Navarro. Instructed patient to sit and wait 10-20 minutes before leaving the premises so that we can watch for any complications or adverse reactions. Patient given vaccine information statement handout before vaccine was given. Patient tolerated well without adverse reactions or complications. 1. Have you been to the ER, urgent care clinic or hospitalized since your last visit? YES. Urgent care for bronchitis in Jan. 2019 and was treated with albuterol. 2. Have you seen or consulted any other health care providers outside of the [address] [place] [address] since your last visit (Include any pap smears or colon screening)? YES, patient states he was seen by his pulmonologist in December. 2018. ENT last seen 2 weeks ago. [facility] seen for monitor last week. Neurology last seen 3-4 weeks ago. Do you have an Advanced Directive? YES, patient states he does have one and was notified to bring in with understanding.

## 2019-02-19 ENCOUNTER — TELEPHONE (OUTPATIENT)
Dept: INTERNAL MEDICINE CLINIC | Age: 69
End: 2019-02-19

## 2019-02-19 DIAGNOSIS — E78.5 DYSLIPIDEMIA: Primary | ICD-10-CM

## 2019-02-19 DIAGNOSIS — I10 PRIMARY HYPERTENSION: ICD-10-CM

## 2019-02-19 DIAGNOSIS — I10 PRIMARY HYPERTENSION: Primary | ICD-10-CM

## 2019-02-19 RX ORDER — AMLODIPINE BESYLATE 2.5 MG/1
2.5 TABLET ORAL DAILY
Qty: 30 TAB | Refills: 11 | Status: SHIPPED | OUTPATIENT
Start: 2019-02-19 | End: 2019-02-19 | Stop reason: SDUPTHER

## 2019-02-19 RX ORDER — AMLODIPINE BESYLATE 2.5 MG/1
TABLET ORAL
Qty: 90 TAB | Refills: 3 | Status: SHIPPED | OUTPATIENT
Start: 2019-02-19 | End: 2019-03-29 | Stop reason: DRUGHIGH

## 2019-02-20 NOTE — TELEPHONE ENCOUNTER
PT called re: bp med -Amlodipine-that was prescribed-he read about an  interaction with Simvastatin over 20 mg- he takes 40 mg of Simvastatin    He took Amlodipine  today- he will not take the Simvastatin till he hears back from you.  Please advise  Call # [phone]

## 2019-02-20 NOTE — TELEPHONE ENCOUNTER
pls call    Ambulatory bp monitoring confirms htn - systolics in the 319+ range    Start amlodipine 2.5 every day - called in

## 2019-02-20 NOTE — TELEPHONE ENCOUNTER
Chief Complaint   Patient presents with    Blood Pressure Check     per Dr Buckley Kempton BP monitoring confirms Hypertension Systolics in the 026 + range, Start Amlodipine 2.5 mg every day, check your pharmacy for      Patient's wife reached and 2 identifiers were used: Full Name, and Date of Birth. The patient's wife informed to have the patient check his pharmacy for  on new medication Amlodipine 2.5 mg, and to take as directed per Dr Cristy West. All understood. NOTE:  The patient did return my call and informed of medication to , and take as prescribed by Dr Cristy West. All understood.

## 2019-02-21 NOTE — TELEPHONE ENCOUNTER
Very rare interaction  Never actually seen it in my practice -  Muscle aches, cramps, etc  Ok to try if he's willing to try it  If not, we can try different bp med

## 2019-02-22 RX ORDER — ROSUVASTATIN CALCIUM 10 MG/1
10 TABLET, COATED ORAL
Qty: 90 TAB | Refills: 3 | Status: SHIPPED | OUTPATIENT
Start: 2019-02-22 | End: 2020-02-24

## 2019-02-22 NOTE — TELEPHONE ENCOUNTER
So he wants to change chol med    Ok to stop zocor  crestor 10 every day called in    Stay on norvasc as bp med

## 2019-02-22 NOTE — TELEPHONE ENCOUNTER
Pt requesting alternative medication . He stated is willing to try Crestor because his wife is taking it and he is very familiar with mediction.

## 2019-03-08 DIAGNOSIS — F41.1 GAD (GENERALIZED ANXIETY DISORDER): ICD-10-CM

## 2019-03-08 DIAGNOSIS — F33.0 MDD (MAJOR DEPRESSIVE DISORDER), RECURRENT EPISODE, MILD (HCC): ICD-10-CM

## 2019-03-08 RX ORDER — FLUTICASONE PROPIONATE 50 MCG
SPRAY, SUSPENSION (ML) NASAL
Qty: 48 G | Refills: 3 | Status: CANCELLED | OUTPATIENT
Start: 2019-03-08

## 2019-03-08 RX ORDER — PAROXETINE HYDROCHLORIDE 20 MG/1
TABLET, FILM COATED ORAL
Qty: 90 TAB | Refills: 11 | Status: CANCELLED | OUTPATIENT
Start: 2019-03-08

## 2019-03-08 RX ORDER — PAROXETINE 30 MG/1
30 TABLET, FILM COATED ORAL DAILY
Qty: 90 TAB | Refills: 1 | OUTPATIENT
Start: 2019-03-08

## 2019-03-12 ENCOUNTER — TELEPHONE (OUTPATIENT)
Dept: INTERNAL MEDICINE CLINIC | Age: 69
End: 2019-03-12

## 2019-03-12 NOTE — TELEPHONE ENCOUNTER
Patient is calling stating he has been taking the Amlodipine for a few weeks and his BP had gone down. It has been climbing back up into the upper 130 and now it's in the upper 140's. Please advise if he needs to increase the dose.

## 2019-03-15 ENCOUNTER — APPOINTMENT (OUTPATIENT)
Dept: INTERNAL MEDICINE CLINIC | Age: 69
End: 2019-03-15

## 2019-03-15 ENCOUNTER — HOSPITAL ENCOUNTER (OUTPATIENT)
Dept: LAB | Age: 69
Discharge: HOME | End: 2019-03-15
Payer: MEDICARE

## 2019-03-15 DIAGNOSIS — R79.89 ELEVATED SERUM CREATININE: ICD-10-CM

## 2019-03-15 LAB
ANION GAP SERPL CALC-SCNC: 5 MMOL/L (ref 3–18)
BUN SERPL-MCNC: 19 MG/DL (ref 7–18)
BUN/CREAT SERPL: 18 (ref 12–20)
CALCIUM SERPL-MCNC: 8.9 MG/DL (ref 8.5–10.1)
CHLORIDE SERPL-SCNC: 106 MMOL/L (ref 100–108)
CO2 SERPL-SCNC: 29 MMOL/L (ref 21–32)
CREAT SERPL-MCNC: 1.05 MG/DL (ref 0.6–1.3)
GLUCOSE SERPL-MCNC: 105 MG/DL (ref 74–99)
POTASSIUM SERPL-SCNC: 4.4 MMOL/L (ref 3.5–5.5)
SODIUM SERPL-SCNC: 140 MMOL/L (ref 136–145)

## 2019-03-15 PROCEDURE — 36415 COLL VENOUS BLD VENIPUNCTURE: CPT

## 2019-03-15 PROCEDURE — 80048 BASIC METABOLIC PNL TOTAL CA: CPT

## 2019-03-18 RX ORDER — PAROXETINE 30 MG/1
30 TABLET, FILM COATED ORAL DAILY
Qty: 90 TAB | Refills: 3 | Status: SHIPPED | OUTPATIENT
Start: 2019-03-18 | End: 2019-07-03 | Stop reason: SDUPTHER

## 2019-03-18 NOTE — TELEPHONE ENCOUNTER
pls call    Results for orders placed or performed during the hospital encounter of 33/62/60   METABOLIC PANEL, BASIC   Result Value Ref Range    Sodium 140 136 - 145 mmol/L    Potassium 4.4 3.5 - 5.5 mmol/L    Chloride 106 100 - 108 mmol/L    CO2 29 21 - 32 mmol/L    Anion gap 5 3.0 - 18 mmol/L    Glucose 105 (H) 74 - 99 mg/dL    BUN 19 (H) 7.0 - 18 MG/DL    Creatinine 1.05 0.6 - 1.3 MG/DL    BUN/Creatinine ratio 18 12 - 20      GFR est AA >60 >60 ml/min/1.73m2    GFR est non-AA >60 >60 ml/min/1.73m2    Calcium 8.9 8.5 - 10.1 MG/DL     Avoid naproxen if at all possible as creatinine did normalize

## 2019-03-18 NOTE — TELEPHONE ENCOUNTER
Spoke with patient, he was given message below. He said he will avoid naproxen from here on out. He said he takes naproxen for his arthritic pain, since he stopped, his pain and swelling has come back and bothering him quite a bit. He has been taking tylenol arthritis but he is scared to take it due to his recent creat increase. Can he take motrin/advil now? If not, what can he take? He also asked if he can increase his paxil to 30mg? He took 30mg in the past and it helped, he has been taking 1 tab and 1/2 since Jan 2019. I have pended a rx for 30mg if its ok for him to take. He is still taking the wellbutrin as well.

## 2019-03-18 NOTE — TELEPHONE ENCOUNTER
No nsaids - aleve, naproxen, advil, motrin, etc  Ok to take tylenol - will never bother the kidneys  Stay below total 4000 mg daily as needed and see if that's enough to control sx

## 2019-03-22 DIAGNOSIS — R05.3 CHRONIC COUGH: ICD-10-CM

## 2019-03-22 DIAGNOSIS — J98.01 BRONCHOSPASM: ICD-10-CM

## 2019-03-22 RX ORDER — BUDESONIDE AND FORMOTEROL FUMARATE DIHYDRATE 160; 4.5 UG/1; UG/1
2 AEROSOL RESPIRATORY (INHALATION) 2 TIMES DAILY
Qty: 3 INHALER | Refills: 3 | Status: SHIPPED | OUTPATIENT
Start: 2019-03-22 | End: 2019-07-03

## 2019-03-22 NOTE — TELEPHONE ENCOUNTER
Jorge Kidd is requesting a 90 day supply    Last Visit: 2-18-19 with MD Dar Townsend  Next Appointment: 2-19-20 with MD Dar Townsend    Requested Prescriptions     Pending Prescriptions Disp Refills    budesonide-formoterol (SYMBICORT) 160-4.5 mcg/actuation HFAA 3 Inhaler 3     Sig: Take 2 Puffs by inhalation two (2) times a day.

## 2019-03-29 ENCOUNTER — TELEPHONE (OUTPATIENT)
Dept: INTERNAL MEDICINE CLINIC | Age: 69
End: 2019-03-29

## 2019-03-29 DIAGNOSIS — I10 PRIMARY HYPERTENSION: Primary | ICD-10-CM

## 2019-03-29 RX ORDER — AMLODIPINE BESYLATE 10 MG/1
10 TABLET ORAL DAILY
Qty: 90 TAB | Refills: 3 | Status: SHIPPED | OUTPATIENT
Start: 2019-03-29 | End: 2019-03-29 | Stop reason: SDUPTHER

## 2019-03-29 NOTE — TELEPHONE ENCOUNTER
Pt aware and verbalized understanding. Amlodipine 10 mg with 10 tabs zero refills called into [facility] per verbal order from [address].

## 2019-03-29 NOTE — TELEPHONE ENCOUNTER
Increase to 10 mg    Script sent to kitty    Call in 10 tabs of the 10 mg if he's running out of the 2.5

## 2019-03-29 NOTE — TELEPHONE ENCOUNTER
Regarding: Prescription Question  Contact: [phone]  ----- Message from [address] [place] [address] 951, Generic sent at 3/28/2019  5:02 PM EDT -----    I started taking Amlodipine Besylate 2.5 mg daily in February 2019 after results of a 48 hr blood pressure monitoring were reviewed. I called your office 2-3 weeks ago after my blood pressure readings showed regular 999/430 level systolic readings, and was advised to up the intake to 5 mg daily. I did. I got 5 blood pressure readings between 18 and 28 March, and recorded the following (25 March reading was taken at a doctor's office):    3/18 -131/83 (pulse 98); 3/19 - 142/80 (pulse 76); 3/20 - 120/75 (pulse 81); 3/22 - 141/80 (pulse 83); 3/25 - 128/100 (nurse commenetd on disatolic reading being high); 3/26 - 139/77 (pulse 78); 3/28 - 148/84 (pulse 78). What now?   PASQUALE/Martin Jeffery

## 2019-04-18 DIAGNOSIS — I10 PRIMARY HYPERTENSION: ICD-10-CM

## 2019-04-18 RX ORDER — METOPROLOL TARTRATE 25 MG/1
TABLET, FILM COATED ORAL
Qty: 180 TAB | Refills: 5 | Status: SHIPPED | OUTPATIENT
Start: 2019-04-18 | End: 2019-12-16

## 2019-04-19 ENCOUNTER — TELEPHONE (OUTPATIENT)
Dept: INTERNAL MEDICINE CLINIC | Age: 69
End: 2019-04-19

## 2019-04-19 NOTE — TELEPHONE ENCOUNTER
Regarding: RE: Prescription Question  Contact: [phone]  ----- Message from [address] [place] [address] 951, [facility] sent at 4/18/2019 12:09 PM EDT -----    Thank you --- yesterday when my ankles and feet continued to swell so that my shoes were getting really tight, I went to the local Urgent Care, where  the MD there also told me to decrease amlodipine to 5 mg and to call my PCP for further directions -- so I am doing so, and will  the metoprolol today --- will record BP and HR every day, and call in the end of next week. Thank you/R Hugo Muñoz    NOTE:  Pls also tell Dr. Chavez Harmon, for my records, that  I recently went to Dr. Sandra Zimmerman, psychiatrist and I am now going on Viibryd 20 mg daily, changing from Buproprion 150 mg twice a day to 300mg SR once a day, and tapering off Paxil 10 mg a week down to 0 in a month.  ----- Message -----  From: Bulmaro Pedro LPN  Sent: 2/40/7561  8:55 AM EDT  To: Cheyenne Garcia  Subject: RE: Prescription Question  Mr Dedrick Rick,   Dr Chavez Harmon reviewed your message and suggests the following:      Yes the swelling is most likely fromthe norvasc being increased to 10  Decrease to 5 mg daily     Will need to add medication i'm afraid     Start metoprolol 25 bid - called locally to pharmacy; Call in BP and heart rate in 1-2 weeks please      Let us know if you have any questions or concerns.   Xavier Rivera         ----- Message -----     From: Cheyenne Garcia     Sent: 4/15/2019 10:10 PM EDT       To: Quinn Miller MD  Subject: Prescription Question    My Amlodipine Besylate was increased from 2.5mg to 10 mg on/or about 3/29 - last week I noticed my ankles were swollen a bit - now my feet and ankles are pretty swollen, though I have no discomfort - when I take my socks off, there is a distinct swelling \"bulge\" of the leg above the sock line, which eventually evens out -- I see swelling in the legs is a possible side eftect of this medication - (1) Could the medication increase be a cause of the swelling?  (2) Should I schedule an appointment , or take other action?   Thank you/PASQUALE Galdamez

## 2019-04-30 ENCOUNTER — TELEPHONE (OUTPATIENT)
Dept: INTERNAL MEDICINE CLINIC | Age: 69
End: 2019-04-30

## 2019-04-30 NOTE — TELEPHONE ENCOUNTER
bp looks good and hr in acceptable range  The edema might just be residual effect of the noavasc 5  The lopressor is supposed to slow the HR down at baseline and during exercise so that's ok  Would not change anything at this time

## 2019-04-30 NOTE — TELEPHONE ENCOUNTER
Reference Ms. Tabares's message to me of 4/18 to reduce amlodopine,  check BP and heart rate for 1-2 weeks, and report these -   Feet/ankle swelling reduced, but not gone away (?) - here are diastolic/systolic/heart rate data from 4/19:   4//65/60;4//67/56,116/71/63;4//72/61,105/68/67;4//80/60,107/69/62;4//79/59,107/75/57;4//80/54;4//66/55,116/75/56,105/74/62;4//62/59;4//72/55;4//72/56;4//80/58,105/69/59. NOTE: In my normal 18 min cross  machine routine at [facility], pulse typically ended up between 118-130; since 4/18 only recall twice it has been as high 100 (101,103) - is this good that heart rate has dropped, while feeling a bit more tired during/after routine?

## 2019-05-01 DIAGNOSIS — I10 PRIMARY HYPERTENSION: Primary | ICD-10-CM

## 2019-05-01 NOTE — TELEPHONE ENCOUNTER
CORRECTION TO MY EARLIER REPLY SENT TODAY -> THIS IS WHY I LEFT YOU A MESSAGE TO PLEASE CALL ME --- YOUR BELOW MESSAGE REFERS TO A POSSIBLE \"RESIDUAL EFFECT\" FROM PRIOR 5 MG AMLODIPINE DOSAGE AS POSSIBLE CAUSE FOR CONTINUED SWELLING OF LOWER LEGS AND FEET, IMPLYING YOU THOUGHT I WAS NO LONGER ON 5 MG -- HOWEVER, HERE IS THE SEQUENCE OF EVENTS: (1) STARTED ON 2.5 MG IN FEB; (2) MOVED TO 10 MG AFTER BLOOD PRESSURE STAYED HIGH: (3) CALLED ABOUT SWELLING, WAS TOLD TO GO DOWN TO 5 MG --- THUS I AM STILL ON 5 MG  - SO IF 5 MG IS STILL CAUSING SWELLING, AND 2.5 MG WAS DEEMED NOT HIGH ENOUGH, WHAT DO I DO NOW?  THX/R YARY MCCLOUD

## 2019-05-01 NOTE — TELEPHONE ENCOUNTER
Responded to my chart message- Amlodipine 2.5 mg- upped to 10mg- feet swelling- went to 5mg- he is confused about the dosing

## 2019-05-02 DIAGNOSIS — I10 PRIMARY HYPERTENSION: ICD-10-CM

## 2019-05-02 RX ORDER — TRIAMTERENE AND HYDROCHLOROTHIAZIDE 37.5; 25 MG/1; MG/1
1 CAPSULE ORAL DAILY
Qty: 30 CAP | Refills: 5 | Status: SHIPPED | OUTPATIENT
Start: 2019-05-02 | End: 2019-05-02 | Stop reason: SDUPTHER

## 2019-05-02 RX ORDER — TRIAMTERENE AND HYDROCHLOROTHIAZIDE 37.5; 25 MG/1; MG/1
CAPSULE ORAL
Qty: 90 CAP | Refills: 5 | Status: SHIPPED | OUTPATIENT
Start: 2019-05-02 | End: 2020-05-04

## 2019-05-02 NOTE — TELEPHONE ENCOUNTER
Per my last message 4/30    bp looks good and hr in acceptable range  The edema might just be residual effect of the noavasc 5  The lopressor is supposed to slow the HR down at baseline and during exercise so that's ok  Would not change anything at this time      So NO, I knew he was on the 5    He should be on metoprolol 25 bid  amlo 5 every day    The question really is, can he live with the mild edema?   If not, then will have to dc the amlodipine and change to a different med  We can try acei, arb, diuretic; they will have their own set of sfx  Would not inc the metoprolol as we'll slow the heart rate down some more and he's already in the 50s    pls make a decision

## 2019-05-02 NOTE — TELEPHONE ENCOUNTER
Spoke with pt, he is ok to stay on current dose of metoprolol  He said he would like to change amlodipine to another medication if possible. His swelling is still there, not as bad as it was on 10mg.  When he takes his socks off at the end of the day it looks like \"2 different body parts\"  He said if there is another medication that doesn't have a sfx of edema  If possible  Pls send alternative to [facility] [address], 30 day supply please to be sure he does ok on it

## 2019-05-15 RX ORDER — FINASTERIDE 5 MG/1
TABLET, FILM COATED ORAL
Qty: 90 TAB | Refills: 3 | Status: SHIPPED | OUTPATIENT
Start: 2019-05-15 | End: 2020-02-19

## 2019-06-26 ENCOUNTER — OFFICE VISIT (OUTPATIENT)
Dept: ORTHOPEDIC SURGERY | Age: 69
End: 2019-06-26

## 2019-06-26 VITALS
HEART RATE: 60 BPM | SYSTOLIC BLOOD PRESSURE: 92 MMHG | RESPIRATION RATE: 14 BRPM | HEIGHT: 74 IN | BODY MASS INDEX: 25.31 KG/M2 | TEMPERATURE: 98.6 F | DIASTOLIC BLOOD PRESSURE: 56 MMHG | OXYGEN SATURATION: 94 % | WEIGHT: 197.2 LBS

## 2019-06-26 DIAGNOSIS — M48.062 SPINAL STENOSIS, LUMBAR REGION WITH NEUROGENIC CLAUDICATION: Primary | ICD-10-CM

## 2019-06-26 DIAGNOSIS — M47.816 SPONDYLOSIS OF LUMBAR REGION WITHOUT MYELOPATHY OR RADICULOPATHY: ICD-10-CM

## 2019-06-26 RX ORDER — VILAZODONE HYDROCHLORIDE 40 MG/1
1 TABLET ORAL DAILY
Refills: 0 | COMMUNITY
Start: 2019-06-06 | End: 2021-06-01 | Stop reason: ALTCHOICE

## 2019-06-26 RX ORDER — UBIDECARENONE 30 MG
30 CAPSULE ORAL DAILY
COMMUNITY
End: 2020-01-23

## 2019-06-26 RX ORDER — DEXTROMETHORPHAN HYDROBROMIDE, GUAIFENESIN 5; 100 MG/5ML; MG/5ML
650 LIQUID ORAL
COMMUNITY
End: 2020-08-25

## 2019-06-26 RX ORDER — BUPROPION HYDROCHLORIDE 300 MG/1
1 TABLET ORAL DAILY
Refills: 0 | COMMUNITY
Start: 2019-06-06 | End: 2019-07-03 | Stop reason: SDUPTHER

## 2019-06-26 RX ORDER — ALPRAZOLAM 0.25 MG/1
1 TABLET ORAL DAILY
Refills: 0 | COMMUNITY
Start: 2019-05-13 | End: 2022-01-13 | Stop reason: SDUPTHER

## 2019-06-26 RX ORDER — GABAPENTIN 600 MG/1
600 TABLET ORAL 3 TIMES DAILY
Qty: 90 TAB | Refills: 5 | Status: SHIPPED | OUTPATIENT
Start: 2019-06-03 | End: 2019-12-18 | Stop reason: SDUPTHER

## 2019-06-26 NOTE — PROGRESS NOTES
Chief complaint/History of Present Illness:  Chief Complaint   Patient presents with    Neck Pain    Back Pain     lumbar    Follow-up     6 month    Shoulder Pain     left     HPI  Willy Babb is a  81 y.o.  male      HISTORY OF PRESENT ILLNESS:  The patient comes in today for followup of his back pain. He does get some pain into his lateral thighs bilaterally. Neurontin 600 mg three times a day does help. Since we last saw him, he had to come off his Naproxen that he has been on for many years through his PCP due to a bump in his Creatinine when they rechecked it. After discontinuing the Naproxen, it did come back down to normal.  He is now taking Tylenol Arthritis for his aches and pains, but he can definitely tell that he is not on an NSAID anymore, which did work better. Also, they did diagnosis him asthma. He is on Proair as needed. They had to stop the Symbicort. It was causing other issues. HE is also now seeing a balance doctor, Dr. Belem Waller, because he has some dizziness and nausea. They attributed it to some thinning membrane in his ear on the right. He was also diagnosed with hypertension and was worked up through a cardiologist for that. He does continue to work out three to four times a week at [email or web address]. He denies fever and bowel or bladder dysfunction. As far as his neck pain, he is not really having any. He is not having any arm pain now that he has stopped working and retired and not having to drive two and a half hours a day. He does have some balance issues that could be attributed to his ear. He is not dropping things or have any dexterity issues. He is a nonsmoker. He denies fever and bowel or bladder dysfunction. PHYSICAL EXAM:  Mr. Tiffanie Dailey is a 82-year-old male. He is alert and oriented. He has a normal mood and affect. He has a full weightbearing, non-antalgic gait using no assistive device.   He has 4/5 strength of the bilateral lower extremities and negative straight leg raise. There is a mildly poor tandem gait. ASSESSMENT/PLAN:  This is a patient who has lumbar spinal stenosis and spondylosis. He is doing well with the Neurontin 600 mg three times a day. We have refilled that. He does not have any side effects from it. His kidney function is doing fine on it. We will see him back in six months or sooner if needed. Review of systems:    Past Medical History:   Diagnosis Date    Allergic rhinitis     Dr Olena Mejia; never took immunotherapy    Anxiety     saw Dr Clau Brandon; pfts show no obstruction but high RV    Basal cell carcinoma 2013    Dr. Kay Thompson s/p resection 2 spots    BPH (benign prostatic hyperplasia)     Dr. Harp Payment Cardiac stress test 08/12/2009    Neg maximal exercise stress test.  Ex time 15:00.    Depression     and anxiety; paxil 2008?  lexapro and wellbutrin til 2018; tried prozac    Dyslipidemia     Erectile dysfunction     FHx: heart disease     GERD (gastroesophageal reflux disease)     s/p dilation 2003 Dr. Latha Hodgson; egd 2015    Heel spur     Dr Krupa Gibson Hypertension 02/2019    24 hr ambulatory monitoring Dr Arango Factor    Hypovitaminosis D     Overweight (BMI 25.0-29.9) 2/12/2018    Peripheral neuropathy     and B CTS on EMG Dr. Terry Roman 2014    Sleep apnea     intol cpap Dr Alejandrina Mosquera 2015; using oral appliance Dr Mariama Hargrove; AHI 17.8 min desats 83    Spinal stenosis 03/2014    MRI (3/14) showed scoliosis w multilevel degen findings, mod L3-4, L4-5 central stenosis, mod L5-S1 foraminal stenosis;  (10/17) severe L3-4 central stenosis; seeing DR Phillip Davis     Past Surgical History:   Procedure Laterality Date    CARDIAC SURG PROCEDURE UNLIST  8/09    ETT negative    CHEST SURGERY PROCEDURE UNLISTED  07/2018    Dr Bette Baker; FEV1 96%, 5% improvement postbd, ratio 104, , , DLCO 82    HX COLONOSCOPY      Dr. Latha Hodgson mild diverticulosis 2000, 6/12    HX ORTHOPAEDIC B CT release 2007, 2009    HX ORTHOPAEDIC      DEXA t score 4.2 spine, 0.2 hip (2012)     Social History     Socioeconomic History    Marital status:      Spouse name: Not on file    Number of children: 2    Years of education: Not on file    Highest education level: Not on file   Occupational History    Occupation: ret [employer]   Social Needs    Financial resource strain: Not on file    Food insecurity:     Worry: Not on file     Inability: Not on file    Transportation needs:     Medical: Not on file     Non-medical: Not on file   Tobacco Use    Smoking status: Former Smoker    Smokeless tobacco: Never Used    Tobacco comment: teenager    Substance and Sexual Activity    Alcohol use:  Yes     Alcohol/week: 0.6 oz     Types: 1 Glasses of wine per week     Comment: 1-2 glasses of wine a month    Drug use: No    Sexual activity: Yes     Partners: Female     Birth control/protection: None   Lifestyle    Physical activity:     Days per week: Not on file     Minutes per session: Not on file    Stress: Not on file   Relationships    Social connections:     Talks on phone: Not on file     Gets together: Not on file     Attends Anglican service: Not on file     Active member of club or organization: Not on file     Attends meetings of clubs or organizations: Not on file     Relationship status: Not on file    Intimate partner violence:     Fear of current or ex partner: Not on file     Emotionally abused: Not on file     Physically abused: Not on file     Forced sexual activity: Not on file   Other Topics Concern    Not on file   Social History Narrative    Not on file     Family History   Problem Relation Age of Onset    Cancer Mother         leukemia    Heart Disease Father     Psychiatric Disorder Daughter         depression       Physical Exam:  Visit Vitals  BP 92/56   Pulse 60   Temp 98.6 °F (37 °C) (Oral)   Resp 14   Ht 6' 2\" (1.88 m)   Wt 197 lb 3.2 oz (89.4 kg)   SpO2 94%   BMI 25.32 kg/m²     Pain Scale: 6/10       has been . reviewed and is appropriate          Diagnoses and all orders for this visit:    1. Spinal stenosis, lumbar region with neurogenic claudication  -     gabapentin (NEURONTIN) 600 mg tablet; Take 1 Tab by mouth three (3) times daily. Indications: Neuropathic Pain    2. Spondylosis of lumbar region without myelopathy or radiculopathy            Follow-up and Dispositions    · Return in about 6 months (around 12/26/2019) for with NP Faith.              We have informed Olga Lidiaoskar Chiara to notify us for immediate appointment if he has any worsening neurogical symptoms or if an emergency situation presents, then call [phone]

## 2019-06-26 NOTE — PROGRESS NOTES
Roosevelt Rodney presents today for   Chief Complaint   Patient presents with    Neck Pain    Back Pain     lumbar    Follow-up     6 month    Shoulder Pain     left       Is someone accompanying this pt? no    Is the patient using any DME equipment during OV? no    Depression Screening:  3 most recent PHQ Screens 6/26/2019   Little interest or pleasure in doing things Not at all   Feeling down, depressed, irritable, or hopeless More than half the days   Total Score PHQ 2 2       Learning Assessment:  Learning Assessment 8/6/2018   PRIMARY LEARNER Patient   HIGHEST LEVEL OF EDUCATION - PRIMARY LEARNER  -   BARRIERS PRIMARY LEARNER -   CO-LEARNER CAREGIVER -   PRIMARY LANGUAGE ENGLISH   LEARNER PREFERENCE PRIMARY DEMONSTRATION   ANSWERED BY patient   RELATIONSHIP SELF       Abuse Screening:  Abuse Screening Questionnaire 1/3/2019   Do you ever feel afraid of your partner? N   Are you in a relationship with someone who physically or mentally threatens you? N   Is it safe for you to go home? Y       Fall Risk  Fall Risk Assessment, last 12 mths 6/26/2019   Able to walk? Yes   Fall in past 12 months? No   Fall with injury? -   Number of falls in past 12 months -   Fall Risk Score -       OPIOID RISK TOOL  No flowsheet data found. Pt currently taking Antiplatelet therapy? no    Coordination of Care:  1. Have you been to the ER, urgent care clinic since your last visit? Yes, [facility]  Hospitalized since your last visit? 2. Have you seen or consulted any other health care providers outside of the [address] [place] [address] since your last visit? As above Include any pap smears or colon screening.      Last  Checked 06/26/19

## 2019-07-03 ENCOUNTER — OFFICE VISIT (OUTPATIENT)
Dept: INTERNAL MEDICINE CLINIC | Age: 69
End: 2019-07-03

## 2019-07-03 VITALS
WEIGHT: 194 LBS | RESPIRATION RATE: 14 BRPM | SYSTOLIC BLOOD PRESSURE: 103 MMHG | HEIGHT: 74 IN | HEART RATE: 60 BPM | BODY MASS INDEX: 24.9 KG/M2 | DIASTOLIC BLOOD PRESSURE: 67 MMHG | OXYGEN SATURATION: 97 % | TEMPERATURE: 98.2 F

## 2019-07-03 DIAGNOSIS — I10 PRIMARY HYPERTENSION: ICD-10-CM

## 2019-07-03 DIAGNOSIS — M94.0 COSTOCHONDRITIS: Primary | ICD-10-CM

## 2019-07-03 DIAGNOSIS — G62.9 NEUROPATHY: ICD-10-CM

## 2019-07-03 DIAGNOSIS — F41.1 GAD (GENERALIZED ANXIETY DISORDER): ICD-10-CM

## 2019-07-03 DIAGNOSIS — R07.9 CHEST PAIN, UNSPECIFIED TYPE: ICD-10-CM

## 2019-07-03 RX ORDER — PREDNISONE 20 MG/1
20 TABLET ORAL
Qty: 6 TAB | Refills: 0 | Status: SHIPPED | OUTPATIENT
Start: 2019-07-03 | End: 2019-09-11 | Stop reason: ALTCHOICE

## 2019-07-03 NOTE — PROGRESS NOTES
81 y.o. WHITE OR  male who presents for evaluation. He is here to follow-up after his ER visit. He had presented yesterday with sharp chest pain in the mid sternal area to [facility]. EKG negative, ruled out by biomarkers, d-dimer negative, stress echo was negative. He was diagnosed with costochondritis and told to take anti-inflammatories. However, we have taken him off anti-inflammatories back in February because of elevated kidney tests. He would not be averse to using short course of steroids which has helped musculoskeletal issues in the past.  He does not recall any specific trauma but he does workout a lot. Of note,  he is now seeing psychiatry on regimen below    Also, we diagnosed him with hypertension back in February via 24-hour ambulatory monitoring. Initially started him on amlodipine but then we had to add metoprolol low-dose. The amlodipine eventually caused swelling so he was switched over to Dyazide. Remains on metoprolol 25 twice daily but he reports that he has a hard time getting his heart rate up with exercise. Also, sometimes his blood pressure dropped down into the 99C systolic although no syncope or presyncope. Neuropathy remains an issue, he has had some gait problems, being followed by neurology    Past Medical History:   Diagnosis Date    Allergic rhinitis     Dr Joe Kruse; never took immunotherapy    Anxiety     saw Dr Ashlyn Gipson; pfts show no obstruction but high RV    Basal cell carcinoma 2013    Dr. Chela Mustafa s/p resection 2 spots    BPH (benign prostatic hyperplasia)     Dr. Austen Encinas Cardiac stress test 08/12/2009    Neg maximal exercise stress test.  Ex time 15:00.    Depression     and anxiety; paxil 2008?  lexapro and wellbutrin til 2018; tried prozac    Dyslipidemia     Erectile dysfunction     FHx: heart disease     GERD (gastroesophageal reflux disease)     s/p dilation 2003 Dr. Phill Jones; egd 2015    Heel spur     Dr Katey Villatoro Hypertension 02/2019    24 hr ambulatory monitoring Dr Cheryle Toro    Hypovitaminosis D     Overweight (BMI 25.0-29.9) 2/12/2018    Peripheral neuropathy     and B CTS on EMG Dr. Dyana Ramirez 2014    Sleep apnea     intol cpap Dr Rocco Wang 2015; using oral appliance Dr Celeste Franco; AHI 17.8 min desats 83    Spinal stenosis 03/2014    MRI (3/14) showed scoliosis w multilevel degen findings, mod L3-4, L4-5 central stenosis, mod L5-S1 foraminal stenosis;  (10/17) severe L3-4 central stenosis; seeing DR Carter Hurst     Past Surgical History:   Procedure Laterality Date    CARDIAC SURG PROCEDURE UNLIST  8/09    ETT negative    CHEST SURGERY PROCEDURE UNLISTED  07/2018    Dr Jonna Abraham; FEV1 96%, 5% improvement postbd, ratio 104, , , DLCO 82    HX COLONOSCOPY      Dr. Marisol Campoverde mild diverticulosis 2000, 6/12    HX ORTHOPAEDIC      B CT release 2007, 2009    HX ORTHOPAEDIC      DEXA t score 4.2 spine, 0.2 hip (2012)     Social History     Socioeconomic History    Marital status:      Spouse name: Not on file    Number of children: 2    Years of education: Not on file    Highest education level: Not on file   Occupational History    Occupation: ret [employer]   Social Needs    Financial resource strain: Not on file    Food insecurity:     Worry: Not on file     Inability: Not on file    Transportation needs:     Medical: Not on file     Non-medical: Not on file   Tobacco Use    Smoking status: Former Smoker    Smokeless tobacco: Never Used    Tobacco comment: teenager    Substance and Sexual Activity    Alcohol use:  Yes     Alcohol/week: 0.6 oz     Types: 1 Glasses of wine per week     Comment: 1-2 glasses of wine a month    Drug use: No    Sexual activity: Yes     Partners: Female     Birth control/protection: None   Lifestyle    Physical activity:     Days per week: Not on file     Minutes per session: Not on file    Stress: Not on file   Relationships    Social connections: Talks on phone: Not on file     Gets together: Not on file     Attends Adventism service: Not on file     Active member of club or organization: Not on file     Attends meetings of clubs or organizations: Not on file     Relationship status: Not on file    Intimate partner violence:     Fear of current or ex partner: Not on file     Emotionally abused: Not on file     Physically abused: Not on file     Forced sexual activity: Not on file   Other Topics Concern    Not on file   Social History Narrative    Not on file     Current Outpatient Medications   Medication Sig    MEN'S MULTI-VITAMIN PO Take  by mouth.  ALPRAZolam (XANAX) 0.25 mg tablet Take 1 Tab by mouth two (2) times daily as needed.  coenzyme Q-10 (CO Q-10) 30 mg capsule Take 30 mg by mouth daily.  VIIBRYD 40 mg tab tablet Take 1 Tab by mouth daily.  acetaminophen (TYLENOL ARTHRITIS PAIN) 650 mg TbER Take 650 mg by mouth every eight (8) hours as needed.  gabapentin (NEURONTIN) 600 mg tablet Take 1 Tab by mouth three (3) times daily. Indications: Neuropathic Pain    finasteride (PROSCAR) 5 mg tablet TAKE 1 TABLET DAILY    triamterene-hydroCHLOROthiazide (DYAZIDE) 37.5-25 mg per capsule TAKE 1 CAPSULE BY MOUTH DAILY    metoprolol tartrate (LOPRESSOR) 25 mg tablet TAKE 1 TABLET BY MOUTH TWICE DAILY    rosuvastatin (CRESTOR) 10 mg tablet Take 1 Tab by mouth nightly.  PARoxetine (PAXIL) 20 mg tablet TAKE 1 TABLET BY MOUTH DAILY    buPROPion SR (WELLBUTRIN SR) 150 mg SR tablet Take 1 Tab by mouth two (2) times a day. (Patient taking differently: Take 300 mg by mouth daily.)    sildenafil citrate (VIAGRA) 100 mg tablet Take 1 Tab by mouth as needed.  latanoprost (XALATAN) 0.005 % ophthalmic solution OMAR 1 GTT INTO RIGHT EYE HS    albuterol (PROAIR HFA) 90 mcg/actuation inhaler Take 2 Puffs by inhalation every four (4) hours as needed for Wheezing.     fluticasone (FLONASE) 50 mcg/actuation nasal spray USE 2 SPRAYS IN [facility] NOSTRIL DAILY    NEXIUM 40 mg capsule TAKE 1 CAPSULE DAILY    ascorbic acid (VITAMIN C) 500 mg tablet Take  by mouth.  aspirin delayed-release 81 mg tablet Take  by mouth daily.  cholecalciferol, vitamin d3, (VITAMIN D3) 1,000 unit tablet Take  by mouth daily.  Gluc-Scar-MSM#0-N-Mbeu-Mike-Bor (OSTEO BI-FLEX) 750-625-30 mg Tab Take  by mouth daily.  OMEGA-3 FATTY ACIDS (OMEGA 3 PO) Take  by mouth two (2) times a day. 3 QD     No current facility-administered medications for this visit. Visit Vitals  /67   Pulse 60   Temp 98.2 °F (36.8 °C) (Oral)   Resp 14   Ht 6' 2\" (1.88 m)   Wt 194 lb (88 kg)   SpO2 97%   BMI 24.91 kg/m²   A&O x3  Affect is appropriate. Mood stable  No apparent distress  Anicteric, no JVD, adenopathy or thyromegaly. No carotid bruits or radiated murmur  Lungs clear to auscultation, no wheezes or rales  Chest wall tender mid sternal region and shading to left side  Heart showed regular rate and rhythm. No murmur, rubs, gallops  Abdomen soft nontender, no hepatosplenomegaly or masses. Extremities without edema. Pulses 1-2+ symmetrically    Assessment and plan:  1. Costochondritis. Short course of prednisone and we discussed natural course of this process  2. History of pulmonary nodules. Reviewed previous readings in the chart, offered to do follow-up CT, they declined for now  3. Psychiatric. Continue current regimen   4. Hypertension. Decrease metoprolol to 12.5 twice daily, continue Dyazide. Chemistries okay at the ER visit. Follow-up as previously scheduled      Above conditions discussed at length and patient vocalized understanding.   All questions answered to patient satisfaction

## 2019-07-03 NOTE — PROGRESS NOTES
Arlen Armendariz presents today for   Chief Complaint   Patient presents with   [facility] Follow Up     chest pain    Abnormal Chest X Ray     noted on xray small upper bilateral lung nodules              Depression Screening:  3 most recent PHQ Screens 6/26/2019   Little interest or pleasure in doing things Not at all   Feeling down, depressed, irritable, or hopeless More than half the days   Total Score PHQ 2 2       Learning Assessment:  Learning Assessment 7/3/2019   PRIMARY LEARNER Patient   HIGHEST LEVEL OF EDUCATION - PRIMARY LEARNER  4 YEARS OF COLLEGE   BARRIERS PRIMARY LEARNER NONE   CO-LEARNER CAREGIVER No   PRIMARY LANGUAGE ENGLISH   LEARNER PREFERENCE PRIMARY READING   ANSWERED BY patient   RELATIONSHIP SELF       Abuse Screening:  Abuse Screening Questionnaire 1/3/2019   Do you ever feel afraid of your partner? N   Are you in a relationship with someone who physically or mentally threatens you? N   Is it safe for you to go home? Y       Fall Risk  Fall Risk Assessment, last 12 mths 6/26/2019   Able to walk? Yes   Fall in past 12 months? No   Fall with injury? -   Number of falls in past 12 months -   Fall Risk Score -           Coordination of Care:  1. Have you been to the ER, urgent care clinic since your last visit? Hospitalized since your last visit? Yes [facility] 7/2/19    2. Have you seen or consulted any other health care providers outside of the [address] [place] [address] since your last visit? Include any pap smears or colon screening. No    Med list updated. Not current or medications patient no longer taking removed from list per verbal order from [address] [place].

## 2019-07-10 ENCOUNTER — HOSPITAL ENCOUNTER (OUTPATIENT)
Dept: CT IMAGING | Age: 69
Discharge: HOME | End: 2019-07-10
Attending: INTERNAL MEDICINE
Payer: MEDICARE

## 2019-07-10 DIAGNOSIS — R10.32 ABDOMINAL PAIN, LEFT LOWER QUADRANT: ICD-10-CM

## 2019-07-10 DIAGNOSIS — K58.9 IRRITABLE BOWEL SYNDROME: ICD-10-CM

## 2019-07-10 DIAGNOSIS — K21.9 GASTROESOPHAGEAL REFLUX DISEASE WITHOUT ESOPHAGITIS: ICD-10-CM

## 2019-07-10 DIAGNOSIS — F32.A DEPRESSIVE DISORDER: ICD-10-CM

## 2019-07-10 LAB — CREAT UR-MCNC: 1.3 MG/DL (ref 0.6–1.3)

## 2019-07-10 PROCEDURE — 82565 ASSAY OF CREATININE: CPT

## 2019-07-10 PROCEDURE — 74011636320 HC RX REV CODE- 636/320: Performed by: INTERNAL MEDICINE

## 2019-07-10 PROCEDURE — 74177 CT ABD & PELVIS W/CONTRAST: CPT

## 2019-07-10 RX ADMIN — IOPAMIDOL 80 ML: 612 INJECTION, SOLUTION INTRAVENOUS at 08:34

## 2019-09-09 ENCOUNTER — TELEPHONE (OUTPATIENT)
Dept: INTERNAL MEDICINE CLINIC | Age: 69
End: 2019-09-09

## 2019-09-09 NOTE — TELEPHONE ENCOUNTER
Granulomas are scarred lung tissue from prior infection, etc - benign and not cancerous - usually don't recommend f/u for it

## 2019-09-09 NOTE — TELEPHONE ENCOUNTER
----- Message from [employer] sent at 9/6/2019  6:29 PM EDT -----  Regarding: Test Results Question  Contact: [phone]  I had a CTscan (abdominal and pelvic) due to persistent stomach pains on 7/10/19 (Dr. Beverly Schmitz). One finding says \"Lung base: Small calcified right lower lobe granuloma\" - What exactly is this? What would possibly cause such? Is this something I need to have looekd into?  Thank you/PASQUALE Rock

## 2019-09-11 ENCOUNTER — OFFICE VISIT (OUTPATIENT)
Dept: CARDIOLOGY CLINIC | Age: 69
End: 2019-09-11

## 2019-09-11 VITALS
BODY MASS INDEX: 24.51 KG/M2 | HEART RATE: 82 BPM | HEIGHT: 74 IN | OXYGEN SATURATION: 98 % | WEIGHT: 191 LBS | SYSTOLIC BLOOD PRESSURE: 112 MMHG | DIASTOLIC BLOOD PRESSURE: 80 MMHG

## 2019-09-11 DIAGNOSIS — I10 ESSENTIAL HYPERTENSION: ICD-10-CM

## 2019-09-11 DIAGNOSIS — E78.5 DYSLIPIDEMIA: ICD-10-CM

## 2019-09-11 DIAGNOSIS — R07.81 PLEURITIC CHEST PAIN: ICD-10-CM

## 2019-09-11 DIAGNOSIS — R94.31 ABNORMAL EKG: Primary | ICD-10-CM

## 2019-09-11 NOTE — PROGRESS NOTES
HISTORY OF PRESENT ILLNESS  Edilma Pual is a 81 y.o. male. Cholesterol Problem     Chest Pain (Angina)    Pertinent negatives include no dizziness, no nausea and no palpitations. Patient presents for a scheduled followup visit. Patient has a past medical history significant for dyslipidemia and a very strong family for early coronary disease. Patient had been taking simvastatin and fish oil for his lipids. His last stress test was in August 2009, where he exercised for a total of 15 minutes, without symptoms or EKG abnormalities. His lipids have been historically followed by his PCP. The patient was last seen in the office a little over a year ago. He was recently seen in the ER at [facility] at the beginning of July 2019 for somewhat atypical chest pain. His initial EKG was normal.  He was ruled out for myocardial infarction and underwent a exercise stress echocardiogram the following day which was a normal and low risk study. He exercised for 10 minutes using the Dilip protocol achieving a total workload of 12.8 METS. Patient's chest pain was ultimately determined to be likely musculoskeletal in nature. Returns to the office today without any anginal type complaints. He does exercise regularly and denies ever having any exertional chest pain or significant shortness of breath. He does have intermittent pleuritic type chest pain. He also reports neck pains as well. Both are nonexertional in nature. Both have improved with courses of steroids in the past.  He does have a chronic bronchitis as well.     Past Medical History:   Diagnosis Date    Allergic rhinitis     Dr Aparna Leo; never took immunotherapy    Anxiety     saw Dr Celina Boyer; pfts show no obstruction but high RV    Basal cell carcinoma 2013    Dr. Enriqueta Tsai s/p resection 2 spots    BPH (benign prostatic hyperplasia)     Dr. Li Thompson Cardiac stress test 07/2019    Negative stress echocardiogram    Depression     and anxiety; paxil 2008? lexapro and wellbutrin til 2018; tried prozac    Dyslipidemia     Erectile dysfunction     FHx: heart disease     GERD (gastroesophageal reflux disease)     s/p dilation 2003 Dr. Domenica Wilkinson; egd 2015    Heel spur     Dr Demar Craig Hypertension 02/2019    24 hr ambulatory monitoring Dr Genia Irwin Hypovitaminosis D     Overweight (BMI 25.0-29.9) 2/12/2018    Peripheral neuropathy     and B CTS on EMG Dr. Shay Mead Pulmonary nodules 2011    bilat upper lobes; no change 2018; noted again [facility] 6/19    Scoliosis     Dr Pitt Poster 2014    Sleep apnea     intol cpap Dr [facility] 2015; using oral appliance Dr Rocio Anderson; AHI 17.8 min desats 83    Spinal stenosis 03/2014    MRI (3/14) showed scoliosis w multilevel degen findings, mod L3-4, L4-5 central stenosis, mod L5-S1 foraminal stenosis;  (10/17) severe L3-4 central stenosis; seeing DR Bonnie Martinez      Current Outpatient Medications   Medication Sig Dispense Refill    fluticasone propionate (FLONASE) 50 mcg/actuation nasal spray SHAKE LIQUID AND USE 2 SPRAYS IN EACH NOSTRIL DAILY 3 Bottle 3    MEN'S MULTI-VITAMIN PO Take  by mouth.  ALPRAZolam (XANAX) 0.25 mg tablet Take 1 Tab by mouth two (2) times daily as needed. 0    coenzyme Q-10 (CO Q-10) 30 mg capsule Take 30 mg by mouth daily.  VIIBRYD 40 mg tab tablet Take 1 Tab by mouth daily. 0    acetaminophen (TYLENOL ARTHRITIS PAIN) 650 mg TbER Take 650 mg by mouth every eight (8) hours as needed.  gabapentin (NEURONTIN) 600 mg tablet Take 1 Tab by mouth three (3) times daily.  Indications: Neuropathic Pain 90 Tab 5    finasteride (PROSCAR) 5 mg tablet TAKE 1 TABLET DAILY 90 Tab 3    triamterene-hydroCHLOROthiazide (DYAZIDE) 37.5-25 mg per capsule TAKE 1 CAPSULE BY MOUTH DAILY 90 Cap 5    metoprolol tartrate (LOPRESSOR) 25 mg tablet TAKE 1 TABLET BY MOUTH TWICE DAILY (Patient taking differently: Take 12.5 mg by mouth two (2) times a day.) 180 Tab 5    rosuvastatin (CRESTOR) 10 mg tablet Take 1 Tab by mouth nightly. 90 Tab 3    buPROPion SR (WELLBUTRIN SR) 150 mg SR tablet Take 1 Tab by mouth two (2) times a day. (Patient taking differently: Take 300 mg by mouth daily. ) 180 Tab 3    sildenafil citrate (VIAGRA) 100 mg tablet Take 1 Tab by mouth as needed. 10 Tab 1    latanoprost (XALATAN) 0.005 % ophthalmic solution OMAR 1 GTT INTO RIGHT EYE HS  3    albuterol (PROAIR HFA) 90 mcg/actuation inhaler Take 2 Puffs by inhalation every four (4) hours as needed for Wheezing. 3 Inhaler 3    NEXIUM 40 mg capsule TAKE 1 CAPSULE DAILY 90 Cap 3    ascorbic acid (VITAMIN C) 500 mg tablet Take  by mouth.  aspirin delayed-release 81 mg tablet Take  by mouth daily.  cholecalciferol, vitamin d3, (VITAMIN D3) 1,000 unit tablet Take  by mouth daily.  Gluc-Scar-MSM#7-M-Xtdb-Mike-Bor (OSTEO BI-FLEX) 750-625-30 mg Tab Take  by mouth daily.  OMEGA-3 FATTY ACIDS (OMEGA 3 PO) Take  by mouth two (2) times a day. 3 QD         Allergies   Allergen Reactions    Cefdinir Nausea and Vomiting    Niacin Other (comments)     Flushing        Social History     Tobacco Use    Smoking status: Former Smoker    Smokeless tobacco: Never Used    Tobacco comment: teenager    Substance Use Topics    Alcohol use: Yes     Alcohol/week: 1.0 standard drinks     Types: 1 Glasses of wine per week     Comment: 1-2 glasses of wine a month    Drug use: No     Family History   Problem Relation Age of Onset    Cancer Mother         leukemia    Heart Disease Father     Psychiatric Disorder Daughter         depression         Review of Systems   Constitutional: Negative for chills and weight loss. HENT: Negative for nosebleeds. Eyes: Negative for blurred vision and double vision. Cardiovascular: Negative for palpitations. Gastrointestinal: Negative for heartburn and nausea. Genitourinary: Negative for dysuria and hematuria.    Musculoskeletal: Negative for falls and myalgias. Neurological: Negative for dizziness and focal weakness. Endo/Heme/Allergies: Does not bruise/bleed easily. Psychiatric/Behavioral: Negative for substance abuse. Visit Vitals  /80 (BP 1 Location: Right arm, BP Patient Position: Sitting)   Pulse 82   Ht 6' 2\" (1.88 m)   Wt 86.6 kg (191 lb)   SpO2 98%   BMI 24.52 kg/m²      Physical Exam   Constitutional: He is oriented to person, place, and time. He appears well-developed and well-nourished. HENT:   Head: Normocephalic and atraumatic. Eyes: Conjunctivae are normal.   Neck: Neck supple. No JVD present. Carotid bruit is not present. Cardiovascular: Normal rate, regular rhythm, S1 normal, S2 normal and normal pulses. Exam reveals no gallop and no S3. No murmur heard. Pulmonary/Chest: Breath sounds normal. He has no wheezes. He has no rales. Abdominal: Soft. Bowel sounds are normal. There is no tenderness. Musculoskeletal: He exhibits no edema. Neurological: He is alert and oriented to person, place, and time. Skin: Skin is warm and dry. EKG:  Normal sinus rhythm. Normal axis, normal QTc interval.  Nonspecific anterolateral T wave abnormality. Compared to his prior EKGs from earlier this year and last year, the T wave abnormality is new. ASSESSMENT and PLAN    Abnormal EKG. Patient has new anterolateral T wave changes on his tracing today. His prior EKGs have all been normal.  He does give a history of chest pain, which sounds very atypical for angina, with more of a pleuritic type component. I am more concerned for a possible pericarditis picture. I have recommended a complete resting echocardiogram.  He did undergo a completely normal exercise stress echocardiogram at the beginning of July 2019.   However at that time, his resting EKG was normal.  I did discuss that if he develops exertional symptoms are more classic angina, would have a low threshold for to perform a cardiac catheterization to definitively evaluate his coronary anatomy. Dyslipidemia. The patient is now taking rosuvastatin 10 mg daily. This is followed by his PCP. Essential hypertension. New diagnosis this year. Patient is now being managed with a low-dose beta-blocker and a diuretic. His blood pressure is much better on these medications. He apparently did not tolerate a calcium channel blocker due to leg swelling. Family history for coronary disease: Because of this we have discussed the importance of risk factor modification. Obstructive sleep apnea. Patient could not tolerate multiple CPAP, so now uses a oral device. Total visit time 40 minutes of which greater than 50% was face-to-face counseling    Followup in 4 months, sooner if needed.

## 2019-09-11 NOTE — PROGRESS NOTES
Sabino Blazer presents today for   Chief Complaint   Patient presents with    Chest Pain     1 year follow up     Shortness of Breath     exertion       Sabino Harmon preferred language for health care discussion is english/other. Is someone accompanying this pt? no    Is the patient using any DME equipment during [address]? no    Depression Screening:  3 most recent PHQ Screens 6/26/2019   Little interest or pleasure in doing things Not at all   Feeling down, depressed, irritable, or hopeless More than half the days   Total Score PHQ 2 2       Learning Assessment:  Learning Assessment 7/3/2019   PRIMARY LEARNER Patient   HIGHEST LEVEL OF EDUCATION - PRIMARY LEARNER  4 YEARS OF COLLEGE   BARRIERS PRIMARY LEARNER NONE   CO-LEARNER CAREGIVER No   PRIMARY LANGUAGE ENGLISH   LEARNER PREFERENCE PRIMARY READING   ANSWERED BY patient   RELATIONSHIP SELF       Abuse Screening:  Abuse Screening Questionnaire 1/3/2019   Do you ever feel afraid of your partner? N   Are you in a relationship with someone who physically or mentally threatens you? N   Is it safe for you to go home? Y       Fall Risk  Fall Risk Assessment, last 12 mths 6/26/2019   Able to walk? Yes   Fall in past 12 months? No   Fall with injury? -   Number of falls in past 12 months -   Fall Risk Score -       Pt currently taking Anticoagulant therapy? ASA 81mg every day     Coordination of Care:  1. Have you been to the ER, urgent care clinic since your last visit? Hospitalized since your last visit? 7/2/19 for chest pain     2. Have you seen or consulted any other health care providers outside of the [address] [place] [address] [place] since your last visit? Include any pap smears or colon screening.  no

## 2019-09-26 ENCOUNTER — HOSPITAL ENCOUNTER (OUTPATIENT)
Dept: NON INVASIVE DIAGNOSTICS | Age: 69
Discharge: HOME | End: 2019-09-26
Attending: INTERNAL MEDICINE
Payer: MEDICARE

## 2019-09-26 VITALS
SYSTOLIC BLOOD PRESSURE: 112 MMHG | WEIGHT: 191 LBS | HEIGHT: 74 IN | BODY MASS INDEX: 24.51 KG/M2 | DIASTOLIC BLOOD PRESSURE: 80 MMHG

## 2019-09-26 DIAGNOSIS — R94.31 ABNORMAL EKG: ICD-10-CM

## 2019-09-26 LAB
ECHO AO ASC DIAM: 3.79 CM
ECHO AO ROOT DIAM: 3.7 CM
ECHO AV REGURGITANT PHT: 1018.8 CM
ECHO LA AREA 4C: 16.4 CM2
ECHO LA VOL 2C: 62.71 ML (ref 18–58)
ECHO LA VOL 4C: 40.27 ML (ref 18–58)
ECHO LA VOL BP: 55.31 ML (ref 18–58)
ECHO LA VOL/BSA BIPLANE: 25.95 ML/M2 (ref 16–28)
ECHO LA VOLUME INDEX A2C: 29.43 ML/M2 (ref 16–28)
ECHO LA VOLUME INDEX A4C: 18.9 ML/M2 (ref 16–28)
ECHO LV E' LATERAL VELOCITY: 14.38 CM/S
ECHO LV E' SEPTAL VELOCITY: 7.73 CM/S
ECHO LV INTERNAL DIMENSION DIASTOLIC: 4.69 CM (ref 4.2–5.9)
ECHO LV INTERNAL DIMENSION SYSTOLIC: 3.11 CM
ECHO LV IVSD: 1.13 CM (ref 0.6–1)
ECHO LV MASS 2D: 225.4 G (ref 88–224)
ECHO LV MASS INDEX 2D: 105.8 G/M2 (ref 49–115)
ECHO LV POSTERIOR WALL DIASTOLIC: 1.11 CM (ref 0.6–1)
ECHO LVOT DIAM: 2.33 CM
ECHO LVOT PEAK GRADIENT: 4.3 MMHG
ECHO LVOT PEAK VELOCITY: 103.65 CM/S
ECHO LVOT SV: 98.4 ML
ECHO LVOT VTI: 23.12 CM
ECHO MV A VELOCITY: 75.09 CM/S
ECHO MV E DECELERATION TIME (DT): 265.4 MS
ECHO MV E VELOCITY: 71.47 CM/S
ECHO MV E/A RATIO: 0.95
ECHO MV E/E' LATERAL: 4.97
ECHO MV E/E' RATIO (AVERAGED): 7.11
ECHO MV E/E' SEPTAL: 9.25
ECHO TV REGURGITANT MAX VELOCITY: 222.94 CM/S
ECHO TV REGURGITANT PEAK GRADIENT: 19.9 MMHG
PISA AR MAX VEL: 254.4 CM/S

## 2019-09-26 PROCEDURE — 93306 TTE W/DOPPLER COMPLETE: CPT

## 2019-09-27 NOTE — PROGRESS NOTES
Per your last note\" Abnormal EKG. Patient has new anterolateral T wave changes on his tracing today. His prior EKGs have all been normal.  He does give a history of chest pain, which sounds very atypical for angina, with more of a pleuritic type component. I am more concerned for a possible pericarditis picture. I have recommended a complete resting echocardiogram.  He did undergo a completely normal exercise stress echocardiogram at the beginning of July 2019. However at that time, his resting EKG was normal.  I did discuss that if he develops exertional symptoms are more classic angina, would have a low threshold for to perform a cardiac catheterization to definitively evaluate his coronary anatomy.

## 2019-10-01 ENCOUNTER — TELEPHONE (OUTPATIENT)
Dept: CARDIOLOGY CLINIC | Age: 69
End: 2019-10-01

## 2019-10-01 NOTE — TELEPHONE ENCOUNTER
----- Message from Alber Rucker MD sent at 9/27/2019  4:25 PM EDT -----  Please let the patient know that his heart function was normal on echocardiogram.  There is no evidence of a pericardial effusion.  ----- Message -----  From: Jorje Fry LPN  Sent: 9/47/8747   3:39 PM EDT  To: Alber Rucker MD    Per your last note\" Abnormal EKG. Patient has new anterolateral T wave changes on his tracing today. His prior EKGs have all been normal.  He does give a history of chest pain, which sounds very atypical for angina, with more of a pleuritic type component. I am more concerned for a possible pericarditis picture. I have recommended a complete resting echocardiogram.  He did undergo a completely normal exercise stress echocardiogram at the beginning of July 2019.   However at that time, his resting EKG was normal.  I did discuss that if he develops exertional symptoms are more classic angina, would have a low threshold for to perform a cardiac catheterization to definitively evaluate his coronary anatomy.

## 2019-10-01 NOTE — TELEPHONE ENCOUNTER
Verified patient name and , results have been fully explained to the patient, who indicates understanding.

## 2019-10-01 NOTE — TELEPHONE ENCOUNTER
----- Message from May Duff MD sent at 9/27/2019  4:25 PM EDT -----  Please let the patient know that his heart function was normal on echocardiogram.  There is no evidence of a pericardial effusion.  ----- Message -----  From: Juliana Layton LPN  Sent: 3/64/1748   3:39 PM EDT  To: May Duff MD    Per your last note\" Abnormal EKG. Patient has new anterolateral T wave changes on his tracing today. His prior EKGs have all been normal.  He does give a history of chest pain, which sounds very atypical for angina, with more of a pleuritic type component. I am more concerned for a possible pericarditis picture. I have recommended a complete resting echocardiogram.  He did undergo a completely normal exercise stress echocardiogram at the beginning of July 2019.   However at that time, his resting EKG was normal.  I did discuss that if he develops exertional symptoms are more classic angina, would have a low threshold for to perform a cardiac catheterization to definitively evaluate his coronary anatomy.

## 2019-10-12 DIAGNOSIS — J98.01 BRONCHOSPASM: ICD-10-CM

## 2019-10-12 DIAGNOSIS — R05.3 CHRONIC COUGH: ICD-10-CM

## 2019-10-14 RX ORDER — BUDESONIDE AND FORMOTEROL FUMARATE DIHYDRATE 160; 4.5 UG/1; UG/1
AEROSOL RESPIRATORY (INHALATION)
Qty: 3 INHALER | Refills: 3 | Status: SHIPPED | OUTPATIENT
Start: 2019-10-14

## 2019-10-14 RX ORDER — NAPROXEN 375 MG/1
TABLET ORAL
Qty: 180 TAB | Refills: 3 | Status: SHIPPED | OUTPATIENT
Start: 2019-10-14 | End: 2019-12-16 | Stop reason: ALTCHOICE

## 2019-10-14 RX ORDER — MONTELUKAST SODIUM 10 MG/1
TABLET ORAL
Qty: 90 TAB | Refills: 3 | Status: SHIPPED | OUTPATIENT
Start: 2019-10-14 | End: 2019-12-16 | Stop reason: ALTCHOICE

## 2019-10-17 RX ORDER — SIMVASTATIN 40 MG/1
TABLET, FILM COATED ORAL
Qty: 90 TAB | Refills: 0 | OUTPATIENT
Start: 2019-10-17

## 2019-10-25 ENCOUNTER — CLINICAL SUPPORT (OUTPATIENT)
Dept: INTERNAL MEDICINE CLINIC | Age: 69
End: 2019-10-25

## 2019-10-25 DIAGNOSIS — Z23 ENCOUNTER FOR IMMUNIZATION: ICD-10-CM

## 2019-10-25 NOTE — PATIENT INSTRUCTIONS
Vaccine Information Statement    Influenza (Flu) Vaccine (Inactivated or Recombinant): What You Need to Know    Many Vaccine Information Statements are available in Georgian and other languages. See www.immunize.org/vis  Hojas de información sobre vacunas están disponibles en español y en muchos otros idiomas. Visite www.immunize.org/vis    1. Why get vaccinated? Influenza vaccine can prevent influenza (flu). Flu is a contagious disease that spreads around the [facility] every year, usually between October and May. Anyone can get the flu, but it is more dangerous for some people. Infants and young children, people 72years of age and older, pregnant women, and people with certain health conditions or a weakened immune system are at greatest risk of flu complications. Pneumonia, bronchitis, sinus infections and ear infections are examples of flu-related complications. If you have a medical condition, such as heart disease, cancer or diabetes, flu can make it worse. Flu can cause fever and chills, sore throat, muscle aches, fatigue, cough, headache, and runny or stuffy nose. Some people may have vomiting and diarrhea, though this is more common in children than adults. Each year thousands of people in the [facility] die from flu, and many more are hospitalized. Flu vaccine prevents millions of illnesses and flu-related visits to the doctor each year. 2. Influenza vaccines     CDC recommends everyone 10months of age and older get vaccinated every flu season. Children 6 months through 6years of age may need 2 doses during a single flu season. Everyone else needs only 1 dose each flu season. It takes about 2 weeks for protection to develop after vaccination. There are many flu viruses, and they are always changing. Each year a new flu vaccine is made to protect against three or four viruses that are likely to cause disease in the upcoming flu season.  Even when the vaccine doesnt exactly match these viruses, it may still provide some protection. Influenza vaccine does not cause flu. Influenza vaccine may be given at the same time as other vaccines. 3. Talk with your health care provider    Tell your vaccine provider if the person getting the vaccine:   Has had an allergic reaction after a previous dose of influenza vaccine, or has any severe, life-threatening allergies.  Has ever had Guillain-Barré Syndrome (also called GBS). In some cases, your health care provider may decide to postpone influenza vaccination to a future visit. People with minor illnesses, such as a cold, may be vaccinated. People who are moderately or severely ill should usually wait until they recover before getting influenza vaccine. Your health care provider can give you more information. 4. Risks of a reaction     Soreness, redness, and swelling where shot is given, fever, muscle aches, and headache can happen after influenza vaccine.  There may be a very small increased risk of Guillain-Barré Syndrome (GBS) after inactivated influenza vaccine (the flu shot). [facility] children who get the flu shot along with pneumococcal vaccine (PCV13), and/or DTaP vaccine at the same time might be slightly more likely to have a seizure caused by fever. Tell your health care provider if a child who is getting flu vaccine has ever had a seizure. People sometimes faint after medical procedures, including vaccination. Tell your provider if you feel dizzy or have vision changes or ringing in the ears. As with any medicine, there is a very remote chance of a vaccine causing a severe allergic reaction, other serious injury, or death. 5. What if there is a serious problem? An allergic reaction could occur after the vaccinated person leaves the clinic.  If you see signs of a severe allergic reaction (hives, swelling of the face and throat, difficulty breathing, a fast heartbeat, dizziness, or weakness), call 9-1-1 and get the person to the nearest hospital.    For other signs that concern you, call your health care provider. Adverse reactions should be reported to the Vaccine Adverse Event Reporting System (VAERS). Your health care provider will usually file this report, or you can do it yourself. Visit the VAERS website at www.vaers. [facility].gov or call 0-311.999.3758. VAERS is only for reporting reactions, and VAERS staff do not give medical advice. 6. The National Vaccine Injury Compensation Program    The [facility] Vaccine Injury Compensation Program (VICP) is a federal program that was created to compensate people who may have been injured by certain vaccines. Visit the VICP website at www.[facility].gov/vaccinecompensation or call 2-854.302.8448 to learn about the program and about filing a claim. There is a time limit to file a claim for compensation. 7. How can I learn more?  Ask your health care provider.  Call your local or state health department.  Contact the Centers for Disease Control and Prevention (CDC):  - Call 0-744.104.2800 (1-800-CDC-INFO) or  - Visit CDCs influenza website at www.cdc.gov/flu    Vaccine Information Statement (Interim)  Inactivated Influenza Vaccine   8/15/2019  42 AMITA [address] [id number]   Department of Health and Human Services  Centers for Disease Control and Prevention    Office Use Only

## 2019-10-25 NOTE — PROGRESS NOTES
Ricardo Pedroza is a 81 y.o. male who presents for routine immunizations. High Dose Influenza-left deltoid  He denies any symptoms , reactions or allergies that would exclude them from being immunized today. Risks and adverse reactions were discussed and the VIS was given to them. All questions were addressed. He was advised to wait 15 min post injection. There were no reactions observed.     Nahomy Stark LPN

## 2019-10-28 ENCOUNTER — TELEPHONE (OUTPATIENT)
Dept: INTERNAL MEDICINE CLINIC | Age: 69
End: 2019-10-28

## 2019-10-28 NOTE — TELEPHONE ENCOUNTER
Alcira Norwood is calling from [facility]. Stating patient has sent over a self referral. She is only there for a couple months so her boss wants her to help them get through all of their referrals with a one time visit but she needs to make sure that Dr. Geovanna Grimes is ok with taking over his meds after his visit with her.

## 2019-10-30 NOTE — TELEPHONE ENCOUNTER
Polo Ray called back, but the intention is that [facility] would see the patient for a one time visits (as she will only be there for a couple of months) and then Dr. Alexia Edwards would take over prescribing medications. If we wish for patient to continue to follow a psychiatrist for medication management and refills, we would need to refer elsewhere at this point. Please call Maria Fernanda Smith dn let her know how we would like to proceed.

## 2019-11-05 NOTE — TELEPHONE ENCOUNTER
Message was left for Amparo Arambula with below message, phone number was left in case they had any questions or concerns

## 2019-12-16 ENCOUNTER — OFFICE VISIT (OUTPATIENT)
Dept: CARDIOLOGY CLINIC | Age: 69
End: 2019-12-16

## 2019-12-16 VITALS
OXYGEN SATURATION: 93 % | HEIGHT: 74 IN | BODY MASS INDEX: 25.41 KG/M2 | WEIGHT: 198 LBS | DIASTOLIC BLOOD PRESSURE: 72 MMHG | HEART RATE: 70 BPM | SYSTOLIC BLOOD PRESSURE: 124 MMHG

## 2019-12-16 DIAGNOSIS — R00.2 PALPITATION: ICD-10-CM

## 2019-12-16 DIAGNOSIS — R94.31 ABNORMAL EKG: ICD-10-CM

## 2019-12-16 DIAGNOSIS — E78.5 DYSLIPIDEMIA: ICD-10-CM

## 2019-12-16 DIAGNOSIS — I10 ESSENTIAL HYPERTENSION: Primary | ICD-10-CM

## 2019-12-16 RX ORDER — ARIPIPRAZOLE 2 MG/1
2 TABLET ORAL DAILY
COMMUNITY
End: 2022-01-03 | Stop reason: ALTCHOICE

## 2019-12-16 NOTE — PROGRESS NOTES
Rohanlatricia Beltran presents today for   Chief Complaint   Patient presents with    Chest Pain     3 month follow up     Shortness of Breath     exertion       Eliza Beltran preferred language for health care discussion is english/other. Is someone accompanying this pt? no    Is the patient using any DME equipment during [address]? no    Depression Screening:  3 most recent PHQ Screens 6/26/2019   Little interest or pleasure in doing things Not at all   Feeling down, depressed, irritable, or hopeless More than half the days   Total Score PHQ 2 2       Learning Assessment:  Learning Assessment 7/3/2019   PRIMARY LEARNER Patient   HIGHEST LEVEL OF EDUCATION - PRIMARY LEARNER  4 YEARS OF COLLEGE   BARRIERS PRIMARY LEARNER NONE   CO-LEARNER CAREGIVER No   PRIMARY LANGUAGE ENGLISH   LEARNER PREFERENCE PRIMARY READING   ANSWERED BY patient   RELATIONSHIP SELF       Abuse Screening:  Abuse Screening Questionnaire 1/3/2019   Do you ever feel afraid of your partner? N   Are you in a relationship with someone who physically or mentally threatens you? N   Is it safe for you to go home? Y       Fall Risk  Fall Risk Assessment, last 12 mths 6/26/2019   Able to walk? Yes   Fall in past 12 months? No   Fall with injury? -   Number of falls in past 12 months -   Fall Risk Score -       Pt currently taking Anticoagulant therapy? ASA 81g every day     Coordination of Care:  1. Have you been to the ER, urgent care clinic since your last visit? Hospitalized since your last visit? no    2. Have you seen or consulted any other health care providers outside of the [address] [place] [address] since your last visit? Include any pap smears or colon screening.  no

## 2019-12-16 NOTE — PROGRESS NOTES
HISTORY OF PRESENT ILLNESS  Isaias Virgen is a 81 y.o. male. Shortness of Breath   Pertinent negatives include no fever, no headaches, no cough, no wheezing, no PND, no orthopnea, no chest pain, no vomiting, no abdominal pain, no rash, no leg swelling and no claudication. Patient presents for a followup office visit. Patient has a past medical history significant for dyslipidemia, more recently hypertension, and a very strong family for early coronary disease. The patient was seen in the ER at [facility] at the beginning of July 2019 for somewhat atypical chest pain. His initial EKG was normal.  He was ruled out for myocardial infarction and underwent a exercise stress echocardiogram the following day which was a normal and low risk study. He exercised for 10 minutes using the Dilip protocol achieving a total workload of 12.8 METS. Patient's chest pain was ultimately determined to be likely musculoskeletal in nature. Patient was last seen in our office 3 months ago. At that time he was found to have new EKG changes with anterolateral T wave inversions, but no anginal type symptoms. He did mention intermittent pleuritic chest pain which has been ongoing for several months. As a result, he underwent an echocardiogram at the end of September 2019, demonstrating preserved LV systolic function, EF 55 to 60% with no valvular heart disease, and a mildly dilated aortic root, and normal PA pressures. Since last visit, he states reports his pleuritic chest pain has resolved. He denies any exertional chest pain or major change in his activity level. He still complains of intermittent shortness of breath which has been attributed to his asthma in the past.  This is unchanged.     Past Medical History:   Diagnosis Date    Allergic rhinitis     Dr Leatha Madison; never took immunotherapy    Anxiety     saw Dr Nelly Owens; pfts show no obstruction but high RV    Basal cell carcinoma 2013    Dr. Monster Wiggins s/p resection 2 spots    BPH (benign prostatic hyperplasia)     Dr. Srini Gustafson Cardiac stress test 07/2019    Negative stress echocardiogram    Depression     and anxiety; paxil 2008? lexapro and wellbutrin til 2018; tried prozac    Dyslipidemia     Erectile dysfunction     FHx: heart disease     GERD (gastroesophageal reflux disease)     s/p dilation 2003 Dr. Haleigh Stephenson; egd 2015    Heel spur     Dr Manolo Da Silva Hypertension 02/2019    24 hr ambulatory monitoring Dr Clearance Claude Hypovitaminosis D     Overweight (BMI 25.0-29.9) 2/12/2018    Peripheral neuropathy     and B CTS on EMG Dr. Willy Rodríguez Pulmonary nodules 2011    bilat upper lobes; no change 2018; noted again [facility] 6/19    Scoliosis     Dr Marissa Joe 2014    Sleep apnea     intol cpap Dr Hung Coles 2015; using oral appliance Dr Gibson Carroll; AHI 17.8 min desats 83    Spinal stenosis 03/2014    MRI (3/14) showed scoliosis w multilevel degen findings, mod L3-4, L4-5 central stenosis, mod L5-S1 foraminal stenosis;  (10/17) severe L3-4 central stenosis; seeing DR Rene Hogue      Current Outpatient Medications   Medication Sig Dispense Refill    ARIPiprazole (ABILIFY) 2 mg tablet Take 2 mg by mouth daily.  SYMBICORT 160-4.5 mcg/actuation HFAA INHALE 2 PUFFS BY MOUTH TWICE DAILY 3 Inhaler 3    fluticasone propionate (FLONASE) 50 mcg/actuation nasal spray SHAKE LIQUID AND USE 2 SPRAYS IN EACH NOSTRIL DAILY 3 Bottle 3    MEN'S MULTI-VITAMIN PO Take  by mouth.  ALPRAZolam (XANAX) 0.25 mg tablet Take 1 Tab by mouth two (2) times daily as needed. 0    coenzyme Q-10 (CO Q-10) 30 mg capsule Take 30 mg by mouth daily.  VIIBRYD 40 mg tab tablet Take 1 Tab by mouth daily. 0    acetaminophen (TYLENOL ARTHRITIS PAIN) 650 mg TbER Take 650 mg by mouth every eight (8) hours as needed.  gabapentin (NEURONTIN) 600 mg tablet Take 1 Tab by mouth three (3) times daily.  Indications: Neuropathic Pain 90 Tab 5    finasteride (PROSCAR) 5 mg tablet TAKE 1 TABLET DAILY 90 Tab 3    triamterene-hydroCHLOROthiazide (DYAZIDE) 37.5-25 mg per capsule TAKE 1 CAPSULE BY MOUTH DAILY 90 Cap 5    rosuvastatin (CRESTOR) 10 mg tablet Take 1 Tab by mouth nightly. 90 Tab 3    buPROPion SR (WELLBUTRIN SR) 150 mg SR tablet Take 1 Tab by mouth two (2) times a day. (Patient taking differently: Take 300 mg by mouth daily. ) 180 Tab 3    sildenafil citrate (VIAGRA) 100 mg tablet Take 1 Tab by mouth as needed. 10 Tab 1    latanoprost (XALATAN) 0.005 % ophthalmic solution OMAR 1 GTT INTO RIGHT EYE HS  3    albuterol (PROAIR HFA) 90 mcg/actuation inhaler Take 2 Puffs by inhalation every four (4) hours as needed for Wheezing. 3 Inhaler 3    NEXIUM 40 mg capsule TAKE 1 CAPSULE DAILY 90 Cap 3    ascorbic acid (VITAMIN C) 500 mg tablet Take  by mouth.  aspirin delayed-release 81 mg tablet Take  by mouth daily.  cholecalciferol, vitamin d3, (VITAMIN D3) 1,000 unit tablet Take  by mouth daily.  Gluc-Scar-MSM#5-C-Rsah-Mike-Bor (OSTEO BI-FLEX) 750-625-30 mg Tab Take  by mouth daily.  OMEGA-3 FATTY ACIDS (OMEGA 3 PO) Take  by mouth two (2) times a day. 3 QD         Allergies   Allergen Reactions    Cefdinir Nausea and Vomiting    Niacin Other (comments)     Flushing        Social History     Tobacco Use    Smoking status: Former Smoker    Smokeless tobacco: Never Used    Tobacco comment: teenager    Substance Use Topics    Alcohol use: Yes     Alcohol/week: 1.0 standard drinks     Types: 1 Glasses of wine per week     Comment: 1-2 glasses of wine a month    Drug use: No     Family History   Problem Relation Age of Onset    Cancer Mother         leukemia    Heart Disease Father     Psychiatric Disorder Daughter         depression         Review of Systems   Constitutional: Negative for chills, fever and weight loss. HENT: Negative for nosebleeds. Eyes: Negative for blurred vision and double vision.    Respiratory: Positive for shortness of breath. Negative for cough and wheezing. Cardiovascular: Negative for chest pain, palpitations, orthopnea, claudication, leg swelling and PND. Gastrointestinal: Negative for abdominal pain, heartburn, nausea and vomiting. Genitourinary: Negative for dysuria and hematuria. Musculoskeletal: Negative for falls and myalgias. Skin: Negative for rash. Neurological: Negative for dizziness, focal weakness and headaches. Endo/Heme/Allergies: Does not bruise/bleed easily. Psychiatric/Behavioral: Negative for substance abuse. Visit Vitals  /72 (BP 1 Location: Left arm, BP Patient Position: Sitting)   Pulse 70   Ht 6' 2\" (1.88 m)   Wt 89.8 kg (198 lb)   SpO2 93%   BMI 25.42 kg/m²      Physical Exam   Constitutional: He is oriented to person, place, and time. He appears well-developed and well-nourished. HENT:   Head: Normocephalic and atraumatic. Eyes: Conjunctivae are normal.   Neck: Neck supple. No JVD present. Carotid bruit is not present. Cardiovascular: Normal rate, regular rhythm, S1 normal, S2 normal and normal pulses. Exam reveals no gallop and no S3. No murmur heard. Pulmonary/Chest: Breath sounds normal. He has no wheezes. He has no rales. Abdominal: Soft. Bowel sounds are normal. There is no tenderness. Musculoskeletal:         General: No edema. Neurological: He is alert and oriented to person, place, and time. Skin: Skin is warm and dry. EKG:  Normal sinus rhythm. Normal axis, normal QTc interval.  No ST or T wave abnormalities concerning for ischemia. Compared to the previous EKG, the anterolateral T wave inversions have resolved. ASSESSMENT and PLAN    Prior abnormal EKG. Patient's EKG has returned to his normal baseline today. His pleuritic chest pain is also resolved. He underwent an echocardiogram at the end of September 2019 which did not show any evidence of any pericardial effusion. No regional wall motion abnormalities, normal LV systolic function. My suspicion is that he may have had pericarditis which has since resolved. If he does develop recurrent pleuritic type chest pain in the future,  I have recommended a short course of NSAIDs. Dyslipidemia. The patient is now taking rosuvastatin 10 mg daily. This is followed by his PCP. Essential hypertension. New diagnosis this year. Patient is now being managed with a low-dose beta-blocker and a diuretic. He apparently did not tolerate a calcium channel blocker due to leg swelling. He feels that his blood pressure started climbing with worsening depression which has now been optimally treated with new psychiatric medications. I have recommended that he stop his metoprolol altogether. Family history for coronary disease: Because of this we have discussed the importance of risk factor modification. Obstructive sleep apnea. Patient could not tolerate multiple CPAP, so now uses a oral device. He continues to follow-up with his sleep specialist.    Followup in 6 months, sooner if needed.

## 2019-12-18 ENCOUNTER — OFFICE VISIT (OUTPATIENT)
Dept: ORTHOPEDIC SURGERY | Age: 69
End: 2019-12-18

## 2019-12-18 VITALS
OXYGEN SATURATION: 96 % | WEIGHT: 196.8 LBS | TEMPERATURE: 97.9 F | BODY MASS INDEX: 25.26 KG/M2 | DIASTOLIC BLOOD PRESSURE: 85 MMHG | HEIGHT: 74 IN | SYSTOLIC BLOOD PRESSURE: 115 MMHG | RESPIRATION RATE: 14 BRPM | HEART RATE: 84 BPM

## 2019-12-18 DIAGNOSIS — M54.16 LUMBAR RADICULOPATHY: ICD-10-CM

## 2019-12-18 DIAGNOSIS — M54.2 CERVICAL PAIN: ICD-10-CM

## 2019-12-18 DIAGNOSIS — M53.3 SI (SACROILIAC) JOINT DYSFUNCTION: ICD-10-CM

## 2019-12-18 DIAGNOSIS — M47.816 SPONDYLOSIS OF LUMBAR REGION WITHOUT MYELOPATHY OR RADICULOPATHY: ICD-10-CM

## 2019-12-18 DIAGNOSIS — M48.062 SPINAL STENOSIS, LUMBAR REGION WITH NEUROGENIC CLAUDICATION: Primary | ICD-10-CM

## 2019-12-18 RX ORDER — GABAPENTIN 600 MG/1
600 TABLET ORAL 3 TIMES DAILY
Qty: 270 TAB | Refills: 1 | Status: SHIPPED | OUTPATIENT
Start: 2019-12-18 | End: 2020-06-10 | Stop reason: SDUPTHER

## 2019-12-18 NOTE — PROGRESS NOTES
Chief complaint/History of Present Illness:  Chief Complaint   Patient presents with    Back Pain     6 month follow up     Neck Pain     neck pain     SAQIB Mackenzie is a  81 y.o.  male      HISTORY OF PRESENT ILLNESS:  The patient comes in today for followup of his chronic low back pain with pain into his bilateral lateral thighs. He states he is not having pain into the thighs now. The Neurontin 600 mg three times a day helps with that. His neck pain and arm pain is pretty much resolved since he is retired and not having to drive 80 miles roundtrip every day. He does report his back pain has gotten a little bit worse. He finds himself very stiff in the morning. It is hard to straighten up. He got a new pillow top mattress, which does help. He wears a weightlifter belt when he does heavy activities, which also helps. He reports that about five years ago, he had right SI joint injections by Dr. Wayne Pop. He feels like he could use them again and would like to be referred back there for that. He is unable to take NSAIDs due to kidney dysfunction. He continues to work out at [employer] three to four times a week. He denies fever and bowel and bladder dysfunction. He is retired. He is a nonsmoker. PHYSICAL EXAM:  Mr. Naomi Prado is a 79-year-old male. He is alert and oriented. He has a normal mood and affect. He has a full weightbearing, non-antalgic gait using no acute distress. He has 5/5 strength of his left lower extremity, 4/5 on the right. He has a negative straight leg raise. He has pain with hyperextension of the lumbar spine. ASSESSMENT/PLAN:  This is a patient who has lumbar spondylosis and spinal stenosis. I have given him a referral to Dr. Arpit Reyes for SI joint injection as requested. I have refilled his Neurontin. He is tolerating this well. It works well for him. We will see him back in six months or sooner if needed.         Review of systems:    Past Medical History:   Diagnosis Date    Allergic rhinitis     Dr Racquel Jackson; never took immunotherapy    Anxiety     saw Dr Lisbet Quintana; pfts show no obstruction but high RV    Basal cell carcinoma 2013    Dr. Jose Bingham s/p resection 2 spots    BPH (benign prostatic hyperplasia)     Dr. Julián Stuart Cardiac stress test 07/2019    Negative stress echocardiogram    Depression     and anxiety; paxil 2008?  lexapro and wellbutrin til 2018; tried prozac    Dyslipidemia     Erectile dysfunction     FHx: heart disease     GERD (gastroesophageal reflux disease)     s/p dilation 2003 Dr. Berna Merchant; egd 2015    Heel spur     Dr Mariama Ceballos Hypertension 02/2019    24 hr ambulatory monitoring Dr Tara Smyth Hypovitaminosis D     Overweight (BMI 25.0-29.9) 2/12/2018    Peripheral neuropathy     and B CTS on EMG Dr. Teresa Taylor Pulmonary nodules 2011    bilat upper lobes; no change 2018; noted again [facility] 6/19    Scoliosis     Dr Margarito Faye 2014    Sleep apnea     intol cpap Dr Mark Montague 2015; using oral appliance Dr Rosibel Hernandez; AHI 17.8 min desats 83    Spinal stenosis 03/2014    MRI (3/14) showed scoliosis w multilevel degen findings, mod L3-4, L4-5 central stenosis, mod L5-S1 foraminal stenosis;  (10/17) severe L3-4 central stenosis; seeing DR Abbi Hahn     Past Surgical History:   Procedure Laterality Date    CARDIAC SURG PROCEDURE UNLIST  8/09    ETT negative    CARDIAC SURG PROCEDURE UNLIST  06/2019    neg stress echo [facility]    CHEST SURGERY PROCEDURE UNLISTED  07/2018    Dr Ramirez Stands; FEV1 96%, 5% improvement postbd, ratio 104, , , DLCO 82    HX COLONOSCOPY      Dr. Berna Merchant mild diverticulosis 2000, 6/12    HX ORTHOPAEDIC      B CT release 2007, 2009    HX ORTHOPAEDIC      DEXA t score 4.2 spine, 0.2 hip (2012)     Social History     Socioeconomic History    Marital status:      Spouse name: Not on file    Number of children: 2    Years of education: Not on file    Highest education level: Not on file   Occupational History    Occupation: ret [employer] [address]   Social Needs    Financial resource strain: Not on file    Food insecurity:     Worry: Not on file     Inability: Not on file    Transportation needs:     Medical: Not on file     Non-medical: Not on file   Tobacco Use    Smoking status: Former Smoker    Smokeless tobacco: Never Used    Tobacco comment: teenager    Substance and Sexual Activity    Alcohol use: Yes     Alcohol/week: 1.0 standard drinks     Types: 1 Glasses of wine per week     Comment: 1-2 glasses of wine a month    Drug use: No    Sexual activity: Yes     Partners: Female     Birth control/protection: None   Lifestyle    Physical activity:     Days per week: Not on file     Minutes per session: Not on file    Stress: Not on file   Relationships    Social connections:     Talks on phone: Not on file     Gets together: Not on file     Attends Rastafarian service: Not on file     Active member of club or organization: Not on file     Attends meetings of clubs or organizations: Not on file     Relationship status: Not on file    Intimate partner violence:     Fear of current or ex partner: Not on file     Emotionally abused: Not on file     Physically abused: Not on file     Forced sexual activity: Not on file   Other Topics Concern    Not on file   Social History Narrative    Not on file     Family History   Problem Relation Age of Onset    Cancer Mother         leukemia    Heart Disease Father     Psychiatric Disorder Daughter         depression       Physical Exam:  Visit Vitals  /85 (BP 1 Location: Right arm, BP Patient Position: Sitting)   Pulse 84   Temp 97.9 °F (36.6 °C) (Oral)   Resp 14   Ht 6' 2\" (1.88 m)   Wt 196 lb 12.8 oz (89.3 kg)   SpO2 96%   BMI 25.27 kg/m²     Pain Scale: 3/10       has been . reviewed and is appropriate          Diagnoses and all orders for this visit:    1.  Spinal stenosis, lumbar region with neurogenic claudication  -     gabapentin (NEURONTIN) 600 mg tablet; Take 1 Tab by mouth three (3) times daily. Indications: Neuropathic Pain    2. Spondylosis of lumbar region without myelopathy or radiculopathy    3. Lumbar radiculopathy    4. SI (sacroiliac) joint dysfunction  -     REFERRAL TO PAIN MANAGEMENT    5. Cervical pain            Follow-up and Dispositions    · Return in about 6 months (around 6/18/2020) for with NP.              We have informed Princess King to notify us for immediate appointment if he has any worsening neurogical symptoms or if an emergency situation presents, then call [phone]

## 2020-01-23 ENCOUNTER — OFFICE VISIT (OUTPATIENT)
Dept: INTERNAL MEDICINE CLINIC | Age: 70
End: 2020-01-23

## 2020-01-23 VITALS
HEIGHT: 74 IN | OXYGEN SATURATION: 94 % | HEART RATE: 72 BPM | DIASTOLIC BLOOD PRESSURE: 72 MMHG | BODY MASS INDEX: 25.15 KG/M2 | RESPIRATION RATE: 16 BRPM | WEIGHT: 196 LBS | TEMPERATURE: 98.4 F | SYSTOLIC BLOOD PRESSURE: 114 MMHG

## 2020-01-23 DIAGNOSIS — N52.9 ERECTILE DYSFUNCTION, UNSPECIFIED ERECTILE DYSFUNCTION TYPE: Primary | ICD-10-CM

## 2020-01-23 DIAGNOSIS — R97.20 ELEVATED PSA: ICD-10-CM

## 2020-01-23 RX ORDER — TADALAFIL 20 MG/1
20 TABLET ORAL AS NEEDED
Qty: 30 TAB | Refills: 5 | Status: SHIPPED | OUTPATIENT
Start: 2020-01-23 | End: 2020-08-25

## 2020-01-23 RX ORDER — METOPROLOL TARTRATE 50 MG/1
TABLET ORAL DAILY
COMMUNITY
End: 2020-06-10 | Stop reason: DRUGHIGH

## 2020-01-23 NOTE — PROGRESS NOTES
Navneet Vuong presents today for   Chief Complaint   Patient presents with    Medication Check              Depression Screening:  3 most recent PHQ Screens 6/26/2019   Little interest or pleasure in doing things Not at all   Feeling down, depressed, irritable, or hopeless More than half the days   Total Score PHQ 2 2       Learning Assessment:  Learning Assessment 7/3/2019   PRIMARY LEARNER Patient   HIGHEST LEVEL OF EDUCATION - PRIMARY LEARNER  4 YEARS OF COLLEGE   BARRIERS PRIMARY LEARNER NONE   CO-LEARNER CAREGIVER No   PRIMARY LANGUAGE ENGLISH   LEARNER PREFERENCE PRIMARY READING   ANSWERED BY patient   RELATIONSHIP SELF       Abuse Screening:  Abuse Screening Questionnaire 1/3/2019   Do you ever feel afraid of your partner? N   Are you in a relationship with someone who physically or mentally threatens you? N   Is it safe for you to go home? Y       Fall Risk  Fall Risk Assessment, last 12 mths 6/26/2019   Able to walk? Yes   Fall in past 12 months? No   Fall with injury? -   Number of falls in past 12 months -   Fall Risk Score -           Coordination of Care:  1. Have you been to the ER, urgent care clinic since your last visit? Hospitalized since your last visit? no    2. Have you seen or consulted any other health care providers outside of the [address] [place] [address] since your last visit? Include any pap smears or colon screening.  Dr. Zion Shepard

## 2020-01-23 NOTE — PROGRESS NOTES
78 y.o. WHITE OR  male who presents with questions    He reports no cardiovascular complaints and had recent eval as below the last few months    He apparently saw Dr Jaky Carpenter in [place] who put him on abilify but he then switched to Dr Kina Anderson as he could never reach the other doctor for questions,  Feeling much better on higher dosing    He went to [facility] for testosterone check and he was told his number was low and that he needed $3K every 3 mos for the shots. Here asking to have us do it instead. Of note, his issue is actually mostly with dec sexual function to the point where the viagra does not seem to work as well as it used to. He has not tried cialis. Also, his sex drive is fine, no problems with hair growth pr testicular shrinkage . His actualt total T was 271, psa 4.6. He was not offered workup for 2ndary causes    LAST MEDICARE WELLNESS EXAM: never as not medicare b    Past Medical History:   Diagnosis Date    Allergic rhinitis     Dr Scout Becker; never took immunotherapy    Anxiety     saw Dr Siddhartha Boo; pfts show no obstruction but high RV    Basal cell carcinoma 2013    Dr. Brenda Bass s/p resection 2 spots    BPH (benign prostatic hyperplasia)     Dr. Gia Chaparro Cardiac stress test 07/2019    Negative stress echocardiogram    Depression     and anxiety; paxil 2008?  lexapro and wellbutrin til 2018; tried proza; saw Dr Francie Gonzales now seeing Dr Elmer Acuna Dyslipidemia     Erectile dysfunction     FHx: heart disease     GERD (gastroesophageal reflux disease)     s/p dilation 2003 Dr. Theda Bence; egd 2015    Heel spur     Dr Lorri Gallagher Hypertension 02/2019    24 hr ambulatory monitoring Dr Norma Hay Hypovitaminosis D     Overweight (BMI 25.0-29.9) 2/12/2018    Peripheral neuropathy     and B CTS on EMG Dr. Daysi Mackey Pulmonary nodules 2011    bilat upper lobes; no change 2018; noted again [facility] 6/19    Scoliosis     Dr Zelalem Coronado 2014  Sleep apnea     intol cpap Dr Zachariah Moore 2015; using oral appliance Dr Flower Hauser; AHI 17.8 min desats 83    Spinal stenosis 03/2014    MRI (3/14) showed scoliosis w multilevel degen findings, mod L3-4, L4-5 central stenosis, mod L5-S1 foraminal stenosis;  (10/17) severe L3-4 central stenosis; seeing DR Franky Velez     Past Surgical History:   Procedure Laterality Date    CARDIAC SURG PROCEDURE UNLIST  8/09    ETT negative    CARDIAC SURG PROCEDURE UNLIST  06/2019    neg stress echo [facility]    CARDIAC SURG PROCEDURE UNLIST  09/2019    echo nl lv, ef 55%, no wma or valvular disease    CHEST SURGERY PROCEDURE UNLISTED  07/2018    Dr Kesha Pretty; FEV1 96%, 5% improvement postbd, ratio 104, , , DLCO 82    HX COLONOSCOPY      Dr. Theda Bence mild diverticulosis 2000, 6/12    HX GI  06/2019    CT abd/pelvis showed nothing acute, small LIH, small HH, bladder divertic    HX ORTHOPAEDIC      B CT release 2007, 2009    HX ORTHOPAEDIC      DEXA t score 4.2 spine, 0.2 hip (2012)     Social History     Socioeconomic History    Marital status:      Spouse name: Not on file    Number of children: 2    Years of education: Not on file    Highest education level: Not on file   Occupational History    Occupation: ret [employer]   Social Needs    Financial resource strain: Not on file    Food insecurity:     Worry: Not on file     Inability: Not on file    Transportation needs:     Medical: Not on file     Non-medical: Not on file   Tobacco Use    Smoking status: Former Smoker     Packs/day: 0.25     Years: 2.00     Pack years: 0.50    Smokeless tobacco: Never Used    Tobacco comment: reports occasional use as a teenager   Substance and Sexual Activity    Alcohol use:  Yes     Alcohol/week: 1.0 standard drinks     Types: 1 Glasses of wine per week     Comment: 1-2 glasses of wine a month    Drug use: No    Sexual activity: Yes     Partners: Female     Birth control/protection: None   Lifestyle    Physical activity:     Days per week: Not on file     Minutes per session: Not on file    Stress: Not on file   Relationships    Social connections:     Talks on phone: Not on file     Gets together: Not on file     Attends [employer] service: Not on file     Active member of club or organization: Not on file     Attends meetings of clubs or organizations: Not on file     Relationship status: Not on file    Intimate partner violence:     Fear of current or ex partner: Not on file     Emotionally abused: Not on file     Physically abused: Not on file     Forced sexual activity: Not on file   Other Topics Concern    Not on file   Social History Narrative    Not on file     Family History   Problem Relation Age of Onset    Cancer Mother         leukemia    Heart Disease Father     Psychiatric Disorder Daughter         depression     Immunization History   Administered Date(s) Administered    Influenza High Dose Vaccine PF 11/17/2017    Influenza Vaccine (Quad) PF 11/02/2015, 11/11/2016    Influenza Vaccine (Tri) Adjuvanted 10/26/2018, 10/25/2019    Influenza Vaccine PF 11/05/2013, 10/30/2014    Influenza Vaccine Split 10/21/2011, 10/31/2012    Influenza Vaccine Whole 11/05/2010    Pneumococcal Conjugate (PCV-13) 01/31/2017    Pneumococcal Polysaccharide (PPSV-23) 08/07/2013, 02/18/2019    TD Vaccine 07/22/2009    Tdap 11/11/2016    Zoster 05/07/2012     Current Outpatient Medications   Medication Sig    metoprolol tartrate (LOPRESSOR) 50 mg tablet Take  by mouth daily.  tadalafil (CIALIS) 20 mg tablet Take 1 Tab by mouth as needed for Erectile Dysfunction.  gabapentin (NEURONTIN) 600 mg tablet Take 1 Tab by mouth three (3) times daily. Indications: Neuropathic Pain    ARIPiprazole (ABILIFY) 2 mg tablet Take 2 mg by mouth daily.     SYMBICORT 160-4.5 mcg/actuation HFAA INHALE 2 PUFFS BY MOUTH TWICE DAILY    fluticasone propionate (FLONASE) 50 mcg/actuation nasal spray SHAKE LIQUID AND USE 2 SPRAYS IN EACH NOSTRIL DAILY    MEN'S MULTI-VITAMIN PO Take  by mouth.  ALPRAZolam (XANAX) 0.25 mg tablet Take 1 Tab by mouth daily.  VIIBRYD 40 mg tab tablet Take 1 Tab by mouth daily.  acetaminophen (TYLENOL ARTHRITIS PAIN) 650 mg TbER Take 650 mg by mouth every eight (8) hours as needed.  finasteride (PROSCAR) 5 mg tablet TAKE 1 TABLET DAILY    triamterene-hydroCHLOROthiazide (DYAZIDE) 37.5-25 mg per capsule TAKE 1 CAPSULE BY MOUTH DAILY    rosuvastatin (CRESTOR) 10 mg tablet Take 1 Tab by mouth nightly.  buPROPion SR (WELLBUTRIN SR) 150 mg SR tablet Take 1 Tab by mouth two (2) times a day. (Patient taking differently: Take 300 mg by mouth daily.)    latanoprost (XALATAN) 0.005 % ophthalmic solution OMAR 1 GTT INTO RIGHT EYE HS    albuterol (PROAIR HFA) 90 mcg/actuation inhaler Take 2 Puffs by inhalation every four (4) hours as needed for Wheezing.  NEXIUM 40 mg capsule TAKE 1 CAPSULE DAILY    ascorbic acid (VITAMIN C) 500 mg tablet Take  by mouth.  aspirin delayed-release 81 mg tablet Take  by mouth daily.  cholecalciferol, vitamin d3, (VITAMIN D3) 1,000 unit tablet Take  by mouth daily.  Gluc-Scar-MSM#3-C-Nrcw-Mike-Bor (OSTEO BI-FLEX) 750-625-30 mg Tab Take  by mouth daily.  OMEGA-3 FATTY ACIDS (OMEGA 3 PO) Take  by mouth two (2) times a day. 3 QD     No current facility-administered medications for this visit.       Allergies   Allergen Reactions    Cefdinir Nausea and Vomiting    Niacin Other (comments)     Flushing       REVIEW OF SYSTEMS: colo 6/12 Dr Aditya Gallo  Ophtho  no vision change or eye pain  Oral  no mouth pain, tongue or tooth problems  Ears  no hearing loss, ear pain, fullness, no swallowing problems  Cardiac  no CP, PND, orthopnea, edema, palpitations or syncope  Chest  no breast masses  Resp  no wheezing, chronic coughing, dyspnea  GI  no heartburn, nausea, vomiting, change in bowel habits, bleeding, hemorrhoids  Urinary  no dysuria, hematuria, flank pain, urgency, frequency    Visit Vitals  /72 (BP 1 Location: Left arm, BP Patient Position: Sitting)   Pulse 72   Temp 98.4 °F (36.9 °C) (Oral)   Resp 16   Ht 6' 2\" (1.88 m)   Wt 196 lb (88.9 kg)   SpO2 94%   BMI 25.16 kg/m²   A&O x3  Affect is appropriate. Mood stable  No apparent distress  Anicteric, no JVD, adenopathy or thyromegaly. No carotid bruits or radiated murmur  Lungs clear to auscultation, no wheezes or rales  Heart showed regular rate and rhythm. No murmur, rubs, gallops  Abdomen soft nontender, no hepatosplenomegaly or masses. Extremities without edema.   Pulses 1-2+ symmetrically    LABS  From 11/10 showed gluc 91, cr 1.10,             alt 25, chol 146, tg 74,   hdl 26, ldl-c 95, wbc 6.2, hb 14.3, plt 191, psa 0.60  From 4/12 showed                           alt 26, chol 138, tg 105, hdl 42, ldl-c 75  From 5/12 showed                                      vit d 57.9  From 7/13 showed                 alt 25, chol 135, tg 70,   hdl 44, ldl-c 77  From 7/13 showed   gluc 88, cr 1.02, gfr 79,  alt 10,                wbc 5.0, hb 14.3, plt 182, psa 0.60  From 9/14 showed   gluc 95, cr 0.99, gfr>60, alt 13, chol 138, tg 81,   hdl 41, ldl-c 81, wbc 5.6, hb 13.9, plt 187, psa 1.00  From 11/15 showed gluc 84, cr 1.01, gfr>60, alt 12, chol 148, tg 72,   hdl 44, ldl-c 90, wbc 4.8, hb 14.9, plt 193  From 1/17 showed   gluc 85, cr 1.05, gfr>60, alt 36, chol 144, tg 69,   hdl 56, ldl-c 74, wbc 4.9, hb 14.7, plt 212, psa 1.60, hba1c 5.7, vit d 44.2, hep c neg  From 2/18 showed   gluc 89, cr 1.04, gfr>60, alt 35, chol 142, tg 64,   hdl 53, ldl-c 76, wbc 4.5, hb 14.5, plt 208, psa 2.30  From 1/19 showed                            tsh 1.10, ft4 0.80, b12 1006, fol>20, b6 nl, lorraine neg, ferritin 58  From 2/19 showed   gluc 95, cr 1.35, gfr 53,  alt 32, chol 151, tg 132, hdl 46, ldl-c 79, wbc 5.7, hb 14.1, plt 194, psa 2.80  From 3/19 showed   glu 105, cr 1.05, gfr>60  From 7/19 showed   gluc 99, cr 1.20, gfr>60, ck/trop neg, ddimer neg  From 1/20 showed               psa 4.60, test 271    Patient Active Problem List   Diagnosis Code    Dyslipidemia E78.5    Hypovitaminosis D E55.9    BPH (benign prostatic hyperplasia) Dr. Marta Marin N40.0    Neuropathy Dr. Nidia Hernadez G62.9    Erectile dysfunction N52.9    Gastroesophageal reflux disease without esophagitis K21.9    Diverticulosis of large intestine without hemorrhage Dr Karmen York K57.30    GALILEA (obstructive sleep apnea) Dr Nidia Hernadez using oral appliance AHI 17.8 G47.33    Spinal stenosis, lumbar region with neurogenic claudication M48.062    Spondylosis of lumbar region without myelopathy or radiculopathy M47.816    Lumbar radiculopathy M54.16    Other intervertebral disc degeneration, lumbar region M51.36    Overweight (BMI 25.0-29. 9) E66.3    MDD (major depressive disorder), recurrent episode, mild (HCC) F33.0    CHANCE (generalized anxiety disorder) F41.1    History of OCD (obsessive compulsive disorder) Z86.59    Mild intermittent asthma without complication E97.78    Essential hypertension I10     Assessment and plan:  1. Testosterone issue. Long discussion  His level is actually normal, no physiologic signs or sx, would hold off on supplementation especially in light of rising psa  2. Dyslipidemia. Continue current  3. Allergic rhinitis. Continue current  4. GERD. Continue current. 5.  ED. Trial of cialis and he will go to [address] [place] [address] due to cost issues  6. Neuropathy. F/U Dr. Nidia Hernadez as directed  7. Depression and anxiety. Continue current regimen and f/u Dr Ramonita Mcintyre  8. Hypovit D. Follow   9. GALILEA. Per neurology  10. Spinal stenosis. Continue gabapentin and f/u Dr Mae Bolton  11. Elevated and rising psa. Long discussion also, will refer to [facility] for further evaluation        RTC 7/20    Above conditions discussed at length and patient vocalized understanding.   All questions answered to patient satifaction

## 2020-02-12 ENCOUNTER — LAB ONLY (OUTPATIENT)
Dept: INTERNAL MEDICINE CLINIC | Age: 70
End: 2020-02-12

## 2020-02-12 ENCOUNTER — HOSPITAL ENCOUNTER (OUTPATIENT)
Dept: LAB | Age: 70
Discharge: HOME | End: 2020-02-12
Payer: COMMERCIAL

## 2020-02-12 DIAGNOSIS — E78.5 DYSLIPIDEMIA: ICD-10-CM

## 2020-02-12 DIAGNOSIS — I10 PRIMARY HYPERTENSION: ICD-10-CM

## 2020-02-12 DIAGNOSIS — Z00.00 PHYSICAL EXAM: Primary | ICD-10-CM

## 2020-02-12 LAB
ALBUMIN SERPL-MCNC: 3.8 G/DL (ref 3.4–5)
ALBUMIN/GLOB SERPL: 1.3 {RATIO} (ref 0.8–1.7)
ALP SERPL-CCNC: 72 U/L (ref 45–117)
ALT SERPL-CCNC: 32 U/L (ref 16–61)
ANION GAP SERPL CALC-SCNC: 6 MMOL/L (ref 3–18)
AST SERPL-CCNC: 30 U/L (ref 10–38)
BASOPHILS # BLD: 0 K/UL (ref 0–0.1)
BASOPHILS NFR BLD: 1 % (ref 0–2)
BILIRUB SERPL-MCNC: 0.4 MG/DL (ref 0.2–1)
BUN SERPL-MCNC: 24 MG/DL (ref 7–18)
BUN/CREAT SERPL: 19 (ref 12–20)
CALCIUM SERPL-MCNC: 9.3 MG/DL (ref 8.5–10.1)
CHLORIDE SERPL-SCNC: 108 MMOL/L (ref 100–111)
CHOLEST SERPL-MCNC: 158 MG/DL
CO2 SERPL-SCNC: 30 MMOL/L (ref 21–32)
CREAT SERPL-MCNC: 1.26 MG/DL (ref 0.6–1.3)
DIFFERENTIAL METHOD BLD: NORMAL
EOSINOPHIL # BLD: 0.2 K/UL (ref 0–0.4)
EOSINOPHIL NFR BLD: 3 % (ref 0–5)
ERYTHROCYTE [DISTWIDTH] IN BLOOD BY AUTOMATED COUNT: 14.2 % (ref 11.6–14.5)
GLOBULIN SER CALC-MCNC: 2.9 G/DL (ref 2–4)
GLUCOSE SERPL-MCNC: 85 MG/DL (ref 74–99)
HCT VFR BLD AUTO: 45.4 % (ref 36–48)
HDLC SERPL-MCNC: 47 MG/DL (ref 40–60)
HDLC SERPL: 3.4 {RATIO} (ref 0–5)
HGB BLD-MCNC: 15 G/DL (ref 13–16)
LDLC SERPL CALC-MCNC: 75.2 MG/DL (ref 0–100)
LIPID PROFILE,FLP: ABNORMAL
LYMPHOCYTES # BLD: 2 K/UL (ref 0.9–3.6)
LYMPHOCYTES NFR BLD: 35 % (ref 21–52)
MCH RBC QN AUTO: 30.4 PG (ref 24–34)
MCHC RBC AUTO-ENTMCNC: 33 G/DL (ref 31–37)
MCV RBC AUTO: 91.9 FL (ref 74–97)
MONOCYTES # BLD: 0.5 K/UL (ref 0.05–1.2)
MONOCYTES NFR BLD: 9 % (ref 3–10)
NEUTS SEG # BLD: 2.9 K/UL (ref 1.8–8)
NEUTS SEG NFR BLD: 52 % (ref 40–73)
PLATELET # BLD AUTO: 189 K/UL (ref 135–420)
PMV BLD AUTO: 10.1 FL (ref 9.2–11.8)
POTASSIUM SERPL-SCNC: 4.2 MMOL/L (ref 3.5–5.5)
PROT SERPL-MCNC: 6.7 G/DL (ref 6.4–8.2)
RBC # BLD AUTO: 4.94 M/UL (ref 4.7–5.5)
SODIUM SERPL-SCNC: 144 MMOL/L (ref 136–145)
TRIGL SERPL-MCNC: 179 MG/DL (ref ?–150)
VLDLC SERPL CALC-MCNC: 35.8 MG/DL
WBC # BLD AUTO: 5.6 K/UL (ref 4.6–13.2)

## 2020-02-12 PROCEDURE — 80053 COMPREHEN METABOLIC PANEL: CPT

## 2020-02-12 PROCEDURE — 80061 LIPID PANEL: CPT

## 2020-02-12 PROCEDURE — 36415 COLL VENOUS BLD VENIPUNCTURE: CPT

## 2020-02-12 PROCEDURE — 85025 COMPLETE CBC W/AUTO DIFF WBC: CPT

## 2020-02-13 NOTE — PROGRESS NOTES
78 y.o. WHITE OR  male who presents for RPE    He continues to go to the [facility] around 2x/week doing 40 min on the treadmill or elliptical along with cybex machines. bp has been running on the lower end although asymptomatic. He sees Dr Tanvi White routinely. He is taking toprol 25 only    He continues to see Dr Jason Ramirez and the abilify is doing well for him    He saw Dr Marguerite Handy for the elev psa and the proscar was held with repeat testing scheduled in a couple months. He tried the cialis at 22 every day prn but not much better than viagra and lots of sinus congestion as sfx    The neuropathy is about the same    LAST MEDICARE WELLNESS EXAM: never as not medicare b    Past Medical History:   Diagnosis Date    Allergic rhinitis     Dr Maria D Sales; never took immunotherapy    Anxiety     saw Dr Criss Cole; pfts show no obstruction but high RV    Basal cell carcinoma 2013    Dr. Wendee Favre s/p resection 2 spots    BPH (benign prostatic hyperplasia)     Dr. Megan Snider; on proscar for years    Depression     and anxiety; paxil 2008?  lexapro and wellbutrin til 2018; tried proza; saw Dr Katelin Dougherty now seeing Dr Unruly Romeo Dyslipidemia     Erectile dysfunction     FHx: heart disease     GERD (gastroesophageal reflux disease)     s/p dilation 2003 Dr. Daisy Mckay; egd 2015    Heel spur     Dr Abhijit Ames Hypertension 02/2019    24 hr ambulatory monitoring Dr Freddy Hermosillo Hypovitaminosis D     Overweight (BMI 25.0-29.9) 2/12/2018    Peripheral neuropathy     and B CTS on EMG Dr. Mary Graham Pulmonary nodules 2011    bilat upper lobes; no change 2018; noted again [facility] 6/19    Scoliosis     Dr Svetlana Miramontes 2014    Sleep apnea     intol cpap Dr Chastity Harris 2015; using oral appliance Dr Mauricio Bajwa; AHI 17.8 min desats 83    Spinal stenosis 03/2014    MRI (3/14) showed scoliosis w multilevel degen findings, mod L3-4, L4-5 central stenosis, mod L5-S1 foraminal stenosis;  (10/17) severe L3-4 central stenosis; seeing Dr Gerda Palomares     Past Surgical History:   Procedure Laterality Date    CARDIAC SURG PROCEDURE UNLIST  8/09    ETT negative (8/09); SOH neg stress echo (6/19)    CARDIAC SURG PROCEDURE UNLIST  09/2019    echo nl lv, ef 55%, no wma or valvular disease    CHEST SURGERY PROCEDURE UNLISTED  07/2018    Dr Keagan Dunham; FEV1 96%, 5% improvement postbd, ratio 104, , , DLCO 82    HX COLONOSCOPY      Dr. Stephanie Ramos mild diverticulosis 2000, 6/12    HX GI  06/2019    CT abd/pelvis showed nothing acute, small LIH, small HH, bladder divertic    HX ORTHOPAEDIC      B CT release 2007, 2009    HX ORTHOPAEDIC      DEXA t score 4.2 spine, 0.2 hip (2012)     Social History     Socioeconomic History    Marital status:      Spouse name: Not on file    Number of children: 2    Years of education: Not on file    Highest education level: Not on file   Occupational History    Occupation: ret [employer]   Social Needs    Financial resource strain: Not on file    Food insecurity:     Worry: Not on file     Inability: Not on file    Transportation needs:     Medical: Not on file     Non-medical: Not on file   Tobacco Use    Smoking status: Former Smoker     Packs/day: 0.25     Years: 2.00     Pack years: 0.50    Smokeless tobacco: Never Used    Tobacco comment: reports occasional use as a teenager   Substance and Sexual Activity    Alcohol use:  Yes     Alcohol/week: 1.0 standard drinks     Types: 1 Glasses of wine per week     Comment: 1-2 glasses of wine a month    Drug use: No    Sexual activity: Yes     Partners: Female     Birth control/protection: None   Lifestyle    Physical activity:     Days per week: Not on file     Minutes per session: Not on file    Stress: Not on file   Relationships    Social connections:     Talks on phone: Not on file     Gets together: Not on file     Attends Mosque service: Not on file     Active member of club or organization: Not on file     Attends meetings of clubs or organizations: Not on file     Relationship status: Not on file    Intimate partner violence:     Fear of current or ex partner: Not on file     Emotionally abused: Not on file     Physically abused: Not on file     Forced sexual activity: Not on file   Other Topics Concern    Not on file   Social History Narrative    Not on file     Family History   Problem Relation Age of Onset    Cancer Mother         leukemia    Heart Disease Father     Psychiatric Disorder Daughter         depression     Immunization History   Administered Date(s) Administered    Influenza High Dose Vaccine PF 11/17/2017    Influenza Vaccine (Quad) PF 11/02/2015, 11/11/2016    Influenza Vaccine (Tri) Adjuvanted 10/26/2018, 10/25/2019    Influenza Vaccine PF 11/05/2013, 10/30/2014    Influenza Vaccine Split 10/21/2011, 10/31/2012    Influenza Vaccine Whole 11/05/2010    Pneumococcal Conjugate (PCV-13) 01/31/2017    Pneumococcal Polysaccharide (PPSV-23) 08/07/2013, 02/18/2019    TD Vaccine 07/22/2009    Tdap 11/11/2016    Zoster 05/07/2012     Current Outpatient Medications   Medication Sig    metoprolol tartrate (LOPRESSOR) 50 mg tablet Take  by mouth daily.  tadalafil (CIALIS) 20 mg tablet Take 1 Tab by mouth as needed for Erectile Dysfunction.  gabapentin (NEURONTIN) 600 mg tablet Take 1 Tab by mouth three (3) times daily. Indications: Neuropathic Pain    ARIPiprazole (ABILIFY) 2 mg tablet Take 2 mg by mouth daily.  SYMBICORT 160-4.5 mcg/actuation HFAA INHALE 2 PUFFS BY MOUTH TWICE DAILY    fluticasone propionate (FLONASE) 50 mcg/actuation nasal spray SHAKE LIQUID AND USE 2 SPRAYS IN EACH NOSTRIL DAILY    MEN'S MULTI-VITAMIN PO Take  by mouth.  ALPRAZolam (XANAX) 0.25 mg tablet Take 1 Tab by mouth daily.  VIIBRYD 40 mg tab tablet Take 1 Tab by mouth daily.  acetaminophen (TYLENOL ARTHRITIS PAIN) 650 mg TbER Take 650 mg by mouth every eight (8) hours as needed.     triamterene-hydroCHLOROthiazide (DYAZIDE) 37.5-25 mg per capsule TAKE 1 CAPSULE BY MOUTH DAILY    rosuvastatin (CRESTOR) 10 mg tablet Take 1 Tab by mouth nightly.  buPROPion SR (WELLBUTRIN SR) 150 mg SR tablet Take 1 Tab by mouth two (2) times a day. (Patient taking differently: Take 300 mg by mouth daily.)    latanoprost (XALATAN) 0.005 % ophthalmic solution OMAR 1 GTT INTO RIGHT EYE HS    albuterol (PROAIR HFA) 90 mcg/actuation inhaler Take 2 Puffs by inhalation every four (4) hours as needed for Wheezing.  NEXIUM 40 mg capsule TAKE 1 CAPSULE DAILY    ascorbic acid (VITAMIN C) 500 mg tablet Take  by mouth.  aspirin delayed-release 81 mg tablet Take  by mouth daily.  cholecalciferol, vitamin d3, (VITAMIN D3) 1,000 unit tablet Take  by mouth daily.  Gluc-Scar-MSM#4-Y-Bagv-Mike-Bor (OSTEO BI-FLEX) 750-625-30 mg Tab Take  by mouth daily.  OMEGA-3 FATTY ACIDS (OMEGA 3 PO) Take  by mouth two (2) times a day. 3 QD     No current facility-administered medications for this visit. Allergies   Allergen Reactions    Cefdinir Nausea and Vomiting    Niacin Other (comments)     Flushing      Norvasc [Amlodipine] Swelling     REVIEW OF SYSTEMS: colo 6/12 Dr Stephanie Ramos  Ophtho - no vision change or eye pain  Oral - no mouth pain, tongue or tooth problems  Ears - no hearing loss, ear pain, fullness, no swallowing problems  Cardiac - no CP, PND, orthopnea, edema, palpitations or syncope  Chest - no breast masses  Resp - no wheezing, chronic coughing, dyspnea  GI - no heartburn, nausea, vomiting, change in bowel habits, bleeding, hemorrhoids  Urinary - no dysuria, hematuria, flank pain, urgency, frequency    Visit Vitals  /70   Pulse 81   Temp 97.9 °F (36.6 °C) (Oral)   Resp 14   Ht 6' 2\" (1.88 m)   Wt 198 lb (89.8 kg)   SpO2 95%   BMI 25.42 kg/m²   I manually got 104/72    Affect is appropriate.   Mood stable  No apparent distress  HEENT --Anicteric sclerae, tympanic membranes normal,  ear canals normal.  PERRL, EOMI, conjunctiva and lids normal.  Disks were sharp  Sinuses were nontender, turbinates normal, hearing normal.  Oropharynx without  erythema, normal tongue, oral mucosa and tonsils. No thyromegaly, JVD, or bruits. Lungs --Clear to auscultation, normal percussion. Heart --Regular rate and rhythm, no murmurs, rubs, gallops, or clicks. Chest wall --Nontender to palpation. PMI normal.  Abdomen -- Soft and nontender, no hepatosplenomegaly or masses. Extremities -- Without cyanosis, clubbing, edema. 2+ pulses equally and bilaterally.     LABS  From 11/10 showed gluc 91, cr 1.10,             alt 25, chol 146, tg 74,   hdl 26, ldl-c 95, wbc 6.2, hb 14.3, plt 191, psa 0.60  From 4/12 showed                           alt 26, chol 138, tg 105, hdl 42, ldl-c 75  From 5/12 showed                                      vit d 57.9  From 7/13 showed                 alt 25, chol 135, tg 70,   hdl 44, ldl-c 77  From 7/13 showed   gluc 88, cr 1.02, gfr 79,  alt 10,                wbc 5.0, hb 14.3, plt 182, psa 0.60  From 9/14 showed   gluc 95, cr 0.99, gfr>60, alt 13, chol 138, tg 81,   hdl 41, ldl-c 81, wbc 5.6, hb 13.9, plt 187, psa 1.00  From 11/15 showed gluc 84, cr 1.01, gfr>60, alt 12, chol 148, tg 72,   hdl 44, ldl-c 90, wbc 4.8, hb 14.9, plt 193  From 1/17 showed   gluc 85, cr 1.05, gfr>60, alt 36, chol 144, tg 69,   hdl 56, ldl-c 74, wbc 4.9, hb 14.7, plt 212, psa 1.60, hba1c 5.7, vit d 44.2, hep c neg  From 2/18 showed   gluc 89, cr 1.04, gfr>60, alt 35, chol 142, tg 64,   hdl 53, ldl-c 76, wbc 4.5, hb 14.5, plt 208, psa 2.30  From 1/19 showed                            tsh 1.10, ft4 0.80, b12 1006, fol>20, b6 nl, lorraine neg, ferritin 58  From 1/20 showed                   psa 4.60, test 271    Results for orders placed or performed during the hospital encounter of 02/12/20   CBC WITH AUTOMATED DIFF   Result Value Ref Range    WBC 5.6 4.6 - 13.2 K/uL    RBC 4.94 4.70 - 5.50 M/uL    HGB 15.0 13.0 - 16.0 g/dL    HCT 45.4 36.0 - 48.0 %    MCV 91.9 74.0 - 97.0 FL    MCH 30.4 24.0 - 34.0 PG    MCHC 33.0 31.0 - 37.0 g/dL    RDW 14.2 11.6 - 14.5 %    PLATELET 389 246 - 543 K/uL    MPV 10.1 9.2 - 11.8 FL    NEUTROPHILS 52 40 - 73 %    LYMPHOCYTES 35 21 - 52 %    MONOCYTES 9 3 - 10 %    EOSINOPHILS 3 0 - 5 %    BASOPHILS 1 0 - 2 %    ABS. NEUTROPHILS 2.9 1.8 - 8.0 K/UL    ABS. LYMPHOCYTES 2.0 0.9 - 3.6 K/UL    ABS. MONOCYTES 0.5 0.05 - 1.2 K/UL    ABS. EOSINOPHILS 0.2 0.0 - 0.4 K/UL    ABS. BASOPHILS 0.0 0.0 - 0.1 K/UL    DF AUTOMATED     METABOLIC PANEL, COMPREHENSIVE   Result Value Ref Range    Sodium 144 136 - 145 mmol/L    Potassium 4.2 3.5 - 5.5 mmol/L    Chloride 108 100 - 111 mmol/L    CO2 30 21 - 32 mmol/L    Anion gap 6 3.0 - 18 mmol/L    Glucose 85 74 - 99 mg/dL    BUN 24 (H) 7.0 - 18 MG/DL    Creatinine 1.26 0.6 - 1.3 MG/DL    BUN/Creatinine ratio 19 12 - 20      GFR est AA >60 >60 ml/min/1.73m2    GFR est non-AA 57 (L) >60 ml/min/1.73m2    Calcium 9.3 8.5 - 10.1 MG/DL    Bilirubin, total 0.4 0.2 - 1.0 MG/DL    ALT (SGPT) 32 16 - 61 U/L    AST (SGOT) 30 10 - 38 U/L    Alk.  phosphatase 72 45 - 117 U/L    Protein, total 6.7 6.4 - 8.2 g/dL    Albumin 3.8 3.4 - 5.0 g/dL    Globulin 2.9 2.0 - 4.0 g/dL    A-G Ratio 1.3 0.8 - 1.7     LIPID PANEL   Result Value Ref Range    LIPID PROFILE          Cholesterol, total 158 <200 MG/DL    Triglyceride 179 (H) <150 MG/DL    HDL Cholesterol 47 40 - 60 MG/DL    LDL, calculated 75.2 0 - 100 MG/DL    VLDL, calculated 35.8 MG/DL    CHOL/HDL Ratio 3.4 0 - 5.0       Patient Active Problem List   Diagnosis Code    Dyslipidemia E78.5    Hypovitaminosis D E55.9    BPH (benign prostatic hyperplasia) Dr. Evelio Simmons N40.0    Neuropathy Dr. Alejandrina Mosquera G62.9    Erectile dysfunction N52.9    Gastroesophageal reflux disease without esophagitis K21.9    Diverticulosis of large intestine without hemorrhage Dr Latha Hodgson K57.30    GALILEA (obstructive sleep apnea) Dr Alejandrina Mosquera using oral appliance AHI 17.8 G47.33    Spinal stenosis, lumbar region with neurogenic claudication M48.062    Spondylosis of lumbar region without myelopathy or radiculopathy M47.816    Lumbar radiculopathy M54.16    Other intervertebral disc degeneration, lumbar region M51.36    Overweight (BMI 25.0-29. 9) E66.3    MDD (major depressive disorder), recurrent episode, mild (HCC) F33.0    CHANCE (generalized anxiety disorder) F41.1    History of OCD (obsessive compulsive disorder) Z86.59    Mild intermittent asthma without complication K65.35    Essential hypertension I10     Assessment and plan:  1. Elevated psa, BPH. Per Dr Yee Duff  2. Dyslipidemia. Continue current regimen. 3.  Allergic rhinitis. Continue current  4. GERD. Continue current. 5.  ED. Prn cialis although he will try 5qd instead of prn  6. HTN. Suggested holding the dyazide and seeing how it goes  7. Depression, anxiety, OCD. Continue current and f/u Dr Venice Mccrary. 8.  Hypovit D. Follow   9. GALILEA. Per neurology  10. Spinal stenosis. Continue gabapentin and f/u Dr Cade Domínguez  11. shingrix advocated        RTC 2/21    Above conditions discussed at length and patient vocalized understanding.   All questions answered to patient satisfaction

## 2020-02-19 ENCOUNTER — OFFICE VISIT (OUTPATIENT)
Dept: INTERNAL MEDICINE CLINIC | Age: 70
End: 2020-02-19

## 2020-02-19 VITALS
HEIGHT: 74 IN | WEIGHT: 198 LBS | OXYGEN SATURATION: 95 % | HEART RATE: 81 BPM | DIASTOLIC BLOOD PRESSURE: 70 MMHG | RESPIRATION RATE: 14 BRPM | BODY MASS INDEX: 25.41 KG/M2 | SYSTOLIC BLOOD PRESSURE: 100 MMHG | TEMPERATURE: 97.9 F

## 2020-02-19 DIAGNOSIS — N40.1 BENIGN PROSTATIC HYPERPLASIA WITH LOWER URINARY TRACT SYMPTOMS, SYMPTOM DETAILS UNSPECIFIED: ICD-10-CM

## 2020-02-19 DIAGNOSIS — N52.9 ERECTILE DYSFUNCTION, UNSPECIFIED ERECTILE DYSFUNCTION TYPE: ICD-10-CM

## 2020-02-19 DIAGNOSIS — F33.0 MDD (MAJOR DEPRESSIVE DISORDER), RECURRENT EPISODE, MILD (HCC): ICD-10-CM

## 2020-02-19 DIAGNOSIS — R97.20 ELEVATED PSA: ICD-10-CM

## 2020-02-19 DIAGNOSIS — I10 ESSENTIAL HYPERTENSION: ICD-10-CM

## 2020-02-19 DIAGNOSIS — M47.816 SPONDYLOSIS OF LUMBAR REGION WITHOUT MYELOPATHY OR RADICULOPATHY: ICD-10-CM

## 2020-02-19 DIAGNOSIS — G62.9 NEUROPATHY: ICD-10-CM

## 2020-02-19 DIAGNOSIS — G47.33 OSA (OBSTRUCTIVE SLEEP APNEA): ICD-10-CM

## 2020-02-19 DIAGNOSIS — J45.20 MILD INTERMITTENT ASTHMA WITHOUT COMPLICATION: ICD-10-CM

## 2020-02-19 DIAGNOSIS — E78.5 DYSLIPIDEMIA: Primary | ICD-10-CM

## 2020-02-19 DIAGNOSIS — Z00.00 PHYSICAL EXAM: ICD-10-CM

## 2020-02-19 NOTE — PROGRESS NOTES
Axel Pinedo presents today for   Chief Complaint   Patient presents with    Physical     labs              Depression Screening:  3 most recent PHQ Screens 6/26/2019   Little interest or pleasure in doing things Not at all   Feeling down, depressed, irritable, or hopeless More than half the days   Total Score PHQ 2 2       Learning Assessment:  Learning Assessment 7/3/2019   PRIMARY LEARNER Patient   HIGHEST LEVEL OF EDUCATION - PRIMARY LEARNER  4 YEARS OF COLLEGE   BARRIERS PRIMARY LEARNER NONE   CO-LEARNER CAREGIVER No   PRIMARY LANGUAGE ENGLISH   LEARNER PREFERENCE PRIMARY READING   ANSWERED BY patient   RELATIONSHIP SELF       Abuse Screening:  Abuse Screening Questionnaire 1/3/2019   Do you ever feel afraid of your partner? N   Are you in a relationship with someone who physically or mentally threatens you? N   Is it safe for you to go home? Y       Fall Risk  Fall Risk Assessment, last 12 mths 6/26/2019   Able to walk? Yes   Fall in past 12 months? No   Fall with injury? -   Number of falls in past 12 months -   Fall Risk Score -       Health Maintenance Due   Topic Date Due    Shingrix Vaccine Age 49> (1 of 2) 01/08/2002    AAA Screening 73-69 YO Male Smoking Patients  01/08/2017    GLAUCOMA SCREENING Q2Y  01/31/2019         Coordination of Care:  1. Have you been to the ER, urgent care clinic since your last visit? Hospitalized since your last visit? no    2. Have you seen or consulted any other health care providers outside of the [address] [place] [address] since your last visit? Include any pap smears or colon screening.  no

## 2020-02-21 DIAGNOSIS — E78.5 DYSLIPIDEMIA: ICD-10-CM

## 2020-02-24 RX ORDER — ROSUVASTATIN CALCIUM 10 MG/1
TABLET, COATED ORAL
Qty: 90 TAB | Refills: 3 | Status: SHIPPED | OUTPATIENT
Start: 2020-02-24 | End: 2021-02-14

## 2020-03-03 DIAGNOSIS — I10 PRIMARY HYPERTENSION: ICD-10-CM

## 2020-03-05 NOTE — TELEPHONE ENCOUNTER
Called and spoke with patients wife. She verified that patient is currently taking the medication. Please send the refill in to his pharmacy.

## 2020-03-09 RX ORDER — METOPROLOL TARTRATE 25 MG/1
TABLET, FILM COATED ORAL
Qty: 180 TAB | Refills: 3 | Status: SHIPPED | OUTPATIENT
Start: 2020-03-09 | End: 2021-03-21

## 2020-03-09 NOTE — TELEPHONE ENCOUNTER
Patient is calling again for this medication. Stating he stopped taking it for a couple days and his legs started swelling. Brady takes half a tablet a day and is almost out.

## 2020-03-30 ENCOUNTER — TELEPHONE (OUTPATIENT)
Dept: INTERNAL MEDICINE CLINIC | Age: 70
End: 2020-03-30

## 2020-03-30 NOTE — TELEPHONE ENCOUNTER
----- Message from Luli Avendaño sent at 3/28/2020  3:33 PM EDT -----  Regarding: Prescription Question  Contact: [phone]  I currently take 5 mg cialis generic daily - I have viagra generic tablets from before - can I take a viagra tablet one day in lieu of the 5 mg cialis that day? Thank you.

## 2020-05-01 ENCOUNTER — TELEPHONE (OUTPATIENT)
Dept: ORTHOPEDIC SURGERY | Age: 70
End: 2020-05-01

## 2020-05-01 DIAGNOSIS — M48.062 SPINAL STENOSIS, LUMBAR REGION WITH NEUROGENIC CLAUDICATION: Primary | ICD-10-CM

## 2020-05-01 RX ORDER — GABAPENTIN 300 MG/1
600 CAPSULE ORAL 3 TIMES DAILY
Qty: 180 CAP | Refills: 0 | Status: SHIPPED | OUTPATIENT
Start: 2020-05-01 | End: 2020-06-10 | Stop reason: DRUGHIGH

## 2020-05-01 NOTE — TELEPHONE ENCOUNTER
Per [facility], Gabapentin 600mg is backordered. They are asking if this can be changed to the 300mg dose. Would you like to change or should this be located elsewhere?

## 2020-05-03 DIAGNOSIS — I10 PRIMARY HYPERTENSION: ICD-10-CM

## 2020-05-04 RX ORDER — TRIAMTERENE AND HYDROCHLOROTHIAZIDE 37.5; 25 MG/1; MG/1
CAPSULE ORAL
Qty: 90 CAP | Refills: 5 | Status: SHIPPED | OUTPATIENT
Start: 2020-05-04 | End: 2021-07-22

## 2020-06-09 DIAGNOSIS — M48.062 SPINAL STENOSIS, LUMBAR REGION WITH NEUROGENIC CLAUDICATION: ICD-10-CM

## 2020-06-09 RX ORDER — GABAPENTIN 300 MG/1
600 CAPSULE ORAL 3 TIMES DAILY
Qty: 180 CAP | Refills: 0 | OUTPATIENT
Start: 2020-06-09

## 2020-06-09 NOTE — TELEPHONE ENCOUNTER
Last Visit: 12/18/19 with NP Faith  Next Appointment: 6/10/20 with NP Faith  Previous Refill Encounter(s): 5/1/20 #180    Requested Prescriptions     Pending Prescriptions Disp Refills    gabapentin (NEURONTIN) 300 mg capsule 180 Cap 0     Sig: Take 2 Caps by mouth three (3) times daily. Max Daily Amount: 1,800 mg.  Indications: neuropathic pain

## 2020-06-10 ENCOUNTER — VIRTUAL VISIT (OUTPATIENT)
Dept: ORTHOPEDIC SURGERY | Age: 70
End: 2020-06-10

## 2020-06-10 DIAGNOSIS — M54.16 LUMBAR RADICULOPATHY: ICD-10-CM

## 2020-06-10 DIAGNOSIS — M53.3 SI (SACROILIAC) JOINT DYSFUNCTION: ICD-10-CM

## 2020-06-10 DIAGNOSIS — M48.062 SPINAL STENOSIS, LUMBAR REGION WITH NEUROGENIC CLAUDICATION: Primary | ICD-10-CM

## 2020-06-10 DIAGNOSIS — M47.816 SPONDYLOSIS OF LUMBAR REGION WITHOUT MYELOPATHY OR RADICULOPATHY: ICD-10-CM

## 2020-06-10 RX ORDER — GABAPENTIN 600 MG/1
600 TABLET ORAL 3 TIMES DAILY
Qty: 270 TAB | Refills: 1 | Status: SHIPPED | OUTPATIENT
Start: 2020-06-10 | End: 2020-11-19

## 2020-06-10 NOTE — PROGRESS NOTES
History of Present Illness:    Consent: Amie Healy, who was seen by telephone call and/or his healthcare decision maker, is aware that this patient-initiated, Telehealth encounter on 6/10/2020 is a billable service, with coverage as determined by his insurance carrier. He is aware that he may receive a bill and has provided verbal consent to proceed: Yes. The provider was located at home office and the patient was located at their residence. No one else participated in the visit with the patient. The platform used was telephone call    Patient was evaluated today for his chronic low back pain. He gets pain into his bilateral lateral thighs. He does get stiff in the morning. He states he gets flares of pain off and on. He is doing well on Neurontin 600 mg 3 times a day. He has not had any side effects from it. He is unable to take NSAIDs due to his kidney function. He used to work out at [employer] consistently; however, with the [employer] he has not been able to go there. He is trying to bike for exercise several times a week. He is retired. He is a non-smoker. Last visit we had referred him to Dr. Norm Salazar for SI joint injections. He did not go and does not need to have it done at this time. When he does feel like he needs an injection he will call for the referral again. Physical Exam: The patient is a 71-year-old male    Vital Signs: (As obtained by patient/caregiver at home)  There were no vitals taken for this visit. Pain Scale: /10       has been reviewed and is appropriate            We discussed the expected course, resolution and complications of the diagnosis(es) in detail. Medication risks, benefits, costs, interactions, and alternatives were discussed as indicated. I advised him to contact the office if his condition worsens, changes or fails to improve as anticipated. For emergencies or worsening neurological symptoms the patient was instructed to call 911.   He expressed understanding with the diagnosis(es) and plan. 1    Ricardo Mcclure is a 78 y.o. male being evaluated by a video visit encounter for concerns as above. A caregiver was present when appropriate. Due to this being a TeleHealth encounter (During NPVL-52 public health emergency), evaluation of the following organ systems was limited: Vitals/Constitutional/EENT/Resp/CV/GI//MS/Neuro/Skin/Heme-Lymph-Imm. Pursuant to the emergency declaration under the [address] [place] [address], [facility] waiver authority and the [employer] and [employer] Act, this Virtual  Visit was conducted, with patient's (and/or legal guardian's) consent, to reduce the patient's risk of exposure to COVID-19 and provide necessary medical care. CPT Codes 50330-74290 for Established Patients may apply to this Telehealth Visit  Time-based coding, delete if not needed: I spent at least 25 minutes with this established patient, and >50% of the time was spent counseling and/or coordinating care regarding His back pain  Start time: 9:01 AM and Stop time: 9:29 AM.        Due to this being a TeleHealth evaluation, many elements of the physical examination are unable to be assessed. Pursuant to the emergency declaration under the [address] [place] [address], [facility] wa[facility] Health authority and the [employer] and [employer] Act, this Virtual  Visit was conducted, with patient's consent, to reduce the patient's risk of exposure to COVID-19 and provide continuity of care for an established patient. Services were provided through a video synchronous discussion virtually to substitute for in-person clinic visit.     Lexis Gonzalez NP

## 2020-06-22 ENCOUNTER — OFFICE VISIT (OUTPATIENT)
Dept: CARDIOLOGY CLINIC | Age: 70
End: 2020-06-22

## 2020-06-22 VITALS
HEIGHT: 74 IN | HEART RATE: 57 BPM | WEIGHT: 195 LBS | OXYGEN SATURATION: 96 % | DIASTOLIC BLOOD PRESSURE: 80 MMHG | SYSTOLIC BLOOD PRESSURE: 110 MMHG | BODY MASS INDEX: 25.03 KG/M2

## 2020-06-22 DIAGNOSIS — G47.33 OSA (OBSTRUCTIVE SLEEP APNEA): ICD-10-CM

## 2020-06-22 DIAGNOSIS — E78.5 DYSLIPIDEMIA: ICD-10-CM

## 2020-06-22 DIAGNOSIS — I10 ESSENTIAL HYPERTENSION: Primary | ICD-10-CM

## 2020-06-22 NOTE — PROGRESS NOTES
HISTORY OF PRESENT ILLNESS  Seth Osman is a 78 y.o. male. Follow-up   Pertinent negatives include no chest pain, no abdominal pain, no headaches and no shortness of breath. Patient presents for a followup office visit. Patient has a past medical history significant for dyslipidemia, more recently hypertension, and a very strong family for early coronary disease. The patient was seen in the ER at [facility] at the beginning of July 2019 for somewhat atypical chest pain. His initial EKG was normal.  He was ruled out for myocardial infarction and underwent a exercise stress echocardiogram the following day which was a normal and low risk study. He exercised for 10 minutes using the Dilip protocol achieving a total workload of 12.8 METS. Patient's chest pain was ultimately determined to be likely musculoskeletal in nature. In September 2019, he was found to have new EKG changes in our office with anterolateral T wave inversions, but no anginal type symptoms. He did mention intermittent pleuritic chest pain which has been ongoing for several months. As a result, he underwent an echocardiogram at the end of September 2019, demonstrating preserved LV systolic function, EF 55 to 60% with no valvular heart disease, and a mildly dilated aortic root, and normal PA pressures. Patient was last seen in our office 6 months ago. Since last visit he has been feeling quite well. No recurrent chest pain or shortness of breath. No major change in his activity tolerance. He is trying to either walk or bicycle most days of the week.     Past Medical History:   Diagnosis Date    Allergic rhinitis     Dr Jorge Hernandez; never took immunotherapy    Anxiety     saw Dr June Gustafson; pfts show no obstruction but high RV    Basal cell carcinoma 2013    Dr. Karin Cabezas s/p resection 2 spots    BPH (benign prostatic hyperplasia)     Dr. Buenrostro Morning; on proscar for years    Depression     and anxiety; paxil 2008? lexapro and wellbutrin til 2018; tried proza; saw Dr Amparo Flores now seeing Dr Michelle Burch Dyslipidemia     Elevated PSA 2020    Dr Oleg Madison Erectile dysfunction     FHx: heart disease     GERD (gastroesophageal reflux disease)     s/p dilation 2003 Dr. Sharif Oar; egd 2015    Heel spur     Dr Jayleen Bernal Hypertension 02/2019    24 hr ambulatory monitoring Dr Lele Patiño Hypovitaminosis D     Overweight (BMI 25.0-29.9) 2/12/2018    Peripheral neuropathy     and B CTS on EMG Dr. Trudee Rinne Pulmonary nodules 2011    bilat upper lobes; no change 2018; noted again [facility] 6/19    Scoliosis     Dr Ching Marc 2014    Sleep apnea     intol cpap Dr Vincent Franklin 2015; using oral appliance Dr Domitila Hayes; AHI 17.8 min desats 83    Spinal stenosis 03/2014    MRI (3/14) showed scoliosis w multilevel degen findings, mod L3-4, L4-5 central stenosis, mod L5-S1 foraminal stenosis;  (10/17) severe L3-4 central stenosis; seeing Dr Elizabeth Marin      Current Outpatient Medications   Medication Sig Dispense Refill    gabapentin (Neurontin) 600 mg tablet Take 1 Tab by mouth three (3) times daily. Max Daily Amount: 1,800 mg. Indications: neuropathic pain 270 Tab 1    triamterene-hydroCHLOROthiazide (DYAZIDE) 37.5-25 mg per capsule TAKE 1 CAPSULE BY MOUTH DAILY 90 Cap 5    metoprolol tartrate (LOPRESSOR) 25 mg tablet TAKE 1 TABLET BY MOUTH TWICE DAILY 180 Tab 3    rosuvastatin (CRESTOR) 10 mg tablet TAKE 1 TABLET BY MOUTH EVERY NIGHT 90 Tab 3    tadalafil (CIALIS) 20 mg tablet Take 1 Tab by mouth as needed for Erectile Dysfunction. (Patient taking differently: Take 5 mg by mouth daily.) 30 Tab 5    ARIPiprazole (ABILIFY) 2 mg tablet Take 2 mg by mouth daily.       SYMBICORT 160-4.5 mcg/actuation HFAA INHALE 2 PUFFS BY MOUTH TWICE DAILY 3 Inhaler 3    fluticasone propionate (FLONASE) 50 mcg/actuation nasal spray SHAKE LIQUID AND USE 2 SPRAYS IN EACH NOSTRIL DAILY 3 Bottle 3    MEN'S MULTI-VITAMIN PO Take by mouth.  ALPRAZolam (XANAX) 0.25 mg tablet Take 1 Tab by mouth daily. 0    VIIBRYD 40 mg tab tablet Take 1 Tab by mouth daily. 0    acetaminophen (TYLENOL ARTHRITIS PAIN) 650 mg TbER Take 650 mg by mouth every eight (8) hours as needed.  buPROPion SR (WELLBUTRIN SR) 150 mg SR tablet Take 1 Tab by mouth two (2) times a day. (Patient taking differently: Take 300 mg by mouth daily. ) 180 Tab 3    latanoprost (XALATAN) 0.005 % ophthalmic solution OMAR 1 GTT INTO RIGHT EYE HS  3    albuterol (PROAIR HFA) 90 mcg/actuation inhaler Take 2 Puffs by inhalation every four (4) hours as needed for Wheezing. 3 Inhaler 3    NEXIUM 40 mg capsule TAKE 1 CAPSULE DAILY 90 Cap 3    ascorbic acid (VITAMIN C) 500 mg tablet Take  by mouth.  aspirin delayed-release 81 mg tablet Take  by mouth daily.  cholecalciferol, vitamin d3, (VITAMIN D3) 1,000 unit tablet Take  by mouth daily.  Gluc-Scar-MSM#6-K-Ylka-Mike-Bor (OSTEO BI-FLEX) 750-625-30 mg Tab Take  by mouth daily.  OMEGA-3 FATTY ACIDS (OMEGA 3 PO) Take  by mouth two (2) times a day. 3 QD         Allergies   Allergen Reactions    Cefdinir Nausea and Vomiting    Niacin Other (comments)     Flushing      Norvasc [Amlodipine] Swelling      Social History     Tobacco Use    Smoking status: Former Smoker     Packs/day: 0.25     Years: 2.00     Pack years: 0.50    Smokeless tobacco: Never Used    Tobacco comment: reports occasional use as a teenager   Substance Use Topics    Alcohol use: Yes     Alcohol/week: 1.0 standard drinks     Types: 1 Glasses of wine per week     Comment: 1-2 glasses of wine a month    Drug use: No     Family History   Problem Relation Age of Onset    Cancer Mother         leukemia    Heart Disease Father     Psychiatric Disorder Daughter         depression         Review of Systems   Constitutional: Negative for chills, fever and weight loss. HENT: Negative for nosebleeds.     Eyes: Negative for blurred vision and double vision. Respiratory: Negative for cough, shortness of breath and wheezing. Cardiovascular: Negative for chest pain, palpitations, orthopnea, claudication, leg swelling and PND. Gastrointestinal: Negative for abdominal pain, heartburn, nausea and vomiting. Genitourinary: Negative for dysuria and hematuria. Musculoskeletal: Negative for falls and myalgias. Skin: Negative for rash. Neurological: Negative for dizziness, focal weakness and headaches. Endo/Heme/Allergies: Does not bruise/bleed easily. Psychiatric/Behavioral: Negative for substance abuse. Visit Vitals  /80 (BP 1 Location: Left arm, BP Patient Position: Sitting)   Pulse (!) 57   Ht 6' 2\" (1.88 m)   Wt 88.5 kg (195 lb)   SpO2 96%   BMI 25.04 kg/m²      Physical Exam   Constitutional: He is oriented to person, place, and time. He appears well-developed and well-nourished. HENT:   Head: Normocephalic and atraumatic. Eyes: Conjunctivae are normal.   Neck: Neck supple. No JVD present. Carotid bruit is not present. Cardiovascular: Normal rate, regular rhythm, S1 normal, S2 normal and normal pulses. Exam reveals no gallop and no S3. No murmur heard. Pulmonary/Chest: Breath sounds normal. He has no wheezes. He has no rales. Abdominal: Soft. Bowel sounds are normal. There is no abdominal tenderness. Musculoskeletal:         General: No edema. Neurological: He is alert and oriented to person, place, and time. Skin: Skin is warm and dry. EKG:  Normal sinus rhythm. Normal axis, normal QTc interval.  No ST or T wave abnormalities concerning for ischemia. Compared to the previous EKG, no significant overall change. ASSESSMENT and PLAN    History of probable pericarditis. Patient EKG changes have since resolved. His symptoms were pleuritic in nature. I did advise him that if he has similar type pleuritic chest pain in future to take a 2-week course of potent NSAIDs. Dyslipidemia.   The patient is now taking rosuvastatin 10 mg daily. His most recent lipid panel was very reasonable: Total cholesterol 158, HDL 47, LDL 75. This is followed by his PCP. Essential hypertension. Patient is now being managed with a low-dose beta-blocker and a diuretic. He apparently did not tolerate a calcium channel blocker due to leg swelling. Patient's blood pressure is now well controlled. I will continue his current medical regimen. Family history for coronary disease: Because of this we have discussed the importance of risk factor modification. Obstructive sleep apnea. Patient could not tolerate multiple CPAP, so now uses a oral device. He continues to follow-up with his sleep specialist.    Followup in 12 months, sooner if needed.

## 2020-06-22 NOTE — PROGRESS NOTES
Royce Murray presents today for   Chief Complaint   Patient presents with    Follow-up     6 month follow up       Royce Murray preferred language for health care discussion is english/other. Is someone accompanying this pt? no    Is the patient using any DME equipment during [address]? no    Depression Screening:  3 most recent PHQ Screens 6/22/2020   Little interest or pleasure in doing things Not at all   Feeling down, depressed, irritable, or hopeless Not at all   Total Score PHQ 2 0       Learning Assessment:  Learning Assessment 7/3/2019   PRIMARY LEARNER Patient   HIGHEST LEVEL OF EDUCATION - PRIMARY LEARNER  4 YEARS OF COLLEGE   BARRIERS PRIMARY LEARNER NONE   CO-LEARNER CAREGIVER No   PRIMARY LANGUAGE ENGLISH   LEARNER PREFERENCE PRIMARY READING   ANSWERED BY patient   RELATIONSHIP SELF       Abuse Screening:  Abuse Screening Questionnaire 6/22/2020   Do you ever feel afraid of your partner? N   Are you in a relationship with someone who physically or mentally threatens you? N   Is it safe for you to go home? Y       Fall Risk  Fall Risk Assessment, last 12 mths 6/22/2020   Able to walk? Yes   Fall in past 12 months? -   Fall with injury? -   Number of falls in past 12 months -   Fall Risk Score -       Pt currently taking Anticoagulant therapy? ASA 81 mg once a day    Coordination of Care:  1. Have you been to the ER, urgent care clinic since your last visit? Hospitalized since your last visit? no    2. Have you seen or consulted any other health care providers outside of the [address] [place] [address] since your last visit? Include any pap smears or colon screening.  no

## 2020-06-29 ENCOUNTER — TELEPHONE (OUTPATIENT)
Dept: INTERNAL MEDICINE CLINIC | Age: 70
End: 2020-06-29

## 2020-06-29 ENCOUNTER — VIRTUAL VISIT (OUTPATIENT)
Dept: INTERNAL MEDICINE CLINIC | Age: 70
End: 2020-06-29

## 2020-06-29 DIAGNOSIS — S90.561A INFECTED INSECT BITE OF RIGHT ANKLE, INITIAL ENCOUNTER: Primary | ICD-10-CM
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
## 2020-06-29 DIAGNOSIS — L08.9 INFECTED INSECT BITE OF RIGHT ANKLE, INITIAL ENCOUNTER: Primary | ICD-10-CM

## 2020-06-29 NOTE — TELEPHONE ENCOUNTER
Pt states he pulled off a tick off his right ankle Friday and now has a dark red, hard and swollen area around bite and it itches. Said he had a bite years ago and was put on antibiotics for it (seen by a dermatologist). Doesn't know if he needs to be seen or something called in for him or what. Please advise him at [phone].

## 2020-06-29 NOTE — PROGRESS NOTES
Consuelo Gibbs is a 78 y.o. male who was seen by synchronous (real-time) audio-video technology on 6/29/2020. Consent: Consuelo Gibbs, who was seen by synchronous (real-time) audio-video technology, and/or his healthcare decision maker, is aware that this patient-initiated, Telehealth encounter on 6/29/2020 is a billable service, with coverage as determined by his insurance carrier. He is aware that he may receive a bill and has provided verbal consent to proceed: Yes. Assessment & Plan:   Diagnoses and all orders for this visit:    1. Tick bite, initial encounter        I spent at least 11 minutes on this visit with this established patient. 712  Subjective:   Consuelo Gibbs is a 78 y.o. male who was seen for No chief complaint on file. In the community they live in, there happen to be a lot of deer and this past year, he's been seeing a lot of ticks. He was out and about on 6/26/20, thinks he got bitten by a tick in the right medial ankle during that timeframe. He is pretty sure he got it off within a couple hours and has been doing local care. In the last day, he has noted more irritation but no actual target lesion that is enlarging. Feels no symptoms otherwise systemically, joint wise, etc.    Past Medical History:   Diagnosis Date    Allergic rhinitis     Dr Xena Foley; never took immunotherapy    Anxiety     saw Dr Felipa Saleem; pfts show no obstruction but high RV    Basal cell carcinoma 2013    Dr. Erika Driscoll s/p resection 2 spots    BPH (benign prostatic hyperplasia)     Dr. Alireza Tyson; on proscar for years    Depression     and anxiety; paxil 2008?  lexapro and wellbutrin til 2018; tried proza; saw Dr Mamie Rivas now seeing Dr Ronaldo King Dyslipidemia     Elevated PSA 2020    Dr Amita Jordan Erectile dysfunction     FHx: heart disease     GERD (gastroesophageal reflux disease)     s/p dilation 2003 Dr. Benjie Huggins; egd 2015    Heel spur      [place]    Hypertension 02/2019    24 hr ambulatory monitoring Dr Estefany Harrington Hypovitaminosis D     Overweight (BMI 25.0-29.9) 2/12/2018    Peripheral neuropathy     and B CTS on EMG Dr. Pilar Camacho Pulmonary nodules 2011    bilat upper lobes; no change 2018; noted again [facility] 6/19    Scoliosis     Dr Chapis Nix 2014    Sleep apnea     intol cpap Dr Nilesh Stevens 2015; using oral appliance Dr Bradley Guerrero; AHI 17.8 min desats 83    Spinal stenosis 03/2014    MRI (3/14) showed scoliosis w multilevel degen findings, mod L3-4, L4-5 central stenosis, mod L5-S1 foraminal stenosis;  (10/17) severe L3-4 central stenosis; seeing Dr Norma Flood     Current Outpatient Medications   Medication Sig    gabapentin (Neurontin) 600 mg tablet Take 1 Tab by mouth three (3) times daily. Max Daily Amount: 1,800 mg. Indications: neuropathic pain    triamterene-hydroCHLOROthiazide (DYAZIDE) 37.5-25 mg per capsule TAKE 1 CAPSULE BY MOUTH DAILY    metoprolol tartrate (LOPRESSOR) 25 mg tablet TAKE 1 TABLET BY MOUTH TWICE DAILY    rosuvastatin (CRESTOR) 10 mg tablet TAKE 1 TABLET BY MOUTH EVERY NIGHT    tadalafil (CIALIS) 20 mg tablet Take 1 Tab by mouth as needed for Erectile Dysfunction. (Patient taking differently: Take 5 mg by mouth daily.)    ARIPiprazole (ABILIFY) 2 mg tablet Take 2 mg by mouth daily.  SYMBICORT 160-4.5 mcg/actuation HFAA INHALE 2 PUFFS BY MOUTH TWICE DAILY    fluticasone propionate (FLONASE) 50 mcg/actuation nasal spray SHAKE LIQUID AND USE 2 SPRAYS IN EACH NOSTRIL DAILY    MEN'S MULTI-VITAMIN PO Take  by mouth.  ALPRAZolam (XANAX) 0.25 mg tablet Take 1 Tab by mouth daily.  VIIBRYD 40 mg tab tablet Take 1 Tab by mouth daily.  acetaminophen (TYLENOL ARTHRITIS PAIN) 650 mg TbER Take 650 mg by mouth every eight (8) hours as needed.  buPROPion SR (WELLBUTRIN SR) 150 mg SR tablet Take 1 Tab by mouth two (2) times a day.  (Patient taking differently: Take 300 mg by mouth daily.)    latanoprost (XALATAN) 0.005 % ophthalmic solution OMAR 1 GTT INTO RIGHT EYE HS    albuterol (PROAIR HFA) 90 mcg/actuation inhaler Take 2 Puffs by inhalation every four (4) hours as needed for Wheezing.  NEXIUM 40 mg capsule TAKE 1 CAPSULE DAILY    ascorbic acid (VITAMIN C) 500 mg tablet Take  by mouth.  aspirin delayed-release 81 mg tablet Take  by mouth daily.  cholecalciferol, vitamin d3, (VITAMIN D3) 1,000 unit tablet Take  by mouth daily.  Gluc-Scar-MSM#6-E-Rcbx-Mike-Bor (OSTEO BI-FLEX) 750-625-30 mg Tab Take  by mouth daily.  OMEGA-3 FATTY ACIDS (OMEGA 3 PO) Take  by mouth two (2) times a day. 3 QD     No current facility-administered medications for this visit. Allergies   Allergen Reactions    Cefdinir Nausea and Vomiting    Niacin Other (comments)     Flushing      Norvasc [Amlodipine] Swelling     Objective: There were no vitals taken for this visit. General: alert, cooperative, no distress   Mental  status: normal mood, behavior, speech, dress, motor activity, and thought processes, able to follow commands   HENT: NCAT   Neck: no visualized mass   Resp: no respiratory distress   Neuro: no gross deficits   Skin: no discoloration or lesions of concern on visible areas   Psychiatric: normal affect, consistent with stated mood, no evidence of hallucinations     Additional exam findings: The tick bite site showed an inflamed area, no obvious red streaking or target lesion, drainage, tenderness; [icture resolution was not great, however. Assessment and plan:  1. Status post tick bite. We discussed option of observation versus empiric treatment, he wanted to do the former for now. Call if progressive symptoms and we told him what to watch out for. Above conditions discussed at length and patient vocalized understanding. All questions answered to patient satisfaction      We discussed the expected course, resolution and complications of the diagnosis(es) in detail.   Medication risks, benefits, costs, interactions, and alternatives were discussed as indicated. I advised him to contact the office if his condition worsens, changes or fails to improve as anticipated. He expressed understanding with the diagnosis(es) and plan. Ryan Gotti is a 78 y.o. male who was evaluated by a video visit encounter for concerns as above. Patient identification was verified prior to start of the visit. A caregiver was present when appropriate. Due to this being a TeleHealth encounter (During BTIAG-19 public health emergency), evaluation of the following organ systems was limited: Vitals/Constitutional/EENT/Resp/CV/GI//MS/Neuro/Skin/Heme-Lymph-Imm. Pursuant to the emergency declaration under the [facility], [facility] waiver authority and the [employer] and [employer] Act, this Virtual  Visit was conducted, with patient's (and/or legal guardian's) consent, to reduce the patient's risk of exposure to COVID-19 and provide necessary medical care. Services were provided through a video synchronous discussion virtually to substitute for in-person clinic visit. Patient and provider were located at their individual homes.       Anne Rojas MD

## 2020-07-06 ENCOUNTER — TELEPHONE (OUTPATIENT)
Dept: ORTHOPEDIC SURGERY | Age: 70
End: 2020-07-06

## 2020-07-06 DIAGNOSIS — M53.3 SI (SACROILIAC) JOINT DYSFUNCTION: Primary | ICD-10-CM

## 2020-07-06 NOTE — TELEPHONE ENCOUNTER
Patient was told to call for referral to Dr. Emily Ellison when ready for injection and he would like to proceed. This was discussed at last office visit 6/10/20.

## 2020-08-25 PROBLEM — I10 HYPERTENSION: Status: ACTIVE | Noted: 2019-02-01

## 2020-09-09 RX ORDER — FLUTICASONE PROPIONATE 50 MCG
SPRAY, SUSPENSION (ML) NASAL
Qty: 48 G | Refills: 3 | Status: SHIPPED | OUTPATIENT
Start: 2020-09-09 | End: 2021-06-11

## 2020-11-19 DIAGNOSIS — M48.062 SPINAL STENOSIS, LUMBAR REGION WITH NEUROGENIC CLAUDICATION: ICD-10-CM

## 2020-11-19 DIAGNOSIS — M53.3 SI (SACROILIAC) JOINT DYSFUNCTION: ICD-10-CM

## 2020-11-19 DIAGNOSIS — M47.816 SPONDYLOSIS OF LUMBAR REGION WITHOUT MYELOPATHY OR RADICULOPATHY: ICD-10-CM

## 2020-11-19 DIAGNOSIS — M54.16 LUMBAR RADICULOPATHY: ICD-10-CM

## 2020-11-19 RX ORDER — GABAPENTIN 600 MG/1
TABLET ORAL
Qty: 270 TAB | Refills: 0 | Status: SHIPPED | OUTPATIENT
Start: 2020-11-19 | End: 2021-05-04 | Stop reason: SDUPTHER

## 2020-11-25 ENCOUNTER — OFFICE VISIT (OUTPATIENT)
Dept: ORTHOPEDIC SURGERY | Age: 70
End: 2020-11-25
Payer: COMMERCIAL

## 2020-11-25 VITALS
WEIGHT: 189 LBS | DIASTOLIC BLOOD PRESSURE: 59 MMHG | RESPIRATION RATE: 14 BRPM | BODY MASS INDEX: 25.63 KG/M2 | SYSTOLIC BLOOD PRESSURE: 102 MMHG | HEART RATE: 68 BPM | TEMPERATURE: 97.1 F

## 2020-11-25 DIAGNOSIS — M48.062 SPINAL STENOSIS, LUMBAR REGION WITH NEUROGENIC CLAUDICATION: Primary | ICD-10-CM

## 2020-11-25 PROCEDURE — 99213 OFFICE O/P EST LOW 20 MIN: CPT | Performed by: NURSE PRACTITIONER

## 2020-11-25 NOTE — PATIENT INSTRUCTIONS
Lumbar Spinal Stenosis: Care Instructions Your Care Instructions Stenosis in the spine is a narrowing of the canal that is around the spinal cord and nerve roots in your back. It can happen as part of aging. Sometimes bone and other tissue grow into this canal and press on the nerves that branch out from the spinal cord. This can cause pain, numbness, and weakness. When it happens in the lower part of your back, it is called lumbar spinal stenosis. It can cause problems in the legs, feet, and rear end (buttocks). You may be able to get relief from the symptoms of spinal stenosis by taking pain medicine. Your doctor may suggest physical therapy and exercises to keep your spine strong and flexible. Some people try steroid shots to reduce swelling. If pain and numbness in your legs are still so bad that you cannot do your normal activities, you may need surgery. Follow-up care is a key part of your treatment and safety. Be sure to make and go to all appointments, and call your doctor if you are having problems. It's also a good idea to know your test results and keep a list of the medicines you take. How can you care for yourself at home? · Take an over-the-counter pain medicine. Nonsteroidal anti-inflammatory drugs (NSAIDs) such as ibuprofen or naproxen seem to work best. But if you can't take NSAIDs, you can try acetaminophen. Be safe with medicines. Read and follow all instructions on the label. · Do not take two or more pain medicines at the same time unless the doctor told you to. Many pain medicines have acetaminophen, which is Tylenol. Too much acetaminophen (Tylenol) can be harmful. · Stay at a healthy weight. Being overweight puts extra strain on your spine. · Change positions often when you sit or stand. This can ease pain. It may also reduce pressure on the spinal cord and its nerves. · Avoid doing things that make your symptoms worse. Walking downhill and standing for a long time may cause pain. · Stretch and strengthen your back muscles as your doctor or physical therapist recommends. If your doctor says it is okay to do them, these exercises may help. ? Lie on your back with your knees bent. Gently pull one bent knee to your chest. Put that foot back on the floor, and then pull the other knee to your chest. 
? Do pelvic tilts. Lie on your back with your knees bent. Tighten your stomach muscles. Pull your belly button (navel) in and up toward your ribs. You should feel like your back is pressing to the floor and your hips and pelvis are slightly lifting off the floor. Hold for 6 seconds while breathing smoothly. ? Stand with your back flat against a wall. Slowly slide down until your knees are slightly bent. Hold for 10 seconds, then slide back up the wall. · Remove or change anything in your house that may cause you to fall. Keep walkways clear of clutter, electrical cords, and throw rugs. When should you call for help? Call 911 anytime you think you may need emergency care. For example, call if: 
  · You are unable to move a leg at all. Call your doctor now or seek immediate medical care if: 
  · You have new or worse symptoms in your legs, belly, or buttocks. Symptoms may include: 
? Numbness or tingling. ? Weakness. ? Pain.  
  · You lose bladder or bowel control. Watch closely for changes in your health, and be sure to contact your doctor if: 
  · You have a fever, lose weight, or don't feel well.  
  · You are not getting better as expected. Where can you learn more? Go to http://www.[employer]/ [employer] in the search box to learn more about \"Lumbar Spinal Stenosis: Care Instructions. \" Current as of: March 2, 2020               Content Version: 12.6 © 2030-6641 [employer]. Care instructions adapted under license by [employer] (which disclaims liability or warranty for this information). If you have questions about a medical condition or this instruction, always ask your healthcare professional. [address] any warranty or liability for your use of this information.

## 2020-11-25 NOTE — PROGRESS NOTES
[address], [address]  [place], [facility]  Phone: [phone]  Fax: [phone]  PROGRESS NOTE  Patient: Cookie Moran                MRN: [id number]       SSN: [id number]  YOB: 1950        AGE: 78 y.o. SEX: male  Body mass index is 25.63 kg/m². PCP: Mesha Jaimes MD  11/25/20    Chief Complaint   Patient presents with    Back Pain     fu        HISTORY OF PRESENT ILLNESS:  Cookie Moran is a 78 y.o.  male with history of chronic low back and SI pain, he occasionally gets BLE radicular pain in the L3 distribution if he leans or sits a certain way, it resolves w/ repositioning. Prior history of back problems: Last L MRI 7/2020 at [facility]: multi-level degenerative changes w/ FA, disc bulges, and ligamentous hypertrophy, scoliosis, severe stenosis L3-4. At last OV we last saw him over the summer and referred him to get some SI injections. Today, since we have seen him, he has had an updated L MRI, he also saw Dr. Pam Lim and was referred to PT. He is not going to get surgery because his pain is well managed. His pain ranges from 2-6/10. His pain is consistent w/ stenosis and arthritis. He remains neuro intact. His pain is aching and stiff worse w/ stooping, bending, twisting and standing. Denies bladder/bowel dysfunction, saddle paresthesia, weakness, gait disturbance, or other neurological deficits. Medications: Gabapentin 600mg TID with     PMHx: Anxiety      ASSESSMENT   Diagnoses and all orders for this visit:    1. Spinal stenosis, lumbar region with neurogenic claudication         IMPRESSION AND PLAN:  This is a pt with stenosis and scoliosis who remains stable on his medications.  He has seen Dr. Pam Lim regarding his L MRI from 7/2020, he is not getting surgery, I did offer an apt w/ Dr. Lulu Enriquez to go over the images, he prefers to follow up w/ a NP     > Pt was given information on STENOSIS  > Cont Gabapentin  > HEP  > Mr. Jeanmarie Meneses has a reminder for a \"due or due soon\" health maintenance. I have asked that he contact his primary care provider, Timmie Opitz, MD, for follow-up on this health maintenance.  > We have informed patient to notify us for immediate appointment if he has any worsening neurogical symptoms or if an emergency situation presents, then call 911  >  has been reviewed and is appropriate  > Pt will follow-up in 6 mo w/ NP    Subjective    Pain Scale: 0 - No pain/10    Pain Assessment  11/25/2020   Location of Pain Back; Toe   Location Modifiers -   Severity of Pain 0   Quality of Pain (No Data)   Quality of Pain Comment numbness tingling soreness   Duration of Pain -   Frequency of Pain Intermittent   Aggravating Factors Bending;Straightening   Aggravating Factors Comment -   Limiting Behavior Some   Relieving Factors (No Data)   Relieving Factors Comment gabapentin   Result of Injury -         REVIEW OF SYSTEMS  Constitutional: Negative for fever, chills, or weight change. Respiratory: Negative for cough or shortness of breath. Cardiovascular: Negative for chest pain or palpitations. Gastrointestinal: Negative for incontinence, acid reflux, change in bowel habits, or constipation. Genitourinary: Negative for incontinence, dysuria and flank pain. Musculoskeletal: Positive for backpain. See HPI. Skin: Negative for rash. Neurological:intermittent BLE L3 radiculopathy. See HPI. Endo/Heme/Allergies: Negative. Psychiatric/Behavioral: Negative. PHYSICAL EXAMINATION  Visit Vitals  BP (!) 102/59 (BP 1 Location: Left arm, BP Patient Position: Sitting)   Pulse 68   Temp 97.1 °F (36.2 °C) (Temporal)   Resp 14   Wt 189 lb (85.7 kg)   BMI 25.63 kg/m²         Accompanied by self. Constitutional:  Well developed, well nourished, in no acute distress. Psychiatric: Affect and mood are appropriate. Integumentary: No rashes or abrasions noted on exposed areas. Cardiovascular/Peripheral Vascular: +2 radial & pedal pulses. No peripheral edema is noted. Lymphatic:  No evidence of lymphedema. No cervical lymphadenopathy. SPINE/MUSCULOSKELETAL EXAM     Lumbar spine:  No rash, ecchymosis, or gross obliquity. No fasciculations. No focal atrophy is noted. Range of motion is decreased with no pain. Tenderness to palpation bilateral low back L3-5. No tenderness to palpation at the sciatic notch. SI joints non-tender. Trochanters non tender. Straight leg raise Neg  Hip Impingement neg    Sensation grossly intact to light touch. MOTOR:     Hip Flex Quads Hamstrings Ankle DF EHL Ankle PF   Right 5/5 5/5 5/5 5/5 5/5 5/5   Left 5/5 5/5 5/5 5/5 5/5 5/5       Ambulation w/ no assistive device,  + 's cart  Scoliosis noted on exam.         PAST MEDICAL HISTORY   Past Medical History:   Diagnosis Date    Allergic rhinitis     Dr Huntley Left; never took immunotherapy    Anxiety     saw Dr Tyra Deal; pfts show no obstruction but high RV    Basal cell carcinoma 2013    Dr. Owen Allen s/p resection 2 spots    BPH (benign prostatic hyperplasia)     Dr. Galdino Carvajal; on proscar for years    Depression     and anxiety; paxil 2008?  lexapro and wellbutrin til 2018; tried proza; saw Dr Halie Farrell now seeing Dr Tricia Mckeon Dyslipidemia     Elevated PSA 2020    Dr Kaia Fleming Erectile dysfunction     FHx: heart disease     GERD (gastroesophageal reflux disease)     s/p dilation 2003 Dr. Terrell Shrestha; egd 2015    Heel spur     Dr Lindsay Morley Hypertension 02/2019    24 hr ambulatory monitoring Dr Stefania Rush Hypovitaminosis D     Overweight (BMI 25.0-29.9) 2/12/2018    Peripheral neuropathy     and B CTS on EMG Dr. Dani Daigle Pulmonary nodules 2011    bilat upper lobes; no change 2018; [facility] 6/19    Scoliosis     Dr Samantha Luo 2014    Sleep apnea     intol cpap Dr Hawkins Masters 2015; using oral appliance Dr Taylor Chaparro; AHI 17.8 min desats 83    Spinal stenosis 03/2014    MRI (3/14) showed scoliosis w multilevel degen findings, mod L3-4, L4-5 central stenosis, mod L5-S1 foraminal stenosis;  (10/17) severe L3-4 central stenosis; seeing Dr Neil Nguyen       Past Surgical History:   Procedure Laterality Date    CARDIAC SURG PROCEDURE UNLIST  8/09    ETT negative (8/09); SOH neg stress echo (6/19)    CARDIAC SURG PROCEDURE UNLIST  09/2019    echo nl lv, ef 55%, no wma or valvular disease    CHEST SURGERY PROCEDURE UNLISTED  07/2018    Dr Ariane Cooley; FEV1 96%, 5% improvement postbd, ratio 104, , , DLCO 82    HX COLONOSCOPY      Dr. Lisette Frazier mild diverticulosis 2000, 6/12    HX GI  06/2019    CT abd/pelvis showed nothing acute, small LIH, small HH, bladder divertic    HX ORTHOPAEDIC      B CT release 2007, 2009    HX ORTHOPAEDIC      DEXA t score 4.2 spine, 0.2 hip (2012)   . MEDICATIONS      Current Outpatient Medications   Medication Sig Dispense Refill    gabapentin (NEURONTIN) 600 mg tablet TAKE 1 TABLET BY MOUTH THREE TIMES DAILY FOR NEUROPATIC PAIN. MAXIMUM DAILY AMOUNT OF 1800  Tab 0    Syringe with Needle, Disp, (Allergy Syringe) 1 mL 27 x 1/2\" syrg FOR USE WITH INTRACAVERNOSAL INJECTIONS. 25 Pen Needle 2    Injector Device maría DISPENSE INJECT-EASE AUTO-INJECTOR FOR USE WITH BI-MIX INJECTIONS. 1 Each 1    tri mix compound 0.5-1 mL by IntraCAVernosal route as needed (FOR E.D.). DISPENSE TRI-MIX (PGE-1 60 mcg/Papaverine 40 mg/ Phentolamine 6 mg/m).  PATIENT TO START WITH 0.5 ML/ INJ AND TITRATE UP 0.1 ML AT A TIME TO ACHIEVE SATISFACTORY RESULT. 5 mL 5    fluticasone propionate (FLONASE) 50 mcg/actuation nasal spray SHAKE LIQUID AND USE 2 SPRAYS IN EACH NOSTRIL DAILY 48 g 3    triamterene-hydroCHLOROthiazide (DYAZIDE) 37.5-25 mg per capsule TAKE 1 CAPSULE BY MOUTH DAILY 90 Cap 5    metoprolol tartrate (LOPRESSOR) 25 mg tablet TAKE 1 TABLET BY MOUTH TWICE DAILY 180 Tab 3    rosuvastatin (CRESTOR) 10 mg tablet TAKE 1 TABLET BY MOUTH EVERY NIGHT 90 Tab 3    ARIPiprazole (ABILIFY) 2 mg tablet Take 2 mg by mouth daily.  SYMBICORT 160-4.5 mcg/actuation HFAA INHALE 2 PUFFS BY MOUTH TWICE DAILY 3 Inhaler 3    ALPRAZolam (XANAX) 0.25 mg tablet Take 1 Tab by mouth daily. 0    VIIBRYD 40 mg tab tablet Take 1 Tab by mouth daily. 0    buPROPion SR (WELLBUTRIN SR) 150 mg SR tablet Take 1 Tab by mouth two (2) times a day. (Patient taking differently: Take 300 mg by mouth daily. ) 180 Tab 3    latanoprost (XALATAN) 0.005 % ophthalmic solution OMAR 1 GTT INTO RIGHT EYE HS  3    albuterol (PROAIR HFA) 90 mcg/actuation inhaler Take 2 Puffs by inhalation every four (4) hours as needed for Wheezing. 3 Inhaler 3    NEXIUM 40 mg capsule TAKE 1 CAPSULE DAILY 90 Cap 3    ascorbic acid (VITAMIN C) 500 mg tablet Take  by mouth.  aspirin delayed-release 81 mg tablet Take  by mouth daily.  cholecalciferol, vitamin d3, (VITAMIN D3) 1,000 unit tablet Take  by mouth daily.  Gluc-Scar-MSM#0-N-Cfei-Mike-Bor (OSTEO BI-FLEX) 750-625-30 mg Tab Take  by mouth daily.           ALLERGIES    Allergies   Allergen Reactions    Cefdinir Nausea and Vomiting    Niacin Other (comments)     Flushing      Norvasc [Amlodipine] Swelling          SOCIAL HISTORY    Social History     Socioeconomic History    Marital status:      Spouse name: Not on file    Number of children: 2    Years of education: Not on file    Highest education level: Not on file   Occupational History    Occupation: ret HR PN   Social Needs    Financial resource strain: Not on file    Food insecurity     Worry: Not on file     Inability: Not on file   Vaultize needs     Medical: Not on file     Non-medical: Not on file   Tobacco Use    Smoking status: Former Smoker     Packs/day: 0.25     Years: 2.00     Pack years: 0.50    Smokeless tobacco: Never Used    Tobacco comment: reports occasional use as a teenager   Substance and Sexual Activity    Alcohol use: Yes     Alcohol/week: 1.0 standard drinks     Types: 1 Glasses of wine per week     Comment: 1-2 glasses of wine a month    Drug use: No    Sexual activity: Yes     Partners: Female     Birth control/protection: None   Lifestyle    Physical activity     Days per week: Not on file     Minutes per session: Not on file    Stress: Not on file   Relationships    Social connections     Talks on phone: Not on file     Gets together: Not on file     Attends [employer] service: Not on file     Active member of club or organization: Not on file     Attends meetings of clubs or organizations: Not on file     Relationship status: Not on file    Intimate partner violence     Fear of current or ex partner: Not on file     Emotionally abused: Not on file     Physically abused: Not on file     Forced sexual activity: Not on file   Other Topics Concern    Not on file   Social History Narrative    Not on file       FAMILY HISTORY    Family History   Problem Relation Age of Onset    Cancer Mother         leukemia    Heart Disease Father     Psychiatric Disorder Daughter         depression         Jersey Otero NP

## 2020-11-25 NOTE — PROGRESS NOTES
Silvia Purvis presents today for   Chief Complaint   Patient presents with    Back Pain     fu        Is someone accompanying this pt? NO    Is the patient using any DME equipment during OV? NO    Depression Screening:  3 most recent PHQ Screens 6/22/2020   Little interest or pleasure in doing things Not at all   Feeling down, depressed, irritable, or hopeless Not at all   Total Score PHQ 2 0       Learning Assessment:  Learning Assessment 7/3/2019   PRIMARY LEARNER Patient   HIGHEST LEVEL OF EDUCATION - PRIMARY LEARNER  4 YEARS OF COLLEGE   BARRIERS PRIMARY LEARNER NONE   CO-LEARNER CAREGIVER No   PRIMARY LANGUAGE ENGLISH   LEARNER PREFERENCE PRIMARY READING   ANSWERED BY patient   RELATIONSHIP SELF       Abuse Screening:  Abuse Screening Questionnaire 6/22/2020   Do you ever feel afraid of your partner? N   Are you in a relationship with someone who physically or mentally threatens you? N   Is it safe for you to go home? Y       Fall Risk  Fall Risk Assessment, last 12 mths 11/25/2020   Able to walk? Yes   Fall in past 12 months? No   Fall with injury? -   Number of falls in past 12 months -   Fall Risk Score -         Coordination of Care:  1. Have you been to the ER, urgent care clinic since your last visit? NO  Hospitalized since your last visit? NO    2. Have you seen or consulted any other health care providers outside of the [address] [place] [address] [address] since your last visit? NO Include any pap smears or colon screening.  NO    Last  Checked 11/25/2020

## 2020-12-22 ENCOUNTER — HOSPITAL ENCOUNTER (OUTPATIENT)
Dept: LAB | Age: 70
Discharge: HOME | End: 2020-12-22
Payer: COMMERCIAL

## 2020-12-22 DIAGNOSIS — I10 ESSENTIAL HYPERTENSION: ICD-10-CM

## 2020-12-22 LAB
ANION GAP SERPL CALC-SCNC: 3 MMOL/L (ref 3–18)
BUN SERPL-MCNC: 16 MG/DL (ref 7–18)
BUN/CREAT SERPL: 14 (ref 12–20)
CALCIUM SERPL-MCNC: 9.4 MG/DL (ref 8.5–10.1)
CHLORIDE SERPL-SCNC: 110 MMOL/L (ref 100–111)
CO2 SERPL-SCNC: 31 MMOL/L (ref 21–32)
CREAT SERPL-MCNC: 1.17 MG/DL (ref 0.6–1.3)
GLUCOSE SERPL-MCNC: 83 MG/DL (ref 74–99)
MAGNESIUM SERPL-MCNC: 2.2 MG/DL (ref 1.6–2.6)
POTASSIUM SERPL-SCNC: 4.5 MMOL/L (ref 3.5–5.5)
SODIUM SERPL-SCNC: 144 MMOL/L (ref 136–145)

## 2020-12-22 PROCEDURE — 83735 ASSAY OF MAGNESIUM: CPT

## 2020-12-22 PROCEDURE — 36415 COLL VENOUS BLD VENIPUNCTURE: CPT

## 2020-12-22 PROCEDURE — 80048 BASIC METABOLIC PNL TOTAL CA: CPT

## 2021-02-12 DIAGNOSIS — E78.5 DYSLIPIDEMIA: ICD-10-CM

## 2021-02-14 RX ORDER — ROSUVASTATIN CALCIUM 10 MG/1
TABLET, COATED ORAL
Qty: 90 TAB | Refills: 3 | Status: SHIPPED | OUTPATIENT
Start: 2021-02-14 | End: 2021-06-01 | Stop reason: ALTCHOICE

## 2021-02-16 ENCOUNTER — TELEPHONE (OUTPATIENT)
Dept: INTERNAL MEDICINE CLINIC | Age: 71
End: 2021-02-16

## 2021-02-16 DIAGNOSIS — I10 ESSENTIAL HYPERTENSION: ICD-10-CM

## 2021-02-16 DIAGNOSIS — E78.5 DYSLIPIDEMIA: Primary | ICD-10-CM

## 2021-02-16 NOTE — TELEPHONE ENCOUNTER
----- Message from Delia Billy sent at 2/16/2021 11:56 AM EST -----  Regarding: Orders  Hello, these pt need lab orders for 2/17 thank you.

## 2021-02-17 ENCOUNTER — APPOINTMENT (OUTPATIENT)
Dept: INTERNAL MEDICINE CLINIC | Age: 71
End: 2021-02-17

## 2021-02-17 ENCOUNTER — OFFICE VISIT (OUTPATIENT)
Dept: ORTHOPEDIC SURGERY | Age: 71
End: 2021-02-17
Payer: COMMERCIAL

## 2021-02-17 ENCOUNTER — HOSPITAL ENCOUNTER (OUTPATIENT)
Dept: LAB | Age: 71
Discharge: HOME | End: 2021-02-17
Payer: COMMERCIAL

## 2021-02-17 VITALS
DIASTOLIC BLOOD PRESSURE: 74 MMHG | OXYGEN SATURATION: 100 % | TEMPERATURE: 96.9 F | BODY MASS INDEX: 23.25 KG/M2 | HEIGHT: 75 IN | RESPIRATION RATE: 16 BRPM | SYSTOLIC BLOOD PRESSURE: 116 MMHG | WEIGHT: 187 LBS | HEART RATE: 57 BPM

## 2021-02-17 DIAGNOSIS — E78.5 DYSLIPIDEMIA: ICD-10-CM

## 2021-02-17 DIAGNOSIS — M19.071 PRIMARY OSTEOARTHRITIS OF RIGHT FOOT: ICD-10-CM

## 2021-02-17 DIAGNOSIS — I10 ESSENTIAL HYPERTENSION: ICD-10-CM

## 2021-02-17 DIAGNOSIS — M20.41 HAMMER TOE OF RIGHT FOOT: Primary | ICD-10-CM

## 2021-02-17 DIAGNOSIS — M79.671 RIGHT FOOT PAIN: ICD-10-CM

## 2021-02-17 LAB
ALBUMIN SERPL-MCNC: 3.9 G/DL (ref 3.4–5)
ALBUMIN/GLOB SERPL: 1.3 {RATIO} (ref 0.8–1.7)
ALP SERPL-CCNC: 82 U/L (ref 45–117)
ALT SERPL-CCNC: 27 U/L (ref 16–61)
ANION GAP SERPL CALC-SCNC: 4 MMOL/L (ref 3–18)
AST SERPL-CCNC: 24 U/L (ref 10–38)
BILIRUB SERPL-MCNC: 0.6 MG/DL (ref 0.2–1)
BUN SERPL-MCNC: 21 MG/DL (ref 7–18)
BUN/CREAT SERPL: 17 (ref 12–20)
CALCIUM SERPL-MCNC: 9.3 MG/DL (ref 8.5–10.1)
CHLORIDE SERPL-SCNC: 107 MMOL/L (ref 100–111)
CHOLEST SERPL-MCNC: 150 MG/DL
CO2 SERPL-SCNC: 32 MMOL/L (ref 21–32)
CREAT SERPL-MCNC: 1.23 MG/DL (ref 0.6–1.3)
ERYTHROCYTE [DISTWIDTH] IN BLOOD BY AUTOMATED COUNT: 13.4 % (ref 11.6–14.5)
GLOBULIN SER CALC-MCNC: 2.9 G/DL (ref 2–4)
GLUCOSE SERPL-MCNC: 87 MG/DL (ref 74–99)
HCT VFR BLD AUTO: 45.2 % (ref 36–48)
HDLC SERPL-MCNC: 46 MG/DL (ref 40–60)
HDLC SERPL: 3.3 {RATIO} (ref 0–5)
HGB BLD-MCNC: 14.7 G/DL (ref 13–16)
LDLC SERPL CALC-MCNC: 79.8 MG/DL (ref 0–100)
LIPID PROFILE,FLP: NORMAL
MCH RBC QN AUTO: 30.7 PG (ref 24–34)
MCHC RBC AUTO-ENTMCNC: 32.5 G/DL (ref 31–37)
MCV RBC AUTO: 94.4 FL (ref 74–97)
PLATELET # BLD AUTO: 196 K/UL (ref 135–420)
PMV BLD AUTO: 10.5 FL (ref 9.2–11.8)
POTASSIUM SERPL-SCNC: 4 MMOL/L (ref 3.5–5.5)
PROT SERPL-MCNC: 6.8 G/DL (ref 6.4–8.2)
RBC # BLD AUTO: 4.79 M/UL (ref 4.7–5.5)
SODIUM SERPL-SCNC: 143 MMOL/L (ref 136–145)
TRIGL SERPL-MCNC: 121 MG/DL (ref ?–150)
VLDLC SERPL CALC-MCNC: 24.2 MG/DL
WBC # BLD AUTO: 5 K/UL (ref 4.6–13.2)

## 2021-02-17 PROCEDURE — 36415 COLL VENOUS BLD VENIPUNCTURE: CPT

## 2021-02-17 PROCEDURE — 1101F PT FALLS ASSESS-DOCD LE1/YR: CPT | Performed by: ORTHOPAEDIC SURGERY

## 2021-02-17 PROCEDURE — G8536 NO DOC ELDER MAL SCRN: HCPCS | Performed by: ORTHOPAEDIC SURGERY

## 2021-02-17 PROCEDURE — G8427 DOCREV CUR MEDS BY ELIG CLIN: HCPCS | Performed by: ORTHOPAEDIC SURGERY

## 2021-02-17 PROCEDURE — G8420 CALC BMI NORM PARAMETERS: HCPCS | Performed by: ORTHOPAEDIC SURGERY

## 2021-02-17 PROCEDURE — 73630 X-RAY EXAM OF FOOT: CPT | Performed by: ORTHOPAEDIC SURGERY

## 2021-02-17 PROCEDURE — 99203 OFFICE O/P NEW LOW 30 MIN: CPT | Performed by: ORTHOPAEDIC SURGERY

## 2021-02-17 PROCEDURE — 3017F COLORECTAL CA SCREEN DOC REV: CPT | Performed by: ORTHOPAEDIC SURGERY

## 2021-02-17 PROCEDURE — 80053 COMPREHEN METABOLIC PANEL: CPT

## 2021-02-17 PROCEDURE — G9717 DOC PT DX DEP/BP F/U NT REQ: HCPCS | Performed by: ORTHOPAEDIC SURGERY

## 2021-02-17 PROCEDURE — 80061 LIPID PANEL: CPT

## 2021-02-17 PROCEDURE — G8752 SYS BP LESS 140: HCPCS | Performed by: ORTHOPAEDIC SURGERY

## 2021-02-17 PROCEDURE — 85027 COMPLETE CBC AUTOMATED: CPT

## 2021-02-17 PROCEDURE — G8754 DIAS BP LESS 90: HCPCS | Performed by: ORTHOPAEDIC SURGERY

## 2021-02-17 RX ORDER — BRIMONIDINE TARTRATE, TIMOLOL MALEATE 2; 5 MG/ML; MG/ML
SOLUTION/ DROPS OPHTHALMIC
COMMUNITY
Start: 2021-02-01

## 2021-02-17 NOTE — LETTER
2/17/2021 Patient: Consuelo Gibbs YOB: 1950 Date of Visit: 2/17/2021 Fabiola Leonard MD 
[address] [place] [address] [address] [employer] Dear Fabiola Leonard MD, Thank you for referring Mr. Caitlin Bruce to [address] [place] [address] for evaluation. My notes for this consultation are attached. If you have questions, please do not hesitate to call me. I look forward to following your patient along with you.  
 
 
Sincerely, 
 
Mariposa Mcintosh MD

## 2021-02-17 NOTE — PROGRESS NOTES
AMBULATORY PROGRESS NOTE      Patient: Rosana Norwood             MRN: [id number]     SSN: [id number] Body mass index is 23.37 kg/m². YOB: 1950     AGE: 73 y.o. EX: male    PCP: Hamlet Agrawal MD       IMPRESSION //  DIAGNOSIS AND TREATMENT PLAN      DIAGNOSES  1. Hammer toe of right foot    2. Primary osteoarthritis of right foot    3. Right foot pain        Orders Placed This Encounter    [[id number]] Foot Min 3V     Order Specific Question:   Weight bearing? Answer: Yes    Combigan 0.2-0.5 % drop ophthalmic solution     Sig: INSTILL 1 DROP INTO RIGHT EYE TWICE DAILY          Rosana Norwood has has a multiple rigid hammertoes to his right forefoot, as well as some OA to the right great toe IP region and some valgus alignment of the right great toe IP region. He has degenerative changes, seen across the right great toe IP joint, and has hammering of his lesser digits, right #234, and 5 toes. He is getting ready to have his second Covid shot, the next few weeks. I did talk about surgical intervention. He is going to require a multiple hammertoe procedure, right second, third, fourth toes, fifth toes, with possible implants in the digits 2 3 and 4, and a K wire in the fifth, exam require also multiple extensor tenotomies, and anticipate a right great toe IP joint arthrodesis to straighten this right great toe            PLAN:    1. I have discussed the risk and benefits of this surgery/procedure and the alternatives with the patient. All questions answered to their satisfaction. 2. Patient is interested in surgery and wishes to proceed. 3. Patient was advised to follow up with his PCP for other general complaints, if they persist.    RTO-      HPI //  Ansleytawnya IS A 73 y.o. male who presents to my outpatient office for evaluation of:    Right foot hammer toe.     Rosana Norwood (1950) is a 73 y.o. male, new patient, here for evaluation of the following chief complaint(s):    Chief Complaint   Patient presents with    Foot Pain     right foot pain       Patient reports that he sees a podiatrist who has recommended a need for surgery. He states that he has had some form of hammer toe all throughout his life, but notices they have gotten worse as he's gotten older. Patient denies pain. Patient has idiopathic peripheral neuropathy. He also reports an ulcer on his third toe on the right foot that his been scraped. Visit Vitals  /74 (BP 1 Location: Left upper arm, BP Patient Position: Sitting)   Pulse (!) 57   Temp 96.9 °F (36.1 °C) (Temporal)   Resp 16   Ht 6' 3\" (1.905 m)   Wt 187 lb (84.8 kg)   SpO2 100%   BMI 23.37 kg/m²       Appearance: Alert, well appearing and pleasant patient who is in no distress, oriented to person, place/time, and who follows commands. This patient is accompanied in the examination room by his  self. There is no signs of: no dementia  Psychiatric: Affect and mood are appropriate. Patient arrives to office via: without assistive device  H EENT (2): Head normocephalic & atraumatic. Both pupils are round, non icteric sclera     Eye: EOM are intact // Neck: ROM WNL  //  Hearings Intact   Respiratory: Breathing is unlabored without accessory chest muscle use    ANKLE/FOOT right    Psychiatry: Alert, oriented x 3 (name,place,time of day); speech normal in context and clarity, memory intact grossly, no involuntary movements - tremors, no dementia  Gait: normal  Tenderness: no    Cutaneous: #2,3,4,5 hammer toe, more pronounced at #2 and #3. Visual arthritic changes across IP joint of great toe with swelling. Joint Motion: excellent ROM of ankle and hindfoot  Joint / Tendon Stability: No Ankle or Subtalar instability or joint laxity.                        No peroneal sublux ability or dislocation  Alignment: neutral Hindfoot,    Neuro Motor/Sensory: NL/NL,  Vascular: NL foot/ankle pulses, good DT/PT pulse  Lymphatics: No extremity lymphedema, No calf swelling, no tenderness to calf muscles. CHART REVIEW     Syeda Blackwell has been experiencing pain and discomfort confirmed as outlined in the pain assessment outlined below. Pain Assessment  2/17/2021   Location of Pain Foot   Location Modifiers Right   Severity of Pain 0   Quality of Pain (No Data)   Quality of Pain Comment causing ulcers   Duration of Pain -   Frequency of Pain -   Aggravating Factors -   Aggravating Factors Comment -   Limiting Behavior No   Relieving Factors -   Relieving Factors Comment -   Result of Injury No        Syeda Blackwell  has a past medical history of Allergic rhinitis, Anxiety, Asthma, Basal cell carcinoma (2013), BPH (benign prostatic hyperplasia), Depression, Dyslipidemia, Elevated PSA (2020), Erectile dysfunction, FHx: heart disease, GERD (gastroesophageal reflux disease), Heel spur, Hypertension (02/2019), Hypovitaminosis D, GALILEA (obstructive sleep apnea), Overweight (BMI 25.0-29.9) (2/12/2018), Peripheral neuropathy, Pulmonary nodules (2011), Scoliosis, and Spinal stenosis (03/2014). Patients is employed at:   [employer]      Past Medical History:   Diagnosis Date    Allergic rhinitis     Dr Vaishnavi Angel; never took immunotherapy    Anxiety     saw Dr Lynnda Bosworth Dr Gearline Monas; pfts show no obstruction but high RV    Basal cell carcinoma 2013    Dr. Callahan Linear s/p resection 2 spots    BPH (benign prostatic hyperplasia)     Dr. Fran Garner; on proscar for years    Depression     and anxiety; paxil 2008?  lexapro and wellbutrin til 2018; tried proza; saw Dr Miriam Rene now seeing Dr Landen Turner Dyslipidemia     Elevated PSA 2020    Dr Marcus Katz Erectile dysfunction     FHx: heart disease     GERD (gastroesophageal reflux disease)     s/p dilation 2003 Dr. Thad Mancuso; egd 2015; Dr Thad Mancuso egd 12/20 dilation, hiatal hernia    Heel spur     Dr Marlyu Ferrer Hypertension 02/2019    24 hr ambulatory monitoring Dr Lorin May Hypovitaminosis D     GALILEA (obstructive sleep apnea)     intol cpap Dr Corie Soni 2015; using oral appliance Dr Nayan Hayes; AHI 17.8 min desats 83    Overweight (BMI 25.0-29.9) 2/12/2018    Peripheral neuropathy     and B CTS on EMG Dr. Tracie Barahona Pulmonary nodules 2011    bilat upper lobes; no change 2018; noted again [address] 6/19    Scoliosis     Dr Cindy Gonzalez 2014    Spinal stenosis 03/2014    MRI (3/14) showed scoliosis w multilevel degen findings, mod L3-4, L4-5 central stenosis, mod L5-S1 foraminal stenosis;  (10/17) severe L3-4 central stenosis; seeing Dr Chapin George     Past Surgical History:   Procedure Laterality Date    HX COLONOSCOPY      Dr. Ashu Mckinley mild diverticulosis 2000, 6/12    HX GI  06/2019    CT abd/pelvis showed nothing acute, small LIH, small HH, bladder divertic    HX ORTHOPAEDIC      B CT release 2007, 2009    HX ORTHOPAEDIC      DEXA t score 4.2 spine, 0.2 hip (2012)    [facility] PROCEDURE UNLIST  8/09    ETT negative (8/09); SOH neg stress echo (6/19)    [facility] PROCEDURE UNLIST  09/2019    echo nl lv, ef 55%, no wma or valvular disease    [facility] PROCEDURE UNLISTED  07/2018    Dr Mahi Salazar; FEV1 96%, 5% improvement postbd, ratio 104, , , DLCO 82     Current Outpatient Medications   Medication Sig    Combigan 0.2-0.5 % drop ophthalmic solution INSTILL 1 DROP INTO RIGHT EYE TWICE DAILY    rosuvastatin (CRESTOR) 10 mg tablet TAKE 1 TABLET BY MOUTH EVERY NIGHT    Dexilant 60 mg CpDB capsule (delayed release) TAKE 1 CAPSULE BY MOUTH EVERY DAY    sucralfate (CARAFATE) 1 gram tablet sucralfate 1 gram tablet   TAKE 1 TABLET BY MOUTH THREE TIMES DAILY BEFORE MEALS    gabapentin (NEURONTIN) 600 mg tablet TAKE 1 TABLET BY MOUTH THREE TIMES DAILY FOR NEUROPATIC PAIN. MAXIMUM DAILY AMOUNT OF 1800 MG    Syringe with Needle, Disp, (Allergy Syringe) 1 mL 27 x 1/2\" syrg FOR USE WITH INTRACAVERNOSAL INJECTIONS.     Injector Device maría DISPENSE INJECT-EASE AUTO-INJECTOR FOR USE WITH BI-MIX INJECTIONS.  tri mix compound 0.5-1 mL by IntraCAVernosal route as needed (FOR E.D.). DISPENSE TRI-MIX (PGE-1 60 mcg/Papaverine 40 mg/ Phentolamine 6 mg/m). PATIENT TO START WITH 0.5 ML/ INJ AND TITRATE UP 0.1 ML AT A TIME TO ACHIEVE SATISFACTORY RESULT.  fluticasone propionate (FLONASE) 50 mcg/actuation nasal spray SHAKE LIQUID AND USE 2 SPRAYS IN EACH NOSTRIL DAILY    triamterene-hydroCHLOROthiazide (DYAZIDE) 37.5-25 mg per capsule TAKE 1 CAPSULE BY MOUTH DAILY    metoprolol tartrate (LOPRESSOR) 25 mg tablet TAKE 1 TABLET BY MOUTH TWICE DAILY    ARIPiprazole (ABILIFY) 2 mg tablet Take 2 mg by mouth daily.  SYMBICORT 160-4.5 mcg/actuation HFAA INHALE 2 PUFFS BY MOUTH TWICE DAILY    ALPRAZolam (XANAX) 0.25 mg tablet Take 1 Tab by mouth daily.  VIIBRYD 40 mg tab tablet Take 1 Tab by mouth daily.  latanoprost (XALATAN) 0.005 % ophthalmic solution OMAR 1 GTT INTO RIGHT EYE HS    albuterol (PROAIR HFA) 90 mcg/actuation inhaler Take 2 Puffs by inhalation every four (4) hours as needed for Wheezing.  NEXIUM 40 mg capsule TAKE 1 CAPSULE DAILY    ascorbic acid (VITAMIN C) 500 mg tablet Take  by mouth.  aspirin delayed-release 81 mg tablet Take  by mouth daily.  cholecalciferol, vitamin d3, (VITAMIN D3) 1,000 unit tablet Take  by mouth daily.  Gluc-Scar-MSM#0-D-Hzpd-Mike-Bor (OSTEO BI-FLEX) 750-625-30 mg Tab Take  by mouth daily.  buPROPion SR (WELLBUTRIN SR) 150 mg SR tablet Take 1 Tab by mouth two (2) times a day. (Patient taking differently: Take 300 mg by mouth daily.)     No current facility-administered medications for this visit.       Allergies   Allergen Reactions    Cefdinir Nausea and Vomiting    Naproxen Other (comments)     elev cr 2/19    Niacin Other (comments)     Flushing      Norvasc [Amlodipine] Swelling     Social History     Occupational History    Occupation: ret [employer]   Tobacco Use    Smoking status: Former Smoker     Packs/day: 0.25     Years: 2.00     Pack years: 0.50    Smokeless tobacco: Never Used    Tobacco comment: reports occasional use as a teenager   Substance and Sexual Activity    Alcohol use: Yes     Alcohol/week: 1.0 standard drinks     Types: 1 Glasses of wine per week     Comment: 1-2 glasses of wine a month    Drug use: No    Sexual activity: Yes     Partners: Female     Birth control/protection: None     Family History   Problem Relation Age of Onset    Cancer Mother         leukemia    Heart Disease Father     Psychiatric Disorder Daughter         depression        DIAGNOSTIC LAB DATA      Lab Results   Component Value Date/Time    Hemoglobin A1c 5.6 02/12/2018 04:28 PM    Hemoglobin A1c 5.7 (H) 01/24/2017 08:35 AM    //   Lab Results   Component Value Date/Time    Glucose 83 12/22/2020 12:27 PM        No results found for: [id number], [facility]      Lab Results   Component Value Date/Time    Vitamin D 25-Hydroxy 44.2 01/24/2017 08:35 AM         REVIEW OF SYSTEMS : 2/17/2021  ALL BELOW ARE Negative except : SEE HPI     All other systems reviewed and are negative. 12 point review of systems otherwise negative unless noted in HPI. DIAGNOSTIC IMAGING      FOOT X RAYS 3 VIEWS Right   2/17/2021    WEIGHT BEARING    X RAYS AT [address] [place] [address]  2/17/2021      Bones: No fractures or dislocations. No focal osteolytic or osteoblastic process     Bone Spurs: No significant bone spurs  Foot Alignment: Severe hammertoes right #2 ,3, 4 and 5 toes, valgus alignment, right great toe, the IP joint  Joint Condition:   OA is present to the right great toe IP joint and some valgus alignment the right great toe first IP joint. Soft Tissues: Normal, No radiopaque foreign body and No abnormal calcific densities to soft tissues   No ankle joint effusion in lateral projection.   Mineralization: Suggests   Osteopenia    I have personally reviewed the results of the above study and the interpretation of this study is my professional opinion      Gibson Gonzalez MD  2/17/2021  10:46 AM

## 2021-02-24 ENCOUNTER — OFFICE VISIT (OUTPATIENT)
Dept: INTERNAL MEDICINE CLINIC | Age: 71
End: 2021-02-24
Payer: COMMERCIAL

## 2021-02-24 VITALS
DIASTOLIC BLOOD PRESSURE: 72 MMHG | OXYGEN SATURATION: 100 % | TEMPERATURE: 97.3 F | RESPIRATION RATE: 14 BRPM | WEIGHT: 187 LBS | SYSTOLIC BLOOD PRESSURE: 108 MMHG | BODY MASS INDEX: 23.25 KG/M2 | HEIGHT: 75 IN | HEART RATE: 62 BPM

## 2021-02-24 DIAGNOSIS — F32.89 OTHER DEPRESSION: ICD-10-CM

## 2021-02-24 DIAGNOSIS — Z00.00 PHYSICAL EXAM: Primary | ICD-10-CM

## 2021-02-24 DIAGNOSIS — F33.0 MDD (MAJOR DEPRESSIVE DISORDER), RECURRENT EPISODE, MILD (HCC): ICD-10-CM

## 2021-02-24 DIAGNOSIS — N52.9 ERECTILE DYSFUNCTION, UNSPECIFIED ERECTILE DYSFUNCTION TYPE: ICD-10-CM

## 2021-02-24 DIAGNOSIS — I10 ESSENTIAL HYPERTENSION: ICD-10-CM

## 2021-02-24 DIAGNOSIS — F41.9 ANXIETY: ICD-10-CM

## 2021-02-24 DIAGNOSIS — E78.5 DYSLIPIDEMIA: ICD-10-CM

## 2021-02-24 DIAGNOSIS — R97.20 ELEVATED PSA: ICD-10-CM

## 2021-02-24 DIAGNOSIS — N40.1 BENIGN PROSTATIC HYPERPLASIA WITH LOWER URINARY TRACT SYMPTOMS, SYMPTOM DETAILS UNSPECIFIED: ICD-10-CM

## 2021-02-24 PROBLEM — M51.36 OTHER INTERVERTEBRAL DISC DEGENERATION, LUMBAR REGION: Status: RESOLVED | Noted: 2017-11-06 | Resolved: 2021-02-24

## 2021-02-24 PROBLEM — F41.1 GAD (GENERALIZED ANXIETY DISORDER): Status: RESOLVED | Noted: 2018-04-05 | Resolved: 2021-02-24

## 2021-02-24 PROBLEM — J45.20 MILD INTERMITTENT ASTHMA WITHOUT COMPLICATION: Status: RESOLVED | Noted: 2018-12-26 | Resolved: 2021-02-24

## 2021-02-24 PROBLEM — Z86.59 HISTORY OF OCD (OBSESSIVE COMPULSIVE DISORDER): Status: RESOLVED | Noted: 2018-04-05 | Resolved: 2021-02-24

## 2021-02-24 PROBLEM — M47.816 SPONDYLOSIS OF LUMBAR REGION WITHOUT MYELOPATHY OR RADICULOPATHY: Status: RESOLVED | Noted: 2017-11-06 | Resolved: 2021-02-24

## 2021-02-24 PROBLEM — M54.16 LUMBAR RADICULOPATHY: Status: RESOLVED | Noted: 2017-11-06 | Resolved: 2021-02-24

## 2021-02-24 PROCEDURE — 99397 PER PM REEVAL EST PAT 65+ YR: CPT | Performed by: INTERNAL MEDICINE

## 2021-02-24 RX ORDER — ACETAMINOPHEN 325 MG/1
325 TABLET ORAL
COMMUNITY

## 2021-02-24 RX ORDER — TADALAFIL 5 MG/1
5 TABLET ORAL DAILY
Qty: 90 TAB | Refills: 3 | Status: SHIPPED | OUTPATIENT
Start: 2021-02-24 | End: 2022-03-24

## 2021-03-17 DIAGNOSIS — I10 PRIMARY HYPERTENSION: ICD-10-CM

## 2021-03-18 ENCOUNTER — TELEPHONE (OUTPATIENT)
Dept: CARDIOLOGY CLINIC | Age: 71
End: 2021-03-18

## 2021-03-18 NOTE — LETTER
3/18/2021 9:19 AM 
 
Mr. Radha [address]utta [address] [address] [address] [id number] Radha Dutta was seen in our office on 6/22/2020 for cardiac evaluation. From a cardiac standpoint he is low risk. Please feel free to contact our office if you have any questions regarding this patient. Sincerely, Lencho Jimenez MD

## 2021-03-21 RX ORDER — METOPROLOL TARTRATE 25 MG/1
TABLET, FILM COATED ORAL
Qty: 180 TAB | Refills: 3 | Status: SHIPPED | OUTPATIENT
Start: 2021-03-21 | End: 2022-03-14

## 2021-04-02 ENCOUNTER — OFFICE VISIT (OUTPATIENT)
Dept: CARDIOLOGY CLINIC | Age: 71
End: 2021-04-02
Payer: COMMERCIAL

## 2021-04-02 VITALS
HEART RATE: 58 BPM | HEIGHT: 75 IN | WEIGHT: 188 LBS | SYSTOLIC BLOOD PRESSURE: 118 MMHG | OXYGEN SATURATION: 98 % | BODY MASS INDEX: 23.38 KG/M2 | DIASTOLIC BLOOD PRESSURE: 82 MMHG

## 2021-04-02 DIAGNOSIS — G47.33 OSA (OBSTRUCTIVE SLEEP APNEA): ICD-10-CM

## 2021-04-02 DIAGNOSIS — C61 PROSTATE CANCER (HCC): ICD-10-CM

## 2021-04-02 DIAGNOSIS — I10 ESSENTIAL HYPERTENSION: Primary | ICD-10-CM

## 2021-04-02 DIAGNOSIS — E78.5 DYSLIPIDEMIA: ICD-10-CM

## 2021-04-02 PROCEDURE — 99214 OFFICE O/P EST MOD 30 MIN: CPT | Performed by: INTERNAL MEDICINE

## 2021-04-02 PROCEDURE — 93000 ELECTROCARDIOGRAM COMPLETE: CPT | Performed by: INTERNAL MEDICINE

## 2021-04-02 RX ORDER — DEXLANSOPRAZOLE 60 MG/1
CAPSULE, DELAYED RELEASE ORAL
COMMUNITY
End: 2021-06-01 | Stop reason: ALTCHOICE

## 2021-04-02 NOTE — PROGRESS NOTES
HISTORY OF PRESENT ILLNESS  Kourtney Sibley is a 73 y.o. male. Follow-up  Pertinent negatives include no chest pain, no abdominal pain, no headaches and no shortness of breath. Patient presents for an add-on office visit. Patient has a past medical history significant for dyslipidemia, more recently hypertension, and a very strong family for early coronary disease. The patient was seen in the ER at [facility] at the beginning of July 2019 for somewhat atypical chest pain. His initial EKG was normal.  He was ruled out for myocardial infarction and underwent a exercise stress echocardiogram the following day which was a normal and low risk study. He exercised for 10 minutes using the Dilip protocol achieving a total workload of 12.8 METS. Patient's chest pain was ultimately determined to be likely musculoskeletal in nature. In September 2019, he was found to have new EKG changes in our office with anterolateral T wave inversions, but no anginal type symptoms. He did mention intermittent pleuritic chest pain which has been ongoing for several months. As a result, he underwent an echocardiogram at the end of September 2019, demonstrating preserved LV systolic function, EF 55 to 60% with no valvular heart disease, and a mildly dilated aortic root, and normal PA pressures. Patient was last seen in our office approximately 9 months ago. Since last visit, he was diagnosed with prostate cancer in his scheduled to undergo a robotically assisted prostatectomy later this month. He has not noted any major change in his activity tolerance. He just recently started exercising again the [facility] after he got his second COVID-19 vaccination. He tries exercise 45 to 60 minutes doing both cardiovascular exercises and weightlifting and has not experienced any exertional chest pain or shortness of breath. In fact he feels his activity level is better now that he started exercising.     Past Medical History: Diagnosis Date    Allergic rhinitis     Dr Nael Ward; never took immunotherapy    Anxiety     saw Dr Ginny Latif; pfts show no obstruction but high RV    Basal cell carcinoma 2013    Dr. Mandi Olivarez s/p resection 2 spots    BPH (benign prostatic hyperplasia)     Dr. Александр Kidd; on proscar for years    Depression     and anxiety; paxil 2008? lexapro and wellbutrin til 2018; tried proza; saw Dr Bethany Kaiser now seeing Dr Chuck Logan Dyslipidemia     Elevated PSA 2020    Dr Wilda Ramírez Erectile dysfunction     ICI with penile trauma; JOVANNA not helpful; peak performance unsuccessful    FHx: heart disease     GERD (gastroesophageal reflux disease)     s/p dilation 2003 Dr. Swetha Shirley; egd 2015; Dr Swetha Shirley egd 12/20 dilation, hiatal hernia    Heel spur     Dr Souza Band Hypertension 02/2019    24 hr ambulatory monitoring Dr She Brooks    Hypovitaminosis D     GALILEA (obstructive sleep apnea)     intol cpap Dr Selene Cosby 2015; using oral appliance Dr Karen Bonds; AHI 17.8 min desats 83    Overweight (BMI 25.0-29.9) 2/12/2018    Peripheral neuropathy     and B CTS on EMG Dr. Silvino Chow Pulmonary nodules 2011    bilat upper lobes; no change 2018; noted again [facility] 6/19    Scoliosis     Dr Basil Gomez 2014    Spinal stenosis 03/2014    MRI (3/14) showed scoliosis w multilevel degen findings, mod L3-4, L4-5 central stenosis, mod L5-S1 foraminal stenosis;  (10/17) severe L3-4 central stenosis; seeing Dr Davi Shannon      Current Outpatient Medications   Medication Sig Dispense Refill    dexlansoprazole (Dexilant) 60 mg CpDB capsule (delayed release) Take  by mouth. Take 1 tablet by mouth daily.  metoprolol tartrate (LOPRESSOR) 25 mg tablet TAKE 1 TABLET BY MOUTH TWICE DAILY 180 Tab 3    acetaminophen (TylenoL) 325 mg tablet Take 325 mg by mouth every four (4) hours as needed for Pain.  tadalafiL (CIALIS) 5 mg tablet Take 1 Tab by mouth daily.  90 Tab 3    sildenafil citrate (Viagra) 100 mg tablet Take 1 Tab by mouth daily as needed for Erectile Dysfunction for up to 10 doses. 30 Tab 12    Combigan 0.2-0.5 % drop ophthalmic solution INSTILL 1 DROP INTO RIGHT EYE TWICE DAILY      rosuvastatin (CRESTOR) 10 mg tablet TAKE 1 TABLET BY MOUTH EVERY NIGHT 90 Tab 3    gabapentin (NEURONTIN) 600 mg tablet TAKE 1 TABLET BY MOUTH THREE TIMES DAILY FOR NEUROPATIC PAIN. MAXIMUM DAILY AMOUNT OF 1800  Tab 0    Syringe with Needle, Disp, (Allergy Syringe) 1 mL 27 x 1/2\" syrg FOR USE WITH INTRACAVERNOSAL INJECTIONS. 25 Pen Needle 2    Injector Device maría DISPENSE INJECT-EASE AUTO-INJECTOR FOR USE WITH BI-MIX INJECTIONS. 1 Each 1    fluticasone propionate (FLONASE) 50 mcg/actuation nasal spray SHAKE LIQUID AND USE 2 SPRAYS IN EACH NOSTRIL DAILY 48 g 3    triamterene-hydroCHLOROthiazide (DYAZIDE) 37.5-25 mg per capsule TAKE 1 CAPSULE BY MOUTH DAILY 90 Cap 5    ARIPiprazole (ABILIFY) 2 mg tablet Take 2 mg by mouth daily.  SYMBICORT 160-4.5 mcg/actuation HFAA INHALE 2 PUFFS BY MOUTH TWICE DAILY 3 Inhaler 3    ALPRAZolam (XANAX) 0.25 mg tablet Take 1 Tab by mouth daily. 0    VIIBRYD 40 mg tab tablet Take 1 Tab by mouth daily. 0    buPROPion SR (WELLBUTRIN SR) 150 mg SR tablet Take 1 Tab by mouth two (2) times a day. (Patient taking differently: Take 300 mg by mouth daily. ) 180 Tab 3    latanoprost (XALATAN) 0.005 % ophthalmic solution OMAR 1 GTT INTO RIGHT EYE HS  3    albuterol (PROAIR HFA) 90 mcg/actuation inhaler Take 2 Puffs by inhalation every four (4) hours as needed for Wheezing. 3 Inhaler 3    ascorbic acid (VITAMIN C) 500 mg tablet Take  by mouth.  aspirin delayed-release 81 mg tablet Take  by mouth daily.  cholecalciferol, vitamin d3, (VITAMIN D3) 1,000 unit tablet Take  by mouth daily.  Gluc-Scar-MSM#5-U-Noul-Mike-Bor (OSTEO BI-FLEX) 750-625-30 mg Tab Take  by mouth daily.          Allergies   Allergen Reactions    Cefdinir Nausea and Vomiting    Naproxen Other (comments) elev cr 2/19    Niacin Other (comments)     Flushing      Norvasc [Amlodipine] Swelling      Social History     Tobacco Use    Smoking status: Former Smoker     Packs/day: 0.25     Years: 2.00     Pack years: 0.50    Smokeless tobacco: Never Used    Tobacco comment: reports occasional use as a teenager   Substance Use Topics    Alcohol use: Yes     Alcohol/week: 1.0 standard drinks     Types: 1 Glasses of wine per week     Comment: 1-2 glasses of wine a month    Drug use: No     Family History   Problem Relation Age of Onset    Cancer Mother         leukemia    Heart Disease Father     Psychiatric Disorder Daughter         depression         Review of Systems   Constitutional: Negative for chills, fever and weight loss. HENT: Negative for nosebleeds. Eyes: Negative for blurred vision and double vision. Respiratory: Negative for cough, shortness of breath and wheezing. Cardiovascular: Negative for chest pain, palpitations, orthopnea, claudication, leg swelling and PND. Gastrointestinal: Negative for abdominal pain, heartburn, nausea and vomiting. Genitourinary: Negative for dysuria and hematuria. Musculoskeletal: Negative for falls and myalgias. Skin: Negative for rash. Neurological: Negative for dizziness, focal weakness and headaches. Endo/Heme/Allergies: Does not bruise/bleed easily. Psychiatric/Behavioral: Negative for substance abuse. Visit Vitals  /82 (BP 1 Location: Left upper arm, BP Patient Position: Sitting, BP Cuff Size: Adult)   Pulse (!) 58   Ht 6' 3\" (1.905 m)   Wt 85.3 kg (188 lb)   SpO2 98%   BMI 23.50 kg/m²      Physical Exam   Constitutional: He is oriented to person, place, and time. He appears well-developed and well-nourished. HENT:   Head: Normocephalic and atraumatic. Eyes: Conjunctivae are normal.   Neck: Neck supple. No JVD present. Carotid bruit is not present.    Cardiovascular: Normal rate, regular rhythm, S1 normal, S2 normal and normal pulses. Exam reveals no gallop and no S3. No murmur heard. Pulmonary/Chest: Breath sounds normal. He has no wheezes. He has no rales. Abdominal: Soft. Bowel sounds are normal. There is no abdominal tenderness. Musculoskeletal:         General: No edema. Neurological: He is alert and oriented to person, place, and time. Skin: Skin is warm and dry. EKG: Sinus bradycardia. Normal axis, normal QTc interval.  No ST or T wave abnormalities concerning for ischemia. Compared to the previous EKG, no significant overall change. ASSESSMENT and PLAN    Low risk from a cardiac standpoint to undergo robotically assisted prostatectomy as scheduled later this month. No additional cardiac testing is needed. I would recommend continuing his outpatient metoprolol and Crestor perioperatively. Dyslipidemia. The patient is now taking rosuvastatin 10 mg daily. This is followed by his PCP. Historically has been well controlled. His most recent LDL was 80. Essential hypertension. Patient's blood pressure has been excellent on his current medical regimen, all of which I would continue . Obstructive sleep apnea. Patient could not tolerate multiple CPAP, so now uses a oral device. He continues to follow-up with his sleep specialist.    Followup in 12 months, sooner if needed.

## 2021-05-04 DIAGNOSIS — M47.816 SPONDYLOSIS OF LUMBAR REGION WITHOUT MYELOPATHY OR RADICULOPATHY: ICD-10-CM

## 2021-05-04 DIAGNOSIS — M48.062 SPINAL STENOSIS, LUMBAR REGION WITH NEUROGENIC CLAUDICATION: ICD-10-CM

## 2021-05-04 DIAGNOSIS — M54.16 LUMBAR RADICULOPATHY: ICD-10-CM

## 2021-05-04 DIAGNOSIS — M53.3 SI (SACROILIAC) JOINT DYSFUNCTION: ICD-10-CM

## 2021-05-04 RX ORDER — GABAPENTIN 600 MG/1
600 TABLET ORAL 3 TIMES DAILY
Qty: 270 TAB | Refills: 0 | Status: SHIPPED | OUTPATIENT
Start: 2021-05-04 | End: 2021-06-01 | Stop reason: SDUPTHER

## 2021-05-04 NOTE — TELEPHONE ENCOUNTER
Last Visit: 11/25/20 with NP Chetna  Next Appointment: 6/1/21 with LOUIE Zarate  Previous Refill Encounter(s): 11/19/20 #270    Requested Prescriptions     Pending Prescriptions Disp Refills    gabapentin (NEURONTIN) 600 mg tablet 270 Tab 0     Sig: Take 1 Tab by mouth three (3) times daily. Max Daily Amount: 1,800 mg.  Indications: neuropathic pain

## 2021-05-05 ENCOUNTER — VIRTUAL VISIT (OUTPATIENT)
Dept: INTERNAL MEDICINE CLINIC | Age: 71
End: 2021-05-05
Payer: COMMERCIAL

## 2021-05-05 DIAGNOSIS — R74.01 TRANSAMINASEMIA: Primary | ICD-10-CM

## 2021-05-05 DIAGNOSIS — C61 PROSTATE CANCER (HCC): ICD-10-CM

## 2021-05-05 DIAGNOSIS — E78.5 DYSLIPIDEMIA: ICD-10-CM

## 2021-05-05 PROCEDURE — 99214 OFFICE O/P EST MOD 30 MIN: CPT | Performed by: INTERNAL MEDICINE

## 2021-05-06 NOTE — PROGRESS NOTES
Marycarmen Mercado is a 73 y.o. male who was seen by synchronous (real-time) audio-video technology on 5/5/2021 for Prostate Cancer and Abnormal liver tests        Assessment & Plan:   Diagnoses and all orders for this visit:    1. Transaminasemia    2. Dyslipidemia    3. Prostate cancer ([address]        I spent at least 22 minutes on this visit with this established patient. 712  Subjective:     Objective:   No flowsheet data found. General: alert, cooperative, no distress   Mental  status: normal mood, behavior, speech, dress, motor activity, and thought processes, able to follow commands   HENT: NCAT   Neck: no visualized mass   Resp: no respiratory distress   Neuro: no gross deficits   Skin: no discoloration or lesions of concern on visible areas   Psychiatric: normal affect, consistent with stated mood, no evidence of hallucinations     Additional exam findings: We discussed the expected course, resolution and complications of the diagnosis(es) in detail. Medication risks, benefits, costs, interactions, and alternatives were discussed as indicated. I advised him to contact the office if his condition worsens, changes or fails to improve as anticipated. He expressed understanding with the diagnosis(es) and plan. Marycarmen Mercado, was evaluated through a synchronous (real-time) audio-video encounter. The patient (or guardian if applicable) is aware that this is a billable service. Verbal consent to proceed has been obtained within the past 12 months. The visit was conducted pursuant to the emergency declaration under the [address] [place] [address], [address] [place] [address] authority and the [employer] Act. Patient identification was verified, and a caregiver was present when appropriate. The patient was located in a state where the provider was credentialed to provide care.     Johanna May MD    Patient being seen today for transitional care management    Patient was admitted to [facility] from 4/15-16/21. He was then readmitted to [facility] from 4/23-27/21            I thoroughly reviewed the discharge summary, notes, consults, labs and imaging studies in the electronic record. Pertinent details are summarized below and the record has been updated to reflect recent events    Initial admission was for successful DVP for prostate ca by Dr Jessica Ramirez which was uncomplicated    He was readmitted after presenting with BUQ abd pain and cp.  ekg neg, CTA neg but labs showed transaminasemia. US showed nl liver, distended gb w sludge; CT abd showed postop changes but nothing acute; MRCP showed intra/extrahepatic biliary dilation to the ampulla otherwise neg. His sx improved and lfts trended down so ERCP was cancelled. At this time, they have held the viibryd and crestor and he has f/u with Dr Max Euceda 6/10/21. Currently feels ok, no gi or gu sx to report    Past Medical History:   Diagnosis Date    Allergic rhinitis     Dr Jenniffer Rodriguez; never took immunotherapy    Anxiety     saw Dr Katia Ellsworth; pfts show no obstruction but high RV    Basal cell carcinoma 2013    Dr. Adam Davis s/p resection 2 spots    BPH (benign prostatic hyperplasia)     Dr. Clare Nix; on proscar for years    Depression     and anxiety; paxil 2008?  lexapro and wellbutrin til 2018; tried proza; saw Dr Maciej Rapp now seeing Dr Nickolas Claude Dyslipidemia     Erectile dysfunction     ICI with penile trauma; JOVANNA not helpful; peak performance unsuccessful    FHx: heart disease     GERD (gastroesophageal reflux disease)     s/p dilation 2003 Dr. Pk Ascencio; egd 2015; Dr Pk Ascencio egd 12/20 dilation, hiatal hernia    Heel spur     Dr Whitney Anderson Hypertension 02/2019    24 hr ambulatory monitoring Dr Brianna Luna Hypovitaminosis D     Lumbar spinal stenosis 03/2014    MRI (3/14) showed scoliosis w multilevel degen findings, mod L3-4, L4-5 central stenosis, mod L5-S1 foraminal stenosis;  (10/17) severe L3-4 central stenosis;  Dr Velia Valenzuela    GALILEA (obstructive sleep apnea)     intol cpap Dr Lyla Gonzalez 2015; using oral appliance Dr Masoud Prabhakar; AHI 17.8 min desats 83    Overweight (BMI 25.0-29.9) 2/12/2018    Peripheral neuropathy     and B CTS on EMG Dr. Beatrice Monson [facility]) 04/16/2021    Dr Se Pineda; mira 3+3; s/p RALP, BLND Dr Gamaliel Peterson Pulmonary nodules 2011    bilat upper lobes; no change 2018; noted again [facility] 6/19    Scoliosis     Dr Keith Weinstein 2014    Transaminasemia 04/2021    Dr Jorge Alas     Past Surgical History:   Procedure Laterality Date    HX CARPAL TUNNEL RELEASE Bilateral 2007   [facility] COLONOSCOPY      Dr. Fabrice Perez mild diverticulosis 2000, 6/12    HX GI      CT abd/pelvis showed nothing acute, small LIH, small HH, bladder divertic (6/19); MRCP [facility] showed distended gb and mild biliary dilation (4/21)    HX ORTHOPAEDIC      DEXA t score 4.2 spine, 0.2 hip (2012)    HX RADICAL PROSTATECTOMY  04/2021    Dr Myriam Piedra DVP    [facility] UNLIST  8/09    ETT negative (8/09); SOH neg stress echo (6/19)    MD CARDIAC SURG PROCEDURE UNLIST  09/2019    echo nl lv, ef 55%, no wma or valvular disease    MD CHEST SURGERY PROCEDURE UNLISTED  07/2018    Dr Jose Mccarthy; FEV1 96%, 5% improvement postbd, ratio 104, , , DLCO 82     Social History     Socioeconomic History    Marital status:      Spouse name: Not on file    Number of children: 2    Years of education: Not on file    Highest education level: Not on file   Occupational History    Occupation: ret [employer]   Social Needs    Financial resource strain: Not on file    Food insecurity     Worry: Not on file     Inability: Not on file   [employer] needs     Medical: Not on file     Non-medical: Not on file   Tobacco Use    Smoking status: Former Smoker     Packs/day: 0.25     Years: 2.00     Pack years: 0.50    Smokeless tobacco: Never Used    Tobacco comment: reports occasional use as a teenager   Substance and Sexual Activity    Alcohol use: Yes     Alcohol/week: 1.0 standard drinks     Types: 1 Glasses of wine per week     Comment: 1-2 glasses of wine a month    Drug use: No    Sexual activity: Yes     Partners: Female     Birth control/protection: None   Lifestyle    Physical activity     Days per week: Not on file     Minutes per session: Not on file    Stress: Not on file   Relationships    Social connections     Talks on phone: Not on file     Gets together: Not on file     Attends Jew service: Not on file     Active member of club or organization: Not on file     Attends meetings of clubs or organizations: Not on file     Relationship status: Not on file    Intimate partner violence     Fear of current or ex partner: Not on file     Emotionally abused: Not on file     Physically abused: Not on file     Forced sexual activity: Not on file   Other Topics Concern    Not on file   Social History Narrative    Not on file     Current Outpatient Medications   Medication Sig    gabapentin (NEURONTIN) 600 mg tablet Take 1 Tab by mouth three (3) times daily. Max Daily Amount: 1,800 mg. Indications: neuropathic pain    sucralfate (CARAFATE) 1 gram tablet Take 1 g by mouth every twelve (12) hours.  dexlansoprazole (Dexilant) 60 mg CpDB capsule (delayed release) Take  by mouth. Take 1 tablet by mouth daily.  metoprolol tartrate (LOPRESSOR) 25 mg tablet TAKE 1 TABLET BY MOUTH TWICE DAILY    acetaminophen (TylenoL) 325 mg tablet Take 325 mg by mouth every four (4) hours as needed for Pain.  tadalafiL (CIALIS) 5 mg tablet Take 1 Tab by mouth daily.  sildenafil citrate (Viagra) 100 mg tablet Take 1 Tab by mouth daily as needed for Erectile Dysfunction for up to 10 doses.     Combigan 0.2-0.5 % drop ophthalmic solution INSTILL 1 DROP INTO RIGHT EYE TWICE DAILY    rosuvastatin (CRESTOR) 10 mg tablet TAKE 1 TABLET BY MOUTH EVERY NIGHT    Syringe with Needle, Disp, (Allergy Syringe) 1 mL 27 x 1/2\" syrg FOR USE WITH INTRACAVERNOSAL INJECTIONS.  Injector Device maría DISPENSE INJECT-EASE AUTO-INJECTOR FOR USE WITH BI-MIX INJECTIONS.  fluticasone propionate (FLONASE) 50 mcg/actuation nasal spray SHAKE LIQUID AND USE 2 SPRAYS IN EACH NOSTRIL DAILY    triamterene-hydroCHLOROthiazide (DYAZIDE) 37.5-25 mg per capsule TAKE 1 CAPSULE BY MOUTH DAILY    ARIPiprazole (ABILIFY) 2 mg tablet Take 2 mg by mouth daily.  SYMBICORT 160-4.5 mcg/actuation HFAA INHALE 2 PUFFS BY MOUTH TWICE DAILY    ALPRAZolam (XANAX) 0.25 mg tablet Take 1 Tab by mouth daily.  VIIBRYD 40 mg tab tablet Take 1 Tab by mouth daily.  buPROPion SR (WELLBUTRIN SR) 150 mg SR tablet Take 1 Tab by mouth two (2) times a day. (Patient taking differently: Take 300 mg by mouth daily.)    latanoprost (XALATAN) 0.005 % ophthalmic solution OMAR 1 GTT INTO RIGHT EYE HS    albuterol (PROAIR HFA) 90 mcg/actuation inhaler Take 2 Puffs by inhalation every four (4) hours as needed for Wheezing.  ascorbic acid (VITAMIN C) 500 mg tablet Take  by mouth.  aspirin delayed-release 81 mg tablet Take  by mouth daily.  cholecalciferol, vitamin d3, (VITAMIN D3) 1,000 unit tablet Take  by mouth daily.  Gluc-Scar-MSM#4-Y-Urew-Mike-Bor (OSTEO BI-FLEX) 750-625-30 mg Tab Take  by mouth daily. No current facility-administered medications for this visit. Allergies   Allergen Reactions    Cefdinir Nausea and Vomiting    Naproxen Other (comments)     elev cr 2/19    Niacin Other (comments)     Flushing      Norvasc [Amlodipine] Swelling       Assessment and plan:  1. Prostate ca s/p DVP by Dr Neeraj Reno. F/U as scheduled  2. Transaminasemia. Per Dr Yisel Mata  3. HLP. Holding crestor for now  4. Anxiety. Holding viibryd for now, he is in touch with his psychiatrist      Above conditions discussed at length and patient vocalized understanding.   All questions answered to patient satisfaction

## 2021-05-12 ENCOUNTER — PATIENT MESSAGE (OUTPATIENT)
Dept: INTERNAL MEDICINE CLINIC | Age: 71
End: 2021-05-12

## 2021-05-12 NOTE — TELEPHONE ENCOUNTER
From: Cinthya Sotelo  To: Isac Wood MD  Sent: 5/12/2021 12:02 AM EDT  Subject: Prescription Question    I was prescribed Enoxaparin Sodium Injections during and following my recent hospitalization for suspected gall bladder attack and problematic liver enzyme panels. I was told to continue a daily injection for 28 days when I left the hospital. Site of injection has moved around due to red, itching irritation and extended bruising - Today (day 26) I developed similar itching red rashes over my chest and back, though injections have only been in the arm and leg. Is this expanded rash a result of the accumulation of Enoxaparin? We stopped injections today. I feel OK, just bothered by the itching. Please advise. Thank you.  PASQUALE/Martin Koo

## 2021-05-13 ENCOUNTER — OFFICE VISIT (OUTPATIENT)
Dept: INTERNAL MEDICINE CLINIC | Age: 71
End: 2021-05-13
Payer: COMMERCIAL

## 2021-05-13 VITALS
HEIGHT: 75 IN | WEIGHT: 176 LBS | DIASTOLIC BLOOD PRESSURE: 64 MMHG | TEMPERATURE: 97.5 F | OXYGEN SATURATION: 98 % | SYSTOLIC BLOOD PRESSURE: 106 MMHG | BODY MASS INDEX: 21.88 KG/M2 | RESPIRATION RATE: 14 BRPM | HEART RATE: 61 BPM

## 2021-05-13 DIAGNOSIS — T50.905A ADVERSE EFFECT OF DRUG, INITIAL ENCOUNTER: Primary | ICD-10-CM

## 2021-05-13 PROBLEM — R74.01 TRANSAMINITIS: Status: ACTIVE | Noted: 2021-04-23

## 2021-05-13 PROBLEM — N17.9 AKI (ACUTE KIDNEY INJURY) (HCC): Status: ACTIVE | Noted: 2021-04-24

## 2021-05-13 PROBLEM — M47.817 LUMBOSACRAL SPONDYLOSIS WITHOUT MYELOPATHY: Status: ACTIVE | Noted: 2021-05-13

## 2021-05-13 PROBLEM — G89.29 CHRONIC MIDLINE LOW BACK PAIN WITH BILATERAL SCIATICA: Status: ACTIVE | Noted: 2020-08-27

## 2021-05-13 PROBLEM — E80.6 HYPERBILIRUBINEMIA: Status: ACTIVE | Noted: 2021-04-24

## 2021-05-13 PROBLEM — M54.42 CHRONIC MIDLINE LOW BACK PAIN WITH BILATERAL SCIATICA: Status: ACTIVE | Noted: 2020-08-27

## 2021-05-13 PROBLEM — M54.41 CHRONIC MIDLINE LOW BACK PAIN WITH BILATERAL SCIATICA: Status: ACTIVE | Noted: 2020-08-27

## 2021-05-13 PROCEDURE — 99213 OFFICE O/P EST LOW 20 MIN: CPT | Performed by: INTERNAL MEDICINE

## 2021-05-13 RX ORDER — PREDNISONE 20 MG/1
20 TABLET ORAL DAILY
Qty: 5 TAB | Refills: 0 | Status: SHIPPED | OUTPATIENT
Start: 2021-05-13 | End: 2021-06-01 | Stop reason: ALTCHOICE

## 2021-05-13 NOTE — PROGRESS NOTES
73 y.o. OTHER male who presents for evaluation. Wife was present for the evaluation    He was sent home on subcu heparin after his prostate surgery which started on 4/16/21. A week later, he was readmitted to [facility] for abdominal pain and transaminasemia. At that time, he was starting to note a rash wherever he shot the heparin. He was given a prescription totaling 28 days from the 1st admission. On day 25 of the shot, he started noticing the rash not just in the areas of injection but also in the torso. He stopped taking it at that point. Unclear why he was kept on heparin the whole time as he has really been ambulatory since he was discharged the second time on the 4/27/21. He has not tried anything for this. Nothing else new has been given or applied    Past Medical History:   Diagnosis Date    Allergic rhinitis     Dr Savannah Tejada; never took immunotherapy    Anxiety     saw Dr Nato Hunt; pfts show no obstruction but high RV    Basal cell carcinoma 2013    Dr. Sebastian Ashley s/p resection 2 spots    BPH (benign prostatic hyperplasia)     Dr. Vanessa Issa; on proscar for years    Depression     and anxiety; paxil 2008? lexapro and wellbutrin til 2018; tried proza; saw Dr Erin Tucker now seeing Dr Deonna Ramos Dyslipidemia     Erectile dysfunction     ICI with penile trauma; JOVANNA not helpful; peak performance unsuccessful    FHx: heart disease     GERD (gastroesophageal reflux disease)     s/p dilation 2003 Dr. Renata Stewart; egd 2015; Dr Renata Stewart egd 12/20 dilation, hiatal hernia    Heel spur     Dr Dominique Sims Hypertension 02/2019    24 hr ambulatory monitoring Dr Dionicio Rojas Hypovitaminosis D     Lumbar spinal stenosis 03/2014    MRI (3/14) showed scoliosis w multilevel degen findings, mod L3-4, L4-5 central stenosis, mod L5-S1 foraminal stenosis;  (10/17) severe L3-4 central stenosis;  Dr Candice Florez    GALILEA (obstructive sleep apnea)     intol cpap Dr Gwendolyn Billy 2015; using oral appliance Dr Geno Raza; AHI 17.8 min desats 83    Overweight (BMI 25.0-29.9) 2/12/2018    Peripheral neuropathy     and B CTS on EMG Dr. Trinity Carrera [facility]) 04/16/2021    Dr William Plummer; mira 3+3; s/p RALP, BLND Dr Renetta Garcia Pulmonary nodules 2011    bilat upper lobes; no change 2018; noted again [facility] 6/19    Scoliosis     Dr Kendra Oquendo 2014    Transaminasemia 04/2021    Dr Ferny Dumont     Past Surgical History:   Procedure Laterality Date    HX CARPAL TUNNEL RELEASE Bilateral 2007   [facility] COLONOSCOPY      Dr. Jorje Bhatia mild diverticulosis 2000, 6/12    HX GI      CT abd/pelvis showed nothing acute, small LIH, small HH, bladder divertic (6/19); MRCP [facility] showed distended gb and mild biliary dilation (4/21)    HX ORTHOPAEDIC      DEXA t score 4.2 spine, 0.2 hip (2012)    HX RADICAL PROSTATECTOMY  04/2021    Dr Humble Rosario DVP    [facility] UNLIST  8/09    ETT negative (8/09); SOH neg stress echo (6/19)    MS CARDIAC SURG PROCEDURE UNLIST  09/2019    echo nl lv, ef 55%, no wma or valvular disease    MS CHEST SURGERY PROCEDURE UNLISTED  07/2018    Dr Javier Kelsey; FEV1 96%, 5% improvement postbd, ratio 104, , , DLCO 82     Social History     Socioeconomic History    Marital status:      Spouse name: Not on file    Number of children: 2    Years of education: Not on file    Highest education level: Not on file   Occupational History    Occupation: ret [employer]   Social Needs    Financial resource strain: Not on file    Food insecurity     Worry: Not on file     Inability: Not on file   [employer] needs     Medical: Not on file     Non-medical: Not on file   Tobacco Use    Smoking status: Former Smoker     Packs/day: 0.25     Years: 2.00     Pack years: 0.50    Smokeless tobacco: Never Used    Tobacco comment: reports occasional use as a teenager   Substance and Sexual Activity    Alcohol use:  Yes     Alcohol/week: 1.0 standard drinks     Types: 1 Glasses of wine per week Comment: 1-2 glasses of wine a month    Drug use: No    Sexual activity: Yes     Partners: Female     Birth control/protection: None   Lifestyle    Physical activity     Days per week: Not on file     Minutes per session: Not on file    Stress: Not on file   Relationships    Social connections     Talks on phone: Not on file     Gets together: Not on file     Attends [employer] service: Not on file     Active member of club or organization: Not on file     Attends meetings of clubs or organizations: Not on file     Relationship status: Not on file    Intimate partner violence     Fear of current or ex partner: Not on file     Emotionally abused: Not on file     Physically abused: Not on file     Forced sexual activity: Not on file   Other Topics Concern    Not on file   Social History Narrative    Not on file     Current Outpatient Medications   Medication Sig    gabapentin (NEURONTIN) 600 mg tablet Take 1 Tab by mouth three (3) times daily. Max Daily Amount: 1,800 mg. Indications: neuropathic pain    sucralfate (CARAFATE) 1 gram tablet Take 1 g by mouth every twelve (12) hours.  dexlansoprazole (Dexilant) 60 mg CpDB capsule (delayed release) Take  by mouth. Take 1 tablet by mouth daily.  metoprolol tartrate (LOPRESSOR) 25 mg tablet TAKE 1 TABLET BY MOUTH TWICE DAILY    acetaminophen (TylenoL) 325 mg tablet Take 325 mg by mouth every four (4) hours as needed for Pain.  tadalafiL (CIALIS) 5 mg tablet Take 1 Tab by mouth daily.  Combigan 0.2-0.5 % drop ophthalmic solution INSTILL 1 DROP INTO RIGHT EYE TWICE DAILY    Syringe with Needle, Disp, (Allergy Syringe) 1 mL 27 x 1/2\" syrg FOR USE WITH INTRACAVERNOSAL INJECTIONS.  Injector Device maría DISPENSE INJECT-EASE AUTO-INJECTOR FOR USE WITH BI-MIX INJECTIONS.     fluticasone propionate (FLONASE) 50 mcg/actuation nasal spray SHAKE LIQUID AND USE 2 SPRAYS IN EACH NOSTRIL DAILY    triamterene-hydroCHLOROthiazide (DYAZIDE) 37.5-25 mg per capsule TAKE 1 CAPSULE BY MOUTH DAILY    ARIPiprazole (ABILIFY) 2 mg tablet Take 2 mg by mouth daily.  SYMBICORT 160-4.5 mcg/actuation HFAA INHALE 2 PUFFS BY MOUTH TWICE DAILY    ALPRAZolam (XANAX) 0.25 mg tablet Take 1 Tab by mouth daily.  buPROPion SR (WELLBUTRIN SR) 150 mg SR tablet Take 1 Tab by mouth two (2) times a day. (Patient taking differently: Take 300 mg by mouth daily.)    latanoprost (XALATAN) 0.005 % ophthalmic solution OMAR 1 GTT INTO RIGHT EYE HS    albuterol (PROAIR HFA) 90 mcg/actuation inhaler Take 2 Puffs by inhalation every four (4) hours as needed for Wheezing.  ascorbic acid (VITAMIN C) 500 mg tablet Take  by mouth.  aspirin delayed-release 81 mg tablet Take  by mouth daily.  cholecalciferol, vitamin d3, (VITAMIN D3) 1,000 unit tablet Take  by mouth daily.  Gluc-Scar-MSM#3-D-Ixfw-Mike-Bor (OSTEO BI-FLEX) 750-625-30 mg Tab Take  by mouth daily.  sildenafil citrate (Viagra) 100 mg tablet Take 1 Tab by mouth daily as needed for Erectile Dysfunction for up to 10 doses.  rosuvastatin (CRESTOR) 10 mg tablet TAKE 1 TABLET BY MOUTH EVERY NIGHT    VIIBRYD 40 mg tab tablet Take 1 Tab by mouth daily. No current facility-administered medications for this visit. Allergies   Allergen Reactions    Cefdinir Nausea and Vomiting    Heparin Rash    Naproxen Other (comments)     elev cr 2/19    Niacin Other (comments)     Flushing      Norvasc [Amlodipine] Swelling     Visit Vitals  /64   Pulse 61   Temp 97.5 °F (36.4 °C) (Temporal)   Resp 14   Ht 6' 3\" (1.905 m)   Wt 176 lb (79.8 kg)   SpO2 98%   BMI 22.00 kg/m²   A&O x3  Affect is appropriate. Mood stable  No apparent distress  Anicteric, no JVD, adenopathy or thyromegaly. No carotid bruits or radiated murmur  Lungs clear to auscultation, no wheezes or rales  Heart showed regular rate and rhythm. No murmur, rubs, gallops  Abdomen soft nontender, no hepatosplenomegaly or masses. Extremities without edema. Pulses 1-2+ symmetrically  Maculopapular rash present last few places he gave himself injections at. Also present in the torso although fading in various spots    Assessment and plan:  1. Probable drug reaction. Prednisone given, he will start antihistamines as well. Call if no better        Above conditions discussed at length and patient vocalized understanding.   All questions answered to patient satisfaction

## 2021-06-01 ENCOUNTER — OFFICE VISIT (OUTPATIENT)
Dept: ORTHOPEDIC SURGERY | Age: 71
End: 2021-06-01
Payer: COMMERCIAL

## 2021-06-01 VITALS
WEIGHT: 180 LBS | OXYGEN SATURATION: 100 % | HEART RATE: 66 BPM | RESPIRATION RATE: 17 BRPM | TEMPERATURE: 98.7 F | BODY MASS INDEX: 22.5 KG/M2 | DIASTOLIC BLOOD PRESSURE: 66 MMHG | SYSTOLIC BLOOD PRESSURE: 105 MMHG

## 2021-06-01 DIAGNOSIS — M54.16 LUMBAR RADICULOPATHY: ICD-10-CM

## 2021-06-01 DIAGNOSIS — M47.816 SPONDYLOSIS OF LUMBAR REGION WITHOUT MYELOPATHY OR RADICULOPATHY: ICD-10-CM

## 2021-06-01 DIAGNOSIS — M48.062 SPINAL STENOSIS, LUMBAR REGION WITH NEUROGENIC CLAUDICATION: ICD-10-CM

## 2021-06-01 DIAGNOSIS — M53.3 SI (SACROILIAC) JOINT DYSFUNCTION: ICD-10-CM

## 2021-06-01 PROCEDURE — 99213 OFFICE O/P EST LOW 20 MIN: CPT | Performed by: NURSE PRACTITIONER

## 2021-06-01 RX ORDER — GABAPENTIN 600 MG/1
600 TABLET ORAL 3 TIMES DAILY
Qty: 270 TABLET | Refills: 1 | Status: SHIPPED | OUTPATIENT
Start: 2021-06-01 | End: 2021-12-16 | Stop reason: SDUPTHER

## 2021-06-01 NOTE — PROGRESS NOTES
[address]  [address], [address]  [facility], [address]  Phone: [phone]  Fax: [phone]    Margarito Erwin  : 1950  PCP: Jhon Johns MD    PROGRESS NOTE    HISTORY OF PRESENT ILLNESS:  Chief Complaint   Patient presents with    Back Pain     Carrie Corrigan is a 73 y.o.  male with history of chronic low back and SI pain, he occasionally gets BLE radicular pain in the L3 distribution if he leans or sits a certain way, it resolves w/ repositioning. Prior history of back problems: Last L MRI 2020 at [address]: multi-level degenerative changes w/ FA, disc bulges, and ligamentous hypertrophy, scoliosis, severe stenosis L3-4. At last OV  he had an updated L MRI, he also saw Dr. Jessica Shirley and was referred to PT. He is not going to get surgery because his pain is well managed. His pain ranges from 2-6/10. His pain is consistent w/ stenosis and arthritis. He remains neuro intact. His pain is aching and stiff worse w/ stooping, bending, twisting and standing. He was to continue gabapentin and a HEP. Today, he is doing very well. He states the gabapentin continues to  Work very well. Denies bladder/bowel dysfunction, saddle paresthesia, weakness, gait disturbance, or other neurological deficit. Pt at this time desires to  continue with current care/proceed with medication evaluation. ASSESSMENT  73 y.o. male with back pain. Diagnoses and all orders for this visit:    1. Spinal stenosis, lumbar region with neurogenic claudication  -     gabapentin (NEURONTIN) 600 mg tablet; Take 1 Tablet by mouth three (3) times daily. Max Daily Amount: 1,800 mg. Indications: neuropathic pain    2. Spondylosis of lumbar region without myelopathy or radiculopathy  -     gabapentin (NEURONTIN) 600 mg tablet; Take 1 Tablet by mouth three (3) times daily. Max Daily Amount: 1,800 mg. Indications: neuropathic pain    3.  Lumbar radiculopathy  -     gabapentin (NEURONTIN) 600 mg tablet; Take 1 Tablet by mouth three (3) times daily. Max Daily Amount: 1,800 mg. Indications: neuropathic pain    4. SI (sacroiliac) joint dysfunction  -     gabapentin (NEURONTIN) 600 mg tablet; Take 1 Tablet by mouth three (3) times daily. Max Daily Amount: 1,800 mg. Indications: neuropathic pain         IMPRESSION/PLAN    1) Pt was given information on back exercises and stenosis. 2) cont gabapentin  3) cont HEP  4) Mr. Domitila Randhawa has a reminder for a \"due or due soon\" health maintenance. I have asked that he contact his primary care provider, Tripp Reina MD, for follow-up on this health maintenance. 5) We have informed patient to notify us for immediate appointment if he has any worsening neurogical symptoms or if an emergency situation presents, then call 911  5) Pt will follow-up in 6 months for med fu. Risks and benefits of ongoing therapy have been reviewed with the patient.  is appropriate. No pain behaviors. Denies thoughts of harming self or others. Pt has a good risk to benefit ratio which allows the pt to function in a home environment without side effects. PAST MEDICAL HISTORY  Past Medical History:   Diagnosis Date    Allergic rhinitis     Dr Lorena Head; never took immunotherapy    Anxiety     saw Dr Gissell Brandon; pfts show no obstruction but high RV    Basal cell carcinoma 2013    Dr. Fletcher Bustos s/p resection 2 spots    BPH (benign prostatic hyperplasia)     Dr. Felton Devries; on proscar for years    Depression     and anxiety; paxil 2008?  lexapro and wellbutrin til 2018; tried proza; saw Dr Alize Rodriguez now seeing Dr Benjamín Urena Dyslipidemia     Erectile dysfunction     ICI with penile trauma; JOVANNA not helpful; peak performance unsuccessful    FHx: heart disease     GERD (gastroesophageal reflux disease)     s/p dilation 2003 Dr. Pippa Mayers; egd 2015; Dr Pippa Mayers egd 12/20 dilation, hiatal hernia    Heel spur     Dr Amber Schneider Hypertension 02/2019 24 hr ambulatory monitoring Dr Faith Larkin Hypovitaminosis D     Lumbar spinal stenosis 03/2014    MRI (3/14) showed scoliosis w multilevel degen findings, mod L3-4, L4-5 central stenosis, mod L5-S1 foraminal stenosis;  (10/17) severe L3-4 central stenosis; Dr Kandi Marcus    GALILEA (obstructive sleep apnea)     intol cpap Dr Humberto Encarnacion 2015; using oral appliance Dr Robyn Vallejo; AHI 17.8 min desats 83    Overweight (BMI 25.0-29.9) 2/12/2018    Peripheral neuropathy     and B CTS on EMG Dr. Willis Trevino [facility]) 04/16/2021    Dr Merlinda Lurie; mira 3+3; s/p RALP, BLND Dr Boni Doan Pulmonary nodules 2011    bilat upper lobes; no change 2018; noted again [facility] 6/19    Scoliosis     Dr Charmaine Pardo 2014    Transaminasemia 04/2021    Dr Sukhwinder Castano  Current Outpatient Medications   Medication Sig Dispense Refill    gabapentin (NEURONTIN) 600 mg tablet Take 1 Tablet by mouth three (3) times daily. Max Daily Amount: 1,800 mg. Indications: neuropathic pain 270 Tablet 1    esomeprazole (NexIUM) 40 mg capsule       sucralfate (CARAFATE) 1 gram tablet Take 1 g by mouth every twelve (12) hours.  metoprolol tartrate (LOPRESSOR) 25 mg tablet TAKE 1 TABLET BY MOUTH TWICE DAILY 180 Tab 3    acetaminophen (TylenoL) 325 mg tablet Take 325 mg by mouth every four (4) hours as needed for Pain.  Combigan 0.2-0.5 % drop ophthalmic solution INSTILL 1 DROP INTO RIGHT EYE TWICE DAILY      fluticasone propionate (FLONASE) 50 mcg/actuation nasal spray SHAKE LIQUID AND USE 2 SPRAYS IN EACH NOSTRIL DAILY 48 g 3    triamterene-hydroCHLOROthiazide (DYAZIDE) 37.5-25 mg per capsule TAKE 1 CAPSULE BY MOUTH DAILY 90 Cap 5    ARIPiprazole (ABILIFY) 2 mg tablet Take 2 mg by mouth daily.  SYMBICORT 160-4.5 mcg/actuation HFAA INHALE 2 PUFFS BY MOUTH TWICE DAILY 3 Inhaler 3    ALPRAZolam (XANAX) 0.25 mg tablet Take 1 Tab by mouth daily. 0    buPROPion SR (WELLBUTRIN SR) 150 mg SR tablet Take 1 Tab by mouth two (2) times a day. (Patient taking differently: Take 300 mg by mouth daily. ) 180 Tab 3    latanoprost (XALATAN) 0.005 % ophthalmic solution OMAR 1 GTT INTO RIGHT EYE HS  3    albuterol (PROAIR HFA) 90 mcg/actuation inhaler Take 2 Puffs by inhalation every four (4) hours as needed for Wheezing. 3 Inhaler 3    ascorbic acid (VITAMIN C) 500 mg tablet Take  by mouth.  aspirin delayed-release 81 mg tablet Take  by mouth daily.  cholecalciferol, vitamin d3, (VITAMIN D3) 1,000 unit tablet Take  by mouth daily.  Gluc-Scar-MSM#9-C-Crcg-Mike-Bor (OSTEO BI-FLEX) 750-625-30 mg Tab Take  by mouth daily.  tadalafiL (CIALIS) 5 mg tablet Take 1 Tab by mouth daily. 90 Tab 3    sildenafil citrate (Viagra) 100 mg tablet Take 1 Tab by mouth daily as needed for Erectile Dysfunction for up to 10 doses. 30 Tab 12    Syringe with Needle, Disp, (Allergy Syringe) 1 mL 27 x 1/2\" syrg FOR USE WITH INTRACAVERNOSAL INJECTIONS. 25 Pen Needle 2    Injector Device maría DISPENSE INJECT-EASE AUTO-INJECTOR FOR USE WITH BI-MIX INJECTIONS. 1 Each 1       ALLERGIES  Allergies   Allergen Reactions    Cefdinir Nausea and Vomiting    Heparin Rash    Naproxen Other (comments)     elev cr 2/19    Niacin Other (comments)     Flushing      Norvasc [Amlodipine] Swelling       SOCIAL HISTORY    Social History     Socioeconomic History    Marital status:      Spouse name: Not on file    Number of children: 2    Years of education: Not on file    Highest education level: Not on file   Occupational History    Occupation: ret [employer]   Tobacco Use    Smoking status: Former Smoker     Packs/day: 0.25     Years: 2.00     Pack years: 0.50    Smokeless tobacco: Never Used    Tobacco comment: reports occasional use as a teenager   Substance and Sexual Activity    Alcohol use:  Yes     Alcohol/week: 1.0 standard drinks     Types: 1 Glasses of wine per week     Comment: 1-2 glasses of wine a month    Drug use: No    Sexual activity: Yes Partners: Female     Birth control/protection: None   Other Topics Concern    Not on file   Social History Narrative    Not on file     Social Determinants of Health     Financial Resource Strain:     Difficulty of Paying Living Expenses:    Food Insecurity:     Worried About Running Out of Food in the Last Year:     [address] in the Last Year:    Transportation Needs:     Lack of Transportation (Medical):  Lack of Transportation (Non-Medical):    Physical Activity:     Days of Exercise per Week:     Minutes of Exercise per Session:    Stress:     Feeling of Stress :    Social Connections:     Frequency of Communication with Friends and Family:     Frequency of Social Gatherings with Friends and Family:     Attends Yazidism Services:     Active Member of Clubs or Organizations:     Attends Club or Organization Meetings:     Marital Status:    Intimate Partner Violence:     Fear of Current or Ex-Partner:     Emotionally Abused:     Physically Abused:     Sexually Abused:        SUBJECTIVE        Pain Scale: 0 - No pain/10    Pain Assessment  6/1/2021   Location of Pain Back   Location Modifiers -   Severity of Pain 0   Quality of Pain Aching; Sharp   Quality of Pain Comment -   Duration of Pain -   Frequency of Pain Intermittent   Aggravating Factors Bending   Aggravating Factors Comment first thing in the morning. working   Limiting Behavior No   Relieving Factors Rest   Relieving Factors Comment tylenol arthritis, gabapentin   Result of Injury No       Accompanied by self. REVIEW OF SYSTEMS  ROS    Constitutional: Negative for fever, chills, or weight change. Respiratory: Negative for cough or shortness of breath. Cardiovascular: Negative for chest pain or palpitations. Gastrointestinal: Negative for acid reflux, change in bowel habits, or constipation. Genitourinary: Negative for incontinence, dysuria and flank pain. Musculoskeletal: Positive for back pain.   Skin: Negative for rash. Neurological: Negative for headaches, dizziness, or numbness. Endo/Heme/Allergies: Negative . Psychiatric/Behavioral: Negative. PHYSICAL EXAMINATION  Visit Vitals  /66 (BP 1 Location: Left upper arm)   Pulse 66   Temp 98.7 °F (37.1 °C)   Resp 17   Wt 180 lb (81.6 kg)   SpO2 100%   BMI 22.50 kg/m²       Constitutional: Well developed,  well nourished,  awake, alert, and in no acute distress. Neurological:  Sensation to light touch is intact. Psychiatric: Affect and mood are appropriate. Integumentary: No rashes or abrasions noted on exposed areas,  warm, dry and intact. Cardiovascular/Peripheral Vascular:  No peripheral edema is noted. Lymphatic:  No evidence of lymphedema. No cervical lymphadenopathy. SPINE/MUSCULOSKELETAL EXAM    Lumbar spine:  No rash, ecchymosis, or gross obliquity. No fasciculations. No focal atrophy is noted. Range of motion is intact. Tenderness to palpation to low back. SI joints non-tender. Trochanters non tender. Musculoskeletal: No pain with extension, axial loading, or forward flexion. No pain with internal or external rotation of his hips. MOTOR     Hip Flex  Quads Hamstrings Ankle DF EHL Ankle PF   Right +4/5 +4/5 +4/5 +4/5 +4/5 +4/5   Left +4/5 +4/5 +4/5 +4/5 +4/5 +4/5       Straight Leg raise - bilaterally. normal gait and station    Ambulation without assistive device. Full weight bearing,non-antalgic gait.     Arjun Martinez NP

## 2021-06-01 NOTE — PATIENT INSTRUCTIONS
Back Stretches: Exercises  Introduction  Here are some examples of exercises for stretching your back. Start each exercise slowly. Ease off the exercise if you start to have pain. Your doctor or physical therapist will tell you when you can start these exercises and which ones will work best for you. How to do the exercises  Overhead stretch   1. Stand comfortably with your feet shoulder-width apart. 2. Looking straight ahead, raise both arms over your head and reach toward the ceiling. Do not allow your head to tilt back. 3. Hold for 15 to 30 seconds, then lower your arms to your sides. 4. Repeat 2 to 4 times. Side stretch   1. Stand comfortably with your feet shoulder-width apart. 2. Raise one arm over your head, and then lean to the other side. 3. Slide your hand down your leg as you let the weight of your arm gently stretch your side muscles. Hold for 15 to 30 seconds. 4. Repeat 2 to 4 times on each side. Press-up   1. Lie on your stomach, supporting your body with your forearms. 2. Press your elbows down into the floor to raise your upper back. As you do this, relax your stomach muscles and allow your back to arch without using your back muscles. As your press up, do not let your hips or pelvis come off the floor. 3. Hold for 15 to 30 seconds, then relax. 4. Repeat 2 to 4 times. Relax and rest   1. Lie on your back with a rolled towel under your neck and a pillow under your knees. Extend your arms comfortably to your sides. 2. Relax and breathe normally. 3. Remain in this position for about 10 minutes. 4. If you can, do this 2 or 3 times each day. Follow-up care is a key part of your treatment and safety. Be sure to make and go to all appointments, and call your doctor if you are having problems. It's also a good idea to know your test results and keep a list of the medicines you take. Where can you learn more?   Go to http://www.gray.com/  Enter [id number] in the search box to learn more about \"Back Stretches: Exercises. \"  Current as of: November 16, 2020               Content Version: 12.8  © 2006-2021 [employer]. Care instructions adapted under license by [employer] (which disclaims liability or warranty for this information). If you have questions about a medical condition or this instruction, always ask your healthcare professional. [address] any warranty or liability for your use of this information. Lumbar Spinal Stenosis: Care Instructions  Your Care Instructions     Stenosis in the spine is a narrowing of the canal that is around the spinal cord and nerve roots in your back. It can happen as part of aging. Sometimes bone and other tissue grow into this canal and press on the nerves that branch out from the spinal cord. This can cause pain, numbness, and weakness. When it happens in the lower part of your back, it is called lumbar spinal stenosis. It can cause problems in the legs, feet, and rear end (buttocks). You may be able to get relief from the symptoms of spinal stenosis by taking pain medicine. Your doctor may suggest physical therapy and exercises to keep your spine strong and flexible. Some people try steroid shots to reduce swelling. If pain and numbness in your legs are still so bad that you cannot do your normal activities, you may need surgery. Follow-up care is a key part of your treatment and safety. Be sure to make and go to all appointments, and call your doctor if you are having problems. It's also a good idea to know your test results and keep a list of the medicines you take. How can you care for yourself at home? · Take an over-the-counter pain medicine. Nonsteroidal anti-inflammatory drugs (NSAIDs) such as ibuprofen or naproxen seem to work best. But if you can't take NSAIDs, you can try acetaminophen. Be safe with medicines. Read and follow all instructions on the label.   · Do not take two or more pain medicines at the same time unless the doctor told you to. Many pain medicines have acetaminophen, which is Tylenol. Too much acetaminophen (Tylenol) can be harmful. · Stay at a healthy weight. Being overweight puts extra strain on your spine. · Change positions often when you sit or stand. This can ease pain. It may also reduce pressure on the spinal cord and its nerves. · Avoid doing things that make your symptoms worse. Walking downhill and standing for a long time may cause pain. · Stretch and strengthen your back muscles as your doctor or physical therapist recommends. If your doctor says it is okay to do them, these exercises may help. ? Lie on your back with your knees bent. Gently pull one bent knee to your chest. Put that foot back on the floor, and then pull the other knee to your chest.  ? Do pelvic tilts. Lie on your back with your knees bent. Tighten your stomach muscles. Pull your belly button (navel) in and up toward your ribs. You should feel like your back is pressing to the floor and your hips and pelvis are slightly lifting off the floor. Hold for 6 seconds while breathing smoothly. ? Stand with your back flat against a wall. Slowly slide down until your knees are slightly bent. Hold for 10 seconds, then slide back up the wall. · Remove or change anything in your house that may cause you to fall. Keep walkways clear of clutter, electrical cords, and throw rugs. When should you call for help? Call 911 anytime you think you may need emergency care. For example, call if:    · You are unable to move a leg at all. Call your doctor now or seek immediate medical care if:    · You have new or worse symptoms in your legs, belly, or buttocks. Symptoms may include:  ? Numbness or tingling. ? Weakness. ? Pain.     · You lose bladder or bowel control.    Watch closely for changes in your health, and be sure to contact your doctor if:    · You have a fever, lose weight, or don't feel well.     · You are not getting better as expected. Where can you learn more? Go to http://www.gray.com/  Enter X327 in the search box to learn more about \"Lumbar Spinal Stenosis: Care Instructions. \"  Current as of: November 16, 2020               Content Version: 12.8  © 3829-5578 [employer], [employer]. Care instructions adapted under license by [employer] (which disclaims liability or warranty for this information). If you have questions about a medical condition or this instruction, always ask your healthcare professional. [address] any warranty or liability for your use of this information.

## 2021-06-11 RX ORDER — FLUTICASONE PROPIONATE 50 MCG
SPRAY, SUSPENSION (ML) NASAL
Qty: 48 G | Refills: 3 | Status: SHIPPED | OUTPATIENT
Start: 2021-06-11 | End: 2022-03-29

## 2021-07-20 DIAGNOSIS — I10 PRIMARY HYPERTENSION: ICD-10-CM

## 2021-07-22 RX ORDER — TRIAMTERENE AND HYDROCHLOROTHIAZIDE 37.5; 25 MG/1; MG/1
CAPSULE ORAL
Qty: 90 CAPSULE | Refills: 5 | Status: SHIPPED | OUTPATIENT
Start: 2021-07-22 | End: 2022-08-11

## 2021-12-13 ENCOUNTER — NURSE TRIAGE (OUTPATIENT)
Dept: OTHER | Facility: CLINIC | Age: 71
End: 2021-12-13

## 2021-12-13 NOTE — TELEPHONE ENCOUNTER
Received call from Gerson Anders at [facility] with Red Flag Complaint. Brief description of triage: leg edema    Triage indicates for patient to see in office within 3 days. Pt instructed if unable to get an appointment to go to urgent care or walk in clinic. Agreeable. Care advice provided, patient verbalizes understanding; denies any other questions or concerns; instructed to call back for any new or worsening symptoms. Scheduling dept to call pt.   1401: [place]  with scheduling acknowledged. Attention Provider: Thank you for allowing me to participate in the care of your patient. The patient was connected to triage in response to information provided to the [facility]. Please do not respond through this encounter as the response is not directed to a shared pool. Reason for Disposition   MILD swelling of both ankles (i.e., pedal edema) AND new onset or worsening    Answer Assessment - Initial Assessment Questions  1. ONSET: \"When did the swelling start? \" (e.g., minutes, hours, days)      For months    2. LOCATION: \"What part of the leg is swollen? \"  \"Are both legs swollen or just one leg? \"      Between shin and ankle. States notices when taking socks off has line where socks were. Doesn't notice feet swelling. 3. SEVERITY: \"How bad is the swelling? \" (e.g., localized; mild, moderate, severe)   - Localized - small area of swelling localized to one leg   - MILD pedal edema - swelling limited to foot and ankle, pitting edema < 1/4 inch (6 mm) deep, rest and elevation eliminate most or all swelling   - MODERATE edema - swelling of lower leg to knee, pitting edema > 1/4 inch (6 mm) deep, rest and elevation only partially reduce swelling   - SEVERE edema - swelling extends above knee, facial or hand swelling present       Mild     4. REDNESS: \"Does the swelling look red or infected? \"      No redness    5. PAIN: \"Is the swelling painful to touch? \" If so, ask: \"How painful is it? \"   (Scale 1-10; mild, moderate or severe)      Increased numbness with running. Pt has peripheral neuropathy so not pain    6. FEVER: \"Do you have a fever? \" If so, ask: \"What is it, how was it measured, and when did it start? \"        No    7. CAUSE: \"What do you think is causing the leg swelling? \"      Change in blood pressure medication    8. MEDICAL HISTORY: \"Do you have a history of heart failure, kidney disease, liver failure, or cancer? \"      History of edema prior to BP medication change. Equal edema bilaterally    9. RECURRENT SYMPTOM: \"Have you had leg swelling before? \" If so, ask: \"When was the last time? \" \"What happened that time? \"      Medication change    10. OTHER SYMPTOMS: \"Do you have any other symptoms? \" (e.g., chest pain, difficulty breathing)        No CP, no SOB, no calf pain. Intermittent pooja horses. 11. PREGNANCY: \"Is there any chance you are pregnant? \" \"When was your last menstrual period? \"        N/a    Protocols used: LEG SWELLING AND EDEMA-ADULT-OH

## 2021-12-16 ENCOUNTER — OFFICE VISIT (OUTPATIENT)
Dept: ORTHOPEDIC SURGERY | Age: 71
End: 2021-12-16
Payer: COMMERCIAL

## 2021-12-16 VITALS
OXYGEN SATURATION: 97 % | BODY MASS INDEX: 22.75 KG/M2 | HEART RATE: 63 BPM | SYSTOLIC BLOOD PRESSURE: 103 MMHG | DIASTOLIC BLOOD PRESSURE: 66 MMHG | WEIGHT: 183 LBS | HEIGHT: 75 IN | TEMPERATURE: 98.2 F

## 2021-12-16 DIAGNOSIS — M53.3 SI (SACROILIAC) JOINT DYSFUNCTION: ICD-10-CM

## 2021-12-16 DIAGNOSIS — M48.062 SPINAL STENOSIS, LUMBAR REGION WITH NEUROGENIC CLAUDICATION: ICD-10-CM

## 2021-12-16 DIAGNOSIS — M47.816 SPONDYLOSIS OF LUMBAR REGION WITHOUT MYELOPATHY OR RADICULOPATHY: ICD-10-CM

## 2021-12-16 DIAGNOSIS — M54.16 LUMBAR RADICULOPATHY: ICD-10-CM

## 2021-12-16 PROCEDURE — 99213 OFFICE O/P EST LOW 20 MIN: CPT | Performed by: NURSE PRACTITIONER

## 2021-12-16 RX ORDER — GABAPENTIN 600 MG/1
600 TABLET ORAL 3 TIMES DAILY
Qty: 270 TABLET | Refills: 0 | Status: SHIPPED | OUTPATIENT
Start: 2021-12-19 | End: 2022-06-09 | Stop reason: SDUPTHER

## 2021-12-16 NOTE — PROGRESS NOTES
Chief complaint   Chief Complaint   Patient presents with    Back Pain     lower       History of Present Illness:  Mariella Gipson is a  73 y.o.  male  with history of chronic low back and SI pain. He occasionally gets BLE radicular pain in the L3 distribution but states his legs are not really bothering him at this time. Last L MRI 7/2020 at [facility]: multi-level degenerative changes w/ FA, disc bulges, and ligamentous hypertrophy, scoliosis, severe stenosis L3-4.  His pain is consistent w/ stenosis and arthritis. He remains neuro intact. His pain is aching and stiff worse w/ stooping, bending, twisting and standing especially in the mornings. He continues on gabapentin 600 mg 3 times a day and does a HEP at the [facility] 3 times a week. He states the gabapentin continues to  work very well. Denies bladder/bowel dysfunction, saddle paresthesia, weakness, gait disturbance, or other neurological deficit. He is retired. He is a non-smoker. Physical Exam: The patient is a 79-year-old male well-developed well-nourished who is alert and oriented with a normal mood and affect. He has a full weightbearing nonantalgic gait. He did not use any assist device. He has 4 out of 5 strength bilateral lower extremities. Negative straight leg raise. He does have a slight scoliotic curve to the left and lumbar spine. Assessment and Plan: This is a patient has lumbar spondylosis, spinal stenosis and a slight scoliosis. He also has some SI joint dysfunction. He is doing well on gabapentin. I have refilled that for him. We will see him back in 6-month sooner if needed. Medications:  Current Outpatient Medications   Medication Sig Dispense Refill    [START ON 12/19/2021] gabapentin (NEURONTIN) 600 mg tablet Take 1 Tablet by mouth three (3) times daily. Max Daily Amount: 1,800 mg.  Indications: neuropathic pain 270 Tablet 0    triamterene-hydroCHLOROthiazide (DYAZIDE) 37.5-25 mg per capsule TAKE 1 CAPSULE BY MOUTH DAILY 90 Capsule 5    fluticasone propionate (FLONASE) 50 mcg/actuation nasal spray SHAKE LIQUID AND USE 2 SPRAYS IN EACH NOSTRIL DAILY 48 g 3    esomeprazole (NexIUM) 40 mg capsule       sucralfate (CARAFATE) 1 gram tablet Take 1 g by mouth every twelve (12) hours.  metoprolol tartrate (LOPRESSOR) 25 mg tablet TAKE 1 TABLET BY MOUTH TWICE DAILY 180 Tab 3    acetaminophen (TylenoL) 325 mg tablet Take 325 mg by mouth every four (4) hours as needed for Pain.  tadalafiL (CIALIS) 5 mg tablet Take 1 Tab by mouth daily. 90 Tab 3    sildenafil citrate (Viagra) 100 mg tablet Take 1 Tab by mouth daily as needed for Erectile Dysfunction for up to 10 doses. 30 Tab 12    Combigan 0.2-0.5 % drop ophthalmic solution INSTILL 1 DROP INTO RIGHT EYE TWICE DAILY      Syringe with Needle, Disp, (Allergy Syringe) 1 mL 27 x 1/2\" syrg FOR USE WITH INTRACAVERNOSAL INJECTIONS. 25 Pen Needle 2    Injector Device maría DISPENSE INJECT-EASE AUTO-INJECTOR FOR USE WITH BI-MIX INJECTIONS. 1 Each 1    ARIPiprazole (ABILIFY) 2 mg tablet Take 2 mg by mouth daily.  SYMBICORT 160-4.5 mcg/actuation HFAA INHALE 2 PUFFS BY MOUTH TWICE DAILY 3 Inhaler 3    ALPRAZolam (XANAX) 0.25 mg tablet Take 1 Tab by mouth daily. 0    latanoprost (XALATAN) 0.005 % ophthalmic solution OMAR 1 GTT INTO RIGHT EYE HS  3    albuterol (PROAIR HFA) 90 mcg/actuation inhaler Take 2 Puffs by inhalation every four (4) hours as needed for Wheezing. 3 Inhaler 3    ascorbic acid (VITAMIN C) 500 mg tablet Take  by mouth.  aspirin delayed-release 81 mg tablet Take  by mouth daily.  cholecalciferol, vitamin d3, (VITAMIN D3) 1,000 unit tablet Take  by mouth daily.  Gluc-Scar-MSM#1-H-Ygxy-Mike-Bor (OSTEO BI-FLEX) 750-625-30 mg Tab Take  by mouth daily.  buPROPion SR (WELLBUTRIN SR) 150 mg SR tablet Take 1 Tab by mouth two (2) times a day. (Patient taking differently: Take 300 mg by mouth daily. ) 180 Tab 3           Review of systems:    Past Medical History:   Diagnosis Date    Allergic rhinitis     Dr Jessica Dalton; never took immunotherapy    Anxiety     saw Dr Janet Harley; pfts show no obstruction but high RV    Basal cell carcinoma 2013    Dr. Alfaro Asa s/p resection 2 spots    BPH (benign prostatic hyperplasia)     Dr. Akanksha Huerta; on proscar for years    Depression     and anxiety; paxil 2008? lexapro and wellbutrin til 2018; tried proza; saw Dr Emily Hernandez now seeing Dr Goyo Morocho Dyslipidemia     Erectile dysfunction     ICI with penile trauma; JOVANNA not helpful; peak performance unsuccessful    FHx: heart disease     GERD (gastroesophageal reflux disease)     s/p dilation 2003 Dr. Patsy Morales; egd 2015; Dr Patsy Morales egd 12/20 dilation, hiatal hernia    Heel spur     Dr York Ayrshire Hypertension 02/2019    24 hr ambulatory monitoring Dr Cali Waterman Hypovitaminosis D     Lumbar spinal stenosis 03/2014    MRI (3/14) showed scoliosis w multilevel degen findings, mod L3-4, L4-5 central stenosis, mod L5-S1 foraminal stenosis;  (10/17) severe L3-4 central stenosis;  Dr Lambert Ward    GALILEA (obstructive sleep apnea)     intol cpap Dr Colletta Raya 2015; using oral appliance Dr Shannan Patterson; AHI 17.8 min desats 83    Overweight (BMI 25.0-29.9) 2/12/2018    Peripheral neuropathy     and B CTS on EMG Dr. Olga Valdes [facility]) 04/16/2021    Dr Annika Brownlee; mira 3+3; s/p RALP, BLND Dr Jacinda Ocampo Pulmonary nodules 2011    bilat upper lobes; no change 2018; noted again [facility] 6/19    Scoliosis     Dr Saravia 2014    Scoliosis     Transaminasemia 04/2021    Dr Sophie Cronin     Past Surgical History:   Procedure Laterality Date    HX CARPAL TUNNEL RELEASE Bilateral 2007   Soo Morales mild diverticulosis 2000, 6/12    HX GI      CT abd/pelvis showed nothing acute, small LIH, small HH, bladder divertic (6/19); MRCP [facility] showed distended gb and mild biliary dilation (4/21)    HX ORTHOPAEDIC      DEXA t score 4.2 spine, 0.2 hip (2012)    HX RADICAL PROSTATECTOMY  04/2021     [facility]    HI CARDIAC SURG PROCEDURE UNLIST  8/09    ETT negative (8/09); SOH neg stress echo (6/19)    HI CARDIAC SURG PROCEDURE UNLIST  09/2019    echo nl lv, ef 55%, no wma or valvular disease    HI CHEST SURGERY PROCEDURE UNLISTED  07/2018    Dr Letty Upton; FEV1 96%, 5% improvement postbd, ratio 104, , , DLCO 82     Social History     Socioeconomic History    Marital status:      Spouse name: Not on file    Number of children: 2    Years of education: Not on file    Highest education level: Not on file   Occupational History    Occupation: ret [employer]   Tobacco Use    Smoking status: Former Smoker     Packs/day: 0.25     Years: 2.00     Pack years: 0.50    Smokeless tobacco: Never Used    Tobacco comment: reports occasional use as a teenager   Substance and Sexual Activity    Alcohol use: Yes     Alcohol/week: 1.0 standard drink     Types: 1 Glasses of wine per week     Comment: 1-2 glasses of wine a month    Drug use: No    Sexual activity: Yes     Partners: Female     Birth control/protection: None   Other Topics Concern    Not on file   Social History Narrative    Not on file     Social Determinants of Health     Financial Resource Strain:     Difficulty of Paying Living Expenses: Not on file   Food Insecurity:     Worried About Running Out of Food in the Last Year: Not on file    Barney of Food in the Last Year: Not on file   Transportation Needs:     Lack of Transportation (Medical): Not on file    Lack of Transportation (Non-Medical):  Not on file   Physical Activity:     Days of Exercise per Week: Not on file    Minutes of Exercise per Session: Not on file   Stress:     Feeling of Stress : Not on file   Social Connections:     Frequency of Communication with Friends and Family: Not on file    Frequency of Social Gatherings with Friends and Family: Not on file    Attends Roman [employer] Services: Not on file   [place] Active Member of Clubs or Organizations: Not on file    Attends Club or Organization Meetings: Not on file    Marital Status: Not on file   Intimate Partner Violence:     Fear of Current or Ex-Partner: Not on file    Emotionally Abused: Not on file    Physically Abused: Not on file    Sexually Abused: Not on file   Housing Stability:     Unable to Pay for Housing in the Last Year: Not on file    Number of [place] in the Last Year: Not on file    Unstable Housing in the Last Year: Not on file     Family History   Problem Relation Age of Onset    Cancer Mother         leukemia    Heart Disease Father     Psychiatric Disorder Daughter         depression       Physical Exam:  Visit Vitals  /66   Pulse 63   Temp 98.2 °F (36.8 °C)   Ht 6' 3\" (1.905 m)   Wt 183 lb (83 kg)   SpO2 97%   BMI 22.87 kg/m²     Pain Scale: 7/10       has been . reviewed and is appropriate          Diagnoses and all orders for this visit:    1. Spinal stenosis, lumbar region with neurogenic claudication  -     gabapentin (NEURONTIN) 600 mg tablet; Take 1 Tablet by mouth three (3) times daily. Max Daily Amount: 1,800 mg. Indications: neuropathic pain    2. Spondylosis of lumbar region without myelopathy or radiculopathy  -     gabapentin (NEURONTIN) 600 mg tablet; Take 1 Tablet by mouth three (3) times daily. Max Daily Amount: 1,800 mg. Indications: neuropathic pain    3. Lumbar radiculopathy  -     gabapentin (NEURONTIN) 600 mg tablet; Take 1 Tablet by mouth three (3) times daily. Max Daily Amount: 1,800 mg. Indications: neuropathic pain    4. SI (sacroiliac) joint dysfunction  -     gabapentin (NEURONTIN) 600 mg tablet; Take 1 Tablet by mouth three (3) times daily. Max Daily Amount: 1,800 mg. Indications: neuropathic pain            Follow-up and Dispositions    · Return in about 6 months (around 6/16/2022) for with NP Faith.              We have informed Seth Osman to notify us for immediate appointment if he has any worsening neurogical symptoms or if an emergency situation presents, then call [phone]

## 2021-12-20 ENCOUNTER — OFFICE VISIT (OUTPATIENT)
Dept: INTERNAL MEDICINE CLINIC | Age: 71
End: 2021-12-20
Payer: COMMERCIAL

## 2021-12-20 VITALS
OXYGEN SATURATION: 100 % | HEART RATE: 62 BPM | DIASTOLIC BLOOD PRESSURE: 61 MMHG | TEMPERATURE: 97.7 F | SYSTOLIC BLOOD PRESSURE: 102 MMHG | HEIGHT: 75 IN | RESPIRATION RATE: 16 BRPM | WEIGHT: 183 LBS | BODY MASS INDEX: 22.75 KG/M2

## 2021-12-20 DIAGNOSIS — I87.2 VENOUS INSUFFICIENCY: Primary | ICD-10-CM

## 2021-12-20 PROBLEM — E80.6 HYPERBILIRUBINEMIA: Status: RESOLVED | Noted: 2021-04-24 | Resolved: 2021-12-20

## 2021-12-20 PROBLEM — N17.9 AKI (ACUTE KIDNEY INJURY) (HCC): Status: RESOLVED | Noted: 2021-04-24 | Resolved: 2021-12-20

## 2021-12-20 PROBLEM — R74.01 TRANSAMINITIS: Status: RESOLVED | Noted: 2021-04-23 | Resolved: 2021-12-20

## 2021-12-20 PROCEDURE — 99213 OFFICE O/P EST LOW 20 MIN: CPT | Performed by: INTERNAL MEDICINE

## 2021-12-20 RX ORDER — ROSUVASTATIN CALCIUM 10 MG/1
TABLET, COATED ORAL
COMMUNITY
End: 2022-04-24

## 2021-12-20 RX ORDER — VILAZODONE HYDROCHLORIDE 40 MG/1
TABLET ORAL DAILY
COMMUNITY

## 2021-12-20 RX ORDER — DEXLANSOPRAZOLE 60 MG/1
CAPSULE, DELAYED RELEASE ORAL
COMMUNITY
End: 2021-12-20

## 2021-12-20 NOTE — PROGRESS NOTES
73 y.o. OTHER male who presents for evaluation. For the last 6 months or so, he has noted some swelling in his ankles primarily later in the day. He is on Dyazide for blood pressure control. Denies any PND, orthopnea, chest pain or dyspnea. He does have varicose veins and the beginnings of venous stasis change notable. He avoids salt for the most part, exercises just about daily with mostly cardio. Past Medical History:   Diagnosis Date    Allergic rhinitis     Dr Gibson Damon; never took immunotherapy    Anxiety     saw Dr Miya Nelson; pfts show no obstruction but high RV    BCC (basal cell carcinoma of skin) 2013    Dr. Milad Wang s/p resection 2 spots    BPH (benign prostatic hyperplasia)     Dr. Claire Blue; on proscar for years    Depression     and anxiety; paxil 2008? lexapro and wellbutrin til 2018; tried proza; saw Dr Sparkle Rojas now seeing Dr Juan Manuel Chandler Dyslipidemia     Erectile dysfunction     ICI with penile trauma; JOVANNA not helpful; peak performance unsuccessful    FHx: heart disease     GERD (gastroesophageal reflux disease)     s/p dilation 2003 Dr. Arabella Sullivan; egd 2015; Dr Arabella Sullivan egd 12/20 dilation, hiatal hernia    Heel spur     Dr Charlene Alvarez Hypertension 02/2019    24 hr ambulatory monitoring Dr Jemal Aguillon Hypovitaminosis D     Lumbar spinal stenosis 03/2014    MRI (3/14) showed scoliosis w multilevel degen findings, mod L3-4, L4-5 central stenosis, mod L5-S1 foraminal stenosis;  (10/17) severe L3-4 central stenosis;  Dr Sharyn Frances    GALILEA (obstructive sleep apnea)     intol cpap Dr Graham Shell 2015; using oral appliance Dr Lidia Lombard; AHI 17.8 min desats 83    Overweight (BMI 25.0-29.9) 2/12/2018    Peripheral neuropathy     and B CTS on EMG Dr. Jamee Jenkins [facility]) 04/16/2021    Dr Francesca Szymanski; mira 3+3; s/p RALP, BLND Dr Buckley Shows Pulmonary nodules 2011    bilat upper lobes; no change 2018; noted again [facility] 6/19    Scoliosis     Dr Gina Freeman 2014    Venous insufficiency 12/2021     Current Outpatient Medications   Medication Sig    rosuvastatin (CRESTOR) 10 mg tablet rosuvastatin 10 mg tablet   TAKE 1 TABLET BY MOUTH EVERY NIGHT    vilazodone (Viibryd) 40 mg tab tablet Take  by mouth daily.  vitamin D3-folic acid 112 mcg (7,009 unit)-1 mg tab vitamin D3 125 mcg (6,088 unit)-folic acid 1 mg tablet   Take 1 tablet every day by oral route.  gabapentin (NEURONTIN) 600 mg tablet Take 1 Tablet by mouth three (3) times daily. Max Daily Amount: 1,800 mg. Indications: neuropathic pain    triamterene-hydroCHLOROthiazide (DYAZIDE) 37.5-25 mg per capsule TAKE 1 CAPSULE BY MOUTH DAILY    fluticasone propionate (FLONASE) 50 mcg/actuation nasal spray SHAKE LIQUID AND USE 2 SPRAYS IN EACH NOSTRIL DAILY    sucralfate (CARAFATE) 1 gram tablet Take 1 g by mouth every twelve (12) hours.  metoprolol tartrate (LOPRESSOR) 25 mg tablet TAKE 1 TABLET BY MOUTH TWICE DAILY    acetaminophen (TylenoL) 325 mg tablet Take 325 mg by mouth every four (4) hours as needed for Pain.  tadalafiL (CIALIS) 5 mg tablet Take 1 Tab by mouth daily.  Combigan 0.2-0.5 % drop ophthalmic solution INSTILL 1 DROP INTO RIGHT EYE TWICE DAILY    Syringe with Needle, Disp, (Allergy Syringe) 1 mL 27 x 1/2\" syrg FOR USE WITH INTRACAVERNOSAL INJECTIONS.  Injector Device maría DISPENSE INJECT-EASE AUTO-INJECTOR FOR USE WITH BI-MIX INJECTIONS.  ARIPiprazole (ABILIFY) 2 mg tablet Take 2 mg by mouth daily.  SYMBICORT 160-4.5 mcg/actuation HFAA INHALE 2 PUFFS BY MOUTH TWICE DAILY    ALPRAZolam (XANAX) 0.25 mg tablet Take 1 Tab by mouth daily.  buPROPion SR (WELLBUTRIN SR) 150 mg SR tablet Take 1 Tab by mouth two (2) times a day. (Patient taking differently: Take 300 mg by mouth daily.)    latanoprost (XALATAN) 0.005 % ophthalmic solution OMAR 1 GTT INTO RIGHT EYE HS    albuterol (PROAIR HFA) 90 mcg/actuation inhaler Take 2 Puffs by inhalation every four (4) hours as needed for Wheezing.     ascorbic acid (VITAMIN C) 500 mg tablet Take  by mouth.  aspirin delayed-release 81 mg tablet Take  by mouth daily.  cholecalciferol, vitamin d3, (VITAMIN D3) 1,000 unit tablet Take  by mouth daily.  Gluc-Scar-MSM#0-Y-Yijv-Mike-Bor (OSTEO BI-FLEX) 750-625-30 mg Tab Take  by mouth daily. No current facility-administered medications for this visit. Allergies   Allergen Reactions    Bee Pollen Sneezing     Seasonal allergies    Cefdinir Nausea and Vomiting    Heparin Rash    Naproxen Other (comments)     elev cr 2/19    Niacin Other (comments)     Flushing      Norvasc [Amlodipine] Swelling     Visit Vitals  /61   Pulse 62   Temp 97.7 °F (36.5 °C) (Temporal)   Resp 16   Ht 6' 3\" (1.905 m)   Wt 183 lb (83 kg)   SpO2 100%   BMI 22.87 kg/m²   Varicose veins evident bilaterally, venous stasis changes noted bilaterally as well. Pulses 2+ DP and PT. 0-1+ ankle edema    Assessment and plan:  1. Venous insufficiency with varicose veins. Quick discussion on mechanisms of this. He is already taking diuretic as above, we discussed wearing stockings, continuing with exercise, salt avoidance. Discussed general benign nature of this. Above conditions discussed at length and patient vocalized understanding.   All questions answered to patient satisfaction        ICD-10-CM ICD-9-CM    1. Venous insufficiency  I87.2 459.81

## 2021-12-20 NOTE — PROGRESS NOTES
Kiley Isaac presents with   Chief Complaint   Patient presents with    Swelling     X about 6 months, patient notices swelling at the end of the day (above socks)           1. \"Have you been to the ER, urgent care clinic since your last visit? Hospitalized since your last visit? \" NO    2. \"Have you seen or consulted any other health care providers outside of the [address] [place] [address] since your last visit? \" YES- multiple on chart    3. For patients aged 39-70: Has the patient had a colonoscopy? Yes, HM satisfied with blue hyperlink     If the patient is female:    4. For patients aged 41-77: Has the patient had a mammogram within the past 2 years? NA based on age or sex    11. For patients aged 21-65: Has the patient had a pap smear?  NA based on age or sex

## 2022-01-03 ENCOUNTER — TELEPHONE (OUTPATIENT)
Dept: INTERNAL MEDICINE CLINIC | Age: 72
End: 2022-01-03

## 2022-01-03 ENCOUNTER — OFFICE VISIT (OUTPATIENT)
Dept: INTERNAL MEDICINE CLINIC | Age: 72
End: 2022-01-03
Payer: COMMERCIAL

## 2022-01-03 VITALS
OXYGEN SATURATION: 100 % | TEMPERATURE: 97.2 F | HEART RATE: 60 BPM | HEIGHT: 75 IN | SYSTOLIC BLOOD PRESSURE: 123 MMHG | BODY MASS INDEX: 22.88 KG/M2 | DIASTOLIC BLOOD PRESSURE: 66 MMHG | RESPIRATION RATE: 12 BRPM | WEIGHT: 184 LBS

## 2022-01-03 DIAGNOSIS — F41.9 ANXIETY: ICD-10-CM

## 2022-01-03 DIAGNOSIS — F33.0 MDD (MAJOR DEPRESSIVE DISORDER), RECURRENT EPISODE, MILD (HCC): Primary | ICD-10-CM

## 2022-01-03 PROBLEM — R97.20 ELEVATED PSA: Status: RESOLVED | Noted: 2020-02-19 | Resolved: 2022-01-03

## 2022-01-03 PROBLEM — M54.42 CHRONIC MIDLINE LOW BACK PAIN WITH BILATERAL SCIATICA: Status: RESOLVED | Noted: 2020-08-27 | Resolved: 2022-01-03

## 2022-01-03 PROBLEM — M54.41 CHRONIC MIDLINE LOW BACK PAIN WITH BILATERAL SCIATICA: Status: RESOLVED | Noted: 2020-08-27 | Resolved: 2022-01-03

## 2022-01-03 PROBLEM — G89.29 CHRONIC MIDLINE LOW BACK PAIN WITH BILATERAL SCIATICA: Status: RESOLVED | Noted: 2020-08-27 | Resolved: 2022-01-03

## 2022-01-03 PROCEDURE — 99212 OFFICE O/P EST SF 10 MIN: CPT | Performed by: INTERNAL MEDICINE

## 2022-01-03 RX ORDER — DEXLANSOPRAZOLE 60 MG/1
60 CAPSULE, DELAYED RELEASE ORAL DAILY
COMMUNITY
Start: 2021-12-23

## 2022-01-03 RX ORDER — ARIPIPRAZOLE 10 MG/1
1 TABLET ORAL DAILY
COMMUNITY

## 2022-01-03 NOTE — TELEPHONE ENCOUNTER
----- Message from Radha Herbert MD sent at 1/3/2022 12:28 PM EST -----  Pls confirm the abilify dose with pt

## 2022-01-03 NOTE — PROGRESS NOTES
73 y.o. OTHER male who presents for evaluation. His psychiatrist, Dr Nimesh Pride, apparently left the practice and a psychologist helping him is asking for us to cover his medications until they can replace them. Currently on Viibryd, Abilify, Wellbutrin and Xanax. He feels well otherwise    Past Medical History:   Diagnosis Date    Allergic rhinitis     Dr Elena Carrasco; never took immunotherapy    Anxiety     saw Dr Ana María Lara; pfts show no obstruction but high RV    BCC (basal cell carcinoma of skin) 2013    Dr. Estefanía De Santiago s/p resection 2 spots    BPH (benign prostatic hyperplasia)     Dr. Denise Teresa; on proscar for years    Depression     and anxiety; paxil 2008? lexapro and wellbutrin til 2018; tried proza; saw Dr Brielle Duncan now seeing Dr Tila Lyons Dyslipidemia     Erectile dysfunction     ICI with penile trauma; JOVANNA not helpful; peak performance unsuccessful    FHx: heart disease     GERD (gastroesophageal reflux disease)     s/p dilation 2003 Dr. Oumou Almaraz; egd 2015; Dr Oumou Almaraz egd 12/20 dilation, hiatal hernia    Heel spur     Dr Hayden [facility] Hypertension 02/2019    24 hr ambulatory monitoring Dr Vidal Gibbs Hypovitaminosis D     Lumbar spinal stenosis 03/2014    MRI (3/14) showed scoliosis w multilevel degen findings, mod L3-4, L4-5 central stenosis, mod L5-S1 foraminal stenosis;  (10/17) severe L3-4 central stenosis;  Dr Rocky Parker    GALILEA (obstructive sleep apnea)     intol cpap Dr Ricky Lira 2015; using oral appliance Dr Duran Gunn; AHI 17.8 min desats 83    Overweight (BMI 25.0-29.9) 2/12/2018    Peripheral neuropathy     and B CTS on EMG Dr. Christel Ferrell [facility]) 04/16/2021    Dr Tere Ridley; mira 3+3; s/p RALP, BLND Dr Hermine Hodgkins Pulmonary nodules 2011    bilat upper lobes; no change 2018; noted again [facility] 6/19    Scoliosis     Dr Jeanne Collier 2014    Venous insufficiency 12/2021     Current Outpatient Medications   Medication Sig    Dexilant 60 mg CpDB capsule (delayed release)     rosuvastatin (CRESTOR) 10 mg tablet rosuvastatin 10 mg tablet   TAKE 1 TABLET BY MOUTH EVERY NIGHT    vilazodone (Viibryd) 40 mg tab tablet Take  by mouth daily.  gabapentin (NEURONTIN) 600 mg tablet Take 1 Tablet by mouth three (3) times daily. Max Daily Amount: 1,800 mg. Indications: neuropathic pain    triamterene-hydroCHLOROthiazide (DYAZIDE) 37.5-25 mg per capsule TAKE 1 CAPSULE BY MOUTH DAILY    fluticasone propionate (FLONASE) 50 mcg/actuation nasal spray SHAKE LIQUID AND USE 2 SPRAYS IN EACH NOSTRIL DAILY    metoprolol tartrate (LOPRESSOR) 25 mg tablet TAKE 1 TABLET BY MOUTH TWICE DAILY    acetaminophen (TylenoL) 325 mg tablet Take 325 mg by mouth every four (4) hours as needed for Pain.  tadalafiL (CIALIS) 5 mg tablet Take 1 Tab by mouth daily.  Combigan 0.2-0.5 % drop ophthalmic solution INSTILL 1 DROP INTO RIGHT EYE TWICE DAILY    ARIPiprazole (ABILIFY) 2 mg tablet Take 2 mg by mouth daily.  SYMBICORT 160-4.5 mcg/actuation HFAA INHALE 2 PUFFS BY MOUTH TWICE DAILY    ALPRAZolam (XANAX) 0.25 mg tablet Take 1 Tab by mouth daily.  buPROPion SR (WELLBUTRIN SR) 150 mg SR tablet Take 1 Tab by mouth two (2) times a day. (Patient taking differently: Take 300 mg by mouth daily.)    latanoprost (XALATAN) 0.005 % ophthalmic solution OMAR 1 GTT INTO RIGHT EYE HS    albuterol (PROAIR HFA) 90 mcg/actuation inhaler Take 2 Puffs by inhalation every four (4) hours as needed for Wheezing.  ascorbic acid (VITAMIN C) 500 mg tablet Take  by mouth.  aspirin delayed-release 81 mg tablet Take  by mouth daily.  cholecalciferol, vitamin d3, (VITAMIN D3) 1,000 unit tablet Take  by mouth daily.  Gluc-Scar-MSM#0-D-Slxp-Mike-Bor (OSTEO BI-FLEX) 750-625-30 mg Tab Take  by mouth daily.  vitamin D3-folic acid 039 mcg (9,955 unit)-1 mg tab vitamin D3 125 mcg (5,298 unit)-folic acid 1 mg tablet   Take 1 tablet every day by oral route.  (Patient not taking: Reported on 1/3/2022)    sucralfate (CARAFATE) 1 gram tablet Take 1 g by mouth every twelve (12) hours. (Patient not taking: Reported on 1/3/2022)    Syringe with Needle, Disp, (Allergy Syringe) 1 mL 27 x 1/2\" syrg FOR USE WITH INTRACAVERNOSAL INJECTIONS. (Patient not taking: Reported on 1/3/2022)    Injector Device maría DISPENSE INJECT-EASE AUTO-INJECTOR FOR USE WITH BI-MIX INJECTIONS. (Patient not taking: Reported on 1/3/2022)     No current facility-administered medications for this visit. Allergies   Allergen Reactions    Bee Pollen Sneezing     Seasonal allergies    Cefdinir Nausea and Vomiting    Heparin Rash    Naproxen Other (comments)     elev cr 2/19    Niacin Other (comments)     Flushing      Norvasc [Amlodipine] Swelling     Visit Vitals  /66   Pulse 60   Temp 97.2 °F (36.2 °C) (Temporal)   Resp 12   Ht 6' 3\" (1.905 m)   Wt 184 lb (83.5 kg)   SpO2 100%   BMI 23.00 kg/m²   No exam was done    Assessment and plan:  1. Psychiatry. I told him we will refill his meds when it is time. The Abilify goes to [facility], the Viibryd and Wellbutrin [facility], Xanax is local at [facility]        Above conditions discussed at length and patient vocalized understanding. All questions answered to patient satisfaction      ICD-10-CM ICD-9-CM    1. MDD (major depressive disorder), recurrent episode, mild (HCC)  F33.0 296.31    2.  Anxiety  F41.9 300.00

## 2022-01-03 NOTE — PROGRESS NOTES
Archana Gens presents with   Chief Complaint   Patient presents with    Medication Refill     Patient needs psych meds filled until because his psychiatrist left (only MD in practice) unexpectedly and was told to see PCP            1. \"Have you been to the ER, urgent care clinic since your last visit? Hospitalized since your last visit? \" NO    2. \"Have you seen or consulted any other health care providers outside of the [address] [place] [address] since your last visit? \" YES- Psych    3. For patients aged 39-70: Has the patient had a colonoscopy? Yes, HM satisfied with blue hyperlink     If the patient is female:    4. For patients aged 41-77: Has the patient had a mammogram within the past 2 years? NA based on age or sex    11. For patients aged 21-65: Has the patient had a pap smear?  NA based on age or sex

## 2022-01-13 ENCOUNTER — PATIENT MESSAGE (OUTPATIENT)
Dept: INTERNAL MEDICINE CLINIC | Age: 72
End: 2022-01-13

## 2022-01-13 DIAGNOSIS — F41.9 ANXIETY: Primary | ICD-10-CM

## 2022-01-13 RX ORDER — ALPRAZOLAM 0.25 MG/1
0.25 TABLET ORAL
Qty: 60 TABLET | Refills: 0 | Status: SHIPPED | OUTPATIENT
Start: 2022-01-13 | End: 2022-04-14

## 2022-01-13 NOTE — TELEPHONE ENCOUNTER
From: Ani Montes  To: Moody Raza MD  Sent: 1/13/2022 11:45 AM EST  Subject: Refill needed for Alprazolam .25mg tablets    I need subject refill from [facility] at [phone]  13 January 2022  Thank you/PASQUALE Marinelli

## 2022-01-13 NOTE — TELEPHONE ENCOUNTER
[facility]  reports [facility] last [facility] date for Xanax as 11/29/21 for a 30 d/s. Last Visit: 1/3/22 with MD Cierra Dawson  Next Appointment: 2/28/22 with MD Cierra Dawson    Requested Prescriptions     Pending Prescriptions Disp Refills    ALPRAZolam (XANAX) 0.25 mg tablet 60 Tablet 0     Sig: Take 1 Tablet by mouth two (2) times daily as needed for Anxiety.  Max Daily Amount: 0.5 mg.

## 2022-02-21 ENCOUNTER — TELEPHONE (OUTPATIENT)
Dept: INTERNAL MEDICINE CLINIC | Age: 72
End: 2022-02-21

## 2022-02-21 NOTE — TELEPHONE ENCOUNTER
----- Message from Whitney Roche sent at 2/21/2022  8:37 AM EST -----  Subject: Message to Provider    QUESTIONS  Information for Provider? patient requesting lab orders in the system to   have labs done at [facility]   ---------------------------------------------------------------------------  --------------  [address]  What is the best way for the office to contact you? OK to leave message on   voicemail  Preferred Call Back Phone Number? [phone]  ---------------------------------------------------------------------------  --------------  SCRIPT ANSWERS  Relationship to Patient?  Self

## 2022-02-22 ENCOUNTER — HOSPITAL ENCOUNTER (OUTPATIENT)
Dept: LAB | Age: 72
Discharge: HOME | End: 2022-02-22
Payer: COMMERCIAL

## 2022-02-22 DIAGNOSIS — Z00.00 PHYSICAL EXAM: ICD-10-CM

## 2022-02-22 LAB
ALBUMIN SERPL-MCNC: 3.6 G/DL (ref 3.4–5)
ALBUMIN/GLOB SERPL: 1.4 {RATIO} (ref 0.8–1.7)
ALP SERPL-CCNC: 82 U/L (ref 45–117)
ALT SERPL-CCNC: 28 U/L (ref 16–61)
ANION GAP SERPL CALC-SCNC: 5 MMOL/L (ref 3–18)
AST SERPL-CCNC: 19 U/L (ref 10–38)
BILIRUB SERPL-MCNC: 0.7 MG/DL (ref 0.2–1)
BUN SERPL-MCNC: 17 MG/DL (ref 7–18)
BUN/CREAT SERPL: 12 (ref 12–20)
CALCIUM SERPL-MCNC: 9.1 MG/DL (ref 8.5–10.1)
CHLORIDE SERPL-SCNC: 107 MMOL/L (ref 100–111)
CHOLEST SERPL-MCNC: 122 MG/DL
CO2 SERPL-SCNC: 30 MMOL/L (ref 21–32)
CREAT SERPL-MCNC: 1.38 MG/DL (ref 0.6–1.3)
ERYTHROCYTE [DISTWIDTH] IN BLOOD BY AUTOMATED COUNT: 13 % (ref 11.6–14.5)
GLOBULIN SER CALC-MCNC: 2.6 G/DL (ref 2–4)
GLUCOSE SERPL-MCNC: 87 MG/DL (ref 74–99)
HCT VFR BLD AUTO: 42.1 % (ref 36–48)
HDLC SERPL-MCNC: 47 MG/DL (ref 40–60)
HDLC SERPL: 2.6 {RATIO} (ref 0–5)
HGB BLD-MCNC: 13.9 G/DL (ref 13–16)
LDLC SERPL CALC-MCNC: 61.4 MG/DL (ref 0–100)
LIPID PROFILE,FLP: NORMAL
MCH RBC QN AUTO: 30.7 PG (ref 24–34)
MCHC RBC AUTO-ENTMCNC: 33 G/DL (ref 31–37)
MCV RBC AUTO: 92.9 FL (ref 78–100)
NRBC # BLD: 0 K/UL (ref 0–0.01)
NRBC BLD-RTO: 0 PER 100 WBC
PLATELET # BLD AUTO: 193 K/UL (ref 135–420)
PMV BLD AUTO: 9.8 FL (ref 9.2–11.8)
POTASSIUM SERPL-SCNC: 4.3 MMOL/L (ref 3.5–5.5)
PROT SERPL-MCNC: 6.2 G/DL (ref 6.4–8.2)
RBC # BLD AUTO: 4.53 M/UL (ref 4.35–5.65)
SODIUM SERPL-SCNC: 142 MMOL/L (ref 136–145)
TRIGL SERPL-MCNC: 68 MG/DL (ref ?–150)
VLDLC SERPL CALC-MCNC: 13.6 MG/DL
WBC # BLD AUTO: 5.3 K/UL (ref 4.6–13.2)

## 2022-02-22 PROCEDURE — 80061 LIPID PANEL: CPT

## 2022-02-22 PROCEDURE — 80053 COMPREHEN METABOLIC PANEL: CPT

## 2022-02-22 PROCEDURE — 85027 COMPLETE CBC AUTOMATED: CPT

## 2022-02-22 PROCEDURE — 36415 COLL VENOUS BLD VENIPUNCTURE: CPT

## 2022-02-23 NOTE — PROGRESS NOTES
81 y. o. OTHER male who presents for RPE    He goes to the [employer] 3x/week doing the treadmill about 20 min and then the cybex machines and no cp, sob, pnd, edema, palpitations. Sees Dr Jesus Manuel Salamanca, bp controlled. He established with Dr Royal/psychiatry after Dr Marine Marcelino left    He continues to see Dr Neha Buchanan for the prostate ca and no new recs    The neuropathy is about the same    LAST MEDICARE WELLNESS EXAM: never as not medicare b    Past Medical History:   Diagnosis Date    Allergic rhinitis     Dr Johnny Mancilla; never took immunotherapy    Anxiety     saw Dr Leonardo Patterson; pfts show no obstruction but high RV    BCC (basal cell carcinoma of skin) 2013    Dr. Lien Bishop s/p resection 2 spots    BPH (benign prostatic hyperplasia)     Dr. Joey Loomis; on proscar for years    Degenerative arthritis of lumbar spine 03/2014    MRI (3/14) showed scoliosis w multilevel degen findings, mod L3-4, L4-5 central stenosis, mod L5-S1 foraminal stenosis;  (10/17) severe L3-4 central stenosis; Dr Nish Arellano Depression     and anxiety; paxil 2008?  lexapro and wellbutrin til 2018; tried prozac; saw Dr Sherrell Mac then Dr Mark Ferreira now Dr Dunia Patrick Dyslipidemia     ED (erectile dysfunction)     ICI with penile trauma; JOVANNA not helpful; peak performance unsuccessful    FHx: heart disease     GERD (gastroesophageal reflux disease)     s/p dilation 2003 Dr. Pako Espinosa; egd 2015; Dr Pako Espinosa egd 12/20 dilation, hiatal hernia    H/O cardiovascular stress test     ETT neg (8/09); [facility] neg stress echo (6/19)    H/O echocardiogram     nl lv, ef 55%, no wma or valvular dz (9/19)    Heel spur     Dr Kaycee Yang Hypertension 02/2019    24 hr ambulatory monitoring Dr Hiral Luna    Hypovitaminosis D     GALILEA (obstructive sleep apnea)     intol cpap Dr Holly Morse 2015; using oral appliance Dr Cherry Woodard; AHI 17.8 min desats 83    Overweight (BMI 25.0-29.9) 2/12/2018    Peripheral neuropathy     and B CTS on EMG Dr. Yang Nickerson Prostate cancer ([facility] [address] 04/16/2021    Dr Earle Villa; mira 3+3; s/p RALP, BLND Dr Riki Barrios Pulmonary nodules 2011    bilat upper lobes; no change 2018; noted again [facility] 6/19    Scoliosis     Dr Todd Barron 2014    Venous insufficiency 12/2021     Past Surgical History:   Procedure Laterality Date    HX CARPAL TUNNEL RELEASE Bilateral 2007    HX COLONOSCOPY      Dr. Grayce Romberg mild diverticulosis 2000, 6/12    HX LAP CHOLECYSTECTOMY  08/2021    Dr Van Schmid    HX [place] t score 4.2 spine, 0.2 hip (2012)    HX RADICAL PROSTATECTOMY  04/2021    Dr Arias DVP    DC CHEST SURGERY PROCEDURE UNLISTED  07/2018    Dr Concha Rea; FEV1 96%, 5% improvement postbd, ratio 104, , , DLCO 82     Social History     Socioeconomic History    Marital status:      Spouse name: Not on file    Number of children: 2    Years of education: Not on file    Highest education level: Not on file   Occupational History    Occupation: ret [employer]   Tobacco Use    Smoking status: Former Smoker     Packs/day: 0.25     Years: 2.00     Pack years: 0.50    Smokeless tobacco: Never Used    Tobacco comment: reports occasional use as a teenager   Substance and Sexual Activity    Alcohol use: Yes     Alcohol/week: 1.0 standard drink     Types: 1 Glasses of wine per week     Comment: 1-2 glasses of wine a month    Drug use: No    Sexual activity: Yes     Partners: Female     Birth control/protection: None   Other Topics Concern    Not on file   Social History Narrative    Not on file     Social Determinants of Health     Financial Resource Strain:     Difficulty of Paying Living Expenses: Not on file   Food Insecurity:     Worried About Running Out of Food in the Last Year: Not on file    Barney of Food in the Last Year: Not on file   Transportation Needs:     Lack of Transportation (Medical): Not on file    Lack of Transportation (Non-Medical):  Not on file   Physical Activity:     Days of Exercise per Week: Not on file    Minutes of Exercise per Session: Not on file   Stress:     Feeling of Stress : Not on file   Social Connections:     Frequency of Communication with Friends and Family: Not on file    Frequency of Social Gatherings with Friends and Family: Not on file    Attends Orthodoxy Services: Not on file    Active Member of [address] [place] [address] or Organizations: Not on file    Attends Club or Organization Meetings: Not on file    Marital Status: Not on file   Intimate Partner Violence:     Fear of Current or Ex-Partner: Not on file    Emotionally Abused: Not on file    Physically Abused: Not on file    Sexually Abused: Not on file   Housing Stability:     Unable to Pay for Housing in the Last Year: Not on file    Number of [place] in the Last Year: Not on file    Unstable Housing in the Last Year: Not on file     Family History   Problem Relation Age of Onset    Cancer Mother         leukemia    Heart Disease Father     Psychiatric Disorder Daughter         depression     Immunization History   Administered Date(s) Administered    COVID-19, Moderna Booster, PF, 0.25mL Dose 10/07/2021    COVID-19, Moderna, Primary or Immunocompromised Series, MRNA, PF, 100mcg/0.5mL 02/10/2021, 03/10/2021    Influenza High Dose Vaccine PF 11/17/2017, 10/22/2020    Influenza Vaccine [employer] [place]) PF (>6 Mo Flulaval, Fluarix, and >3 Yrs Afluria, Fluzone 10843) 11/02/2015, 11/11/2016    Influenza Vaccine (Tri) Adjuvanted (>65 Yrs FLUAD TRI 20927) 10/26/2018, 10/25/2019, 11/01/2020    Influenza Vaccine PF 11/05/2013, 10/30/2014    Influenza Vaccine Split 10/21/2011, 10/31/2012    Influenza Vaccine Whole 11/05/2010    Pneumococcal Conjugate (PCV-13) 01/31/2017    Pneumococcal Polysaccharide (PPSV-23) 08/07/2013, 02/18/2019    TD Vaccine 07/22/2009    Tdap 11/11/2016, 12/28/2019    Zoster 05/07/2012    Zoster Recombinant 02/19/2020, 09/04/2020     Current Outpatient Medications   Medication Sig    ALPRAZolam (XANAX) 0.25 mg tablet Take 1 Tablet by mouth two (2) times daily as needed for Anxiety. Max Daily Amount: 0.5 mg.    Dexilant 60 mg CpDB capsule (delayed release)     ARIPiprazole (Abilify) 10 mg tablet Take 1 Tablet by mouth daily.  rosuvastatin (CRESTOR) 10 mg tablet rosuvastatin 10 mg tablet   TAKE 1 TABLET BY MOUTH EVERY NIGHT    vilazodone (Viibryd) 40 mg tab tablet Take  by mouth daily.  gabapentin (NEURONTIN) 600 mg tablet Take 1 Tablet by mouth three (3) times daily. Max Daily Amount: 1,800 mg. Indications: neuropathic pain    triamterene-hydroCHLOROthiazide (DYAZIDE) 37.5-25 mg per capsule TAKE 1 CAPSULE BY MOUTH DAILY    fluticasone propionate (FLONASE) 50 mcg/actuation nasal spray SHAKE LIQUID AND USE 2 SPRAYS IN EACH NOSTRIL DAILY    metoprolol tartrate (LOPRESSOR) 25 mg tablet TAKE 1 TABLET BY MOUTH TWICE DAILY    acetaminophen (TylenoL) 325 mg tablet Take 325 mg by mouth every four (4) hours as needed for Pain.  Combigan 0.2-0.5 % drop ophthalmic solution INSTILL 1 DROP INTO RIGHT EYE TWICE DAILY    Syringe with Needle, Disp, (Allergy Syringe) 1 mL 27 x 1/2\" syrg FOR USE WITH INTRACAVERNOSAL INJECTIONS.    SYMBICORT 160-4.5 mcg/actuation HFAA INHALE 2 PUFFS BY MOUTH TWICE DAILY    buPROPion SR (WELLBUTRIN SR) 150 mg SR tablet Take 1 Tab by mouth two (2) times a day. (Patient taking differently: Take 300 mg by mouth daily.)    latanoprost (XALATAN) 0.005 % ophthalmic solution OMAR 1 GTT INTO RIGHT EYE HS    albuterol (PROAIR HFA) 90 mcg/actuation inhaler Take 2 Puffs by inhalation every four (4) hours as needed for Wheezing.  ascorbic acid (VITAMIN C) 500 mg tablet Take  by mouth.  aspirin delayed-release 81 mg tablet Take  by mouth daily.  cholecalciferol, vitamin d3, (VITAMIN D3) 1,000 unit tablet Take  by mouth daily.  Gluc-Scar-MSM#0-W-Xker-Mike-Bor (OSTEO BI-FLEX) 750-625-30 mg Tab Take  by mouth daily.     vitamin D3-folic acid 535 mcg (8,559 unit)-1 mg tab vitamin D3 125 mcg (7,312 unit)-folic acid 1 mg tablet   Take 1 tablet every day by oral route. (Patient not taking: Reported on 1/3/2022)    sucralfate (CARAFATE) 1 gram tablet Take 1 g by mouth every twelve (12) hours. (Patient not taking: Reported on 1/3/2022)    tadalafiL (CIALIS) 5 mg tablet Take 1 Tab by mouth daily. (Patient not taking: Reported on 2/28/2022)    Injector Device maría DISPENSE INJECT-EASE AUTO-INJECTOR FOR USE WITH BI-MIX INJECTIONS. (Patient not taking: Reported on 1/3/2022)     No current facility-administered medications for this visit. Allergies   Allergen Reactions    Bee Pollen Sneezing     Seasonal allergies    Cefdinir Nausea and Vomiting    Heparin Rash    Naproxen Other (comments)     elev cr 2/19    Niacin Other (comments)     Flushing      Norvasc [Amlodipine] Swelling     REVIEW OF SYSTEMS: colo 6/12 Dr Rebecca Marinelli  Ophtho - no vision change or eye pain  Oral - no mouth pain, tongue or tooth problems  Ears - no hearing loss, ear pain, fullness, no swallowing problems  Cardiac - no CP, PND, orthopnea, edema, palpitations or syncope  Chest - no breast masses  Resp - no wheezing, chronic coughing, dyspnea  GI - no heartburn, nausea, vomiting, change in bowel habits, bleeding, hemorrhoids  Urinary - no dysuria, hematuria, flank pain, urgency, frequency    Visit Vitals  /64   Pulse 60   Temp 97.9 °F (36.6 °C) (Temporal)   Resp 16   Ht 6' 3\" (1.905 m)   Wt 184 lb (83.5 kg)   SpO2 99%   BMI 23.00 kg/m²   A&O x3  Affect is appropriate. Mood stable  No apparent distress  Anicteric, no JVD, adenopathy or thyromegaly. No carotid bruits or radiated murmur  Lungs clear to auscultation, no wheezes or rales  Heart showed regular rate and rhythm. No murmur, rubs, gallops  Abdomen soft nontender, no hepatosplenomegaly or masses. Extremities without edema.   Pulses 1-2+ symmetrically    LABS  From 11/10 showed gluc 91, cr 1.10,             alt 25, chol 146, tg 74,   hdl 26, ldl-c 95, wbc 6.2, hb 14.3, plt 191, psa 0.60  From 4/12 showed                           alt 26, chol 138, tg 105, hdl 42, ldl-c 75  From 5/12 showed                                      vit d 57.9  From 7/13 showed                 alt 25, chol 135, tg 70,   hdl 44, ldl-c 77  From 7/13 showed   gluc 88, cr 1.02, gfr 79,  alt 10,                wbc 5.0, hb 14.3, plt 182, psa 0.60  From 9/14 showed   gluc 95, cr 0.99, gfr>60, alt 13, chol 138, tg 81,   hdl 41, ldl-c 81, wbc 5.6, hb 13.9, plt 187, psa 1.00  From 11/15 showed gluc 84, cr 1.01, gfr>60, alt 12, chol 148, tg 72,   hdl 44, ldl-c 90, wbc 4.8, hb 14.9, plt 193  From 1/17 showed   gluc 85, cr 1.05, gfr>60, alt 36, chol 144, tg 69,   hdl 56, ldl-c 74, wbc 4.9, hb 14.7, plt 212, psa 1.60, hba1c 5.7, vit d 44.2, hep c neg  From 2/18 showed   gluc 89, cr 1.04, gfr>60, alt 35, chol 142, tg 64,   hdl 53, ldl-c 76, wbc 4.5, hb 14.5, plt 208, psa 2.30  From 1/19 showed                           tsh 1.10,     ft4 0.80, b12 1006, fol>20, b6 nl, lorraine neg, ferritin 58  From 1/20 showed                        psa 4.60,    test 271  From 2/20 showed   gluc 85, cr 1.26, gfr 57, alt 32,  chol 158, tg 179, hdl 47, ldl-c 75, wbc 5.6, hb 15.0, plt 189  From 2/21 showed   gluc 87, cr 1.03, gfr 58, alt 27,  chol 150, tg 121, hdl 46, ldl-c 80, wbc 5.0, hb 14.7, plt 196, psa 5.16, test 329    Results for orders placed or performed during the hospital encounter of 02/22/22   CBC W/O DIFF   Result Value Ref Range    WBC 5.3 4.6 - 13.2 K/uL    RBC 4.53 4.35 - 5.65 M/uL    HGB 13.9 13.0 - 16.0 g/dL    HCT 42.1 36.0 - 48.0 %    MCV 92.9 78.0 - 100.0 FL    MCH 30.7 24.0 - 34.0 PG    MCHC 33.0 31.0 - 37.0 g/dL    RDW 13.0 11.6 - 14.5 %    PLATELET 574 245 - 105 K/uL    MPV 9.8 9.2 - 11.8 FL    NRBC 0.0 0  WBC    ABSOLUTE NRBC 0.00 0.00 - 0.01 K/uL   LIPID PANEL   Result Value Ref Range    LIPID PROFILE          Cholesterol, total 122 <200 MG/DL    Triglyceride 68 <150 MG/DL    HDL Cholesterol 47 40 - 60 MG/DL    LDL, calculated 61.4 0 - 100 MG/DL    VLDL, calculated 13.6 MG/DL    CHOL/HDL Ratio 2.6 0 - 5.0     METABOLIC PANEL, COMPREHENSIVE   Result Value Ref Range    Sodium 142 136 - 145 mmol/L    Potassium 4.3 3.5 - 5.5 mmol/L    Chloride 107 100 - 111 mmol/L    CO2 30 21 - 32 mmol/L    Anion gap 5 3.0 - 18 mmol/L    Glucose 87 74 - 99 mg/dL    BUN 17 7.0 - 18 MG/DL    Creatinine 1.38 (H) 0.6 - 1.3 MG/DL    BUN/Creatinine ratio 12 12 - 20      GFR est AA >60 >60 ml/min/1.73m2    GFR est non-AA 51 (L) >60 ml/min/1.73m2    Calcium 9.1 8.5 - 10.1 MG/DL    Bilirubin, total 0.7 0.2 - 1.0 MG/DL    ALT (SGPT) 28 16 - 61 U/L    AST (SGOT) 19 10 - 38 U/L    Alk. phosphatase 82 45 - 117 U/L    Protein, total 6.2 (L) 6.4 - 8.2 g/dL    Albumin 3.6 3.4 - 5.0 g/dL    Globulin 2.6 2.0 - 4.0 g/dL    A-G Ratio 1.4 0.8 - 1.7       We reviewed the patient's labs from the last several visits to point out trends in the numbers          Patient Active Problem List   Diagnosis Code    Dyslipidemia E78.5    Hypovitaminosis D E55.9    BPH (benign prostatic hyperplasia) Dr. Brittani Arriola N40.0    Neuropathy Dr. Gali Martinez G62.9    Erectile dysfunction N52.9    Diverticulosis of large intestine without hemorrhage Dr Emmanuel Pope K57.30    GALILEA (obstructive sleep apnea) Dr Gali Martinez using oral appliance AHI 17.8 G47.33    Spinal stenosis, lumbar region with neurogenic claudication M48.062    Overweight (BMI 25.0-29. 9) E66.3    MDD (major depressive disorder), recurrent episode, mild (HCC) F33.0    Essential hypertension I10    GERD (gastroesophageal reflux disease) K21.9    Asthma J45.909    Anxiety F41.9    Hyperlipidemia E78.5    History of prostate cancer Z85.46     Assessment and plan:  1. Prostate ca. Per Dr Lee Cast  2. Dyslipidemia. Continue current regimen. 3.  Allergic rhinitis. Continue current  4. GERD. Continue current. 5.  ED.  cialis daily  6. HTN. Controlled on current regimen. 7.  Depression, anxiety, OCD.   Continue current and f/u Dr Ana Schilling  8. Hypovit D. Follow   9. GALILEA. Per neurology  10. Spinal stenosis. Continue current  11. shingrix advocated        RTC 2/23    Above conditions discussed at length and patient vocalized understanding. All questions answered to patient satisfaction          ICD-10-CM ICD-9-CM    1. Physical exam  Z00.00 V70.9 CBC W/O DIFF      METABOLIC PANEL, COMPREHENSIVE      LIPID PANEL   2. Screen for colon cancer  Z12.11 V76.51 REFERRAL TO GASTROENTEROLOGY   3. Anxiety  F41.9 300.00    4. Diverticulosis of large intestine without hemorrhage Dr Lindsay Ren  K57.30 562.10    5. Dyslipidemia  E78.5 272.4    6. Essential hypertension  I10 401.9    7. Gastroesophageal reflux disease without esophagitis  K21.9 530.81    8. History of prostate cancer  Z85.46 V10.46    9. Hyperlipidemia, unspecified hyperlipidemia type  E78.5 272.4    10. MDD (major depressive disorder), recurrent episode, mild (HCC)  F33.0 296.31    11.  GALILEA (obstructive sleep apnea) Dr Tommie Sandy using oral appliance AHI 17.8  G47.33 327.23

## 2022-02-28 ENCOUNTER — OFFICE VISIT (OUTPATIENT)
Dept: INTERNAL MEDICINE CLINIC | Age: 72
End: 2022-02-28
Payer: COMMERCIAL

## 2022-02-28 VITALS
BODY MASS INDEX: 22.88 KG/M2 | RESPIRATION RATE: 16 BRPM | TEMPERATURE: 97.9 F | SYSTOLIC BLOOD PRESSURE: 109 MMHG | OXYGEN SATURATION: 99 % | WEIGHT: 184 LBS | DIASTOLIC BLOOD PRESSURE: 64 MMHG | HEART RATE: 60 BPM | HEIGHT: 75 IN

## 2022-02-28 DIAGNOSIS — F33.0 MDD (MAJOR DEPRESSIVE DISORDER), RECURRENT EPISODE, MILD (HCC): ICD-10-CM

## 2022-02-28 DIAGNOSIS — Z12.11 SCREEN FOR COLON CANCER: ICD-10-CM

## 2022-02-28 DIAGNOSIS — E78.5 HYPERLIPIDEMIA, UNSPECIFIED HYPERLIPIDEMIA TYPE: ICD-10-CM

## 2022-02-28 DIAGNOSIS — K57.30 DIVERTICULOSIS OF LARGE INTESTINE WITHOUT HEMORRHAGE: ICD-10-CM

## 2022-02-28 DIAGNOSIS — K21.9 GASTROESOPHAGEAL REFLUX DISEASE WITHOUT ESOPHAGITIS: ICD-10-CM

## 2022-02-28 DIAGNOSIS — F41.9 ANXIETY: ICD-10-CM

## 2022-02-28 DIAGNOSIS — Z00.00 PHYSICAL EXAM: Primary | ICD-10-CM

## 2022-02-28 DIAGNOSIS — G47.33 OSA (OBSTRUCTIVE SLEEP APNEA): ICD-10-CM

## 2022-02-28 DIAGNOSIS — E78.5 DYSLIPIDEMIA: ICD-10-CM

## 2022-02-28 DIAGNOSIS — Z85.46 HISTORY OF PROSTATE CANCER: ICD-10-CM

## 2022-02-28 DIAGNOSIS — I10 ESSENTIAL HYPERTENSION: ICD-10-CM

## 2022-02-28 PROBLEM — M47.817 LUMBOSACRAL SPONDYLOSIS WITHOUT MYELOPATHY: Status: RESOLVED | Noted: 2021-05-13 | Resolved: 2022-02-28

## 2022-02-28 PROBLEM — M47.812 CERVICAL SPONDYLOSIS WITHOUT MYELOPATHY: Status: RESOLVED | Noted: 2019-02-06 | Resolved: 2022-02-28

## 2022-02-28 PROBLEM — T75.3XXA MOTION SICKNESS: Status: RESOLVED | Noted: 2019-02-06 | Resolved: 2022-02-28

## 2022-02-28 PROBLEM — C61 PROSTATE CANCER (HCC): Status: RESOLVED | Noted: 2021-04-02 | Resolved: 2022-02-28

## 2022-02-28 PROBLEM — C61 CARCINOMA OF PROSTATE (HCC): Status: ACTIVE | Noted: 2021-06-09

## 2022-02-28 PROBLEM — T75.3XXA MOTION SICKNESS: Status: ACTIVE | Noted: 2019-02-06

## 2022-02-28 PROBLEM — K80.20 SYMPTOMATIC CHOLELITHIASIS: Status: ACTIVE | Noted: 2021-08-02

## 2022-02-28 PROBLEM — R51.9 HEADACHE: Status: ACTIVE | Noted: 2019-02-06

## 2022-02-28 PROBLEM — R51.9 HEADACHE: Status: RESOLVED | Noted: 2019-02-06 | Resolved: 2022-02-28

## 2022-02-28 PROBLEM — K80.20 SYMPTOMATIC CHOLELITHIASIS: Status: RESOLVED | Noted: 2021-08-02 | Resolved: 2022-02-28

## 2022-02-28 PROBLEM — M47.812 CERVICAL SPONDYLOSIS WITHOUT MYELOPATHY: Status: ACTIVE | Noted: 2019-02-06

## 2022-02-28 PROCEDURE — 99397 PER PM REEVAL EST PAT 65+ YR: CPT | Performed by: INTERNAL MEDICINE

## 2022-02-28 NOTE — PROGRESS NOTES
Sabrina Childress presents with   Chief Complaint   Patient presents with   [facility] Physical    Hypertension    Labs     2-22-22          1. \"Have you been to the ER, urgent care clinic since your last visit? Hospitalized since your last visit? \" NO    2. \"Have you seen or consulted any other health care providers outside of the [address] [place] [address] since your last visit? \" NO    3. For patients aged 39-70: Has the patient had a colonoscopy? Yes - no Care Gap present     If the patient is female:    4. For patients aged 41-77: Has the patient had a mammogram within the past 2 years? NA - based on age    11. For patients aged 21-65: Has the patient had a pap smear?  NA - based on age

## 2022-03-14 DIAGNOSIS — I10 PRIMARY HYPERTENSION: ICD-10-CM

## 2022-03-14 RX ORDER — METOPROLOL TARTRATE 25 MG/1
TABLET, FILM COATED ORAL
Qty: 180 TABLET | Refills: 3 | Status: SHIPPED | OUTPATIENT
Start: 2022-03-14

## 2022-03-18 PROBLEM — Z85.46 HISTORY OF PROSTATE CANCER: Status: ACTIVE | Noted: 2021-06-09

## 2022-03-18 PROBLEM — F33.0 MDD (MAJOR DEPRESSIVE DISORDER), RECURRENT EPISODE, MILD (HCC): Status: ACTIVE | Noted: 2018-04-05

## 2022-03-18 PROBLEM — M48.062 SPINAL STENOSIS, LUMBAR REGION WITH NEUROGENIC CLAUDICATION: Status: ACTIVE | Noted: 2017-11-06

## 2022-03-18 PROBLEM — E66.3 OVERWEIGHT (BMI 25.0-29.9): Status: ACTIVE | Noted: 2018-02-12

## 2022-03-19 PROBLEM — I10 ESSENTIAL HYPERTENSION: Status: ACTIVE | Noted: 2019-02-19

## 2022-03-23 DIAGNOSIS — N40.1 BENIGN PROSTATIC HYPERPLASIA WITH LOWER URINARY TRACT SYMPTOMS, SYMPTOM DETAILS UNSPECIFIED: ICD-10-CM

## 2022-03-23 DIAGNOSIS — N52.9 ERECTILE DYSFUNCTION, UNSPECIFIED ERECTILE DYSFUNCTION TYPE: ICD-10-CM

## 2022-03-24 RX ORDER — TADALAFIL 5 MG/1
TABLET ORAL
Qty: 90 TABLET | Refills: 3 | Status: SHIPPED | OUTPATIENT
Start: 2022-03-24

## 2022-03-29 ENCOUNTER — HOSPITAL ENCOUNTER (OUTPATIENT)
Dept: LAB | Age: 72
Discharge: HOME | End: 2022-03-29
Payer: COMMERCIAL

## 2022-03-29 ENCOUNTER — APPOINTMENT (OUTPATIENT)
Dept: INTERNAL MEDICINE CLINIC | Age: 72
End: 2022-03-29

## 2022-03-29 DIAGNOSIS — Z00.00 PHYSICAL EXAM: ICD-10-CM

## 2022-03-29 LAB
ALBUMIN SERPL-MCNC: 3.7 G/DL (ref 3.4–5)
ALBUMIN/GLOB SERPL: 1.3 {RATIO} (ref 0.8–1.7)
ALP SERPL-CCNC: 96 U/L (ref 45–117)
ALT SERPL-CCNC: 33 U/L (ref 16–61)
ANION GAP SERPL CALC-SCNC: 4 MMOL/L (ref 3–18)
AST SERPL-CCNC: 26 U/L (ref 10–38)
BILIRUB SERPL-MCNC: 0.5 MG/DL (ref 0.2–1)
BUN SERPL-MCNC: 16 MG/DL (ref 7–18)
BUN/CREAT SERPL: 14 (ref 12–20)
CALCIUM SERPL-MCNC: 9.5 MG/DL (ref 8.5–10.1)
CHLORIDE SERPL-SCNC: 105 MMOL/L (ref 100–111)
CHOLEST SERPL-MCNC: 121 MG/DL
CO2 SERPL-SCNC: 33 MMOL/L (ref 21–32)
CREAT SERPL-MCNC: 1.12 MG/DL (ref 0.6–1.3)
ERYTHROCYTE [DISTWIDTH] IN BLOOD BY AUTOMATED COUNT: 13 % (ref 11.6–14.5)
GLOBULIN SER CALC-MCNC: 2.8 G/DL (ref 2–4)
GLUCOSE SERPL-MCNC: 78 MG/DL (ref 74–99)
HCT VFR BLD AUTO: 44.4 % (ref 36–48)
HDLC SERPL-MCNC: 47 MG/DL (ref 40–60)
HDLC SERPL: 2.6 {RATIO} (ref 0–5)
HGB BLD-MCNC: 14.4 G/DL (ref 13–16)
LDLC SERPL CALC-MCNC: 57 MG/DL (ref 0–100)
LIPID PROFILE,FLP: NORMAL
MCH RBC QN AUTO: 30.6 PG (ref 24–34)
MCHC RBC AUTO-ENTMCNC: 32.4 G/DL (ref 31–37)
MCV RBC AUTO: 94.5 FL (ref 78–100)
NRBC # BLD: 0 K/UL (ref 0–0.01)
NRBC BLD-RTO: 0 PER 100 WBC
PLATELET # BLD AUTO: 193 K/UL (ref 135–420)
PMV BLD AUTO: 10.1 FL (ref 9.2–11.8)
POTASSIUM SERPL-SCNC: 4.4 MMOL/L (ref 3.5–5.5)
PROT SERPL-MCNC: 6.5 G/DL (ref 6.4–8.2)
RBC # BLD AUTO: 4.7 M/UL (ref 4.35–5.65)
SODIUM SERPL-SCNC: 142 MMOL/L (ref 136–145)
TRIGL SERPL-MCNC: 85 MG/DL (ref ?–150)
VLDLC SERPL CALC-MCNC: 17 MG/DL
WBC # BLD AUTO: 4.9 K/UL (ref 4.6–13.2)

## 2022-03-29 PROCEDURE — 80053 COMPREHEN METABOLIC PANEL: CPT

## 2022-03-29 PROCEDURE — 36415 COLL VENOUS BLD VENIPUNCTURE: CPT

## 2022-03-29 PROCEDURE — 80061 LIPID PANEL: CPT

## 2022-03-29 PROCEDURE — 85027 COMPLETE CBC AUTOMATED: CPT

## 2022-03-29 RX ORDER — FLUTICASONE PROPIONATE 50 MCG
SPRAY, SUSPENSION (ML) NASAL
Qty: 48 G | Refills: 3 | Status: SHIPPED | OUTPATIENT
Start: 2022-03-29 | End: 2022-11-01

## 2022-03-31 ENCOUNTER — TELEPHONE (OUTPATIENT)
Dept: INTERNAL MEDICINE CLINIC | Age: 72
End: 2022-03-31

## 2022-04-14 ENCOUNTER — OFFICE VISIT (OUTPATIENT)
Dept: CARDIOLOGY CLINIC | Age: 72
End: 2022-04-14
Payer: COMMERCIAL

## 2022-04-14 VITALS
HEIGHT: 75 IN | BODY MASS INDEX: 23.25 KG/M2 | OXYGEN SATURATION: 97 % | HEART RATE: 57 BPM | WEIGHT: 187 LBS | SYSTOLIC BLOOD PRESSURE: 114 MMHG | DIASTOLIC BLOOD PRESSURE: 72 MMHG

## 2022-04-14 DIAGNOSIS — I10 ESSENTIAL HYPERTENSION: Primary | ICD-10-CM

## 2022-04-14 DIAGNOSIS — G47.33 OSA (OBSTRUCTIVE SLEEP APNEA): ICD-10-CM

## 2022-04-14 DIAGNOSIS — E78.5 DYSLIPIDEMIA: ICD-10-CM

## 2022-04-14 PROCEDURE — 93000 ELECTROCARDIOGRAM COMPLETE: CPT | Performed by: INTERNAL MEDICINE

## 2022-04-14 PROCEDURE — 99214 OFFICE O/P EST MOD 30 MIN: CPT | Performed by: INTERNAL MEDICINE

## 2022-04-14 RX ORDER — CHOLECALCIFEROL TAB 125 MCG (5000 UNIT) 125 MCG
5000 TAB ORAL DAILY
COMMUNITY

## 2022-04-14 RX ORDER — SUCRALFATE 1 G/1
1 TABLET ORAL
COMMUNITY
End: 2022-05-26

## 2022-04-14 RX ORDER — BUPROPION HYDROCHLORIDE 300 MG/1
300 TABLET ORAL DAILY
COMMUNITY

## 2022-04-14 RX ORDER — DIAZEPAM 2 MG/1
TABLET ORAL
COMMUNITY
Start: 2022-04-07

## 2022-04-14 NOTE — PROGRESS NOTES
Fidel Florez presents today for   Chief Complaint   Patient presents with    Follow-up     1 year follow up       Fidel Florez preferred language for health care discussion is english/other. Is someone accompanying this pt? no    Is the patient using any DME equipment during [address]? no    Depression Screening:  3 most recent PHQ Screens 4/14/2022   Little interest or pleasure in doing things Not at all   Feeling down, depressed, irritable, or hopeless Not at all   Total Score PHQ 2 0       Learning Assessment:  Learning Assessment 4/14/2022   PRIMARY LEARNER Patient   HIGHEST LEVEL OF EDUCATION - PRIMARY LEARNER  -   BARRIERS PRIMARY LEARNER -   CO-LEARNER CAREGIVER -   PRIMARY LANGUAGE ENGLISH   LEARNER PREFERENCE PRIMARY DEMONSTRATION   ANSWERED BY patient   RELATIONSHIP SELF       Abuse Screening:  Abuse Screening Questionnaire 4/14/2022   Do you ever feel afraid of your partner? N   Are you in a relationship with someone who physically or mentally threatens you? N   Is it safe for you to go home? Y       Fall Risk  Fall Risk Assessment, last 12 mths 4/14/2022   Able to walk? Yes   Fall in past 12 months? 0   Do you feel unsteady? 0   Are you worried about falling 0   Number of falls in past 12 months -   Fall with injury? -           Pt currently taking Anticoagulant therapy? no    Pt currently taking Antiplatelet therapy ? ASA 81 mg once a day      Coordination of Care:  1. Have you been to the ER, urgent care clinic since your last visit? Hospitalized since your last visit? yes    2. Have you seen or consulted any other health care providers outside of the [address] [place] [address] since your last visit? Include any pap smears or colon screening.  no

## 2022-04-14 NOTE — PROGRESS NOTES
HISTORY OF PRESENT ILLNESS  Nikolay Paul is a 81 y.o. male. Follow-up  Pertinent negatives include no chest pain, no abdominal pain, no headaches and no shortness of breath. Patient presents for a follow-up office visit. Patient has a past medical history significant for dyslipidemia, more recently hypertension, and a very strong family for early coronary disease. The patient was seen in the ER at [facility] at the beginning of July 2019 for somewhat atypical chest pain. His initial EKG was normal.  He was ruled out for myocardial infarction and underwent a exercise stress echocardiogram the following day which was a normal and low risk study. He exercised for 10 minutes using the Dilip protocol achieving a total workload of 12.8 METS. Patient's chest pain was ultimately determined to be likely musculoskeletal in nature. In September 2019, he was found to have new EKG changes in our office with anterolateral T wave inversions, but no anginal type symptoms. He did mention intermittent pleuritic chest pain which has been ongoing for several months. As a result, he underwent an echocardiogram at the end of September 2019, demonstrating preserved LV systolic function, EF 55 to 60% with no valvular heart disease, and a mildly dilated aortic root, and normal PA pressures. Patient was last seen in our office 1 year ago. Since last visit, he was diagnosed with prostate cancer and underwent a laparoscopic prostatectomy in April 2021. He later developed what sounds like cholangitis and sepsis and ultimately underwent a laparoscopic cholecystectomy in August 2021. He has since recovered from all his surgery, and he is feeling back to baseline. He denies any exertional dyspnea or chest pain. No major change in his activity level. No leg swelling, orthopnea or claudication.     Past Medical History:   Diagnosis Date    Allergic rhinitis     Dr Trinity Gould; never took immunotherapy    Anxiety saw Dr Aquilino Franco; pfts show no obstruction but high RV    BCC (basal cell carcinoma of skin) 2013    Dr. Deb Sibley s/p resection 2 spots    BPH (benign prostatic hyperplasia)     Dr. Sadaf Beaver; on proscar for years    Degenerative arthritis of lumbar spine 03/2014    MRI (3/14) showed scoliosis w multilevel degen findings, mod L3-4, L4-5 central stenosis, mod L5-S1 foraminal stenosis;  (10/17) severe L3-4 central stenosis; Dr Pastor Dc Depression     and anxiety; paxil 2008? lexapro and wellbutrin til 2018; tried prozac; saw Dr Vitor Medrano then Dr Surendra Latif now Dr Giancarlo Ramsey Dyslipidemia     ED (erectile dysfunction)     ICI with penile trauma; JOVANNA not helpful; peak performance unsuccessful    FHx: heart disease     GERD (gastroesophageal reflux disease)     s/p dilation 2003 Dr. Bernadine Puentes; egd 2015; Dr Bernadine Puentes egd 12/20 dilation, hiatal hernia    H/O cardiovascular stress test     ETT neg (8/09); [facility] neg stress echo (6/19)    H/O echocardiogram     nl lv, ef 55%, no wma or valvular dz (9/19)    Heel spur     Dr Darinel Dotson Hypertension 02/2019    24 hr ambulatory monitoring Dr Jeanie Bradford    Hypovitaminosis D     GALILEA (obstructive sleep apnea)     intol cpap Dr Kiley Saucedo 2015; using oral appliance Dr Maria R Adan; AHI 17.8 min desats 83    Overweight (BMI 25.0-29.9) 2/12/2018    Peripheral neuropathy     and B CTS on EMG Dr. Krupa Harris [facility]) 04/16/2021    Dr Charlet Sandifer; mira 3+3; s/p RALP, BLND Dr Kady Saavedra Pulmonary nodules 2011    bilat upper lobes; no change 2018; noted again [facility] 6/19    Scoliosis     Dr Oseas Trevino 2014    Venous insufficiency 12/2021      Current Outpatient Medications   Medication Sig Dispense Refill    buPROPion XL (WELLBUTRIN XL) 300 mg XL tablet Take 300 mg by mouth daily.  sucralfate (Carafate) 1 gram tablet Take 1 g by mouth two (2) times daily as needed.       cholecalciferol (VITAMIN D3) (5000 Units/125 mcg) tab tablet Take 5,000 Units by mouth daily.      fish oil-omega-3 fatty acids 340-1,000 mg capsule Take 1 Capsule by mouth daily.  diazePAM (VALIUM) 2 mg tablet TAKE 4 AND A HALF TABLET BY MOUTH EVERY DAY      fluticasone propionate (FLONASE) 50 mcg/actuation nasal spray SHAKE LIQUID AND USE 2 SPRAYS IN EACH NOSTRIL DAILY 48 g 3    tadalafiL (CIALIS) 5 mg tablet TAKE ONE TABLET BY MOUTH DAILY 90 Tablet 3    metoprolol tartrate (LOPRESSOR) 25 mg tablet TAKE 1 TABLET BY MOUTH TWICE DAILY 180 Tablet 3    Dexilant 60 mg CpDB capsule (delayed release) Take 60 mg by mouth daily.  ARIPiprazole (Abilify) 10 mg tablet Take 1 Tablet by mouth daily.  rosuvastatin (CRESTOR) 10 mg tablet rosuvastatin 10 mg tablet   TAKE 1 TABLET BY MOUTH EVERY NIGHT      vilazodone (Viibryd) 40 mg tab tablet Take  by mouth daily.  gabapentin (NEURONTIN) 600 mg tablet Take 1 Tablet by mouth three (3) times daily. Max Daily Amount: 1,800 mg. Indications: neuropathic pain 270 Tablet 0    triamterene-hydroCHLOROthiazide (DYAZIDE) 37.5-25 mg per capsule TAKE 1 CAPSULE BY MOUTH DAILY 90 Capsule 5    acetaminophen (TylenoL) 325 mg tablet Take 325 mg by mouth every four (4) hours as needed for Pain.  Combigan 0.2-0.5 % drop ophthalmic solution INSTILL 1 DROP INTO RIGHT EYE TWICE DAILY      SYMBICORT 160-4.5 mcg/actuation HFAA INHALE 2 PUFFS BY MOUTH TWICE DAILY 3 Inhaler 3    latanoprost (XALATAN) 0.005 % ophthalmic solution OMAR 1 GTT INTO RIGHT EYE HS  3    albuterol (PROAIR HFA) 90 mcg/actuation inhaler Take 2 Puffs by inhalation every four (4) hours as needed for Wheezing. 3 Inhaler 3    ascorbic acid (VITAMIN C) 500 mg tablet Take 2 tablets by mouth daily      aspirin delayed-release 81 mg tablet Take  by mouth daily.  Gluc-Scar-MSM#5-I-Ljzs-Mike-Bor (OSTEO BI-FLEX) 750-625-30 mg Tab Take 1 Tablet by mouth two (2) times a day.          Allergies   Allergen Reactions    Bee Pollen Sneezing     Seasonal allergies    Cefdinir Nausea and Vomiting    Heparin Rash    Naproxen Other (comments)     elev cr 2/19    Niacin Other (comments)     Flushing      Norvasc [Amlodipine] Swelling      Social History     Tobacco Use    Smoking status: Former Smoker     Packs/day: 0.25     Years: 2.00     Pack years: 0.50    Smokeless tobacco: Never Used    Tobacco comment: reports occasional use as a teenager   Vaping Use    Vaping Use: Never used   Substance Use Topics    Alcohol use: Yes     Alcohol/week: 1.0 standard drink     Types: 1 Glasses of wine per week     Comment: 1-2 glasses of wine a month    Drug use: No     Family History   Problem Relation Age of Onset    Cancer Mother         leukemia    Heart Disease Father     Psychiatric Disorder Daughter         depression         Review of Systems   Constitutional: Negative for chills, fever and weight loss. HENT: Negative for nosebleeds. Eyes: Negative for blurred vision and double vision. Respiratory: Negative for cough, shortness of breath and wheezing. Cardiovascular: Negative for chest pain, palpitations, orthopnea, claudication, leg swelling and PND. Gastrointestinal: Negative for abdominal pain, heartburn, nausea and vomiting. Genitourinary: Negative for dysuria and hematuria. Musculoskeletal: Negative for falls and myalgias. Skin: Negative for rash. Neurological: Negative for dizziness, focal weakness and headaches. Endo/Heme/Allergies: Does not bruise/bleed easily. Psychiatric/Behavioral: Negative for substance abuse. Visit Vitals  /72 (BP 1 Location: Left upper arm, BP Patient Position: Sitting, BP Cuff Size: Small adult)   Pulse (!) 57   Ht 6' 3\" (1.905 m)   Wt 84.8 kg (187 lb)   SpO2 97%   BMI 23.37 kg/m²      Physical Exam  Constitutional:       Appearance: He is well-developed. HENT:      Head: Normocephalic and atraumatic. Eyes:      Conjunctiva/sclera: Conjunctivae normal.   Neck:      Vascular: No carotid bruit or JVD.    Cardiovascular: Rate and Rhythm: Regular rhythm. Bradycardia present. Pulses: Normal pulses. Heart sounds: S1 normal and S2 normal. No murmur heard. No gallop. No S3 sounds. Pulmonary:      Breath sounds: Normal breath sounds. No wheezing or rales. Abdominal:      General: Bowel sounds are normal.      Palpations: Abdomen is soft. Tenderness: There is no abdominal tenderness. Musculoskeletal:      Cervical back: Neck supple. Skin:     General: Skin is warm and dry. Neurological:      Mental Status: He is alert and oriented to person, place, and time. EKG: Sinus bradycardia with sinus arrhythmia. Normal axis, normal QTc interval.  No ST or T wave abnormalities concerning for ischemia. Compared to the previous EKG, no significant overall change. ASSESSMENT and PLAN      Dyslipidemia. The patient is now taking rosuvastatin 10 mg daily. This is followed by his PCP. Historically has been well controlled. His most recent LDL was 80. Essential hypertension. Patient's blood pressure has been excellent on his current medical regimen  Which includes a beta-blocker and a diuretic, both of which I would continue. Obstructive sleep apnea. Patient could not tolerate multiple CPAP, so now uses a oral device. He continues to follow-up with his sleep specialist.    Followup annually, sooner if needed.

## 2022-04-24 RX ORDER — ROSUVASTATIN CALCIUM 10 MG/1
TABLET, COATED ORAL
Qty: 90 TABLET | Refills: 3 | Status: SHIPPED | OUTPATIENT
Start: 2022-04-24

## 2022-06-09 ENCOUNTER — OFFICE VISIT (OUTPATIENT)
Dept: ORTHOPEDIC SURGERY | Age: 72
End: 2022-06-09
Payer: COMMERCIAL

## 2022-06-09 VITALS
TEMPERATURE: 98.1 F | WEIGHT: 184 LBS | OXYGEN SATURATION: 97 % | RESPIRATION RATE: 16 BRPM | BODY MASS INDEX: 22.88 KG/M2 | HEIGHT: 75 IN | HEART RATE: 61 BPM

## 2022-06-09 DIAGNOSIS — M53.3 SI (SACROILIAC) JOINT DYSFUNCTION: ICD-10-CM

## 2022-06-09 DIAGNOSIS — M54.16 LUMBAR RADICULOPATHY: ICD-10-CM

## 2022-06-09 DIAGNOSIS — M47.816 SPONDYLOSIS OF LUMBAR REGION WITHOUT MYELOPATHY OR RADICULOPATHY: ICD-10-CM

## 2022-06-09 DIAGNOSIS — M48.062 SPINAL STENOSIS, LUMBAR REGION WITH NEUROGENIC CLAUDICATION: ICD-10-CM

## 2022-06-09 PROCEDURE — 1123F ACP DISCUSS/DSCN MKR DOCD: CPT | Performed by: NURSE PRACTITIONER

## 2022-06-09 PROCEDURE — 99213 OFFICE O/P EST LOW 20 MIN: CPT | Performed by: NURSE PRACTITIONER

## 2022-06-09 RX ORDER — GABAPENTIN 600 MG/1
600 TABLET ORAL 3 TIMES DAILY
Qty: 270 TABLET | Refills: 1 | Status: SHIPPED | OUTPATIENT
Start: 2022-06-09

## 2022-06-09 NOTE — PROGRESS NOTES
Chief complaint   Chief Complaint   Patient presents with    Follow-up       History of Present Illness:  Darwin Stewart is a  81 y.o.  male has chronic low back pain. He was getting some numbness into the right hip area. He reports his wife gets injections with Dr. Pacheco Salazar and when he was complaining from there he will talk to him about his own stuff. He states he ended up getting an MRI that Dr. Maribeth Leung ordered and underwent an epidural steroid injection about 3 weeks ago which did help in that left numbness is no longer numb. He is supposed to undergo a repeat epidural steroid injection in the near future. The MRI did show severe central canal stenosis at L4-5 with right lateral recess stenosis with impingement on the right L5 nerve root. It also showed scoliosis which patient does have. He states he will be starting Schroth therapy for his scoliosis at the [facility] in [place]. His pain started 1 to 2 weeks. He states he spoke to him about a spinal cord stimulator and he went to an educational class regarding lab but does not wish to have that done. He continues to take gabapentin 600 mg 3 times a day. He does feel like it is helpful and has not tried to get off of it. He denies fever bowel bladder dysfunction. He is retired. He is a non-smoker. Physical Exam: The patient is a 68-year-old male well-developed well-nourished who is alert and oriented with a normal mood and affect. He has a full weightbearing nonantalgic gait. Gait is a little bit slow but steady. He does not use any assist device. He has 4 out of 5 strength bilateral lower extremities. Negative straight leg raise. Assessment and Plan: This is a patient has scoliosis, lumbar spondylosis and spinal stenosis. He is going to follow-up for repeat epidural steroid injection with Dr. Maribeth Leung. I have refilled his gabapentin.   We will see him back in 6 months sooner if needed. Medications:  Current Outpatient Medications   Medication Sig Dispense Refill    gabapentin (NEURONTIN) 600 mg tablet Take 1 Tablet by mouth three (3) times daily. Max Daily Amount: 1,800 mg. Indications: neuropathic pain 270 Tablet 1    rosuvastatin (CRESTOR) 10 mg tablet TAKE 1 TABLET BY MOUTH EVERY NIGHT 90 Tablet 3    buPROPion XL (WELLBUTRIN XL) 300 mg XL tablet Take 300 mg by mouth daily.  cholecalciferol (VITAMIN D3) (5000 Units/125 mcg) tab tablet Take 5,000 Units by mouth daily.  fish oil-omega-3 fatty acids 340-1,000 mg capsule Take 1 Capsule by mouth daily.  diazePAM (VALIUM) 2 mg tablet TAKE 4 AND A HALF TABLET BY MOUTH EVERY DAY      fluticasone propionate (FLONASE) 50 mcg/actuation nasal spray SHAKE LIQUID AND USE 2 SPRAYS IN EACH NOSTRIL DAILY 48 g 3    tadalafiL (CIALIS) 5 mg tablet TAKE ONE TABLET BY MOUTH DAILY 90 Tablet 3    metoprolol tartrate (LOPRESSOR) 25 mg tablet TAKE 1 TABLET BY MOUTH TWICE DAILY 180 Tablet 3    Dexilant 60 mg CpDB capsule (delayed release) Take 60 mg by mouth daily.  ARIPiprazole (Abilify) 10 mg tablet Take 1 Tablet by mouth daily.  vilazodone (Viibryd) 40 mg tab tablet Take  by mouth daily.  triamterene-hydroCHLOROthiazide (DYAZIDE) 37.5-25 mg per capsule TAKE 1 CAPSULE BY MOUTH DAILY 90 Capsule 5    acetaminophen (TylenoL) 325 mg tablet Take 325 mg by mouth every four (4) hours as needed for Pain.  Combigan 0.2-0.5 % drop ophthalmic solution INSTILL 1 DROP INTO RIGHT EYE TWICE DAILY      SYMBICORT 160-4.5 mcg/actuation HFAA INHALE 2 PUFFS BY MOUTH TWICE DAILY 3 Inhaler 3    latanoprost (XALATAN) 0.005 % ophthalmic solution OMAR 1 GTT INTO RIGHT EYE HS  3    albuterol (PROAIR HFA) 90 mcg/actuation inhaler Take 2 Puffs by inhalation every four (4) hours as needed for Wheezing.  3 Inhaler 3    ascorbic acid (VITAMIN C) 500 mg tablet Take 2 tablets by mouth daily      aspirin delayed-release 81 mg tablet Take  by mouth daily.  Gluc-Scar-MSM#0-P-Tiyy-Mike-Bor (OSTEO BI-FLEX) 750-625-30 mg Tab Take 1 Tablet by mouth two (2) times a day. Review of systems:    Past Medical History:   Diagnosis Date    Allergic rhinitis     Dr Jaron Lopez; never took immunotherapy    Anxiety     saw Dr Ale Rob; pfts show no obstruction but high RV    BCC (basal cell carcinoma of skin) 2013    Dr. Jose Salgado s/p resection 2 spots    BPH (benign prostatic hyperplasia)     Dr. Marietta Good; on proscar for years    Degenerative arthritis of lumbar spine 03/2014    MRI (3/14) showed scoliosis w multilevel degen findings, mod L3-4, L4-5 central stenosis, mod L5-S1 foraminal stenosis;  (10/17) severe L3-4 central stenosis; Dr Stacy Eckert Depression     and anxiety; paxil 2008?  lexapro and wellbutrin til 2018; tried prozac; saw Dr Eagle Graham then Dr Yoli Andrew now Dr Yohannes Aguillon Dyslipidemia     ED (erectile dysfunction)     ICI with penile trauma; JOVANNA not helpful; peak performance unsuccessful    FHx: heart disease     GERD (gastroesophageal reflux disease)     s/p dilation 2003 Dr. Amanda Damon; egd 2015; Dr Amanda Damon egd 12/20 dilation, hiatal hernia    H/O cardiovascular stress test     ETT neg (8/09); [facility] neg stress echo (6/19)    H/O echocardiogram     nl lv, ef 55%, no wma or valvular dz (9/19)    Heel spur     Dr Hyman Riedel Hypertension 02/2019    24 hr ambulatory monitoring Dr Yakelin Gracia    Hypovitaminosis D     GALILEA (obstructive sleep apnea)     intol cpap Dr Lisa Brink 2015; using oral appliance Dr Truong Escobar; AHI 17.8 min desats 83    Overweight (BMI 25.0-29.9) 2/12/2018    Peripheral neuropathy     and B CTS on EMG Dr. Olman Larkin Personal history of prostate cancer     Prostate cancer ([facility] [address] 04/16/2021    Dr Humphrey Sanders; mira 3+3; s/p RALP, BLND Dr Mabel Bro Pulmonary nodules 2011    bilat upper lobes; no change 2018; noted again [facility] 6/19    Scoliosis     Dr Paz Hernandez 2014    Venous insufficiency 12/2021     Past Surgical History:   Procedure Laterality Date    HX CARPAL TUNNEL RELEASE Bilateral 2007    HX COLONOSCOPY      Dr. Wendy Espitia mild diverticulosis 2000, 6/12    HX LAP CHOLECYSTECTOMY  08/2021    Dr Audra Ramírez    HX Helene Daniels t score 4.2 spine, 0.2 hip (2012)    HX RADICAL PROSTATECTOMY  04/2021    Dr Jaky Lopez DVP    IA CHEST SURGERY PROCEDURE UNLISTED  07/2018    Dr Anette Sims; FEV1 96%, 5% improvement postbd, ratio 104, , , DLCO 82     Social History     Socioeconomic History    Marital status:      Spouse name: Not on file    Number of children: 2    Years of education: Not on file    Highest education level: Not on file   Occupational History    Occupation: ret [employer]   Tobacco Use    Smoking status: Former Smoker     Packs/day: 0.25     Years: 2.00     Pack years: 0.50    Smokeless tobacco: Never Used    Tobacco comment: reports occasional use as a teenager   Vaping Use    Vaping Use: Never used   Substance and Sexual Activity    Alcohol use: Yes     Alcohol/week: 1.0 standard drink     Types: 1 Glasses of wine per week     Comment: 1-2 glasses of wine a month    Drug use: No    Sexual activity: Yes     Partners: Female     Birth control/protection: None   Other Topics Concern    Not on file   Social History Narrative    Not on file     Social Determinants of Health     Financial Resource Strain:     Difficulty of Paying Living Expenses: Not on file   Food Insecurity:     Worried About Running Out of Food in the Last Year: Not on file    Barney of Food in the Last Year: Not on file   Transportation Needs:     Lack of Transportation (Medical): Not on file    Lack of Transportation (Non-Medical):  Not on file   Physical Activity:     Days of Exercise per Week: Not on file    Minutes of Exercise per Session: Not on file   Stress:     Feeling of Stress : Not on file   Social Connections:     Frequency of Communication with Friends and Family: Not on file    Frequency of Social Gatherings with Friends and Family: Not on file    Attends Baptism Services: Not on file    Active Member of Clubs or Organizations: Not on file    Attends Club or Organization Meetings: Not on file    Marital Status: Not on file   Intimate Partner Violence:     Fear of Current or Ex-Partner: Not on file    Emotionally Abused: Not on file    Physically Abused: Not on file    Sexually Abused: Not on file   Housing Stability:     Unable to Pay for Housing in the Last Year: Not on file    Number of [place] in the Last Year: Not on file    Unstable Housing in the Last Year: Not on file     Family History   Problem Relation Age of Onset    Cancer Mother         leukemia    Heart Disease Father     Psychiatric Disorder Daughter         depression       Physical Exam:  Visit Vitals  Pulse 61   Temp 98.1 °F (36.7 °C) (Temporal)   Resp 16   Ht 6' 3\" (1.905 m)   Wt 184 lb (83.5 kg)   SpO2 97%   BMI 23.00 kg/m²     Pain Scale: 0 - No pain/10       has been . reviewed and is appropriate          Diagnoses and all orders for this visit:    1. Spinal stenosis, lumbar region with neurogenic claudication  -     gabapentin (NEURONTIN) 600 mg tablet; Take 1 Tablet by mouth three (3) times daily. Max Daily Amount: 1,800 mg. Indications: neuropathic pain    2. Spondylosis of lumbar region without myelopathy or radiculopathy  -     gabapentin (NEURONTIN) 600 mg tablet; Take 1 Tablet by mouth three (3) times daily. Max Daily Amount: 1,800 mg. Indications: neuropathic pain    3. Lumbar radiculopathy  -     gabapentin (NEURONTIN) 600 mg tablet; Take 1 Tablet by mouth three (3) times daily. Max Daily Amount: 1,800 mg. Indications: neuropathic pain    4. SI (sacroiliac) joint dysfunction            Follow-up and Dispositions    · Return in about 6 months (around 12/9/2022) for with NP Faith.              We have informed Jus Seen to notify us for immediate appointment if he has any worsening neurogical symptoms or if an emergency situation presents, then call 911    Please note that this dictation was completed with [employer], the computer voice recognition software. Quite often unanticipated grammatical, syntax, homophones, and other interpretive errors are inadvertently transcribed by the computer software. Please disregard these errors. Please excuse any errors that have escaped final proofreading.

## 2022-08-11 DIAGNOSIS — I10 PRIMARY HYPERTENSION: ICD-10-CM

## 2022-08-11 RX ORDER — TRIAMTERENE AND HYDROCHLOROTHIAZIDE 37.5; 25 MG/1; MG/1
CAPSULE ORAL
Qty: 90 CAPSULE | Refills: 5 | Status: SHIPPED | OUTPATIENT
Start: 2022-08-11

## 2022-10-20 ENCOUNTER — OFFICE VISIT (OUTPATIENT)
Dept: ORTHOPEDIC SURGERY | Age: 72
End: 2022-10-20
Payer: COMMERCIAL

## 2022-10-20 VITALS
HEIGHT: 75 IN | TEMPERATURE: 97.9 F | OXYGEN SATURATION: 97 % | BODY MASS INDEX: 22.73 KG/M2 | HEART RATE: 58 BPM | WEIGHT: 182.8 LBS

## 2022-10-20 DIAGNOSIS — M25.552 BILATERAL HIP PAIN: Primary | ICD-10-CM

## 2022-10-20 DIAGNOSIS — M25.551 BILATERAL HIP PAIN: Primary | ICD-10-CM

## 2022-10-20 PROCEDURE — 99214 OFFICE O/P EST MOD 30 MIN: CPT | Performed by: NURSE PRACTITIONER

## 2022-10-20 PROCEDURE — 1123F ACP DISCUSS/DSCN MKR DOCD: CPT | Performed by: NURSE PRACTITIONER

## 2022-10-20 RX ORDER — METHYLPREDNISOLONE 4 MG/1
TABLET ORAL
Qty: 1 DOSE PACK | Refills: 0 | Status: SHIPPED | OUTPATIENT
Start: 2022-10-20

## 2022-10-20 NOTE — PROGRESS NOTES
Chief complaint   Chief Complaint   Patient presents with    Back Pain       History of Present Illness:  Letty Faulkner is a  81 y.o.  male who is usually seen here for chronic low back pain. He states he has been doing Schroth therapy for his scoliosis which involves using some home equipment causes his hips to abduct and abduct. He states he developed some bilateral hip pain right greater than left. He states he also got some bruising on his right lateral thigh that he thinks he must of busted a blood vessel from. He states he is a little bit better now but is still having some significant pain. He would like to have his hips evaluated. He does continue on gabapentin 600 mg 3 times a day for his back pain which is effective. He denies fever bowel bladder dysfunction. He denies any injuries. Physical Exam: Patient is a 72-year-old male well-developed well-nourished who is alert and oriented with a normal mood and affect. He has a full weightbearing nonantalgic gait. He did not use any assist device. He does have pain with internal rotation of the right hip and internal and external rotation of the left. Assessment and Plan: This is a patient who is having bilateral hip pain. I will refer him to Ortho to have them evaluated. In the meantime I will give him a Medrol Dosepak to see if we can calm down this pain he is having. He knows to take it with food not to take NSAIDs with it. Medications:  Current Outpatient Medications   Medication Sig Dispense Refill    methylPREDNISolone (Medrol, Percy,) 4 mg tablet Per dose pack instructions 1 Dose Pack 0    triamterene-hydroCHLOROthiazide (DYAZIDE) 37.5-25 mg per capsule TAKE 1 CAPSULE BY MOUTH DAILY 90 Capsule 5    gabapentin (NEURONTIN) 600 mg tablet Take 1 Tablet by mouth three (3) times daily. Max Daily Amount: 1,800 mg.  Indications: neuropathic pain 270 Tablet 1    rosuvastatin (CRESTOR) 10 mg tablet TAKE 1 TABLET BY MOUTH EVERY NIGHT 90 Tablet 3    buPROPion XL (WELLBUTRIN XL) 300 mg XL tablet Take 300 mg by mouth daily. cholecalciferol (VITAMIN D3) (5000 Units/125 mcg) tab tablet Take 5,000 Units by mouth daily. fish oil-omega-3 fatty acids 340-1,000 mg capsule Take 1 Capsule by mouth daily. diazePAM (VALIUM) 2 mg tablet TAKE 4 AND A HALF TABLET BY MOUTH EVERY DAY      fluticasone propionate (FLONASE) 50 mcg/actuation nasal spray SHAKE LIQUID AND USE 2 SPRAYS IN EACH NOSTRIL DAILY 48 g 3    tadalafiL (CIALIS) 5 mg tablet TAKE ONE TABLET BY MOUTH DAILY 90 Tablet 3    metoprolol tartrate (LOPRESSOR) 25 mg tablet TAKE 1 TABLET BY MOUTH TWICE DAILY 180 Tablet 3    Dexilant 60 mg CpDB capsule (delayed release) Take 60 mg by mouth daily. ARIPiprazole (ABILIFY) 10 mg tablet Take 1 Tablet by mouth daily. vilazodone (VIIBRYD) 40 mg tab tablet Take  by mouth daily. acetaminophen (TYLENOL) 325 mg tablet Take 325 mg by mouth every four (4) hours as needed for Pain. Combigan 0.2-0.5 % drop ophthalmic solution INSTILL 1 DROP INTO RIGHT EYE TWICE DAILY      SYMBICORT 160-4.5 mcg/actuation HFAA INHALE 2 PUFFS BY MOUTH TWICE DAILY 3 Inhaler 3    latanoprost (XALATAN) 0.005 % ophthalmic solution OMAR 1 GTT INTO RIGHT EYE HS  3    albuterol (PROAIR HFA) 90 mcg/actuation inhaler Take 2 Puffs by inhalation every four (4) hours as needed for Wheezing. 3 Inhaler 3    ascorbic acid, vitamin C, (VITAMIN C) 500 mg tablet Take 2 tablets by mouth daily      aspirin delayed-release 81 mg tablet Take  by mouth daily. Gluc-Scar-MSM#3-M-Gmoy-Mike-Bor 750-625-30 mg tab Take 1 Tablet by mouth two (2) times a day.              Review of systems:    Past Medical History:   Diagnosis Date    Allergic rhinitis     Dr Anne Hughes; never took immunotherapy    Anxiety     saw Dr Ermias Hope 2018    Asthma     Dr Jayce Capone; pfts show no obstruction but high RV    BCC (basal cell carcinoma of skin) 2013    Dr. Pauline Reyez s/p resection 2 spots    BPH (benign prostatic hyperplasia)     Dr. Pauline Billy; on proscar for years    Degenerative arthritis of lumbar spine 2014    MRI (3/14) showed scoliosis w multilevel degen findings, mod L3-4, L4-5 central stenosis, mod L5-S1 foraminal stenosis;  (10/17) severe L3-4 central stenosis; Dr Casey Grey    Depression     and anxiety; paxil ?  lexapro and wellbutrin til ; tried prozac; saw Dr Gerry Pierson then Dr Ab Amador now Dr Hernandez Erp    Diverticulosis 2022    Dr Radha Marinelli    Dyslipidemia     ED (erectile dysfunction)     ICI with penile trauma; JOVANNA not helpful; peak performance unsuccessful    FHx: heart disease     GERD (gastroesophageal reflux disease)     s/p dilation  Dr. Jen Bearden; egd ; Dr Jen Bearden egd  dilation, hiatal hernia    H/O cardiovascular stress test     ETT neg (); [facility] neg stress echo ()    H/O echocardiogram     nl lv, ef 55%, no wma or valvular dz ()    Heel spur     Dr Kelly Teresa    Hypertension 2019    24 hr ambulatory monitoring Dr Bharat Swartz    Hypovitaminosis D     GALILEA (obstructive sleep apnea)     intol cpap Dr Radha Billy ; using oral appliance Dr Jim Keenan; AHI 17.8 min desats 83    Overweight (BMI 25.0-29.9) 2018    Peripheral neuropathy     and B CTS on EMG Dr. Radha Billy    Personal history of prostate cancer     Prostate cancer ([facility] [address] 2021    Dr Haydee Drake; mira 3+3; s/p RALP, BLND Dr Ronaldo Soulier    Pulmonary nodules     bilat upper lobes; no change ; noted again [facility]     Scoliosis     Dr Armendariz  2014    Venous insufficiency 2021     Past Surgical History:   Procedure Laterality Date    HX CARPAL TUNNEL RELEASE Bilateral     HX COLONOSCOPY      Dr. Jen Bearden mild diverticulosis , ; 22    HX LAP CHOLECYSTECTOMY  2021    Dr Solo Wolfe ORTHOPAEDIC      DEXA t score 4.2 spine, 0.2 hip ()    HX RADICAL PROSTATECTOMY  2021    Dr Ronaldo Soulier DVP    AK CHEST SURGERY PROCEDURE UNLISTED  2018    Dr Buck Mercedes; FEV1 96%, 5% improvement postbd, ratio 104, , , DLCO 82     Social History     Socioeconomic History    Marital status:      Spouse name: Not on file    Number of children: 2    Years of education: Not on file    Highest education level: Not on file   Occupational History    Occupation: ret [employer]   Tobacco Use    Smoking status: Former     Packs/day: 0.25     Years: 2.00     Pack years: 0.50     Types: Cigarettes     Quit date:      Years since quittin.8    Smokeless tobacco: Never    Tobacco comments:     reports occasional use as a teenager   Vaping Use    Vaping Use: Never used   Substance and Sexual Activity    Alcohol use: Yes     Alcohol/week: 1.0 standard drink     Types: 1 Glasses of wine per week     Comment: 1-2 glasses of wine a month    Drug use: No    Sexual activity: Yes     Partners: Female     Birth control/protection: None   Other Topics Concern    Not on file   Social History Narrative    Not on file     Social Determinants of Health     Financial Resource Strain: Not on file   Food Insecurity: Not on file   Transportation Needs: Not on file   Physical Activity: Not on file   Stress: Not on file   Social Connections: Not on file   Intimate Partner Violence: Not on file   Housing Stability: Not on file     Family History   Problem Relation Age of Onset    Cancer Mother         leukemia    Heart Disease Father     Psychiatric Disorder Daughter         depression       Physical Exam:  Visit Vitals  Pulse (!) 58   Temp 97.9 °F (36.6 °C) (Temporal)   Ht 6' 3\" (1.905 m)   Wt 182 lb 12.8 oz (82.9 kg)   SpO2 97%   BMI 22.85 kg/m²     Pain Scale: 0 - No pain/10       has been . reviewed and is appropriate          Diagnoses and all orders for this visit:    1. Bilateral hip pain  -     methylPREDNISolone (MedrolPercy,) 4 mg tablet; Per dose pack instructions  -     REFERRAL TO ORTHOPEDICS          Follow-up and Dispositions    Return for He will keep his follow-up with me in December. .             We have informed Adele Vizcarra Lacy Burgos to notify us for immediate appointment if he has any worsening neurogical symptoms or if an emergency situation presents, then call 911    Please note that this dictation was completed with [employer], the computer voice recognition software. Quite often unanticipated grammatical, syntax, homophones, and other interpretive errors are inadvertently transcribed by the computer software. Please disregard these errors. Please excuse any errors that have escaped final proofreading.

## 2022-11-01 RX ORDER — FLUTICASONE PROPIONATE 50 MCG
SPRAY, SUSPENSION (ML) NASAL
Qty: 48 G | Refills: 3 | Status: SHIPPED | OUTPATIENT
Start: 2022-11-01

## 2022-11-17 ENCOUNTER — OFFICE VISIT (OUTPATIENT)
Dept: ORTHOPEDIC SURGERY | Age: 72
End: 2022-11-17
Payer: COMMERCIAL

## 2022-11-17 VITALS — HEIGHT: 75 IN | TEMPERATURE: 97.8 F | BODY MASS INDEX: 22.63 KG/M2 | WEIGHT: 182 LBS

## 2022-11-17 DIAGNOSIS — M54.12 CERVICAL RADICULOPATHY: ICD-10-CM

## 2022-11-17 DIAGNOSIS — M19.019 AC JOINT ARTHROPATHY: Primary | ICD-10-CM

## 2022-11-17 DIAGNOSIS — M47.812 CERVICAL SPONDYLOSIS: ICD-10-CM

## 2022-11-17 DIAGNOSIS — M25.511 RIGHT SHOULDER PAIN, UNSPECIFIED CHRONICITY: ICD-10-CM

## 2022-11-17 PROCEDURE — 73030 X-RAY EXAM OF SHOULDER: CPT | Performed by: ORTHOPAEDIC SURGERY

## 2022-11-17 PROCEDURE — 72040 X-RAY EXAM NECK SPINE 2-3 VW: CPT | Performed by: ORTHOPAEDIC SURGERY

## 2022-11-17 PROCEDURE — 99203 OFFICE O/P NEW LOW 30 MIN: CPT | Performed by: ORTHOPAEDIC SURGERY

## 2022-11-17 PROCEDURE — 20611 DRAIN/INJ JOINT/BURSA W/US: CPT | Performed by: ORTHOPAEDIC SURGERY

## 2022-11-17 PROCEDURE — 1123F ACP DISCUSS/DSCN MKR DOCD: CPT | Performed by: ORTHOPAEDIC SURGERY

## 2022-11-17 RX ORDER — TRIAMCINOLONE ACETONIDE 40 MG/ML
40 INJECTION, SUSPENSION INTRA-ARTICULAR; INTRAMUSCULAR ONCE
Status: COMPLETED | OUTPATIENT
Start: 2022-11-17 | End: 2022-11-17

## 2022-11-17 RX ORDER — BACLOFEN 10 MG/1
10 TABLET ORAL 2 TIMES DAILY
Qty: 60 TABLET | Refills: 1 | Status: SHIPPED | OUTPATIENT
Start: 2022-11-17

## 2022-11-17 RX ADMIN — TRIAMCINOLONE ACETONIDE 40 MG: 40 INJECTION, SUSPENSION INTRA-ARTICULAR; INTRAMUSCULAR at 09:48

## 2022-11-17 NOTE — PROGRESS NOTES
Kristal Gates  1950   No chief complaint on file. HISTORY OF PRESENT ILLNESS  Kristal Gates is a 81 y.o. male who presents today for evaluation of right shoulder pain. He rates his pain 6/10 today. Pain has been present for 1 month. Pain started after he was exercising and doing overhead exercises. Has since stopped doing his exercises but pain persists. Endorses some numbness and tingling at nighttime. He is having some pain localized around the [facility] joint. Has tried following treatments: Injections:NO; Brace:NO; Therapy:NO; Cane/Crutch:NO       Allergies   Allergen Reactions    Bee Pollen Sneezing     Seasonal allergies    Cefdinir Nausea and Vomiting    Heparin Rash    Naproxen Other (comments)     elev cr 2/19    Niacin Other (comments)     Flushing      Norvasc [Amlodipine] Swelling        Past Medical History:   Diagnosis Date    Allergic rhinitis     Dr Nikki Diallo; never took immunotherapy    Anxiety     saw Dr Mario Lopez 2018    Asthma     Dr Edmundo Wang; pfts show no obstruction but high RV    BCC (basal cell carcinoma of skin) 2013    Dr. Oviedo Begun s/p resection 2 spots    BPH (benign prostatic hyperplasia)     Dr. Shania Marroquin; on proscar for years    Degenerative arthritis of lumbar spine 03/2014    MRI (3/14) showed scoliosis w multilevel degen findings, mod L3-4, L4-5 central stenosis, mod L5-S1 foraminal stenosis;  (10/17) severe L3-4 central stenosis; Dr Casimiro Ferreira    Depression     and anxiety; paxil 2008?  lexapro and wellbutrin til 2018; tried prozac; saw Dr Gregorio Lo then Dr Anthony Esqueda now Dr Stephanie Renee    Diverticulosis 09/21/2022    Dr Cleo Ferreira    Dyslipidemia     ED (erectile dysfunction)     ICI with penile trauma; JOVANNA not helpful; peak performance unsuccessful    FHx: heart disease     GERD (gastroesophageal reflux disease)     s/p dilation 2003 Dr. Erica Meyers; egd 2015; Dr Erica Meyers egd 12/20 dilation, hiatal hernia    H/O cardiovascular stress test     ETT neg (8/09); [facility] neg stress echo (6/19)    H/O echocardiogram     nl lv, ef 55%, no wma or valvular dz ()    Heel spur     Dr Krupa Song    Hypertension 2019    24 hr ambulatory monitoring Dr Ivory Samuel    Hypovitaminosis D     GALILEA (obstructive sleep apnea)     intol cpap Dr Yesenia David ; using oral appliance Dr Rivera Neff; AHI 17.8 min desats 83    Overweight (BMI 25.0-29.9) 2018    Peripheral neuropathy     and B CTS on EMG Dr. Yesenia David    Personal history of prostate cancer     Prostate cancer ([facility] [address] 2021    Dr Duong Marroquin; mira 3+3; s/p RALP, BLND Dr Riya Velez    Pulmonary nodules     bilat upper lobes; no change ; noted again [facility]     Scoliosis     Dr Jo     Venous insufficiency 2021      Social History     Socioeconomic History    Marital status:      Spouse name: Not on file    Number of children: 2    Years of education: Not on file    Highest education level: Not on file   Occupational History    Occupation: ret [employer]   Tobacco Use    Smoking status: Former     Packs/day: 0.25     Years: 2.00     Pack years: 0.50     Types: Cigarettes     Quit date: 1972     Years since quittin.9    Smokeless tobacco: Never    Tobacco comments:     reports occasional use as a teenager   Vaping Use    Vaping Use: Never used   Substance and Sexual Activity    Alcohol use:  Yes     Alcohol/week: 1.0 standard drink     Types: 1 Glasses of wine per week     Comment: 1-2 glasses of wine a month    Drug use: No    Sexual activity: Yes     Partners: Female     Birth control/protection: None   Other Topics Concern    Not on file   Social History Narrative    Not on file     Social Determinants of Health     Financial Resource Strain: Not on file   Food Insecurity: Not on file   Transportation Needs: Not on file   Physical Activity: Not on file   Stress: Not on file   Social Connections: Not on file   Intimate Partner Violence: Not on file   Housing Stability: Not on file      Past Surgical History:   Procedure Laterality Date    HX CARPAL TUNNEL RELEASE Bilateral 2007    HX COLONOSCOPY      Dr. Vance Marie mild diverticulosis 2000, 6/12; 9/21/22    HX LAP CHOLECYSTECTOMY  08/2021    Dr [facility] Shone ORTHOPAEDIC      DEXA t score 4.2 spine, 0.2 hip (2012)    HX RADICAL PROSTATECTOMY  04/2021    Dr Arsalan Pittman DVP    [place] CHEST SURGERY PROCEDURE UNLISTED  07/2018    Dr David Mata; FEV1 96%, 5% improvement postbd, ratio 104, , , DLCO 80      Family History   Problem Relation Age of Onset    Cancer Mother         leukemia    Heart Disease Father     Psychiatric Disorder Daughter         depression      Current Outpatient Medications   Medication Sig    fluticasone propionate (FLONASE) 50 mcg/actuation nasal spray SHAKE LIQUID AND USE 2 SPRAYS IN EACH NOSTRIL DAILY    methylPREDNISolone (Medrol, Percy,) 4 mg tablet Per dose pack instructions    triamterene-hydroCHLOROthiazide (DYAZIDE) 37.5-25 mg per capsule TAKE 1 CAPSULE BY MOUTH DAILY    gabapentin (NEURONTIN) 600 mg tablet Take 1 Tablet by mouth three (3) times daily. Max Daily Amount: 1,800 mg. Indications: neuropathic pain    rosuvastatin (CRESTOR) 10 mg tablet TAKE 1 TABLET BY MOUTH EVERY NIGHT    buPROPion XL (WELLBUTRIN XL) 300 mg XL tablet Take 300 mg by mouth daily. cholecalciferol (VITAMIN D3) (5000 Units/125 mcg) tab tablet Take 5,000 Units by mouth daily. fish oil-omega-3 fatty acids 340-1,000 mg capsule Take 1 Capsule by mouth daily. diazePAM (VALIUM) 2 mg tablet TAKE 4 AND A HALF TABLET BY MOUTH EVERY DAY    tadalafiL (CIALIS) 5 mg tablet TAKE ONE TABLET BY MOUTH DAILY    metoprolol tartrate (LOPRESSOR) 25 mg tablet TAKE 1 TABLET BY MOUTH TWICE DAILY    Dexilant 60 mg CpDB capsule (delayed release) Take 60 mg by mouth daily. ARIPiprazole (ABILIFY) 10 mg tablet Take 1 Tablet by mouth daily. vilazodone (VIIBRYD) 40 mg tab tablet Take  by mouth daily. acetaminophen (TYLENOL) 325 mg tablet Take 325 mg by mouth every four (4) hours as needed for Pain.     Combigan 0.2-0.5 % drop ophthalmic solution INSTILL 1 DROP INTO RIGHT EYE TWICE DAILY    SYMBICORT 160-4.5 mcg/actuation HFAA INHALE 2 PUFFS BY MOUTH TWICE DAILY    latanoprost (XALATAN) 0.005 % ophthalmic solution OMAR 1 GTT INTO RIGHT EYE HS    albuterol (PROAIR HFA) 90 mcg/actuation inhaler Take 2 Puffs by inhalation every four (4) hours as needed for Wheezing. ascorbic acid, vitamin C, (VITAMIN C) 500 mg tablet Take 2 tablets by mouth daily    aspirin delayed-release 81 mg tablet Take  by mouth daily. Gluc-Scar-MSM#2-K-Uoss-Mike-Bor 750-625-30 mg tab Take 1 Tablet by mouth two (2) times a day. No current facility-administered medications for this visit. REVIEW OF SYSTEM   Patient denies: Weight loss, Fever/Chills, HA, Visual changes, Fatigue, Chest pain, SOB, Abdominal pain, N/V/D/C, Blood in stool or urine, Edema. Pertinent positive as above in HPI. All others were negative    PHYSICAL EXAM:   Visit Vitals  Temp 97.8 °F (36.6 °C) (Temporal)   Ht 6' 3\" (1.905 m)   Wt 182 lb (82.6 kg)   BMI 22.75 kg/m²     The patient is a well-developed, well-nourished male   in no acute distress. The patient is alert and oriented times three. The patient is alert and oriented times three. Mood and affect are normal.  LYMPHATIC: lymph nodes are not enlarged and are within normal limits  SKIN: normal in color and non tender to palpation. There are no bruises or abrasions noted. NEUROLOGICAL: Motor sensory exam is within normal limits. Reflexes are equal bilaterally.  There is normal sensation to pinprick and light touch  MUSCULOSKELETAL:  Examination Right shoulder   Skin Intact   AC joint tenderness +   Biceps tenderness -   Forward flexion/Elevation    Active abduction    Glenohumeral abduction 90   External rotation ROM 45   Internal rotation ROM 45   Apprehension -   Agnieszkas Relocation -   Jerk -   Load and Shift -   Obriens -   Speeds -   Impingement sign +   Supraspinatus/Empty Can -, 5/5   External Rotation Strength -, 5/5   Lift Off/Belly Press -, 5/5   Neurovascular Intact        PROCEDURE:   Right AC Joint Injection with Ultrasound Guidance    Indication:Right  AC joint  pain/swelling    After sterile prep, 1 cc of Xylocaine and 1 cc of Kenalog were injected into the right  AC joint . Intra-articular Ultrasound images captured using [address] Ultrasound machine using a frequency of 10 MHz with a linear transducer and scanned into patient's chart. [facility]  OFFICE PROCEDURE PROGRESS NOTE        Chart reviewed for the following:  Christine Adan M.D, have reviewed the History, Physical and updated the Allergic reactions for [place] performed immediately prior to start of procedure:  Christine Adan M.D, have performed the following reviews on Ani Montes prior to the start of the procedure:            * Patient was identified by name and date of birth   * Agreement on procedure being performed was verified  * Risks and Benefits explained to the patient  * Procedure site verified and marked as necessary  * Patient was positioned for comfort  * Needle placement confirmed by ultrasound  * Consent was signed and verified     Time: 9:43 AM     Date of procedure: 11/17/2022    Procedure performed by:  Eduin Cisneros M.D    Provider assisted by: (see medication administration)    How tolerated by patient: tolerated the procedure well with no complications    Comments: none      IMAGING: XR of the right shoulder with 3 views obtained in the office dated 11/17/2022 was reviewed and read by Dr. Patricia Cartagena: marked djd a-c joint      XR of the cervical spine with 2 views obtained in the office dated 11/17/2022 was reviewed and read by Dr. Patricia Cartagena: Marked spondylotic changes at C4-5, C5-6, and C6-7 with localized kyphosis        IMPRESSION:      ICD-10-CM ICD-9-CM    1. AC joint arthropathy  M19.019 719.91       2.  Right shoulder pain, unspecified chronicity  M25.511 719.41 AMB POC XRAY, SHOULDER; COMPLETE, 2+      3. Cervical radiculopathy  M54.12 723.4 AMB POC XRAY, SPINE, CERVICAL; 2 OR 3      4. Cervical spondylosis  M47.812 721.0            PLAN:  1. Pt presents today with right shoulder pain due to [facility] joint arthropathy and I am hopeful a cortisone injection will provide relief. He also has symptoms c/w cervical spondylosis and was prescribed Baclofen. Patient was provided with physician-directed home exercise program in the office today. Risk factors include: htn  2. No ultrasound exam indicated today  3. Yes cortisone injection indicated today R AC JOINT US  4. No Physical/Occupational Therapy indicated today  5. No diagnostic test indicated today:   6. No durable medical equipment indicated today  7. No referral indicated today   8. Yes medications indicated today: BACLOFEN  9. No Narcotic indicated today       RTC 3 weeks if pain persists      Scribed by Aram Riggs [address]) as dictated by Chandrika Alicia MD    I, Dr. Chandrika Alicia, confirm that all documentation is accurate.     Chandrika Alicia M.D.   [facility]

## 2022-12-09 ENCOUNTER — OFFICE VISIT (OUTPATIENT)
Dept: ORTHOPEDIC SURGERY | Age: 72
End: 2022-12-09
Payer: COMMERCIAL

## 2022-12-09 VITALS — TEMPERATURE: 96.8 F | WEIGHT: 172 LBS | HEIGHT: 75 IN | BODY MASS INDEX: 21.39 KG/M2

## 2022-12-09 DIAGNOSIS — M70.62 TROCHANTERIC BURSITIS OF LEFT HIP: Primary | ICD-10-CM

## 2022-12-09 RX ORDER — TRIAMCINOLONE ACETONIDE 40 MG/ML
80 INJECTION, SUSPENSION INTRA-ARTICULAR; INTRAMUSCULAR ONCE
Status: COMPLETED | OUTPATIENT
Start: 2022-12-09 | End: 2022-12-09

## 2022-12-09 RX ADMIN — TRIAMCINOLONE ACETONIDE 80 MG: 40 INJECTION, SUSPENSION INTRA-ARTICULAR; INTRAMUSCULAR at 11:05

## 2022-12-09 NOTE — PROGRESS NOTES
Patient: Jose Fox                MRN: [id number]       SSN: [id number]  YOB: 1950        AGE: 81 y.o. SEX: male  Body mass index is 21.79 kg/m². PCP: Toni Ward MD  12/09/22  Occupation:   Retired    Chief Complaint: Hip Pain (left)        ICD-10-CM ICD-9-CM    1. Trochanteric bursitis of left hip  M70.62 726.5 AMB POC X-RAY RADEX HIP UNI WITH PELVIS 2-3 VIEWS      REFERRAL TO PHYSICAL THERAPY          HPI: Jose Fox is a 81 y.o. male patient with Hip Pain (left)  With left hip pain that is been on and off for several years. Is becoming more frequent of late. Patient does have a history of scoliosis and has physical therapy in the past for lumbar facet arthropathy and is currently undergoing physical therapy specialty treatment for scoliosis in [place]. He reports the pain is on the lateral aspect of the hip and denies any groin pain. He can get especially bad after riding his bike. Tobacco Use: Medium Risk    Smoking Tobacco Use: Former    Smokeless Tobacco Use: Never    Passive Exposure: Not on file         PHYSICAL EXAMINATION:  Visit Vitals  Temp 96.8 °F (36 °C)   Ht 6' 2.5\" (1.892 m)   Wt 172 lb (78 kg)   BMI 21.79 kg/m²     Body mass index is 21.79 kg/m². GENERAL: Alert and oriented x3, in no acute distress. HEENT: Normocephalic, atraumatic. MSK: Left Hip Exam     Tenderness   The patient is experiencing tenderness in the greater trochanter. Range of Motion   The patient has normal left hip ROM.     Muscle Strength   Abduction: 5/5   Adduction: 5/5   Flexion: 5/5     Tests   MARTHA: negative  Shantel: positive    Other   Erythema: absent  Sensation: normal  Pulse: present    Comments:  Palpably tight IT band with positive Shantel test           IMAGING:  Imaging read by myself and interpreted as follows:  3 view x-ray of the left hip including AP and lateral as well as AP of the pelvis demonstrates no bony pathology, joint space narrowing or osteophytes. ASSESSMENT & PLAN  Diagnosis: 81 y.o. male with left hip greater trochanteric bursitis. I discussed the cause of this and explained that physical therapy for strengthening of the core musculature as well as stretching would be imperative. He has no abductor weakness to suggest abductor tear this pain is directly over the greater trochanter. I will order the physical therapy and given an injection of steroids into the bursa. After explaining potential risk of infection, skin discoloration, hyperglycemia or flushing, I obtained written consent for a left hip greater trochanteric bursa corticosteroid injection, the patient's left lateral hip was prepped with alcohol and ethyl chloride freeze spray. 80 mg of Kenalog along with 2 mL of 2% lidocaine was injected and the patient tolerated it well. Past Medical History:   Diagnosis Date    Allergic rhinitis     Dr Lazara Harrington; never took immunotherapy    Anxiety     saw Dr Siria Zimmerman 2018    Asthma     Dr Kee De Jesus; pfts show no obstruction but high RV    BCC (basal cell carcinoma of skin) 2013    Dr. Bogdan Corona s/p resection 2 spots    BPH (benign prostatic hyperplasia)     Dr. Get Chatterjee; on proscar for years    Degenerative arthritis of lumbar spine 03/2014    MRI (3/14) showed scoliosis w multilevel degen findings, mod L3-4, L4-5 central stenosis, mod L5-S1 foraminal stenosis;  (10/17) severe L3-4 central stenosis; Dr Freda Doan    Depression     and anxiety; paxil 2008?  lexapro and wellbutrin til 2018; tried prozac; saw Dr Mague Romero then Dr Shakir Sadler now Dr Bhavna Torres    Diverticulosis 09/21/2022    Dr Annalisa Drake    Dyslipidemia     ED (erectile dysfunction)     ICI with penile trauma; JOVANNA not helpful; peak performance unsuccessful    FHx: heart disease     GERD (gastroesophageal reflux disease)     s/p dilation 2003 Dr. Carlos Dickinson; egd 2015; Dr Carlos Dickinson egd 12/20 dilation, hiatal hernia    H/O cardiovascular stress test     ETT neg (8/09); [facility] neg stress echo (6/19)    H/O echocardiogram     nl lv, ef 55%, no wma or valvular dz (9/19)    Heel spur     Dr Margareth Driscoll    Hypertension 02/2019    24 hr ambulatory monitoring Dr Beny Evans    Hypovitaminosis D     GALILEA (obstructive sleep apnea)     intol cpap Dr Jh Madsen 2015; using oral appliance Dr Harlan Sanders; AHI 17.8 min desats 83    Overweight (BMI 25.0-29.9) 02/12/2018    Peripheral neuropathy     and B CTS on EMG Dr. Jh Madsen    Personal history of prostate cancer     Prostate cancer ([address] 04/16/2021    Dr Karson Wilkinson; mira 3+3; s/p RALP, BLND Dr Tang Tan    Pulmonary nodules 2011    bilat upper lobes; no change 2018; noted again [facility] 6/19    Scoliosis     Dr Ab Hanson 2014    Venous insufficiency 12/2021       Family History   Problem Relation Age of Onset    Cancer Mother         leukemia    Heart Disease Father     Psychiatric Disorder Daughter         depression       Current Outpatient Medications   Medication Sig Dispense Refill    baclofen (LIORESAL) 10 mg tablet Take 1 Tablet by mouth two (2) times a day. 60 Tablet 1    fluticasone propionate (FLONASE) 50 mcg/actuation nasal spray SHAKE LIQUID AND USE 2 SPRAYS IN EACH NOSTRIL DAILY 48 g 3    triamterene-hydroCHLOROthiazide (DYAZIDE) 37.5-25 mg per capsule TAKE 1 CAPSULE BY MOUTH DAILY 90 Capsule 5    gabapentin (NEURONTIN) 600 mg tablet Take 1 Tablet by mouth three (3) times daily. Max Daily Amount: 1,800 mg. Indications: neuropathic pain 270 Tablet 1    rosuvastatin (CRESTOR) 10 mg tablet TAKE 1 TABLET BY MOUTH EVERY NIGHT 90 Tablet 3    buPROPion XL (WELLBUTRIN XL) 300 mg XL tablet Take 300 mg by mouth daily. cholecalciferol (VITAMIN D3) (5000 Units/125 mcg) tab tablet Take 5,000 Units by mouth daily. fish oil-omega-3 fatty acids 340-1,000 mg capsule Take 1 Capsule by mouth daily.       diazePAM (VALIUM) 2 mg tablet TAKE 4 AND A HALF TABLET BY MOUTH EVERY DAY      tadalafiL (CIALIS) 5 mg tablet TAKE ONE TABLET BY MOUTH DAILY 90 Tablet 3    metoprolol tartrate (LOPRESSOR) 25 mg tablet TAKE 1 TABLET BY MOUTH TWICE DAILY 180 Tablet 3    Dexilant 60 mg CpDB capsule (delayed release) Take 60 mg by mouth daily. ARIPiprazole (ABILIFY) 10 mg tablet Take 1 Tablet by mouth daily. vilazodone (VIIBRYD) 40 mg tab tablet Take  by mouth daily. acetaminophen (TYLENOL) 325 mg tablet Take 325 mg by mouth every four (4) hours as needed for Pain. Combigan 0.2-0.5 % drop ophthalmic solution INSTILL 1 DROP INTO RIGHT EYE TWICE DAILY      SYMBICORT 160-4.5 mcg/actuation HFAA INHALE 2 PUFFS BY MOUTH TWICE DAILY 3 Inhaler 3    latanoprost (XALATAN) 0.005 % ophthalmic solution OMAR 1 GTT INTO RIGHT EYE HS  3    albuterol (PROAIR HFA) 90 mcg/actuation inhaler Take 2 Puffs by inhalation every four (4) hours as needed for Wheezing. 3 Inhaler 3    ascorbic acid, vitamin C, (VITAMIN C) 500 mg tablet Take 2 tablets by mouth daily      aspirin delayed-release 81 mg tablet Take  by mouth daily. Gluc-Scar-MSM#0-N-Gjza-Mike-Bor 750-625-30 mg tab Take 1 Tablet by mouth two (2) times a day.       methylPREDNISolone (Medrol, Percy,) 4 mg tablet Per dose pack instructions (Patient not taking: Reported on 12/9/2022) 1 Dose Pack 0        Allergies   Allergen Reactions    Bee Pollen Sneezing     Seasonal allergies    Cefdinir Nausea and Vomiting    Heparin Rash    Naproxen Other (comments)     elev cr 2/19    Niacin Other (comments)     Flushing      Norvasc [Amlodipine] Swelling       Past Surgical History:   Procedure Laterality Date    HX CARPAL TUNNEL RELEASE Bilateral 2007    HX COLONOSCOPY      Dr. Erica Meyers mild diverticulosis 2000, 6/12; 9/21/22    HX LAP CHOLECYSTECTOMY  08/2021    Dr Nico Lima ORTHOPAEDIC      DEXA t score 4.2 spine, 0.2 hip (2012)    HX RADICAL PROSTATECTOMY  04/2021    Dr Alcira Figueroa DVP    AL CHEST SURGERY PROCEDURE UNLISTED  07/2018    Dr Daniel Camarena; FEV1 96%, 5% improvement postbd, ratio 104, , , DLCO 82       Social History     Socioeconomic History    Marital status:  Spouse name: Not on file    Number of children: 2    Years of education: Not on file    Highest education level: Not on file   Occupational History    Occupation: ret [employer]   Tobacco Use    Smoking status: Former     Packs/day: 0.25     Years: 2.00     Pack years: 0.50     Types: Cigarettes     Quit date: 0     Years since quittin.9    Smokeless tobacco: Never    Tobacco comments:     reports occasional use as a teenager   Vaping Use    Vaping Use: Never used   Substance and Sexual Activity    Alcohol use: Yes     Alcohol/week: 1.0 standard drink     Types: 1 Glasses of wine per week     Comment: 1-2 glasses of wine a month    Drug use: No    Sexual activity: Yes     Partners: Female     Birth control/protection: None   Other Topics Concern    Not on file   Social History Narrative    Not on file     Social Determinants of Health     Financial Resource Strain: Not on file   Food Insecurity: Not on file   Transportation Needs: Not on file   Physical Activity: Not on file   Stress: Not on file   Social Connections: Not on file   Intimate Partner Violence: Not on file   Housing Stability: Not on file       REVIEW OF SYSTEMS:      No changes from previous review of systems unless noted. Prescription medication management discussed with patient. Electronically signed by: Treva Jaimes DO    Note: This note was completed using voice recognition software.   Any typographical/name errors or mistakes are unintentional.

## 2022-12-15 ENCOUNTER — OFFICE VISIT (OUTPATIENT)
Dept: ORTHOPEDIC SURGERY | Age: 72
End: 2022-12-15
Payer: COMMERCIAL

## 2022-12-15 VITALS
WEIGHT: 179.6 LBS | HEART RATE: 56 BPM | OXYGEN SATURATION: 98 % | BODY MASS INDEX: 22.33 KG/M2 | TEMPERATURE: 97.4 F | HEIGHT: 75 IN

## 2022-12-15 DIAGNOSIS — M48.062 SPINAL STENOSIS, LUMBAR REGION WITH NEUROGENIC CLAUDICATION: ICD-10-CM

## 2022-12-15 DIAGNOSIS — M54.16 LUMBAR RADICULOPATHY: ICD-10-CM

## 2022-12-15 DIAGNOSIS — M47.816 SPONDYLOSIS OF LUMBAR REGION WITHOUT MYELOPATHY OR RADICULOPATHY: ICD-10-CM

## 2022-12-15 RX ORDER — GABAPENTIN 600 MG/1
600 TABLET ORAL 3 TIMES DAILY
Qty: 270 TABLET | Refills: 1 | Status: SHIPPED | OUTPATIENT
Start: 2023-01-11

## 2022-12-15 NOTE — PROGRESS NOTES
Chief complaint   Chief Complaint   Patient presents with    Back Pain       History of Present Illness:  Sabrina Childress is a  81 y.o.  male who comes in today for follow-up of his chronic low back pain. He does have scoliosis and continues to do Schroth therapy which she feels like does help. He did see Dr. Alyssa English for his right shoulder and had a cortisone injection. He also saw Dr. Louis Huynh for his left hip which was diagnosed with bursitis and he also got an injection for that. The injections did not help. He is supposed to be going to therapy for his hip. He continues take gabapentin 600 mg 3 times a day which she states does help his back pain. He does not have any radiating leg pain but does get some nerve pain in his face. He denies fever bowel bladder dysfunction. Back      Physical Exam: The patient is a 29-year-old male well-developed well-nourished who is alert and oriented with a normal mood and affect. He has a full weightbearing nonantalgic mood. He does have a scoliosis that curves to the right. He has 4 out of 5 strength bilateral lower extremities. Negative straight leg raise. He has pain with hyperextension lumbar spine. Assessment and Plan: This is a patient who has scoliosis and back pain. I have refilled his gabapentin. He asked if he could continue his baclofen that he gets through Dr. Alyssa English as it does help his back pain also. I told him he can take it as needed. We will see him back in 6 months sooner if needed. Medications:  Current Outpatient Medications   Medication Sig Dispense Refill    [START ON 1/11/2023] gabapentin (NEURONTIN) 600 mg tablet Take 1 Tablet by mouth three (3) times daily. Max Daily Amount: 1,800 mg. Indications: neuropathic pain 270 Tablet 1    baclofen (LIORESAL) 10 mg tablet Take 1 Tablet by mouth two (2) times a day.  60 Tablet 1    fluticasone propionate (FLONASE) 50 mcg/actuation nasal spray SHAKE LIQUID AND USE 2 SPRAYS IN EACH NOSTRIL DAILY 48 g 3    triamterene-hydroCHLOROthiazide (DYAZIDE) 37.5-25 mg per capsule TAKE 1 CAPSULE BY MOUTH DAILY 90 Capsule 5    rosuvastatin (CRESTOR) 10 mg tablet TAKE 1 TABLET BY MOUTH EVERY NIGHT 90 Tablet 3    buPROPion XL (WELLBUTRIN XL) 300 mg XL tablet Take 300 mg by mouth daily. cholecalciferol (VITAMIN D3) (5000 Units/125 mcg) tab tablet Take 5,000 Units by mouth daily. fish oil-omega-3 fatty acids 340-1,000 mg capsule Take 1 Capsule by mouth daily. diazePAM (VALIUM) 2 mg tablet TAKE 4 AND A HALF TABLET BY MOUTH EVERY DAY      tadalafiL (CIALIS) 5 mg tablet TAKE ONE TABLET BY MOUTH DAILY 90 Tablet 3    metoprolol tartrate (LOPRESSOR) 25 mg tablet TAKE 1 TABLET BY MOUTH TWICE DAILY 180 Tablet 3    Dexilant 60 mg CpDB capsule (delayed release) Take 60 mg by mouth daily. ARIPiprazole (ABILIFY) 10 mg tablet Take 1 Tablet by mouth daily. vilazodone (VIIBRYD) 40 mg tab tablet Take  by mouth daily. acetaminophen (TYLENOL) 325 mg tablet Take 325 mg by mouth every four (4) hours as needed for Pain. Combigan 0.2-0.5 % drop ophthalmic solution INSTILL 1 DROP INTO RIGHT EYE TWICE DAILY      SYMBICORT 160-4.5 mcg/actuation HFAA INHALE 2 PUFFS BY MOUTH TWICE DAILY 3 Inhaler 3    latanoprost (XALATAN) 0.005 % ophthalmic solution OMAR 1 GTT INTO RIGHT EYE HS  3    albuterol (PROAIR HFA) 90 mcg/actuation inhaler Take 2 Puffs by inhalation every four (4) hours as needed for Wheezing. 3 Inhaler 3    ascorbic acid, vitamin C, (VITAMIN C) 500 mg tablet Take 2 tablets by mouth daily      aspirin delayed-release 81 mg tablet Take  by mouth daily. Gluc-Scar-MSM#0-W-Ylhf-Mike-Bor 750-625-30 mg tab Take 1 Tablet by mouth two (2) times a day.              Review of systems:    Past Medical History:   Diagnosis Date    Allergic rhinitis     Dr Arvind Barroso; never took immunotherapy    Anxiety     saw Dr Haleigh Valencia 2018    Asthma     Dr Andriy Boyle; pfts show no obstruction but high RV    BCC (basal cell carcinoma of skin)     Dr. Pastor Kay s/p resection 2 spots    BPH (benign prostatic hyperplasia)     Dr. Jesus Cadena; on proscar for years    Degenerative arthritis of lumbar spine 2014    MRI (3/14) showed scoliosis w multilevel degen findings, mod L3-4, L4-5 central stenosis, mod L5-S1 foraminal stenosis;  (10/17) severe L3-4 central stenosis; Dr Lavelle Rubio    Depression     and anxiety; paxil ?  lexapro and wellbutrin til ; tried prozac; saw Dr Estella Roberto then Dr Akiko Mora now Dr Nomi Loja    Diverticulosis 2022    Dr Emma Moore    Dyslipidemia     ED (erectile dysfunction)     ICI with penile trauma; JOVANNA not helpful; peak performance unsuccessful    FHx: heart disease     GERD (gastroesophageal reflux disease)     s/p dilation  Dr. Cleo Porter; egd ; Dr Cleo Porter egd  dilation, hiatal hernia    H/O cardiovascular stress test     ETT neg (); [facility] neg stress echo ()    H/O echocardiogram     nl lv, ef 55%, no wma or valvular dz ()    Heel spur     Dr Trey Franklin    Hypertension 2019    24 hr ambulatory monitoring Dr Arlyn Laird    Hypovitaminosis D     GALILEA (obstructive sleep apnea)     intol cpap Dr Trini Au ; using oral appliance Dr Gilberto Méndez; AHI 17.8 min desats 83    Overweight (BMI 25.0-29.9) 2018    Peripheral neuropathy     and B CTS on EMG Dr. Trini Au    Personal history of prostate cancer     Prostate cancer ([address] 2021    Dr Huong Raza; mira 3+3; s/p RALP, BLND Dr Little Stable    Pulmonary nodules     bilat upper lobes; no change ; noted again [facility]     Scoliosis     Dr Seema Bradford     Venous insufficiency 2021     Past Surgical History:   Procedure Laterality Date    HX CARPAL TUNNEL RELEASE Bilateral 2007    HX COLONOSCOPY      Dr. 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   Tobacco Use    Smoking status: Former     Packs/day: 0.25     Years: 2.00     Pack years: 0.50     Types: Cigarettes     Quit date: 1972     Years since quittin.9    Smokeless tobacco: Never    Tobacco comments:     reports occasional use as a teenager   Vaping Use    Vaping Use: Never used   Substance and Sexual Activity    Alcohol use: Yes     Alcohol/week: 1.0 standard drink     Types: 1 Glasses of wine per week     Comment: 1-2 glasses of wine a month    Drug use: No    Sexual activity: Yes     Partners: Female     Birth control/protection: None   Other Topics Concern    Not on file   Social History Narrative    Not on file     Social Determinants of Health     Financial Resource Strain: Not on file   Food Insecurity: Not on file   Transportation Needs: Not on file   Physical Activity: Not on file   Stress: Not on file   Social Connections: Not on file   Intimate Partner Violence: Not on file   Housing Stability: Not on file     Family History   Problem Relation Age of Onset    Cancer Mother         leukemia    Heart Disease Father     Psychiatric Disorder Daughter         depression       Physical Exam:  Visit Vitals  Pulse (!) 56   Temp 97.4 °F (36.3 °C) (Temporal)   Ht 6' 2.5\" (1.892 m)   Wt 179 lb 9.6 oz (81.5 kg)   SpO2 98%   BMI 22.75 kg/m²     Pain Scale: 7/10       has been . reviewed and is appropriate          Diagnoses and all orders for this visit:    1. Spinal stenosis, lumbar region with neurogenic claudication  -     gabapentin (NEURONTIN) 600 mg tablet; Take 1 Tablet by mouth three (3) times daily. Max Daily Amount: 1,800 mg.  Indications: neuropathic pain    2. Spondylosis of lumbar region without myelopathy or radiculopathy  -     gabapentin (NEURONTIN) 600 mg tablet; Take 1 Tablet by mouth three (3) times daily. Max Daily Amount: 1,800 mg. Indications: neuropathic pain    3. Lumbar radiculopathy  -     gabapentin (NEURONTIN) 600 mg tablet; Take 1 Tablet by mouth three (3) times daily. Max Daily Amount: 1,800 mg. Indications: neuropathic pain          Follow-up and Dispositions    Return for With nurse practitioner Roopa Willett. We have informed Derrell Garza to notify us for immediate appointment if he has any worsening neurogical symptoms or if an emergency situation presents, then call 911    Please note that this dictation was completed with EGEN, the computer voice recognition software. Quite often unanticipated grammatical, syntax, homophones, and other interpretive errors are inadvertently transcribed by the computer software. Please disregard these errors. Please excuse any errors that have escaped final proofreading.

## 2022-12-27 ENCOUNTER — OFFICE VISIT (OUTPATIENT)
Dept: INTERNAL MEDICINE CLINIC | Age: 72
End: 2022-12-27
Payer: COMMERCIAL

## 2022-12-27 VITALS
BODY MASS INDEX: 21.39 KG/M2 | WEIGHT: 172 LBS | TEMPERATURE: 97.5 F | OXYGEN SATURATION: 96 % | HEART RATE: 60 BPM | RESPIRATION RATE: 17 BRPM | SYSTOLIC BLOOD PRESSURE: 107 MMHG | DIASTOLIC BLOOD PRESSURE: 64 MMHG | HEIGHT: 75 IN

## 2022-12-27 DIAGNOSIS — R19.09 GROIN MASS: Primary | ICD-10-CM

## 2022-12-27 PROCEDURE — 99213 OFFICE O/P EST LOW 20 MIN: CPT | Performed by: INTERNAL MEDICINE

## 2022-12-27 PROCEDURE — 1123F ACP DISCUSS/DSCN MKR DOCD: CPT | Performed by: INTERNAL MEDICINE

## 2022-12-27 PROCEDURE — 3074F SYST BP LT 130 MM HG: CPT | Performed by: INTERNAL MEDICINE

## 2022-12-27 PROCEDURE — 3078F DIAST BP <80 MM HG: CPT | Performed by: INTERNAL MEDICINE

## 2022-12-27 NOTE — PROGRESS NOTES
81 y.o. male who presents for evaluation. Weeks ago, he noted a spot in his left groin area. There is mild sensation there, he hesitates to even use the word discomfort. Not really enlarging per se but he thinks it's more prominent. Denies any GI,  complaints. No known history of hernias. No trauma, bruising, rash    Past Medical History:   Diagnosis Date    Allergic rhinitis     Dr Estefany Cassidy; never took immunotherapy    Anxiety     saw Dr Jaren Mcdermott 2018    Asthma     Dr Jaswinder Brown; pfts show no obstruction but high RV    BCC (basal cell carcinoma of skin) 2013    Dr. Ani Thomas s/p resection 2 spots    BPH (benign prostatic hyperplasia)     Dr. Juvenal Lyle; on proscar for years    Degenerative arthritis of lumbar spine 03/2014    MRI (3/14) showed scoliosis w multilevel degen findings, mod L3-4, L4-5 central stenosis, mod L5-S1 foraminal stenosis;  (10/17) severe L3-4 central stenosis; Dr Saundra Garvey    Depression     and anxiety; paxil 2008?  lexapro and wellbutrin til 2018; tried prozac; saw Dr Neeraj Pulido then Dr Ayah Ashley now Dr Maine Duarte    Diverticulosis 09/21/2022    Dr Mame Ku    Dyslipidemia     ED (erectile dysfunction)     ICI with penile trauma; JOVANNA not helpful; peak performance unsuccessful    FHx: heart disease     GERD (gastroesophageal reflux disease)     s/p dilation 2003 Dr. Avinash Emanuel; egd 2015; Dr Avinash Emanuel egd 12/20 dilation, hiatal hernia    H/O cardiovascular stress test     ETT neg (8/09); [facility] neg stress echo (6/19)    H/O echocardiogram     nl lv, ef 55%, no wma or valvular dz (9/19)    Heel spur     Dr Mendez Vasquez    Hypertension 02/2019    24 hr ambulatory monitoring Dr Kash Lynne    Hypovitaminosis D     GALILEA (obstructive sleep apnea)     intol cpap Dr Shae Chavez 2015; using oral appliance Dr Makenzie Suarez; AHI 17.8 min desats 83    Overweight (BMI 25.0-29.9) 02/12/2018    Peripheral neuropathy     and B CTS on EMG Dr. Shae Chavez    Personal history of prostate cancer     Prostate cancer ([address] 04/16/2021    Dr Savanna Oneal; mira 3+3; s/p RALP, BLND Dr Nyasia Bird    Pulmonary nodules 2011    bilat upper lobes; no change 2018; noted again [facility] 6/19    Scoliosis     Dr Desiree Jaimes 2014    Venous insufficiency 12/2021     Current Outpatient Medications   Medication Sig    [START ON 1/11/2023] gabapentin (NEURONTIN) 600 mg tablet Take 1 Tablet by mouth three (3) times daily. Max Daily Amount: 1,800 mg. Indications: neuropathic pain    baclofen (LIORESAL) 10 mg tablet Take 1 Tablet by mouth two (2) times a day. fluticasone propionate (FLONASE) 50 mcg/actuation nasal spray SHAKE LIQUID AND USE 2 SPRAYS IN EACH NOSTRIL DAILY    triamterene-hydroCHLOROthiazide (DYAZIDE) 37.5-25 mg per capsule TAKE 1 CAPSULE BY MOUTH DAILY    rosuvastatin (CRESTOR) 10 mg tablet TAKE 1 TABLET BY MOUTH EVERY NIGHT    buPROPion XL (WELLBUTRIN XL) 300 mg XL tablet Take 300 mg by mouth daily. cholecalciferol (VITAMIN D3) (5000 Units/125 mcg) tab tablet Take 5,000 Units by mouth daily. fish oil-omega-3 fatty acids 340-1,000 mg capsule Take 1 Capsule by mouth daily. diazePAM (VALIUM) 2 mg tablet TAKE 4 AND A HALF TABLET BY MOUTH EVERY DAY    tadalafiL (CIALIS) 5 mg tablet TAKE ONE TABLET BY MOUTH DAILY    metoprolol tartrate (LOPRESSOR) 25 mg tablet TAKE 1 TABLET BY MOUTH TWICE DAILY    Dexilant 60 mg CpDB capsule (delayed release) Take 60 mg by mouth daily. ARIPiprazole (ABILIFY) 10 mg tablet Take 1 Tablet by mouth daily. vilazodone (VIIBRYD) 40 mg tab tablet Take  by mouth daily. acetaminophen (TYLENOL) 325 mg tablet Take 325 mg by mouth every four (4) hours as needed for Pain. Combigan 0.2-0.5 % drop ophthalmic solution INSTILL 1 DROP INTO RIGHT EYE TWICE DAILY    SYMBICORT 160-4.5 mcg/actuation HFAA INHALE 2 PUFFS BY MOUTH TWICE DAILY    latanoprost (XALATAN) 0.005 % ophthalmic solution OMAR 1 GTT INTO RIGHT EYE HS    albuterol (PROAIR HFA) 90 mcg/actuation inhaler Take 2 Puffs by inhalation every four (4) hours as needed for Wheezing.     ascorbic acid, vitamin C, (VITAMIN C) 500 mg tablet Take 2 tablets by mouth daily    aspirin delayed-release 81 mg tablet Take  by mouth daily. Gluc-Scar-MSM#0-H-Evme-Mike-Bor 750-625-30 mg tab Take 1 Tablet by mouth two (2) times a day. No current facility-administered medications for this visit. Allergies   Allergen Reactions    Bee Pollen Sneezing     Seasonal allergies    Cefdinir Nausea and Vomiting    Heparin Rash    Naproxen Other (comments)     elev cr 2/19    Niacin Other (comments)     Flushing      Norvasc [Amlodipine] Swelling     Visit Vitals  /64   Pulse 60   Temp 97.5 °F (36.4 °C) (Temporal)   Resp 17   Ht 6' 2.5\" (1.892 m)   Wt 172 lb (78 kg)   SpO2 96%   BMI 21.79 kg/m²   There is an asymmetry in the left groin region which is more prominent compared to the right. Unclear to me if there is a hernia there. There is no cyst, but there is possibly a fatty prominence? Assessment and plan:  1. Rule out hernia, rule out fatty growth. We will ask for opinion from general surgery        Above conditions discussed at length and patient vocalized understanding.   All questions answered to patient satisfaction

## 2022-12-27 NOTE — PROGRESS NOTES
Susana Tabor presents today for   Chief Complaint   Patient presents with    Cyst     Cyst or mass growing in the groin region. 1. \"Have you been to the ER, urgent care clinic since your last visit? Hospitalized since your last visit? \" no    2. \"Have you seen or consulted any other health care providers outside of the [address] [place] [address] since your last visit? \" yes     3. For patients aged 39-70: Has the patient had a colonoscopy / FIT/ Cologuard? Yes - no Care Gap present      If the patient is female:    4. For patients aged 41-77: Has the patient had a mammogram within the past 2 years? NA - based on age or sex  See top three    5. For patients aged 21-65: Has the patient had a pap smear?  NA - based on age or sex

## 2022-12-30 ENCOUNTER — HOSPITAL ENCOUNTER (OUTPATIENT)
Dept: PHYSICAL THERAPY | Age: 72
End: 2022-12-30
Attending: ORTHOPAEDIC SURGERY
Payer: COMMERCIAL

## 2022-12-30 PROCEDURE — 97162 PT EVAL MOD COMPLEX 30 MIN: CPT

## 2022-12-30 PROCEDURE — 97535 SELF CARE MNGMENT TRAINING: CPT

## 2022-12-30 PROCEDURE — 97110 THERAPEUTIC EXERCISES: CPT

## 2022-12-30 NOTE — PROGRESS NOTES
[facility] - [facility]              [address] [facility] [place]        [place], [address]  [phone] fax    Plan of Care/ Statement of Necessity for Physical Therapy Services  Patient name: Brianna Hernandez Start of Care: 2022   Referral source: Darlene Lacey DO : 1950    Medical Diagnosis: Left hip pain [M25.552]  Payor: BLUE CROSS / Plan: [id number] / Product Type: PPO /  Onset Date:Chronic, Worsening 2022    Treatment Diagnosis: Left Hip Pain   Prior Hospitalization: see medical history Provider#: [id number]   Medications: Verified on Patient summary List    Comorbidities: Spinal Scoliosis, Former Smoker, Degenerative Arthritis of the Lumbar Spine, Depression, Anxiety, BPH, Diverticulosis, ED, Prostate Cancer (2021), Venous Insufficiency, HTN   Prior Level of Function: [employer] 3x/week [place] [employer], [employer]ines), Fall Hx, Ambulation without Restriction, Retired, Household ADLs, [address] [facility]     The [address] [place] [address] and following information is based on the information from the initial evaluation. Assessment:    Patient presents with s/s consistent with left lateral hip pain with PMH significant for scoliotic \"S\" curvature of the spine with a left thoracic convexity and right lumbar convexity with altered gait pattern. Clinician questions correlation of scoliotic curvature to hip pain with hypertonicity of the anterior and medial hip musculature and weakness of the posterolateral hip girdle bilaterally (left>right). Additionally question correlation of symptoms to body morphology with patient 6'2\" and with long extremities thus frequently assuming a position of hip internal rotation to raise and lower self from standard height surfaces. To emphasize correlation of muscular imbalance to optimize gait mechanics and quality and improve functional independence and safety.  At this time because of symptoms patient restricted with currently performed workout regime, with difficulty with recreational road biking and with limitations with prolonged ambulation. Patient will continue to benefit from skilled PT services to modify and progress therapeutic interventions, address functional mobility deficits, address ROM deficits, address strength deficits, analyze and address soft tissue restrictions, analyze and cue movement patterns, analyze and modify body mechanics/ergonomics, assess and modify postural abnormalities, address imbalance/dizziness, and instruct in home and community integration to attain remaining goals.     Key Information:    BP: 100/62 mmHg  Posture/Observation: S with left thoracic convexity and right lumbar convexity, Standing with anterior pelvic tilt     Gait:    Without AD, Left lateral trunk shift with slight trunk flexion with gait     Functional Tests:  SLS: Left SLS 14 sec / Right SLS 6 sec  Sit-to-Stand (stand) - Ability to perform without UE assist without hip pain  Lumbar Screen: Without provocation of left hip pain                                 ROM/Strength          AROM                           MMT (1-5)  Hip Left Right Left Right   Flexion WNL WNL 5 5   Extension     Limited Limited   Abduction Limited Limited 4 3   ER WNL WNL 5 5   IR Limited Limited 5 5   Knee Left Right Left Right   Extension WNL WNL 5 5   Flexion WNL WNL 5 5                    Flexibility: [] Unable to assess at this time  Quadriceps (Sidelying):               (L) Tightness= Moderate              (R) Tightness= Moderate  Hip Flexor (Sidelying)              (L) Tightness = Severe                                                     Palpation: TTP Greater trochanter     Special Tests  Christophe/ Aristides Test:    [x] Neg    [] Pos  J Luis Test:              [] Neg    [x] Pos  Scouring Test:             [x] Neg    [] Pos  FADDIR Test:              [x] Neg    [] Pos    Evaluation Complexity History MEDIUM  Complexity : 1-2 comorbidities / personal factors will impact the outcome/ POC ; Examination MEDIUM Complexity : 3 Standardized tests and measures addressing body structure, function, activity limitation and / or participation in recreation  ;Presentation MEDIUM Complexity : Evolving with changing characteristics  ; Clinical Decision Making MEDIUM Complexity : FOTO score of 26-74  Overall Complexity Rating: MEDIUM  Problem List: pain affecting function, decrease ROM, decrease strength, impaired gait/ balance, decrease ADL/ functional abilitiies, decrease activity tolerance, decrease flexibility/ joint mobility, and decrease transfer abilities   Treatment Plan may include any combination of the following: Therapeutic exercise, Neuromuscular reeducation, Manual therapy, Therapeutic activity, Self care/home management, Electric stim unattended , and Gait training  Patient / Family readiness to learn indicated by: asking questions, trying to perform skills, and interest  Persons(s) to be included in education: patient (P)  Barriers to Learning/Limitations: None  Patient Goal (s): Reduction of pain  Patient Self Reported Health Status: excellent  Rehabilitation Potential: good    Short Term Goals: To be accomplished in 3 weeks:  1. Patient will subjectively report full compliance with prescribed HEP. Eval: HEP provided  2. Patient will demonstrate left/right hip abduction MMT >/= 4+/5 to improve ease with ambulation on uneven surfaces. Eval: Left Hip Abduction MMT = 4/5, Right Hip Abduction MMT = 3/5  3. Patient will demonstrate ability to perform supine bridge throughout 100% AROM to improve ease with bed mobility. Eval: Supine Bridge = 50% limited    Long Term Goals: To be accomplished in 6 weeks:  1. Patient will demonstrate a significant functional improvement as demonstrated by a score of >/= 75 on FOTO. Eval: FOTO = 73       2. Patient will subjectively report ability to ride road bike x30 minutes without symptoms to improve ease with exercise.   Eval: Onset of symptoms after riding road bike x15-20 minutes  3. Patient will demonstrate left/right SLS >/= 30 seconds to demonstrate a reduced falls risk. Eval: Left SLS = 14 sec / Right SLS 6 sec    Frequency / Duration: Patient to be seen 2 times per week for 6 weeks. Patient/ Caregiver education and instruction: Diagnosis, prognosis, self care, activity modification, and exercises   [x]  Plan of care has been reviewed with ROMELIA Pope, PT 12/30/2022 7:48 AM  ________________________________________________________________________    I certify that the above Therapy Services are being furnished while the patient is under my care. I agree with the treatment plan and certify that this therapy is necessary.     [de-identified] Signature:____________Date:_________TIME:________     Leticia Valentin DO  ** Signature, Date and Time must be completed for valid certification **  Please sign and return to [facility] - [address] [place], [address]   [phone] fax

## 2022-12-30 NOTE — PROGRESS NOTES
PT DAILY TREATMENT NOTE     Patient Name: Lizbeth Saucedo  Date:2022  : 1950  [x]  Patient  Verified  Payor: Tele[employer] / Plan: [id number] / Product Type: PPO /    In time:1101  Out time:1150  Total Treatment Time (min): 52  Visit #: 1 of 12    Medicare/BCBS Only   Total Timed Codes (min):  23 1:1 Treatment Time:  49       Treatment Area: Left hip pain [M25.552]    Physical Therapy Evaluation - Hip    SUBJECTIVE      Any medication changes, allergies to medications, adverse drug reactions, diagnosis change, or new procedure performed?: [x] No    [] Yes (see summary sheet for update)    Subjective functional status/changes:     PLOF: [employer] 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[employer], Machines), Fall Hx, Ambulation without Restriction, Retired, Household ADLs, Yardwork  Current Functional Status: Difficulty with road biking, Modification of gym regime  Work Hx: Retired   Living Situation: Lives in [facility] with [address] [place] [address] with wife  Comorbidities: Spinal Scoliosis, Former Smoker, Degenerative Arthritis of the Lumbar Spine, Depression, Anxiety, BPH, Diverticulosis, ED, Prostate Cancer (2021), Venous Insufficiency, HTN  Medications: None Utilized    Subjective: Patient presents with left hip pain with symptoms having been on/off for multiple years with symptoms having become more frequent recently. Patient endorses lateral hip pain with patient denying groin pain. With significant increase in pain reported after riding his bike. Patient with receival of left hip greater trochanter injection 2022 with slight improvement. Patient as well endorses bilateral medial thigh pain with onset 2022 with patient questioning correlation to completion of PT services for scoliosis. Patient reports cessation medial thigh pain with steroid dose pack. Patient primarily reports lateral hip pain with intermittent radiation into proximal 1/2 lateral thigh with patient denying N/T.  Patient reports having been prescribed stretches by physician with some benefit having been reported. Patient as well reports use of ice with relief. Patient denies anterior groin nor posterior buttock pain with patient denying right LE symptoms. Patient reports pain worse with riding road bike and in AM upon waking and with activity. Patient denies sleep disturbances with patient with ability to lie on left hip. Patient denies clicking, catching nor locking of the left hip. Patient reports symptoms worse in AM with insidious relief within 1 hour. Pain Intensity (0-10, VAS): Current 6, Worst 8, Best 0    Patient Goals: \"Reduction of pain. \"     IMAGING (per MD note 12/9/2022): Imaging read by myself and interpreted as follows:  3 view x-ray of the left hip including AP and lateral as well as AP of the pelvis demonstrates no bony pathology, joint space narrowing or osteophytes.     OBJECTIVE:    BP: 100/62 mmHg  Posture/Observation: S with left thoracic convexity and right lumbar convexity, Standing with anterior pelvic tilt    Gait: Without AD, Left lateral trunk shift with slight trunk flexion with gait    Functional Tests:  SLS: Left SLS 14 sec / Right SLS 6 sec  Sit-to-Stand (stand) - Ability to perform without UE assist without hip pain  Lumbar Screen: Without provocation of left hip pain             ROM/Strength    AROM                  MMT (1-5)  Hip Left Right Left Right   Flexion WNL WNL 5 5   Extension   Limited Limited   Abduction Limited Limited 4 3   ER WNL WNL 5 5   IR Limited Limited 5 5   Knee Left Right Left Right   Extension WNL WNL 5 5   Flexion WNL WNL 5 5        Flexibility: [] Unable to assess at this time  Quadriceps (Sidelying):    (L) Tightness= Moderate   (R) Tightness= Moderate  Hip Flexor (Sidelying)   (L) Tightness = Severe                               Palpation: TTP Greater trochanter    Special Tests  Christophe/ Aristides Test: [x] Neg    [] Pos  J Luis Test:  [] Neg    [x] Pos  Scouring Test:  [x] Neg    [] Pos  FADDIR Test:  [x] Neg    [] Pos    OBJECTIVE    26 min [x]Eval                  []Re-Eval     10 min Therapeutic Exercise:  [x] See flow sheet : Patient educated regarding completion of prescribed HEP and provided with written HEP instructions, Patient educated regarding diagnosis and PT POC   Rationale: increase ROM and increase strength to improve the patients ability to improve ease with functional ADLs    5 min Therapeutic Activity:  [x]  See flow sheet :  Discussion re: potential correlation of spinal scoliosis to symptom presentation   Rationale: increase ROM and increase strength  to improve the patients ability to improve ease with yard work. 8 min Self Care/Home Management:   Discussion with regards potential benefit of use of SPC with long-distance ambulation to improve ambulation tolerance   Rationale: increase ROM and increase strength  to improve the patients ability to improve ease with ambulation          With   [] TE   [] TA   [] neuro   [] other: Patient Education: [x] Review HEP    [] Progressed/Changed HEP based on:   [] positioning   [] body mechanics   [] transfers   [] heat/ice application    [] other:      Other Objective/Functional Measures: See objective above. Pain Level (0-10 scale) post treatment: 6    ASSESSMENT/Changes in Function: Patient presents with s/s consistent with left lateral hip pain with PMH significant for scoliotic \"S\" curvature of the spine with a left thoracic convexity and right lumbar convexity with altered gait pattern. Clinician questions correlation of scoliotic curvature to hip pain with hypertonicity of the anterior and medial hip musculature and weakness of the posterolateral hip girdle bilaterally (left>right). Additionally question correlation of symptoms to body morphology with patient 6'2\" and with long extremities thus frequently assuming a position of hip internal rotation to raise and lower self from standard height surfaces.  To emphasize correlation of muscular imbalance to optimize gait mechanics and quality and improve functional independence and safety. At this time because of symptoms patient restricted with currently performed workout regime, with difficulty with recreational road biking and with limitations with prolonged ambulation. Patient will continue to benefit from skilled PT services to modify and progress therapeutic interventions, address functional mobility deficits, address ROM deficits, address strength deficits, analyze and address soft tissue restrictions, analyze and cue movement patterns, analyze and modify body mechanics/ergonomics, assess and modify postural abnormalities, address imbalance/dizziness, and instruct in home and community integration to attain remaining goals. [x]  See Plan of Care  []  See progress note/recertification  []  See Discharge Summary         Progress towards goals / Updated goals:    Short Term Goals: To be accomplished in 3 weeks:  1. Patient will subjectively report full compliance with prescribed HEP. Eval: HEP provided  2. Patient will demonstrate left/right hip abduction MMT >/= 4+/5 to improve ease with ambulation on uneven surfaces. Eval: Left Hip Abduction MMT = 4/5, Right Hip Abduction MMT = 3/5  3. Patient will demonstrate ability to perform supine bridge throughout 100% AROM to improve ease with bed mobility. Eval: Supine Bridge = 50% limited    Long Term Goals: To be accomplished in 6 weeks:  1. Patient will demonstrate a significant functional improvement as demonstrated by a score of >/= 75 on FOTO. Eval: FOTO = 73       2. Patient will subjectively report ability to ride road bike x30 minutes without symptoms to improve ease with exercise. Eval: Onset of symptoms after riding road bike x15-20 minutes  3. Patient will demonstrate left/right SLS >/= 30 seconds to demonstrate a reduced falls risk.   Eval: Left SLS = 14 sec / Right SLS 6 sec      PLAN  [x]  Upgrade activities as tolerated     []  Continue plan of care  []  Update interventions per flow sheet       []  Discharge due to:_  []  Other:_      Candy Graves, PT 12/30/2022  7:42 AM    Future Appointments   Date Time Provider Brii Michelle   12/30/2022 11:00 AM Morenita Munoz [facility]   2/23/2023  8:35 AM [facility] LAB VISIT [facility] BS AMB   3/2/2023  8:20 AM Martin Chakraborty MD [facility] BS AMB   4/13/2023  2:00 PM Yousif Carvajal MD [facility] BS AMB   5/25/2023 11:30 AM [facility] NURSE [facility] LALIT SCHED   6/1/2023 11:20 AM Stephen Thomas PA-C [facility] LALIT SCHED   6/22/2023 10:00 AM Meli Cuevas NP [facility] BS AMB

## 2023-01-04 ENCOUNTER — HOSPITAL ENCOUNTER (OUTPATIENT)
Dept: PHYSICAL THERAPY | Age: 73
Discharge: HOME | End: 2023-01-04
Attending: ORTHOPAEDIC SURGERY
Payer: COMMERCIAL

## 2023-01-04 PROCEDURE — 97112 NEUROMUSCULAR REEDUCATION: CPT

## 2023-01-04 PROCEDURE — 97140 MANUAL THERAPY 1/> REGIONS: CPT

## 2023-01-04 PROCEDURE — 97110 THERAPEUTIC EXERCISES: CPT

## 2023-01-04 NOTE — PROGRESS NOTES
PT DAILY TREATMENT NOTE     Patient Name: Cornelia Leggett  Date:2023  : 1950  [x]  Patient  Verified  Payor: YOGESH QUIROS / Plan: [id number] / Product Type: PPO /    In time:230  Out time:314  Total Treatment Time (min): 44  Visit #: 2 of 12    Medicare/BCBS Only   Total Timed Codes (min):  44 1:1 Treatment Time:  44       Treatment Area: Left hip pain [M25.552]    SUBJECTIVE  Pain Level (0-10 scale): 0  Any medication changes, allergies to medications, adverse drug reactions, diagnosis change, or new procedure performed?: [x] No    [] Yes (see summary sheet for update)  Subjective functional status/changes:   [] No changes reported  Patient denies pain at this time but does report pain level 7/10 this AM upon waking with his lateral left hip pain having been acting up recently. Patient reports full compliance with prescribed HEP up until Monday secondary to having become ill. OBJECTIVE    20 min Therapeutic Exercise:  [x] See flow sheet : Emphasis placed on improving available lumbopelvic and hip AROM and strength   Rationale: increase ROM and increase strength to improve the patients ability to improve ease with ambulation    14 min Neuromuscular Re-education:  [x]  See flow sheet : Emphasis placed on improving activation and recruitment of the gluteal musculature   Rationale: increase ROM, increase strength, improve balance, and increase proprioception  to improve the patients ability to improve ease with functional lifting. 10 min Manual Therapy:    Right Sidelying, Left Hip Medial Grade III-IV Mobilization (neutral, +ext)  Right Sidelying, Left Hip Abduction and Extension Passive Physiological Grade III-IV Mobilization - Extension, Abduction   The manual therapy interventions were performed at a separate and distinct time from the therapeutic activities interventions.   Rationale: decrease pain, increase ROM, and increase tissue extensibility to improve ease with stair negotiation        With   [] TE   [] TA   [] neuro   [] other: Patient Education: [x] Review HEP    [] Progressed/Changed HEP based on:   [] positioning   [] body mechanics   [] transfers   [] heat/ice application    [] other:      Other Objective/Functional Measures:   Left Hip Abduction MMT = 4/5     Pain Level (0-10 scale) post treatment: 0    ASSESSMENT/Changes in Function: Initiated exercises per plan of care with good tolerance to completion with emphasis of care having been placed on promotion of left hip AROM with capsular hypomobility evident with joint mobility assessment. With exercise performance with promotion of neutral spine posturing. Patient will continue to benefit from skilled PT services to modify and progress therapeutic interventions, address functional mobility deficits, address ROM deficits, address strength deficits, analyze and address soft tissue restrictions, analyze and cue movement patterns, analyze and modify body mechanics/ergonomics, assess and modify postural abnormalities, address imbalance/dizziness, and instruct in home and community integration to attain remaining goals. []  See Plan of Care  []  See progress note/recertification  []  See Discharge Summary         Progress towards goals / Updated goals:    Short Term Goals: To be accomplished in 3 weeks:  1. Patient will subjectively report full compliance with prescribed HEP. Eval: HEP provided  Current: Progressing, HEP initiated without full compliance due to recent illness, 1/4/2022  2. Patient will demonstrate left/right hip abduction MMT >/= 4+/5 to improve ease with ambulation on uneven surfaces. Eval: Left Hip Abduction MMT = 4/5, Right Hip Abduction MMT = 3/5  3. Patient will demonstrate ability to perform supine bridge throughout 100% AROM to improve ease with bed mobility. Eval: Supine Bridge = 50% limited     Long Term Goals: To be accomplished in 6 weeks:  1.  Patient will demonstrate a significant functional improvement as demonstrated by a score of >/= 75 on FOTO. Eval: FOTO = 73       2. Patient will subjectively report ability to ride road bike x30 minutes without symptoms to improve ease with exercise. Eval: Onset of symptoms after riding road bike x15-20 minutes  3. Patient will demonstrate left/right SLS >/= 30 seconds to demonstrate a reduced falls risk.   Eval: Left SLS = 14 sec / Right SLS 6 sec    PLAN  [x]  Upgrade activities as tolerated     [x]  Continue plan of care  []  Update interventions per flow sheet       []  Discharge due to:_  []  Other:_      Shirin Jean Baptiste, PT 1/4/2023  8:48 AM    Future Appointments   Date Time Provider Brii Michelle   1/4/2023  2:30 PM Willena Osler, [place] [facility]   1/6/2023 11:30 AM Katherin Jackson PT [facility]   1/9/2023  1:00 PM Ermias Lewis MD [facility] BS AMB   2/23/2023  8:35 AM [facility] LAB VISIT [facility] BS AMB   3/2/2023  8:20 AM Hamlet Agrawal MD [facility] BS AMB   4/13/2023  2:00 PM Duane Sweet MD [facility] BS AMB   5/25/2023 11:30 AM [facility] NURSE [facility] LALIT SCHED   6/1/2023 11:20 AM Patty Nguyen PA-C [facility] LALIT SCHED   6/22/2023 10:00 AM Hong Cuevas NP [facility] BS AMB

## 2023-01-06 ENCOUNTER — HOSPITAL ENCOUNTER (OUTPATIENT)
Dept: PHYSICAL THERAPY | Age: 73
Discharge: HOME | End: 2023-01-06
Attending: ORTHOPAEDIC SURGERY
Payer: COMMERCIAL

## 2023-01-06 PROCEDURE — 97110 THERAPEUTIC EXERCISES: CPT | Performed by: PHYSICAL THERAPIST

## 2023-01-06 PROCEDURE — 97140 MANUAL THERAPY 1/> REGIONS: CPT | Performed by: PHYSICAL THERAPIST

## 2023-01-06 NOTE — PROGRESS NOTES
PT DAILY TREATMENT NOTE     Patient Name: Liam Aviles  Date:2023  : 1950  [x]  Patient  Verified  Payor: Neno Marin / Plan: [id number] / Product Type: PPO /    In time:11:27  Out time:12:10  Total Treatment Time (min): 43  Visit #: 3 of 12    Medicare/BCBS Only   Total Timed Codes (min):  43 1:1 Treatment Time:  33       Treatment Area: Left hip pain [M25.552]    SUBJECTIVE  Pain Level (0-10 scale): 0  Any medication changes, allergies to medications, adverse drug reactions, diagnosis change, or new procedure performed?: [x] No    [] Yes (see summary sheet for update)  Subjective functional status/changes:   [] No changes reported  Patient denies any hip pain today but states he had pain yesterday. OBJECTIVE      19 min Therapeutic Exercise:  [x] See flow sheet : Emphasis placed on improving available lumbopelvic and hip AROM and strength   Rationale: increase ROM and increase strength to improve the patients ability to improve ease with ambulation     14 min Neuromuscular Re-education:  [x]  See flow sheet : Emphasis placed on improving activation and recruitment of the gluteal musculature   Rationale: increase ROM, increase strength, improve balance, and increase proprioception  to improve the patients ability to improve ease with functional lifting. 10 min Manual Therapy:    Right Sidelying, Left Hip Medial Grade III-IV Mobilization (neutral, +ext)  Right Sidelying, Left Hip Abduction and Extension Passive Physiological Grade III-IV Mobilization - Extension, Abduction   The manual therapy interventions were performed at a separate and distinct time from the therapeutic activities interventions.   Rationale: decrease pain, increase ROM, and increase tissue extensibility to improve ease with stair negotiation          With   [] TE   [] TA   [] neuro   [] other: Patient Education: [x] Review HEP    [] Progressed/Changed HEP based on:   [] positioning   [] body mechanics   [] transfers   [] heat/ice application    [] other:      Other Objective/Functional Measures: Gait is without deviation. Pain Level (0-10 scale) post treatment: 0    ASSESSMENT/Changes in Function: Patient with intermittent hip pain. Doing well with his TE. Patient will continue to benefit from skilled PT services to modify and progress therapeutic interventions, address functional mobility deficits, address ROM deficits, address strength deficits, analyze and address soft tissue restrictions, analyze and cue movement patterns, analyze and modify body mechanics/ergonomics, assess and modify postural abnormalities, address imbalance/dizziness, and instruct in home and community integration to attain remaining goals. [x]  See Plan of Care  []  See progress note/recertification  []  See Discharge Summary         Progress towards goals / Updated goals:  Short Term Goals: To be accomplished in 3 weeks:  1. Patient will subjectively report full compliance with prescribed HEP. Eval: HEP provided  Current: Progressing, HEP initiated without full compliance due to recent illness, 1/4/2022  2. Patient will demonstrate left/right hip abduction MMT >/= 4+/5 to improve ease with ambulation on uneven surfaces. Eval: Left Hip Abduction MMT = 4/5, Right Hip Abduction MMT = 3/5  3. Patient will demonstrate ability to perform supine bridge throughout 100% AROM to improve ease with bed mobility. Eval: Supine Bridge = 50% limited     Long Term Goals: To be accomplished in 6 weeks:  1. Patient will demonstrate a significant functional improvement as demonstrated by a score of >/= 75 on FOTO. Eval: FOTO = 73       2. Patient will subjectively report ability to ride road bike x30 minutes without symptoms to improve ease with exercise. Eval: Onset of symptoms after riding road bike x15-20 minutes  3. Patient will demonstrate left/right SLS >/= 30 seconds to demonstrate a reduced falls risk.   Eval: Left SLS = 14 sec / Right SLS 6 sec    PLAN  [x]  Upgrade activities as tolerated     [x]  Continue plan of care  []  Update interventions per flow sheet       []  Discharge due to:_  []  Other:_      Fuadnia Abigail, PT 1/6/2023  7:26 AM    Future Appointments   Date Time Provider Brii Viviana   1/6/2023 11:30 AM Rossana Brown, PT [facility]   1/9/2023  1:00 PM Shea Segovia MD [facility] BS AMB   2/23/2023  8:35 AM IO LAB VISIT [facility] BS AMB   3/2/2023  8:20 AM Yue Fischer MD [facility] BS AMB   4/13/2023  2:00 PM Kyle Arias MD [facility] BS AMB   5/25/2023 11:30 AM [facility] NURSE [facility] LALIT SCHED   6/1/2023 11:20 AM Mikki Clemons PA-C [facility] LALIT SCHED   6/22/2023 10:00 AM Miriam Cuevas NP [facility] BS AMB

## 2023-01-09 ENCOUNTER — OFFICE VISIT (OUTPATIENT)
Dept: SURGERY | Age: 73
End: 2023-01-09
Payer: COMMERCIAL

## 2023-01-09 VITALS
SYSTOLIC BLOOD PRESSURE: 109 MMHG | HEIGHT: 75 IN | HEART RATE: 53 BPM | OXYGEN SATURATION: 97 % | WEIGHT: 178 LBS | DIASTOLIC BLOOD PRESSURE: 73 MMHG | BODY MASS INDEX: 22.13 KG/M2

## 2023-01-09 DIAGNOSIS — K21.9 GASTROESOPHAGEAL REFLUX DISEASE WITHOUT ESOPHAGITIS: ICD-10-CM

## 2023-01-09 DIAGNOSIS — K40.90 LEFT INGUINAL HERNIA: Primary | ICD-10-CM

## 2023-01-09 PROCEDURE — 3074F SYST BP LT 130 MM HG: CPT | Performed by: SURGERY

## 2023-01-09 PROCEDURE — 1123F ACP DISCUSS/DSCN MKR DOCD: CPT | Performed by: SURGERY

## 2023-01-09 PROCEDURE — 3078F DIAST BP <80 MM HG: CPT | Performed by: SURGERY

## 2023-01-09 PROCEDURE — 99214 OFFICE O/P EST MOD 30 MIN: CPT | Performed by: SURGERY

## 2023-01-09 NOTE — PROGRESS NOTES
General Surgery Consult      Amee Pimentel  Admit date: (Not on file)    MRN: [id number]     : 1950     Age: 72 y.o. Attending Physician: Jj Isaacs MD, [facility]      History of Present Illness:     Amee Pimentel is a 72 y.o. male who was referred to me by Dr. Maxi Carnes for evaluation of a left inguinal hernia. The patient had a CT scan in 2019 that showed a very small left inguinal hernia and at that point he was asymptomatic. The patient is very fit and healthy and he looks great for his age and he goes to the gym at least 3 times a week and has been noticing a large bulge and cause discomfort now in the left groin area. He denies any nausea or vomiting or any constipation. He had a history of robotic prostatectomy and laparoscopic cholecystectomy 2 years ago.      Patient Active Problem List    Diagnosis Date Noted    History of prostate cancer 2021    GERD (gastroesophageal reflux disease)     Asthma     Anxiety     Essential hypertension 2019    MDD (major depressive disorder), recurrent episode, mild (HCC) 2018    Overweight (BMI 25.0-29.9) 2018    Spinal stenosis, lumbar region with neurogenic claudication 2017    GALILEA (obstructive sleep apnea) Dr Estefana Primrose using oral appliance AHI 17.8 2015    Diverticulosis of large intestine without hemorrhage Dr Miah Landrum 10/29/2015    Hyperlipidemia 2015    BPH (benign prostatic hyperplasia) Dr. Dalton Patterson 2011    Neuropathy Dr. Estefana Primrose 2011    Erectile dysfunction 2011    Hypovitaminosis D     Dyslipidemia      Past Medical History:   Diagnosis Date    Allergic rhinitis     Dr Pascale Michelle; never took immunotherapy    Anxiety     saw Dr Deng Liu     Asthma     Dr Mehrdad Michelle; pfts show no obstruction but high RV    BCC (basal cell carcinoma of skin)     Dr. Irene Valentin s/p resection 2 spots    BPH (benign prostatic hyperplasia)     Dr. Dalton Patterson; on proscar for years    Degenerative arthritis of lumbar spine 2014 MRI (3/14) showed scoliosis w multilevel degen findings, mod L3-4, L4-5 central stenosis, mod L5-S1 foraminal stenosis;  (10/17) severe L3-4 central stenosis; Dr Kelsy Banks    Depression     and anxiety; paxil 2008?  lexapro and wellbutrin til 2018; tried prozac; saw Dr Salvatore Jarquin then Dr Dayo Ruff now Dr Cecilio Paz    Diverticulosis 09/21/2022    Dr Los Whitaker    Dyslipidemia     ED (erectile dysfunction)     ICI with penile trauma; JOVANNA not helpful; peak performance unsuccessful    FHx: heart disease     GERD (gastroesophageal reflux disease)     s/p dilation 2003 Dr. Lake Romero; egd 2015; Dr Lake Romero egd 12/20 dilation, hiatal hernia    H/O cardiovascular stress test     ETT neg (8/09); [facility] neg stress echo (6/19)    H/O echocardiogram     nl lv, ef 55%, no wma or valvular dz (9/19)    Heel spur     Dr Jarrell Bustillo    Hypertension 02/2019    24 hr ambulatory monitoring Dr Fei Lopez    Hypovitaminosis D     GALILEA (obstructive sleep apnea)     intol cpap Dr Surinder Davis 2015; using oral appliance Dr Anthony Vallejo; AHI 17.8 min desats 83    Overweight (BMI 25.0-29.9) 02/12/2018    Peripheral neuropathy     and B CTS on EMG Dr. Surinder Davis    Personal history of prostate cancer     Prostate cancer ([facility] [address] 04/16/2021    Dr Coleman [facility]; mira 3+3; s/p RALP, BLND Dr Arias    Pulmonary nodules 2011    bilat upper lobes; no change 2018; [facility]    Scoliosis     Dr Jah Oreilly 2014    Venous insufficiency 12/2021      Past Surgical History:   Procedure Laterality Date    HX CARPAL TUNNEL RELEASE Bilateral 2007    HX COLONOSCOPY      Dr. Lake Romero mild diverticulosis 2000, 6/12; 9/21/22    HX LAP CHOLECYSTECTOMY  08/2021    Dr Joy Gallo ORTHOPAEDIC      DEXA t score 4.2 spine, 0.2 hip (2012)    HX RADICAL PROSTATECTOMY  04/2021    Dr Arias DVP    LA UNLISTED PROCEDURE LUNGS & PLEURA  07/2018    Dr Vaishnavi Pfeiffer; FEV1 96%, 5% improvement postbd, ratio 104, , , DLCO 82      Social History     Tobacco Use    Smoking status: Former     Packs/day: 0.25 Years: 2.00     Pack years: 0.50     Types: Cigarettes     Quit date: 0     Years since quittin.0    Smokeless tobacco: Never    Tobacco comments:     reports occasional use as a teenager   Substance Use Topics    Alcohol use: Yes     Alcohol/week: 1.0 standard drink     Types: 1 Glasses of wine per week     Comment: 1-2 glasses of wine a month      Social History     Tobacco Use   Smoking Status Former    Packs/day: 0.25    Years: 2.00    Pack years: 0.50    Types: Cigarettes    Quit date: 1972    Years since quittin.0   Smokeless Tobacco Never   Tobacco Comments    reports occasional use as a teenager     Family History   Problem Relation Age of Onset    Cancer Mother         leukemia    Heart Disease Father     Psychiatric Disorder Daughter         depression      Current Outpatient Medications   Medication Sig    [START ON 2023] gabapentin (NEURONTIN) 600 mg tablet Take 1 Tablet by mouth three (3) times daily. Max Daily Amount: 1,800 mg. Indications: neuropathic pain    fluticasone propionate (FLONASE) 50 mcg/actuation nasal spray SHAKE LIQUID AND USE 2 SPRAYS IN EACH NOSTRIL DAILY    triamterene-hydroCHLOROthiazide (DYAZIDE) 37.5-25 mg per capsule TAKE 1 CAPSULE BY MOUTH DAILY    rosuvastatin (CRESTOR) 10 mg tablet TAKE 1 TABLET BY MOUTH EVERY NIGHT    buPROPion XL (WELLBUTRIN XL) 300 mg XL tablet Take 300 mg by mouth daily. cholecalciferol (VITAMIN D3) (5000 Units/125 mcg) tab tablet Take 5,000 Units by mouth daily. diazePAM (VALIUM) 2 mg tablet TAKE 4 AND A HALF TABLET BY MOUTH EVERY DAY    tadalafiL (CIALIS) 5 mg tablet TAKE ONE TABLET BY MOUTH DAILY    metoprolol tartrate (LOPRESSOR) 25 mg tablet TAKE 1 TABLET BY MOUTH TWICE DAILY    Dexilant 60 mg CpDB capsule (delayed release) Take 60 mg by mouth daily. ARIPiprazole (ABILIFY) 10 mg tablet Take 1 Tablet by mouth daily. vilazodone (VIIBRYD) 40 mg tab tablet Take  by mouth daily.     acetaminophen (TYLENOL) 325 mg tablet Take 325 mg by mouth every four (4) hours as needed for Pain. Combigan 0.2-0.5 % drop ophthalmic solution INSTILL 1 DROP INTO RIGHT EYE TWICE DAILY    SYMBICORT 160-4.5 mcg/actuation HFAA INHALE 2 PUFFS BY MOUTH TWICE DAILY    latanoprost (XALATAN) 0.005 % ophthalmic solution OMAR 1 GTT INTO RIGHT EYE HS    albuterol (PROAIR HFA) 90 mcg/actuation inhaler Take 2 Puffs by inhalation every four (4) hours as needed for Wheezing. ascorbic acid, vitamin C, (VITAMIN C) 500 mg tablet Take 2 tablets by mouth daily    aspirin delayed-release 81 mg tablet Take  by mouth daily. baclofen (LIORESAL) 10 mg tablet Take 1 Tablet by mouth two (2) times a day. fish oil-omega-3 fatty acids 340-1,000 mg capsule Take 1 Capsule by mouth daily. (Patient not taking: Reported on 1/9/2023)    Gluc-Scar[facility]#9-S-Kihz-Mike-Bor 750-625-30 mg tab Take 1 Tablet by mouth two (2) times a day. No current facility-administered medications for this visit.       Allergies   Allergen Reactions    Bee Pollen Sneezing     Seasonal allergies    Cefdinir Nausea and Vomiting    Heparin Rash    Naproxen Other (comments)     elev cr 2/19    Niacin Other (comments)     Flushing      Norvasc [Amlodipine] Swelling        Review of Systems:  Constitutional: negative  Eyes: negative  Ears, Nose, Mouth, Throat, and Face: negative  Respiratory: negative  Cardiovascular: negative  Gastrointestinal: positive for left groin pain  Genitourinary:negative  Integument/Breast: negative  Hematologic/Lymphatic: negative  Musculoskeletal:negative  Neurological: negative  Behavioral/Psychiatric: negative  Endocrine: negative  Allergic/Immunologic: negative    Objective:     Visit Vitals  Ht 6' 2.5\" (1.892 m)   Wt 80.7 kg (178 lb)   BMI 22.55 kg/m²       Physical Exam:      General:  in no apparent distress, alert, oriented times 3, afebrile, and normal vitals   Eyes:  conjunctivae and sclerae normal, pupils equal, round, reactive to light   Throat & Neck: no erythema or exudates noted and neck supple and symmetrical; no palpable masses   Lungs:   clear to auscultation bilaterally   Heart:  Regular rate and rhythm   Abdomen:   flat, soft, nontender, nondistended, no masses or organomegaly. There is a left inguinal hernia that is nontender and reducible. Extremities: extremities normal, atraumatic, no cyanosis or edema   Skin: Normal.       Imaging and Lab Review:     CBC:   Lab Results   Component Value Date/Time    WBC 6.1 11/28/2022 10:55 AM    RBC 4.61 11/28/2022 10:55 AM    HGB 15.2 11/28/2022 10:55 AM    HCT 43.4 11/28/2022 10:55 AM    PLATELET 326 32/60/1596 10:55 AM     BMP:   Lab Results   Component Value Date/Time    Glucose 93 11/28/2022 10:55 AM    Sodium 140 11/28/2022 10:55 AM    Potassium 3.9 11/28/2022 10:55 AM    Chloride 102 11/28/2022 10:55 AM    CO2 23 11/28/2022 10:55 AM    BUN 17 11/28/2022 10:55 AM    Creatinine 0.97 11/28/2022 10:55 AM    Calcium 9.4 11/28/2022 10:55 AM     CMP:  Lab Results   Component Value Date/Time    Glucose 93 11/28/2022 10:55 AM    Sodium 140 11/28/2022 10:55 AM    Potassium 3.9 11/28/2022 10:55 AM    Chloride 102 11/28/2022 10:55 AM    CO2 23 11/28/2022 10:55 AM    BUN 17 11/28/2022 10:55 AM    Creatinine 0.97 11/28/2022 10:55 AM    Calcium 9.4 11/28/2022 10:55 AM    Anion gap 4 03/29/2022 01:15 PM    BUN/Creatinine ratio 18 11/28/2022 10:55 AM    Alk. phosphatase 88 11/28/2022 10:55 AM    Protein, total 6.3 11/28/2022 10:55 AM    Albumin 4.4 11/28/2022 10:55 AM    Globulin 2.8 03/29/2022 01:15 PM    A-G Ratio 2.3 (H) 11/28/2022 10:55 AM       No results found for this or any previous visit (from the past 24 hour(s)). images and reports reviewed    Assessment:   Louis Ayala is a 72 y.o. male who is presenting with a left inguinal hernia. I Discussed the possibility of incarceration, strangulation, enlargement in size over time, and the risk of emergency surgery in the face of strangulation.  I also discussed the use of prosthetic materials (mesh), including the risk of infection. Also discussed the risk of surgery including recurrence and the possible need for reoperation and removal of mesh if used, possibility of postoperative small bowel injury, obstruction or ileus, and the risks of general anesthetic. I explained to the the patient about the robotic hernia repair procedure. I told the patient that Eduin Ryan will call him to schedule his surgery. Plan:     1. Schedule for robotic left inguinal hernia repair with placement of mesh. 2. No heavy lifting for 2 weeks after the surgery (More than 15 pounds)  3. Avoid constipation by taking stool softener.     Please call me if you have any questions (cell phone: [phone])     Signed By: Josiah Miller MD     January 9, 2023

## 2023-01-09 NOTE — PROGRESS NOTES
Mariella Gipson is a 72 y.o. male (: 1950) presenting to address:    Chief Complaint   Patient presents with    New Patient     Mass on left groin area/ referred by Dr. Eliz Lima       Medication list and allergies have been reviewed with Mariella Gipson and updated as of today's date. I have gone over all Medical, Surgical and Social History with Mariella Gipson and updated/added the information accordingly.

## 2023-01-10 ENCOUNTER — TELEPHONE (OUTPATIENT)
Dept: SURGERY | Age: 73
End: 2023-01-10

## 2023-01-10 ENCOUNTER — HOSPITAL ENCOUNTER (OUTPATIENT)
Dept: PHYSICAL THERAPY | Age: 73
Discharge: HOME | End: 2023-01-10
Attending: ORTHOPAEDIC SURGERY
Payer: COMMERCIAL

## 2023-01-10 PROCEDURE — 97110 THERAPEUTIC EXERCISES: CPT

## 2023-01-10 PROCEDURE — 97112 NEUROMUSCULAR REEDUCATION: CPT

## 2023-01-10 PROCEDURE — 97140 MANUAL THERAPY 1/> REGIONS: CPT

## 2023-01-10 NOTE — PROGRESS NOTES
PT DAILY TREATMENT NOTE     Patient Name: Chris Guevara  Date:1/10/2023  : 1950  [x]  Patient  Verified  Payor: [employer] / Plan: [id number] / Product Type: PPO /    In time:1030  Out time:1113  Total Treatment Time (min): 43  Visit #: 4 of 12    Medicare/BCBS Only   Total Timed Codes (min):  43 1:1 Treatment Time:  38       Treatment Area: Left hip pain [M25.552]    SUBJECTIVE  Pain Level (0-10 scale): 0  Any medication changes, allergies to medications, adverse drug reactions, diagnosis change, or new procedure performed?: [x] No    [] Yes (see summary sheet for update)  Subjective functional status/changes:   [] No changes reported  Patient denies pain at this time but reports pain level 7/10 upon waking with relief with generalized activity. Patient additionally reports putting away the [place] decorations yesterday with patient reporting some elevated symptoms after completion. OBJECTIVE    15 min Therapeutic Exercise:  [x] See flow sheet : Emphasis placed on improving available lumbopelvic and hip AROM and strength   Rationale: increase ROM and increase strength to improve the patients ability to improve ease with ambulation     20 min Neuromuscular Re-education:  [x]  See flow sheet : Emphasis placed on improving activation and recruitment of the gluteal musculature   Rationale: increase ROM, increase strength, improve balance, and increase proprioception  to improve the patients ability to improve ease with functional lifting. 8 min Manual Therapy:    Supine, Left Hip AP Grade III-IV Mobilization (90 deg flex, + add/IR)  Supine, Left Hip ER 90/90 Agonist Contract-Relax   The manual therapy interventions were performed at a separate and distinct time from the therapeutic activities interventions.   Rationale: decrease pain, increase ROM, and increase tissue extensibility to improve ease with stair negotiation         With   [] TE   [] TA   [] neuro   [] other: Patient Education: [x] Review HEP    [] Progressed/Changed HEP based on:   [] positioning   [] body mechanics   [] transfers   [] heat/ice application    [] other:      Other Objective/Functional Measures:   Supine Bridge = 50% limited     Pain Level (0-10 scale) post treatment: 2-3    ASSESSMENT/Changes in Function: With pain and ROM loss noted upon assessment of left hip ER with replication of concordant sign with objective assessment. With ability to progress strengthening of the posterolateral hip girdle without adverse response. With exercise performance with greater hypertonicity to the left v. Right hip flexors. Patient will continue to benefit from skilled PT services to modify and progress therapeutic interventions, address functional mobility deficits, address ROM deficits, address strength deficits, analyze and address soft tissue restrictions, analyze and cue movement patterns, analyze and modify body mechanics/ergonomics, assess and modify postural abnormalities, address imbalance/dizziness, and instruct in home and community integration to attain remaining goals. []  See Plan of Care  []  See progress note/recertification  []  See Discharge Summary         Progress towards goals / Updated goals:    Short Term Goals: To be accomplished in 3 weeks:  1. Patient will subjectively report full compliance with prescribed HEP. Eval: HEP provided  Current: Progressing, HEP initiated without full compliance due to recent illness, 1/4/2022  2. Patient will demonstrate left/right hip abduction MMT >/= 4+/5 to improve ease with ambulation on uneven surfaces. Eval: Left Hip Abduction MMT = 4/5, Right Hip Abduction MMT = 3/5  3. Patient will demonstrate ability to perform supine bridge throughout 100% AROM to improve ease with bed mobility. Eval: Supine Bridge = 50% limited  Current: Remains, Supine Bridge = 50% limited, 1/10/2023     Long Term Goals: To be accomplished in 6 weeks:  1.  Patient will demonstrate a significant functional improvement as demonstrated by a score of >/= 75 on FOTO. Eval: FOTO = 73       2. Patient will subjectively report ability to ride road bike x30 minutes without symptoms to improve ease with exercise. Eval: Onset of symptoms after riding road bike x15-20 minutes  3. Patient will demonstrate left/right SLS >/= 30 seconds to demonstrate a reduced falls risk.   Eval: Left SLS = 14 sec / Right SLS 6 sec    PLAN  [x]  Upgrade activities as tolerated     [x]  Continue plan of care  []  Update interventions per flow sheet       []  Discharge due to:_  []  Other:_      Too Tavares, PT 1/10/2023  6:44 AM    Future Appointments   Date Time Provider Brii Michelle   1/10/2023 10:30 AM [facility], [address] [facility]   1/11/2023 11:00 AM Michael Linda PTA [facility]   2/23/2023  8:35 AM [facility] LAB VISIT [facility] BS AMB   3/2/2023  8:20 AM Laurel Hanson MD [facility] BS AMB   4/13/2023  2:00 PM Samantha Leung MD [facility] BS AMB   5/25/2023 11:30 AM [facility] NURSE [facility] LALIT SCHED   6/1/2023 11:20 AM Cipriano Thomas PA-C [facility] LALIT SCHED   6/22/2023 10:00 AM Nanda Cuevas, [facility] BS AMB

## 2023-01-11 ENCOUNTER — HOSPITAL ENCOUNTER (OUTPATIENT)
Dept: PHYSICAL THERAPY | Age: 73
Discharge: HOME | End: 2023-01-11
Attending: ORTHOPAEDIC SURGERY
Payer: COMMERCIAL

## 2023-01-11 PROCEDURE — 97112 NEUROMUSCULAR REEDUCATION: CPT

## 2023-01-11 PROCEDURE — 97110 THERAPEUTIC EXERCISES: CPT

## 2023-01-11 PROCEDURE — 97140 MANUAL THERAPY 1/> REGIONS: CPT

## 2023-01-11 NOTE — PROGRESS NOTES
PT DAILY TREATMENT NOTE     Patient Name: Clint Beckett  Date:2023  : 1950  [x]  Patient  Verified  Payor: BLUE CROSS / Plan: [id number] / Product Type: PPO /    In time:1100  Out time:1140  Total Treatment Time (min): 40  Visit #: 5 of 12    Medicare/BCBS Only   Total Timed Codes (min):  38 1:1 Treatment Time:  38       Treatment Area: Left hip pain [M25.552]    SUBJECTIVE  Pain Level (0-10 scale): 7  Any medication changes, allergies to medications, adverse drug reactions, diagnosis change, or new procedure performed?: [x] No    [] Yes (see summary sheet for update)  Subjective functional status/changes:   [] No changes reported  Pt reported increased pain in left hip    OBJECTIVE     15 min Therapeutic Exercise:  [x] See flow sheet : Emphasis placed on improving available lumbopelvic and hip AROM and strength   Rationale: increase ROM and increase strength to improve the patients ability to improve ease with ambulation     17 min Neuromuscular Re-education:  [x]  See flow sheet : Emphasis placed on improving activation and recruitment of the gluteal musculature   Rationale: increase ROM, increase strength, improve balance, and increase proprioception  to improve the patients ability to improve ease with functional lifting. 8 min Manual Therapy:    Supine, Left Hip AP Grade III-IV Mobilization (90 deg flex, + add/IR)  Supine, Left Hip ER 90/90 Agonist Contract-Relax   The manual therapy interventions were performed at a separate and distinct time from the therapeutic activities interventions.   Rationale: decrease pain, increase ROM, and increase tissue extensibility to improve ease with stair negotiation                                                         With   [] TE   [] TA   [] neuro   [] other: Patient Education: [x] Review HEP    [] Progressed/Changed HEP based on:   [] positioning   [] body mechanics   [] transfers   [] heat/ice application    [] other: Other Objective/Functional Measures:   - progressed therex per flow sheet increasing reps     Pain Level (0-10 scale) post treatment: 0    ASSESSMENT/Changes in Function: progressed therex and increased reps today with good tolerance and no pain reported post treatment. Patient will continue to benefit from skilled PT services to modify and progress therapeutic interventions, address functional mobility deficits, address ROM deficits, address strength deficits, analyze and address soft tissue restrictions, analyze and cue movement patterns, and analyze and modify body mechanics/ergonomics to attain remaining goals. [x]  See Plan of Care  []  See progress note/recertification  []  See Discharge Summary         Progress towards goals / Updated goals:  Short Term Goals: To be accomplished in 3 weeks:  1. Patient will subjectively report full compliance with prescribed HEP. Eval: HEP provided  Current: Progressing, HEP initiated without full compliance due to recent illness, 1/4/2022  2. Patient will demonstrate left/right hip abduction MMT >/= 4+/5 to improve ease with ambulation on uneven surfaces. Eval: Left Hip Abduction MMT = 4/5, Right Hip Abduction MMT = 3/5  3. Patient will demonstrate ability to perform supine bridge throughout 100% AROM to improve ease with bed mobility. Eval: Supine Bridge = 50% limited  Current: Remains, Supine Bridge = 50% limited, 1/10/2023     Long Term Goals: To be accomplished in 6 weeks:  1. Patient will demonstrate a significant functional improvement as demonstrated by a score of >/= 75 on FOTO. Eval: FOTO = 73       2. Patient will subjectively report ability to ride road bike x30 minutes without symptoms to improve ease with exercise. Eval: Onset of symptoms after riding road bike x15-20 minutes  3. Patient will demonstrate left/right SLS >/= 30 seconds to demonstrate a reduced falls risk.   Eval: Left SLS = 14 sec / Right SLS 6 sec    PLAN  [x]  Upgrade activities as tolerated     [x]  Continue plan of care  []  Update interventions per flow sheet       []  Discharge due to:_  []  Other:_      Anali Aguilar, ROMELIA 1/11/2023  11:28 AM    Future Appointments   Date Time Provider Brii Michelle   2/23/2023  8:35 AM [facility] LAB VISIT [facility] BS AMB   3/2/2023  8:20 AM Julio César Win MD [facility] BS AMB   4/13/2023  2:00 PM Haja Velez MD [facility] BS AMB   5/25/2023 11:30 AM [facility] NURSE [facility]   6/1/2023 11:20 AM [facility]   6/22/2023 10:00 AM Autumn Cuevas NP [facility] BS AMB

## 2023-01-13 RX ORDER — DEXTROMETHORPHAN HYDROBROMIDE, GUAIFENESIN 5; 100 MG/5ML; MG/5ML
650 LIQUID ORAL AS NEEDED
COMMUNITY

## 2023-01-16 ENCOUNTER — HOSPITAL ENCOUNTER (OUTPATIENT)
Dept: PHYSICAL THERAPY | Age: 73
Discharge: HOME | End: 2023-01-16
Attending: ORTHOPAEDIC SURGERY
Payer: MEDICARE

## 2023-01-16 PROCEDURE — 97140 MANUAL THERAPY 1/> REGIONS: CPT | Performed by: PHYSICAL THERAPIST

## 2023-01-16 PROCEDURE — 97112 NEUROMUSCULAR REEDUCATION: CPT | Performed by: PHYSICAL THERAPIST

## 2023-01-16 NOTE — PROGRESS NOTES
PT DAILY TREATMENT NOTE     Patient Name: Patric Ryder  Date:2023  : 1950  [x]  Patient  Verified  Payor: YOGESH QUIROS / Plan: [id number] / Product Type: PPO /    In time:10:00  Out time:10:45  Total Treatment Time (min): 45  Visit #: 6 of 12    Medicare/BCBS Only   Total Timed Codes (min):  45 1:1 Treatment Time:  30       Treatment Area: Left hip pain [M25.552]    SUBJECTIVE  Pain Level (0-10 scale): 0  Any medication changes, allergies to medications, adverse drug reactions, diagnosis change, or new procedure performed?: [x] No    [] Yes (see summary sheet for update)  Subjective functional status/changes:   [] No changes reported  Patient states he awoke with back pain but doing better now. Continues to comment on his balance being compromised. Patient states he is having a surgical procedure done this week and PT is on hold for a while. OBJECTIVE      15 min Therapeutic Exercise:  [x] See flow sheet : Emphasis placed on improving available lumbopelvic and hip AROM and strength   Rationale: increase ROM and increase strength to improve the patients ability to improve ease with ambulation     22 min Neuromuscular Re-education:  [x]  See flow sheet : Emphasis placed on improving activation and recruitment of the gluteal musculature   Rationale: increase ROM, increase strength, improve balance, and increase proprioception  to improve the patients ability to improve ease with functional lifting. 8 min Manual Therapy:    Supine, Left Hip AP Grade III-IV Mobilization (90 deg flex, + add/IR)  Supine, Left Hip ER 90/90 Agonist Contract-Relax   The manual therapy interventions were performed at a separate and distinct time from the therapeutic activities interventions.   Rationale: decrease pain, increase ROM, and increase tissue extensibility to improve ease with stair negotiation          With   [] TE   [] TA   [] neuro   [] other: Patient Education: [x] Review HEP [] Progressed/Changed HEP based on:   [] positioning   [] body mechanics   [] transfers   [] heat/ice application    [] other:      Other Objective/Functional Measures: minimal ability to perform hip hike due to tightness. Pain Level (0-10 scale) post treatment: 0    ASSESSMENT/Changes in Function: Patient with intermittent back pain, doing okay today. He will be out of therapy for a while until cleared by his surgeon to return to therapy. Patient will continue to benefit from skilled PT services to modify and progress therapeutic interventions, address functional mobility deficits, address ROM deficits, address strength deficits, analyze and address soft tissue restrictions, analyze and cue movement patterns, and analyze and modify body mechanics/ergonomics to attain remaining goals. [x]  See Plan of Care  []  See progress note/recertification  []  See Discharge Summary         Progress towards goals / Updated goals:  Short Term Goals: To be accomplished in 3 weeks:  1. Patient will subjectively report full compliance with prescribed HEP. Eval: HEP provided  Current: Progressing, HEP initiated without full compliance due to recent illness, 1/4/2022  2. Patient will demonstrate left/right hip abduction MMT >/= 4+/5 to improve ease with ambulation on uneven surfaces. Eval: Left Hip Abduction MMT = 4/5, Right Hip Abduction MMT = 3/5  Current:  Left hip abd = 4+/5; Right hip abd = 4-/5.  1/16/23. Progressing. 3. Patient will demonstrate ability to perform supine bridge throughout 100% AROM to improve ease with bed mobility. Eval: Supine Bridge = 50% limited  Current: Remains, Supine Bridge = 50% limited, 1/10/2023     Long Term Goals: To be accomplished in 6 weeks:  1. Patient will demonstrate a significant functional improvement as demonstrated by a score of >/= 75 on FOTO. Eval: FOTO = 73       2.  Patient will subjectively report ability to ride road bike x30 minutes without symptoms to improve ease with exercise. Eval: Onset of symptoms after riding road bike x15-20 minutes  Current:  Too cold to ride his road bike. 1/16/23. No change. 3. Patient will demonstrate left/right SLS >/= 30 seconds to demonstrate a reduced falls risk. Eval: Left SLS = 14 sec / Right SLS 6 sec  Current:  Left SLS = 12 sec;  Right SLS = 12 sec. 1/16/2023. Progressing.     PLAN  [x]  Upgrade activities as tolerated     [x]  Continue plan of care  []  Update interventions per flow sheet       []  Discharge due to:_  []  Other:_      Lowell Burch, PT 1/16/2023  10:00 AM    Future Appointments   Date Time Provider Brii Michelle   1/30/2023 11:45 AM Yadira Morales MD [facility] BS AMB   2/23/2023  8:35 AM [facility] LAB VISIT [facility] BS AMB   3/2/2023  8:20 AM Anahy Garibay MD [facility] BS AMB   4/13/2023  2:00 PM Valentin Wong MD [facility] BS AMB   5/25/2023 11:30 AM [facility] NURSE [facility]   6/1/2023 11:20 AM Sumit Thomas PA-C [facility]   6/22/2023 10:00 AM Irena Cuevas NP [facility] BS AMB

## 2023-01-17 ENCOUNTER — ANESTHESIA EVENT (OUTPATIENT)
Dept: SURGERY | Age: 73
End: 2023-01-17
Payer: COMMERCIAL

## 2023-01-18 ENCOUNTER — ANESTHESIA (OUTPATIENT)
Dept: SURGERY | Age: 73
End: 2023-01-18
Payer: COMMERCIAL

## 2023-01-18 ENCOUNTER — HOSPITAL ENCOUNTER (OUTPATIENT)
Age: 73
Setting detail: OUTPATIENT SURGERY
Discharge: HOME | End: 2023-01-18
Attending: SURGERY | Admitting: SURGERY
Payer: COMMERCIAL

## 2023-01-18 VITALS
SYSTOLIC BLOOD PRESSURE: 115 MMHG | BODY MASS INDEX: 22.06 KG/M2 | OXYGEN SATURATION: 95 % | TEMPERATURE: 96.7 F | HEIGHT: 75 IN | DIASTOLIC BLOOD PRESSURE: 68 MMHG | RESPIRATION RATE: 14 BRPM | HEART RATE: 53 BPM | WEIGHT: 177.4 LBS

## 2023-01-18 DIAGNOSIS — Z98.890 S/P HERNIA REPAIR: Primary | ICD-10-CM

## 2023-01-18 DIAGNOSIS — Z87.19 S/P HERNIA REPAIR: Primary | ICD-10-CM

## 2023-01-18 LAB — GLUCOSE BLD STRIP.AUTO-MCNC: 124 MG/DL (ref 70–110)

## 2023-01-18 PROCEDURE — C1781 MESH (IMPLANTABLE): HCPCS | Performed by: SURGERY

## 2023-01-18 PROCEDURE — 77030008683 HC TU ET CUF COVD -A: Performed by: ANESTHESIOLOGY

## 2023-01-18 PROCEDURE — 77030022704 HC SUT VLOC COVD -B: Performed by: SURGERY

## 2023-01-18 PROCEDURE — 2709999900 HC NON-CHARGEABLE SUPPLY: Performed by: SURGERY

## 2023-01-18 PROCEDURE — 74011250636 HC RX REV CODE- 250/636: Performed by: SURGERY

## 2023-01-18 PROCEDURE — 74011000250 HC RX REV CODE- 250: Performed by: NURSE ANESTHETIST, CERTIFIED REGISTERED

## 2023-01-18 PROCEDURE — 76010000933 HC OR TIME 0.5 TO 1HR INTENSV - TIER 2: Performed by: SURGERY

## 2023-01-18 PROCEDURE — 82962 GLUCOSE BLOOD TEST: CPT

## 2023-01-18 PROCEDURE — 74011000250 HC RX REV CODE- 250: Performed by: SURGERY

## 2023-01-18 PROCEDURE — 77030040922 HC BLNKT HYPOTHRM STRY -A: Performed by: SURGERY

## 2023-01-18 PROCEDURE — 77030020703 HC SEAL CANN DISP INTU -B: Performed by: SURGERY

## 2023-01-18 PROCEDURE — 74011250636 HC RX REV CODE- 250/636: Performed by: NURSE ANESTHETIST, CERTIFIED REGISTERED

## 2023-01-18 PROCEDURE — 76210000017 HC OR PH I REC 1.5 TO 2 HR: Performed by: SURGERY

## 2023-01-18 PROCEDURE — 77030035277 HC OBTRTR BLDELSS DISP INTU -B: Performed by: SURGERY

## 2023-01-18 PROCEDURE — 77030010507 HC ADH SKN DERMBND J&J -B: Performed by: SURGERY

## 2023-01-18 PROCEDURE — 77030031139 HC SUT VCRL2 J&J -A: Performed by: SURGERY

## 2023-01-18 PROCEDURE — 77030026438 HC STYL ET INTUB CARD -A: Performed by: ANESTHESIOLOGY

## 2023-01-18 PROCEDURE — 77030002933 HC SUT MCRYL J&J -A: Performed by: SURGERY

## 2023-01-18 PROCEDURE — 77030040361 HC SLV COMPR DVT MDII -B: Performed by: SURGERY

## 2023-01-18 PROCEDURE — 76210000021 HC REC RM PH II 0.5 TO 1 HR: Performed by: SURGERY

## 2023-01-18 PROCEDURE — 76060000032 HC ANESTHESIA 0.5 TO 1 HR: Performed by: SURGERY

## 2023-01-18 DEVICE — MESH CS LEFT LARGE 10CM X 16CM: Type: IMPLANTABLE DEVICE | Site: INGUINAL | Status: FUNCTIONAL

## 2023-01-18 RX ORDER — DIPHENHYDRAMINE HYDROCHLORIDE 50 MG/ML
12.5 INJECTION, SOLUTION INTRAMUSCULAR; INTRAVENOUS
Status: DISCONTINUED | OUTPATIENT
Start: 2023-01-18 | End: 2023-01-18 | Stop reason: HOSPADM

## 2023-01-18 RX ORDER — PROPOFOL 10 MG/ML
INJECTION, EMULSION INTRAVENOUS AS NEEDED
Status: DISCONTINUED | OUTPATIENT
Start: 2023-01-18 | End: 2023-01-18 | Stop reason: HOSPADM

## 2023-01-18 RX ORDER — SODIUM CHLORIDE, SODIUM LACTATE, POTASSIUM CHLORIDE, CALCIUM CHLORIDE 600; 310; 30; 20 MG/100ML; MG/100ML; MG/100ML; MG/100ML
50 INJECTION, SOLUTION INTRAVENOUS CONTINUOUS
Status: DISCONTINUED | OUTPATIENT
Start: 2023-01-18 | End: 2023-01-18 | Stop reason: HOSPADM

## 2023-01-18 RX ORDER — MIDAZOLAM HYDROCHLORIDE 1 MG/ML
INJECTION, SOLUTION INTRAMUSCULAR; INTRAVENOUS AS NEEDED
Status: DISCONTINUED | OUTPATIENT
Start: 2023-01-18 | End: 2023-01-18 | Stop reason: HOSPADM

## 2023-01-18 RX ORDER — LIDOCAINE HYDROCHLORIDE 10 MG/ML
0.1 INJECTION, SOLUTION EPIDURAL; INFILTRATION; INTRACAUDAL; PERINEURAL AS NEEDED
Status: DISCONTINUED | OUTPATIENT
Start: 2023-01-18 | End: 2023-01-18 | Stop reason: HOSPADM

## 2023-01-18 RX ORDER — SODIUM CHLORIDE 0.9 % (FLUSH) 0.9 %
5-40 SYRINGE (ML) INJECTION AS NEEDED
Status: DISCONTINUED | OUTPATIENT
Start: 2023-01-18 | End: 2023-01-18 | Stop reason: HOSPADM

## 2023-01-18 RX ORDER — OXYCODONE AND ACETAMINOPHEN 5; 325 MG/1; MG/1
1 TABLET ORAL
Qty: 24 TABLET | Refills: 0 | Status: SHIPPED | OUTPATIENT
Start: 2023-01-18 | End: 2023-01-21

## 2023-01-18 RX ORDER — SODIUM CHLORIDE, SODIUM LACTATE, POTASSIUM CHLORIDE, CALCIUM CHLORIDE 600; 310; 30; 20 MG/100ML; MG/100ML; MG/100ML; MG/100ML
100 INJECTION, SOLUTION INTRAVENOUS CONTINUOUS
Status: DISCONTINUED | OUTPATIENT
Start: 2023-01-18 | End: 2023-01-18 | Stop reason: HOSPADM

## 2023-01-18 RX ORDER — EPHEDRINE SULFATE/0.9% NACL/PF 25 MG/5 ML
SYRINGE (ML) INTRAVENOUS AS NEEDED
Status: DISCONTINUED | OUTPATIENT
Start: 2023-01-18 | End: 2023-01-18 | Stop reason: HOSPADM

## 2023-01-18 RX ORDER — SODIUM CHLORIDE 0.9 % (FLUSH) 0.9 %
5-40 SYRINGE (ML) INJECTION EVERY 8 HOURS
Status: DISCONTINUED | OUTPATIENT
Start: 2023-01-18 | End: 2023-01-18 | Stop reason: HOSPADM

## 2023-01-18 RX ORDER — ROCURONIUM BROMIDE 10 MG/ML
INJECTION, SOLUTION INTRAVENOUS AS NEEDED
Status: DISCONTINUED | OUTPATIENT
Start: 2023-01-18 | End: 2023-01-18 | Stop reason: HOSPADM

## 2023-01-18 RX ORDER — OXYCODONE AND ACETAMINOPHEN 5; 325 MG/1; MG/1
1 TABLET ORAL AS NEEDED
Status: DISCONTINUED | OUTPATIENT
Start: 2023-01-18 | End: 2023-01-18 | Stop reason: HOSPADM

## 2023-01-18 RX ORDER — SUCCINYLCHOLINE CHLORIDE 20 MG/ML
INJECTION INTRAMUSCULAR; INTRAVENOUS AS NEEDED
Status: DISCONTINUED | OUTPATIENT
Start: 2023-01-18 | End: 2023-01-18 | Stop reason: HOSPADM

## 2023-01-18 RX ORDER — NEOSTIGMINE METHYLSULFATE 1 MG/ML
INJECTION, SOLUTION INTRAVENOUS AS NEEDED
Status: DISCONTINUED | OUTPATIENT
Start: 2023-01-18 | End: 2023-01-18 | Stop reason: HOSPADM

## 2023-01-18 RX ORDER — FENTANYL CITRATE 50 UG/ML
INJECTION, SOLUTION INTRAMUSCULAR; INTRAVENOUS AS NEEDED
Status: DISCONTINUED | OUTPATIENT
Start: 2023-01-18 | End: 2023-01-18 | Stop reason: HOSPADM

## 2023-01-18 RX ORDER — ONDANSETRON 2 MG/ML
INJECTION INTRAMUSCULAR; INTRAVENOUS AS NEEDED
Status: DISCONTINUED | OUTPATIENT
Start: 2023-01-18 | End: 2023-01-18 | Stop reason: HOSPADM

## 2023-01-18 RX ORDER — GLYCOPYRROLATE 0.2 MG/ML
INJECTION INTRAMUSCULAR; INTRAVENOUS AS NEEDED
Status: DISCONTINUED | OUTPATIENT
Start: 2023-01-18 | End: 2023-01-18 | Stop reason: HOSPADM

## 2023-01-18 RX ORDER — LIDOCAINE HYDROCHLORIDE 20 MG/ML
INJECTION, SOLUTION EPIDURAL; INFILTRATION; INTRACAUDAL; PERINEURAL AS NEEDED
Status: DISCONTINUED | OUTPATIENT
Start: 2023-01-18 | End: 2023-01-18 | Stop reason: HOSPADM

## 2023-01-18 RX ORDER — FENTANYL CITRATE 50 UG/ML
25 INJECTION, SOLUTION INTRAMUSCULAR; INTRAVENOUS AS NEEDED
Status: DISCONTINUED | OUTPATIENT
Start: 2023-01-18 | End: 2023-01-18 | Stop reason: HOSPADM

## 2023-01-18 RX ORDER — ONDANSETRON 2 MG/ML
4 INJECTION INTRAMUSCULAR; INTRAVENOUS
Status: DISCONTINUED | OUTPATIENT
Start: 2023-01-18 | End: 2023-01-18 | Stop reason: HOSPADM

## 2023-01-18 RX ORDER — HYDROMORPHONE HYDROCHLORIDE 2 MG/ML
0.2 INJECTION, SOLUTION INTRAMUSCULAR; INTRAVENOUS; SUBCUTANEOUS
Status: DISCONTINUED | OUTPATIENT
Start: 2023-01-18 | End: 2023-01-18 | Stop reason: HOSPADM

## 2023-01-18 RX ADMIN — LIDOCAINE HYDROCHLORIDE 100 MG: 20 INJECTION, SOLUTION EPIDURAL; INFILTRATION; INTRACAUDAL; PERINEURAL at 07:34

## 2023-01-18 RX ADMIN — FENTANYL CITRATE 25 MCG: 50 INJECTION, SOLUTION INTRAMUSCULAR; INTRAVENOUS at 07:34

## 2023-01-18 RX ADMIN — SUCCINYLCHOLINE CHLORIDE 100 MG: 20 INJECTION, SOLUTION INTRAMUSCULAR; INTRAVENOUS at 07:35

## 2023-01-18 RX ADMIN — SODIUM CHLORIDE, POTASSIUM CHLORIDE, SODIUM LACTATE AND CALCIUM CHLORIDE 50 ML/HR: 600; 310; 30; 20 INJECTION, SOLUTION INTRAVENOUS at 06:24

## 2023-01-18 RX ADMIN — PROPOFOL 25 MG: 10 INJECTION, EMULSION INTRAVENOUS at 07:44

## 2023-01-18 RX ADMIN — PROPOFOL 120 MG: 10 INJECTION, EMULSION INTRAVENOUS at 07:34

## 2023-01-18 RX ADMIN — NEOSTIGMINE METHYLSULFATE 2 MG: 1 INJECTION, SOLUTION INTRAVENOUS at 08:12

## 2023-01-18 RX ADMIN — DEXAMETHASONE SODIUM PHOSPHATE: 10 INJECTION INTRAMUSCULAR; INTRAVENOUS at 07:51

## 2023-01-18 RX ADMIN — GLYCOPYRROLATE 0.2 MG: 0.2 INJECTION INTRAMUSCULAR; INTRAVENOUS at 07:46

## 2023-01-18 RX ADMIN — MIDAZOLAM HYDROCHLORIDE 1 MG: 2 INJECTION, SOLUTION INTRAMUSCULAR; INTRAVENOUS at 07:28

## 2023-01-18 RX ADMIN — Medication 10 MG: at 07:48

## 2023-01-18 RX ADMIN — CEFAZOLIN SODIUM 2 G: 1 INJECTION, POWDER, FOR SOLUTION INTRAMUSCULAR; INTRAVENOUS at 07:42

## 2023-01-18 RX ADMIN — ROCURONIUM BROMIDE 20 MG: 50 INJECTION INTRAVENOUS at 07:43

## 2023-01-18 RX ADMIN — ONDANSETRON 4 MG: 2 INJECTION INTRAMUSCULAR; INTRAVENOUS at 08:14

## 2023-01-18 RX ADMIN — GLYCOPYRROLATE 0.4 MG: 0.2 INJECTION INTRAMUSCULAR; INTRAVENOUS at 08:12

## 2023-01-18 RX ADMIN — FENTANYL CITRATE 25 MCG: 50 INJECTION, SOLUTION INTRAMUSCULAR; INTRAVENOUS at 07:58

## 2023-01-18 RX ADMIN — FAMOTIDINE 20 MG: 10 INJECTION, SOLUTION INTRAVENOUS at 06:24

## 2023-01-18 NOTE — ANESTHESIA PREPROCEDURE EVALUATION
Relevant Problems   No relevant active problems       Anesthetic History   No history of anesthetic complications            Review of Systems / Medical History  Patient summary reviewed, nursing notes reviewed and pertinent labs reviewed    Pulmonary        Sleep apnea    Asthma        Neuro/Psych         Psychiatric history     Cardiovascular    Hypertension              Exercise tolerance: >4 METS     GI/Hepatic/Renal     GERD           Endo/Other        Arthritis     Other Findings              Physical Exam    Airway  Mallampati: II  TM Distance: 4 - 6 cm  Neck ROM: normal range of motion   Mouth opening: Normal     Cardiovascular  Regular rate and rhythm,  S1 and S2 normal,  no murmur, click, rub, or gallop  Rhythm: regular  Rate: normal         Dental  No notable dental hx       Pulmonary  Breath sounds clear to auscultation               Abdominal  GI exam deferred       Other Findings            Anesthetic Plan    ASA: 3  Anesthesia type: general          Induction: Intravenous  Anesthetic plan and risks discussed with: Patient

## 2023-01-18 NOTE — PROGRESS NOTES
Date of Surgery Update:  Louis Loaiza was seen and examined. History and physical has been reviewed. The patient has been examined. There have been no significant clinical changes since the completion of the originally dated History and Physical. Will proceed with robotic left inguinal hernia repair with placement of mesh.     Signed By: Nika Bellamy MD     January 18, 2023 7:02 AM

## 2023-01-18 NOTE — BRIEF OP NOTE
Brief Postoperative Note    Patient: Karlos Epstein  YOB: 1950  MRN: [id number]    Date of Procedure: 1/18/2023     Pre-Op Diagnosis: Left inguinal hernia [K40.90]    Post-Op Diagnosis: Same as preoperative diagnosis. Procedure(s):  ROBOTIC ASSISTED LEFT INGUINAL HERNIA REPAIR WITH PLACEMENT OF MESH    Surgeon(s):  Yana Herrera MD    Surgical Assistant: Surg Asst-1: Ruth Ann Gregory    Anesthesia: General     Estimated Blood Loss (mL): Minimal    Complications: None    Specimens: * No specimens in log *     Implants:   Implant Name Type Inv. Item Serial No.  Lot No. LRB No. Used Action   MESH CS LEFT LARGE 10CM X 16CM - [id number]  MESH CS LEFT LARGE 10CM X 16CM  BARD DAVOL_WD [id number] Left 1 Implanted       Drains: * No LDAs found *    Findings: Left indirect inguinal hernia.      Electronically Signed by Madison Kennedy MD on 1/18/2023 at 8:19 AM

## 2023-01-18 NOTE — ANESTHESIA POSTPROCEDURE EVALUATION
Procedure(s):  ROBOTIC ASSISTED LEFT INGUINAL HERNIA REPAIR WITH PLACEMENT OF MESH.     general    Anesthesia Post Evaluation      Multimodal analgesia: multimodal analgesia used between 6 hours prior to anesthesia start to PACU discharge  Patient location during evaluation: bedside  Patient participation: complete - patient participated  Level of consciousness: awake  Pain management: adequate  Airway patency: patent  Anesthetic complications: no  Cardiovascular status: stable  Respiratory status: acceptable  Hydration status: acceptable  Post anesthesia nausea and vomiting:  controlled  Final Post Anesthesia Temperature Assessment:  Normothermia (36.0-37.5 degrees C)      INITIAL Post-op Vital signs:   Vitals Value Taken Time   BP 96/56 01/18/23 0826   Temp 36.3 °C (97.4 °F) 01/18/23 0826   Pulse 54 01/18/23 0826   Resp 14 01/18/23 0826   SpO2 99 % 01/18/23 0826

## 2023-01-18 NOTE — DISCHARGE INSTRUCTIONS
Discharge Instructions Following Surgery    Patient: Patric Ryder MRN: [id number]  SSN: [id number]    YOB: 1950  Age: 72 y.o. Sex: male      Activity  As tolerated, walking encourage, stairs are okay. Avoid strenuous activities - no lifting anything heavier than 15 pounds till seen in the clinic. You may shower at home after 24 hours. Diet  Regular diet after nausea from the anesthetic has passed. Pain  Take pain medication as directed by your doctor. Call your doctor if pain is NOT relieved by medication. Wound and Dressing Care  There is glue on the wounds. No need for any dressing care. Apply ice packs to the area of the surgery for the first 1 to 2 days  Apply warm compresses after 2 days for pain relieve if needed    After Anesthesia  For the first 24 hours: DO NOT Drive, Drink alcoholic beverages, or Make important decisions. Be aware of dizziness following anesthesia and while taking pain medication. Call your doctor if  Excessive bleeding that does not stop after holding mild pressure over the area. Temperature of 101 degrees F or above. Redness,excessive swelling or bruising, and/or green or yellow, smelly discharge from incision. If nausea and vomiting continues. Appointment date/time Follow-Up Phone Calls    Call the office at [phone] to make your follow-up appointment in 2 weeks after the surgery (if not already set up) . Dr. Uma Serrano cell phone number is [phone]. Please call me if you have any concerns or questions. DISCHARGE SUMMARY from Nurse    PATIENT INSTRUCTIONS:    After general anesthesia or intravenous sedation, for 24 hours or while taking prescription Narcotics:  Limit your activities  Do not drive and operate hazardous machinery  Do not make important personal or business decisions  Do  not drink alcoholic beverages  If you have not urinated within 8 hours after discharge, please contact your surgeon on call.     Report the following to your surgeon:  Excessive pain, swelling, redness or odor of or around the surgical area  Temperature over 100.5  Nausea and vomiting lasting longer than 4 hours or if unable to take medications  Any signs of decreased circulation or nerve impairment to extremity: change in color, persistent  numbness, tingling, coldness or increase pain  Any questions      These are general instructions for a healthy lifestyle:    No smoking/ No tobacco products/ Avoid exposure to second hand smoke  Surgeon General's Warning:  Quitting smoking now greatly reduces serious risk to your health. Obesity, smoking, and sedentary lifestyle greatly increases your risk for illness    A healthy diet, regular physical exercise & weight monitoring are important for maintaining a healthy lifestyle    You may be retaining fluid if you have a history of heart failure or if you experience any of the following symptoms:  Weight gain of 3 pounds or more overnight or 5 pounds in a week, increased swelling in our hands or feet or shortness of breath while lying flat in bed. Please call your doctor as soon as you notice any of these symptoms; do not wait until your next office visit. The discharge information has been reviewed with the patient and spouse. The patient and spouse verbalized understanding. Discharge medications reviewed with the patient and spouse and appropriate educational materials and side effects teaching were provided.   ___________________________________________________________________________________________________________________________________

## 2023-01-18 NOTE — PROGRESS NOTES
Received discharge prescription for patient at outpatient pharmacy. Patient wife picked up prescription.

## 2023-01-19 NOTE — OP NOTES
[address] [place] [address]   OPERATIVE REPORT    Name:  Orlando Ortega  MR#:   [id number]  :  1950  ACCOUNT #:  [de-identified]  DATE OF SERVICE:  2023    PREOPERATIVE DIAGNOSIS:  Left inguinal hernia. POSTOPERATIVE DIAGNOSIS:  Left indirect inguinal hernia. PROCEDURE PERFORMED:  Robotic repair of left inguinal hernia with placement of mesh. SURGEON:  Maisha Neff MD.    ASSISTANTLesobeida Christensen CSA. ANESTHESIA:  General.    COMPLICATIONS:  None. SPECIMENS REMOVED:  None. IMPLANTS:  Left-sided Bard 3D MID large mesh. ESTIMATED BLOOD LOSS:  Minimal.    DETAILS OF PROCEDURE:  The patient was brought to the operating room. Anesthesia was induced. Scrubbing and draping of the abdomen were done in the usual manner. A time-out was performed. A skin incision in the supraumbilical area was performed. Veress needle was inserted. Saline drop test was performed. Abdomen was insufflated. An 8 mm port was inserted. Abdomen was explored. There was no evidence of injury from the Veress needle or the port placement. Under direct visualization, two other 8 mm ports were placed on the right and left sides of the abdominal wall and TAP block was performed bilaterally. The patient was placed in Trendelenburg position and the robot was docked. There was no evidence of any pathology on the right side. There was a left indirect inguinal hernia with a part of the sigmoid colon inside the hernia. The sigmoid colon was reduced easily, but it was still attached to the peritoneum. We did not dissect it at this point. We opened the peritoneum higher up and then we dissected the preperitoneal space. There was some adhesion from the previous prostatectomy. This was reduced completely. The inferior epigastric vessels were identified and protected. The hernia sac was completely reduced.   There was adhesion on the medial aspect secondary to the prostatectomy, but I was able to gently dissect a space to reach the Tung's ligament. At this point, after creating the space, we opened a large left-sided Bard 3D MID mesh and we placed it in the preperitoneal space. At this point, we covered the peritoneum with a 2-0 V-Loc suture 6 inches in a running fashion to completely cover the mesh. Hemostasis was secured. The needle was taken out. The abdomen was desufflated. The skin incisions were closed with 4-0 Monocryl and glue.       Vanessa Cordero MD      [id number]  D:  01/18/2023 8:23  T:  01/18/2023 19:55  JOB #:  [id number]

## 2023-01-20 ENCOUNTER — APPOINTMENT (OUTPATIENT)
Dept: PHYSICAL THERAPY | Age: 73
End: 2023-01-20
Attending: ORTHOPAEDIC SURGERY
Payer: COMMERCIAL

## 2023-01-30 ENCOUNTER — OFFICE VISIT (OUTPATIENT)
Dept: SURGERY | Age: 73
End: 2023-01-30
Payer: COMMERCIAL

## 2023-01-30 VITALS
BODY MASS INDEX: 22.26 KG/M2 | HEIGHT: 75 IN | WEIGHT: 179 LBS | OXYGEN SATURATION: 98 % | SYSTOLIC BLOOD PRESSURE: 104 MMHG | HEART RATE: 59 BPM | DIASTOLIC BLOOD PRESSURE: 68 MMHG

## 2023-01-30 DIAGNOSIS — Z98.890 S/P HERNIA REPAIR: Primary | ICD-10-CM

## 2023-01-30 DIAGNOSIS — Z87.19 S/P HERNIA REPAIR: Primary | ICD-10-CM

## 2023-01-30 PROCEDURE — 99024 POSTOP FOLLOW-UP VISIT: CPT | Performed by: SURGERY

## 2023-01-30 NOTE — PROGRESS NOTES
Hung Olmstead is a 72 y.o. male (: 1950) presenting to address:    Chief Complaint   Patient presents with    Surgical Follow-up     Repair of left inguinal hernia       Medication list and allergies have been reviewed with Hung Olmstead and updated as of today's date. I have gone over all Medical, Surgical and Social History with Hung Olmstead and updated/added the information accordingly. 1. Have you been to the ER, Urgent Care or Hospitalized since your last visit? NO      2. Have you followed up with your PCP or any other Physicians since your procedure/ last office visit?    NO

## 2023-01-30 NOTE — PROGRESS NOTES
Patient seen and examined. He is doing well. His abdomen is soft and nontender.   Follow-up as needed

## 2023-02-22 ENCOUNTER — TELEPHONE (OUTPATIENT)
Facility: HOSPITAL | Age: 73
End: 2023-02-22

## 2023-02-22 NOTE — TELEPHONE ENCOUNTER
Called patient to follow up, he has not been seen since 1/16. Patient states that he stopped therpay due to surgery but is ready to restart. He has been advised to obtain a new referral due to our 30 day policy. Patient chart will be discharged and patient has been made aware.

## 2023-02-23 ENCOUNTER — TELEPHONE (OUTPATIENT)
Age: 73
End: 2023-02-23

## 2023-02-23 ENCOUNTER — HOSPITAL ENCOUNTER (OUTPATIENT)
Facility: HOSPITAL | Age: 73
Setting detail: SPECIMEN
Discharge: HOME | End: 2023-02-23
Payer: COMMERCIAL

## 2023-02-23 DIAGNOSIS — I10 ESSENTIAL HYPERTENSION: Primary | ICD-10-CM

## 2023-02-23 DIAGNOSIS — I10 ESSENTIAL HYPERTENSION: ICD-10-CM

## 2023-02-23 LAB
ALBUMIN SERPL-MCNC: 3.6 G/DL (ref 3.4–5)
ALBUMIN/GLOB SERPL: 1.5 (ref 0.8–1.7)
ALP SERPL-CCNC: 86 U/L (ref 45–117)
ALT SERPL-CCNC: 25 U/L (ref 16–61)
ANION GAP SERPL CALC-SCNC: 4 MMOL/L (ref 3–18)
AST SERPL-CCNC: 21 U/L (ref 10–38)
BASOPHILS # BLD: 0 K/UL (ref 0–0.1)
BASOPHILS NFR BLD: 1 % (ref 0–2)
BILIRUB SERPL-MCNC: 0.3 MG/DL (ref 0.2–1)
BUN SERPL-MCNC: 19 MG/DL (ref 7–18)
BUN/CREAT SERPL: 16 (ref 12–20)
CALCIUM SERPL-MCNC: 9 MG/DL (ref 8.5–10.1)
CHLORIDE SERPL-SCNC: 106 MMOL/L (ref 100–111)
CHOLEST SERPL-MCNC: 133 MG/DL
CO2 SERPL-SCNC: 30 MMOL/L (ref 21–32)
CREAT SERPL-MCNC: 1.16 MG/DL (ref 0.6–1.3)
DIFFERENTIAL METHOD BLD: ABNORMAL
EOSINOPHIL # BLD: 0.3 K/UL (ref 0–0.4)
EOSINOPHIL NFR BLD: 5 % (ref 0–5)
ERYTHROCYTE [DISTWIDTH] IN BLOOD BY AUTOMATED COUNT: 13.6 % (ref 11.6–14.5)
GLOBULIN SER CALC-MCNC: 2.4 G/DL (ref 2–4)
GLUCOSE SERPL-MCNC: 87 MG/DL (ref 74–99)
HCT VFR BLD AUTO: 43.8 % (ref 36–48)
HDLC SERPL-MCNC: 56 MG/DL (ref 40–60)
HDLC SERPL: 2.4 (ref 0–5)
HGB BLD-MCNC: 13.8 G/DL (ref 13–16)
IMM GRANULOCYTES # BLD AUTO: 0 K/UL (ref 0–0.04)
IMM GRANULOCYTES NFR BLD AUTO: 0 % (ref 0–0.5)
LDLC SERPL CALC-MCNC: 65 MG/DL (ref 0–100)
LIPID PANEL: NORMAL
LYMPHOCYTES # BLD: 1.6 K/UL (ref 0.9–3.6)
LYMPHOCYTES NFR BLD: 29 % (ref 21–52)
MCH RBC QN AUTO: 30.7 PG (ref 24–34)
MCHC RBC AUTO-ENTMCNC: 31.5 G/DL (ref 31–37)
MCV RBC AUTO: 97.6 FL (ref 78–100)
MONOCYTES # BLD: 0.6 K/UL (ref 0.05–1.2)
MONOCYTES NFR BLD: 12 % (ref 3–10)
NEUTS SEG # BLD: 2.9 K/UL (ref 1.8–8)
NEUTS SEG NFR BLD: 54 % (ref 40–73)
NRBC # BLD: 0 K/UL (ref 0–0.01)
NRBC BLD-RTO: 0 PER 100 WBC
PLATELET # BLD AUTO: 168 K/UL (ref 135–420)
PMV BLD AUTO: 9.5 FL (ref 9.2–11.8)
POTASSIUM SERPL-SCNC: 4.3 MMOL/L (ref 3.5–5.5)
PROT SERPL-MCNC: 6 G/DL (ref 6.4–8.2)
RBC # BLD AUTO: 4.49 M/UL (ref 4.35–5.65)
SODIUM SERPL-SCNC: 140 MMOL/L (ref 136–145)
TRIGL SERPL-MCNC: 60 MG/DL
VLDLC SERPL CALC-MCNC: 12 MG/DL
WBC # BLD AUTO: 5.4 K/UL (ref 4.6–13.2)

## 2023-02-23 PROCEDURE — 80053 COMPREHEN METABOLIC PANEL: CPT

## 2023-02-23 PROCEDURE — 85025 COMPLETE CBC W/AUTO DIFF WBC: CPT

## 2023-02-23 PROCEDURE — 80061 LIPID PANEL: CPT

## 2023-02-23 PROCEDURE — 36415 COLL VENOUS BLD VENIPUNCTURE: CPT

## 2023-02-23 NOTE — TELEPHONE ENCOUNTER
----- Message from Cari Celeste sent at 2/22/2023 12:35 PM EST -----  Regarding: Lab Orders  Patient needs lab orders for tomorrow.

## 2023-02-24 NOTE — PROGRESS NOTES
José Luis Awad presents today for   Chief Complaint   Patient presents with    Annual Exam    Discuss Labs     2-23-23    Anxiety    Depression    Hyperlipidemia    Hypertension    Gastroesophageal Reflux     1. \"Have you been to the ER, urgent care clinic since your last visit?  Hospitalized since your last visit?\" Yes   1-18-23 @ [facility], Robotic assisted left inguinal hernia repair      2. \"Have you seen or consulted any other health care providers outside of the [facility] since your last visit?\" no     3. For patients aged 45-75: Has the patient had a colonoscopy / FIT/ Cologuard? Yes - no Care Gap present      If the patient is female:    4. For patients aged 40-74: Has the patient had a mammogram within the past 2 years? NA - based on age or sex      5. For patients aged 21-65: Has the patient had a pap smear? NA - based on age or sex

## 2023-02-27 NOTE — PROGRESS NOTES
72 y.o. male who presents for evaluation RPE. Wife was present for the evaluation    He successfully underwent [facility] repair by Dr Tawana Pacheco with great results. He saw Dr Matias Madrigal in 12/22 for left hip bursitis and got a cortisone shot but the PT was interrupted with the hernia repair    He goes to the [facility] 3x/week doing the treadmill about 20 min and then the cybex machines and no cp, sob, pnd, edema, palpitations. Sees Dr Star Lyon usually yearly and due later this spring     He continues to see Dr Bill Rutherford    He is followed by Dr Kenya Deluca for the prostate ca and no new developments    The neuropathy is about the same    LAST MEDICARE WELLNESS EXAM: never as not medicare b    Past Medical History:   Diagnosis Date    Allergic rhinitis     Dr Mireya Sen; never took immunotherapy    Anxiety     saw Dr Amparo Kelley 2018    Asthma     Dr Sammi Singh; pfts show no obstruction but high RV    BCC (basal cell carcinoma of skin) 2013    Dr. Favian Pettit s/p resection 2 spots    BPH (benign prostatic hyperplasia)     Dr. Hung Hill; on proscar for years    Bursitis of left hip 12/2022    Dr Matias Madrigal    Degenerative arthritis of lumbar spine 03/2014    MRI (3/14) showed scoliosis w multilevel degen findings, mod L3-4, L4-5 central stenosis, mod L5-S1 foraminal stenosis;  (10/17) severe L3-4 central stenosis; Dr Meli Montgomery    Depression     and anxiety; paxil 2008?  lexapro and wellbutrin til 2018; tried prozac; saw Dr Aida Pryor then Dr Melissa Walter now Dr Nidhi Holm    Diverticulosis 09/21/2022    Dr Estevan Guaman    Dyslipidemia     ED (erectile dysfunction)     ICI with penile trauma; TRISTAN not helpful; peak performance unsuccessful    FHx: heart disease     GERD (gastroesophageal reflux disease)     s/p dilation 2003 Dr. Adriana Monroe; egd 2015; Dr Adriana Monroe egd 12/20 dilation, hiatal hernia    H/O cardiovascular stress test     ETT neg (8/09); [facility] neg stress echo (6/19)    H/O echocardiogram     nl lv, ef 55%, no wma or valvular dz (9/19)    Heel spur      Distasio    Hypertension 2019    24 hr ambulatory monitoring Dr Taz Carpenter    Major depressive disorder     MICHELE (obstructive sleep apnea)     intol cpap Dr Kamari Matos ; using oral appliance Dr Mónica Romero; AHI 17.8 min desats 83    Overweight (BMI 25.0-29.9) 2018    Peripheral neuropathy     and B CTS on EMG Dr. Kamari Matos    Prostate cancer [facility]) 2021    Dr Jsesy Banerjee; yulisa 3+3; s/p RALP, BLND Dr Beatriz Lr    Pulmonary nodules     bilat upper lobes; no change ; noted again [facility]     Scoliosis     Dr Tammy Gastelum 2014    Venous insufficiency 2021    Vitamin D deficiency      Past Surgical History:   Procedure Laterality Date    CARPAL TUNNEL RELEASE Bilateral     CHEST SURGERY  2018    Dr Sunni Giang; FEV1 96%, 5% improvement postbd, ratio 104, , , DLCO 82    CHOLECYSTECTOMY, LAPAROSCOPIC  2021    Dr Maame Ortiz  mild diverticulosis , ; 22    HERNIA REPAIR Left 2023    Robotic repair of left inguinal hernia with placement of mesh Dr Iesha Corley t score 4.2 spine, 0.2 hip ()    PROSTATECTOMY  2021    Dr Beatriz Lr DVP     Social History     Socioeconomic History    Marital status:      Spouse name: Not on file    Number of children: 2    Years of education: Not on file    Highest education level: Not on file   Occupational History    Occupation: retired [employer] [address]   Tobacco Use    Smoking status: Former     Packs/day: 0.25     Types: Cigarettes     Quit date: 1972     Years since quittin.2    Smokeless tobacco: Never   Substance and Sexual Activity    Alcohol use:  Yes     Alcohol/week: 1.0 standard drink    Drug use: Never    Sexual activity: Not on file   Other Topics Concern    Not on file   Social History Narrative    Not on file     Social Determinants of Health     Financial Resource Strain: Low Risk     Difficulty of Paying Living Expenses: Not hard at all   Food Insecurity: No Food Insecurity Worried About [facility] in the Last Year: Never true    [address] [facility] [address] in the Last Year: Never true   Transportation Needs: Unknown    Lack of Transportation (Medical): Not on file    Lack of Transportation (Non-Medical): No   Physical Activity: Not on file   Stress: Not on file   Social Connections: Not on file   Intimate Partner Violence: Not on file   Housing Stability: Unknown    Unable to Pay for Housing in the Last Year: Not on file    Number of Places Lived in the Last Year: Not on file    Unstable Housing in the Last Year: No     .  Current Outpatient Medications   Medication Sig    ARIPiprazole (ABILIFY) 10 MG tablet Take 1 tablet by mouth daily    ascorbic acid (VITAMIN C) 500 MG tablet Take 2 tablets by mouth daily    aspirin 81 MG EC tablet Take by mouth daily    baclofen (LIORESAL) 10 MG tablet Take 10 mg by mouth 2 times daily    brimonidine-timolol (COMBIGAN) 0.2-0.5 % ophthalmic solution INSTILL 1 DROP INTO RIGHT EYE TWICE DAILY    budesonide-formoterol (SYMBICORT) 160-4.5 MCG/ACT AERO INHALE 2 PUFFS BY MOUTH TWICE DAILY    buPROPion (WELLBUTRIN XL) 300 MG extended release tablet Take 300 mg by mouth daily    vitamin D3 (CHOLECALCIFEROL) 125 MCG (5000 UT) TABS tablet Take 5,000 Units by mouth daily    dexlansoprazole (DEXILANT) 60 MG CPDR delayed release capsule Take 60 mg by mouth daily    diazePAM (VALIUM) 2 MG tablet TAKE 4 AND A HALF TABLET BY MOUTH EVERY DAY    fluticasone (FLONASE) 50 MCG/ACT nasal spray SHAKE LIQUID AND USE 2 SPRAYS IN EACH NOSTRIL DAILY    gabapentin (NEURONTIN) 600 MG tablet Take 600 mg by mouth 3 times daily.     latanoprost (XALATAN) 0.005 % ophthalmic solution FLAQUITO 1 GTT INTO RIGHT EYE HS    metoprolol tartrate (LOPRESSOR) 25 MG tablet TAKE 1 TABLET BY MOUTH TWICE DAILY    rosuvastatin (CRESTOR) 10 MG tablet TAKE 1 TABLET BY MOUTH EVERY NIGHT    tadalafil (CIALIS) 5 MG tablet TAKE ONE TABLET BY MOUTH DAILY    triamterene-hydroCHLOROthiazide (DYAZIDE) 37.5-25 MG per capsule TAKE 1 CAPSULE BY MOUTH DAILY    vilazodone HCl (VIIBRYD) 40 MG TABS Take by mouth daily    acetaminophen (TYLENOL) 325 MG tablet Take 325 mg by mouth every 4 hours as needed    albuterol sulfate HFA (PROVENTIL;VENTOLIN;PROAIR) 108 (90 Base) MCG/ACT inhaler Inhale 2 puffs into the lungs every 4 hours as needed     No current facility-administered medications for this visit. Allergies   Allergen Reactions    Amlodipine Swelling    Bee Pollen      Other reaction(s): Sneezing  Seasonal allergies    Naproxen Other (See Comments)     elev cr 2/19    Niacin Other (See Comments)     Flushing    Cefdinir Nausea And Vomiting    Heparin Rash     REVIEW OF SYSTEMS: colo 6/12 Dr Kg Hunter  Ophtho - no vision change or eye pain  Oral - no mouth pain, tongue or tooth problems  Ears - no hearing loss, ear pain, fullness, no swallowing problems  Cardiac - no CP, PND, orthopnea, edema, palpitations or syncope  Chest - no breast masses  Resp - no wheezing, chronic coughing, dyspnea  GI - no heartburn, nausea, vomiting, change in bowel habits, bleeding, hemorrhoids  Urinary - no dysuria, hematuria, flank pain, urgency, frequency    Vitals:    03/02/23 0825   BP: 116/62   Pulse: 60   Resp: 16   Temp: 97.8 °F (36.6 °C)   SpO2: 98%   A&O x3  Affect is appropriate. Mood stable  No apparent distress  Anicteric, no JVD, adenopathy or thyromegaly. No carotid bruits or radiated murmur  Lungs clear to auscultation, no wheezes or rales  Heart showed regular rate and rhythm. No murmur, rubs, gallops  Abdomen soft nontender, no hepatosplenomegaly or masses. Extremities without edema.   Pulses 1-2+ symmetrically    LABS  From 11/10 showed gluc 91, cr 1.10,             alt 25, chol 146, tg 74,   hdl 26, ldl-c 95, wbc 6.2, hb 14.3, plt 191, psa 0.60  From 4/12 showed                           alt 26, chol 138, tg 105, hdl 42, ldl-c 75  From 5/12 showed                                        vit d 57.9  From 7/13 showed alt 25, chol 135, tg 70,   hdl 44, ldl-c 77  From 7/13 showed   gluc 88, cr 1.02, gfr 79,  alt 10,           wbc 5.0, hb 14.3, plt 182, psa 0.60  From 9/14 showed   gluc 95, cr 0.99, gfr>60, alt 13, chol 138, tg 81,   hdl 41, ldl-c 81, wbc 5.6, hb 13.9, plt 187, psa 1.00  From 11/15 showed gluc 84, cr 1.01, gfr>60, alt 12, chol 148, tg 72,   hdl 44, ldl-c 90, wbc 4.8, hb 14.9, plt 193  From 1/17 showed   gluc 85, cr 1.05, gfr>60, alt 36, chol 144, tg 69,   hdl 56, ldl-c 74, wbc 4.9, hb 14.7, plt 212, psa 1.60, hba1c 5.7, vit d 44.2, hep c neg  From 2/18 showed   gluc 89, cr 1.04, gfr>60, alt 35, chol 142, tg 64,   hdl 53, ldl-c 76, wbc 4.5, hb 14.5, plt 208, psa 2.30  From 1/19 showed                             tsh 1.10,    ft4 0.80, b12 1006, fol>20, b6 nl, lily neg, ferritin 58  From 1/20 showed                          psa 4.60,    test 271  From 2/20 showed   gluc 85, cr 1.26, gfr 57, alt 32,  chol 158, tg 179, hdl 47, ldl-c 75, wbc 5.6, hb 15.0, plt 189  From 2/21 showed   gluc 87, cr 1.03, gfr 58, alt 27,  chol 150, tg 121, hdl 46, ldl-c 80, wbc 5.0, hb 14.7, plt 196, psa 5.16,             test 329  From 2/22 showed   gluc 87, cr 1.38, gfr 51, alt 28,  chol 122, tg 68,   hdl 47, ldl-c 61, wbc 5.3, hb 13.9, plt 193      Results for orders placed or performed during the hospital encounter of 02/23/23 (from the past 2160 hour(s))   CBC with Auto Differential   Result Value Ref Range    WBC 5.4 4.6 - 13.2 K/uL    RBC 4.49 4.35 - 5.65 M/uL    Hemoglobin 13.8 13.0 - 16.0 g/dL    Hematocrit 43.8 36.0 - 48.0 %    MCV 97.6 78.0 - 100.0 FL    MCH 30.7 24.0 - 34.0 PG    MCHC 31.5 31.0 - 37.0 g/dL    RDW 13.6 11.6 - 14.5 %    Platelets 220 546 - 856 K/uL    MPV 9.5 9.2 - 11.8 FL    Nucleated RBCs 0.0 0  WBC    nRBC 0.00 0.00 - 0.01 K/uL    Seg Neutrophils 54 40 - 73 %    Lymphocytes 29 21 - 52 %    Monocytes 12 (H) 3 - 10 %    Eosinophils % 5 0 - 5 %    Basophils 1 0 - 2 %    Immature Granulocytes 0 0.0 - 0.5 % Segs Absolute 2.9 1.8 - 8.0 K/UL    Absolute Lymph # 1.6 0.9 - 3.6 K/UL    Absolute Mono # 0.6 0.05 - 1.2 K/UL    Absolute Eos # 0.3 0.0 - 0.4 K/UL    Basophils Absolute 0.0 0.0 - 0.1 K/UL    Absolute Immature Granulocyte 0.0 0.00 - 0.04 K/UL    Differential Type AUTOMATED     Comprehensive Metabolic Panel   Result Value Ref Range    Sodium 140 136 - 145 mmol/L    Potassium 4.3 3.5 - 5.5 mmol/L    Chloride 106 100 - 111 mmol/L    CO2 30 21 - 32 mmol/L    Anion Gap 4 3.0 - 18 mmol/L    Glucose 87 74 - 99 mg/dL    BUN 19 (H) 7.0 - 18 MG/DL    Creatinine 1.16 0.6 - 1.3 MG/DL    Bun/Cre Ratio 16 12 - 20      Est, Glom Filt Rate >60 >60 ml/min/1.73m2    Calcium 9.0 8.5 - 10.1 MG/DL    Total Bilirubin 0.3 0.2 - 1.0 MG/DL    ALT 25 16 - 61 U/L    AST 21 10 - 38 U/L    Alk Phosphatase 86 45 - 117 U/L    Total Protein 6.0 (L) 6.4 - 8.2 g/dL    Albumin 3.6 3.4 - 5.0 g/dL    Globulin 2.4 2.0 - 4.0 g/dL    Albumin/Globulin Ratio 1.5 0.8 - 1.7     Lipid Panel   Result Value Ref Range    LIPID PANEL          Cholesterol, Total 133 <200 MG/DL    Triglycerides 60 <150 MG/DL    HDL 56 40 - 60 MG/DL    LDL Calculated 65 0 - 100 MG/DL    VLDL Cholesterol Calculated 12 MG/DL    Chol/HDL Ratio 2.4 0 - 5.0         We reviewed the patient's labs from the last several visits to point out trends in the numbers      Patient Active Problem List   Diagnosis    Diverticulosis of large intestine without hemorrhage    Overweight (BMI 25.0-29. 9)    MDD (major depressive disorder), recurrent episode, mild (HCC)    GERD (gastroesophageal reflux disease)    MICHELE (obstructive sleep apnea)    BPH (benign prostatic hyperplasia)    Neuropathy    Anxiety    History of prostate cancer    Spinal stenosis, lumbar region with neurogenic claudication    Erectile dysfunction    Asthma    Hyperlipidemia    Essential hypertension    Hypovitaminosis D   . Assessment and plan:  1. Prostate ca. Per Dr Bryce Round  2. Dyslipidemia. Continue current regimen.   3. Allergic rhinitis. Continue current  4. GERD. Continue current. 5.  ED.  cialis daily  6. HTN. Controlled on current regimen. 7.  Depression, anxiety, OCD. Continue current and f/u Dr Christopher Chandra  8. Hypovit D. Follow   9. MICHELE. Per neurology  10. Spinal stenosis. Continue current  11. Bursitis. PT ordered        RTC 3/24    Above conditions discussed at length and patient vocalized understanding. All questions answered to patient satisfaction       Diagnosis Orders   1. Physical exam        2. Trochanteric bursitis of left hip  [facility] [address] [place] [address] & [facility]      3. Essential hypertension  CBC with Auto Differential    Comprehensive Metabolic Panel      4. Mild intermittent asthma without complication        5. Gastroesophageal reflux disease without esophagitis        6. Diverticulosis of large intestine without hemorrhage        7. Anxiety        8. MDD (major depressive disorder), recurrent episode, mild ([address]        9. History of prostate cancer        10.  Hyperlipidemia, unspecified hyperlipidemia type  Lipid Panel

## 2023-03-02 ENCOUNTER — TELEPHONE (OUTPATIENT)
Age: 73
End: 2023-03-02

## 2023-03-02 ENCOUNTER — OFFICE VISIT (OUTPATIENT)
Age: 73
End: 2023-03-02
Payer: COMMERCIAL

## 2023-03-02 VITALS
BODY MASS INDEX: 22.13 KG/M2 | TEMPERATURE: 97.8 F | DIASTOLIC BLOOD PRESSURE: 62 MMHG | RESPIRATION RATE: 16 BRPM | HEART RATE: 60 BPM | SYSTOLIC BLOOD PRESSURE: 116 MMHG | WEIGHT: 178 LBS | OXYGEN SATURATION: 98 % | HEIGHT: 75 IN

## 2023-03-02 DIAGNOSIS — K21.9 GASTROESOPHAGEAL REFLUX DISEASE WITHOUT ESOPHAGITIS: ICD-10-CM

## 2023-03-02 DIAGNOSIS — M70.62 TROCHANTERIC BURSITIS OF LEFT HIP: ICD-10-CM

## 2023-03-02 DIAGNOSIS — Z00.00 PHYSICAL EXAM: Primary | ICD-10-CM

## 2023-03-02 DIAGNOSIS — F41.9 ANXIETY: ICD-10-CM

## 2023-03-02 DIAGNOSIS — I10 ESSENTIAL HYPERTENSION: ICD-10-CM

## 2023-03-02 DIAGNOSIS — F33.0 MDD (MAJOR DEPRESSIVE DISORDER), RECURRENT EPISODE, MILD (HCC): ICD-10-CM

## 2023-03-02 DIAGNOSIS — K57.30 DIVERTICULOSIS OF LARGE INTESTINE WITHOUT HEMORRHAGE: ICD-10-CM

## 2023-03-02 DIAGNOSIS — E78.5 HYPERLIPIDEMIA, UNSPECIFIED HYPERLIPIDEMIA TYPE: ICD-10-CM

## 2023-03-02 DIAGNOSIS — Z85.46 HISTORY OF PROSTATE CANCER: ICD-10-CM

## 2023-03-02 DIAGNOSIS — J45.20 MILD INTERMITTENT ASTHMA WITHOUT COMPLICATION: ICD-10-CM

## 2023-03-02 PROCEDURE — G8484 FLU IMMUNIZE NO ADMIN: HCPCS | Performed by: INTERNAL MEDICINE

## 2023-03-02 PROCEDURE — 3074F SYST BP LT 130 MM HG: CPT | Performed by: INTERNAL MEDICINE

## 2023-03-02 PROCEDURE — 3078F DIAST BP <80 MM HG: CPT | Performed by: INTERNAL MEDICINE

## 2023-03-02 PROCEDURE — 99397 PER PM REEVAL EST PAT 65+ YR: CPT | Performed by: INTERNAL MEDICINE

## 2023-03-02 SDOH — ECONOMIC STABILITY: FOOD INSECURITY: WITHIN THE PAST 12 MONTHS, YOU WORRIED THAT YOUR FOOD WOULD RUN OUT BEFORE YOU GOT MONEY TO BUY MORE.: NEVER TRUE

## 2023-03-02 SDOH — ECONOMIC STABILITY: FOOD INSECURITY: WITHIN THE PAST 12 MONTHS, THE FOOD YOU BOUGHT JUST DIDN'T LAST AND YOU DIDN'T HAVE MONEY TO GET MORE.: NEVER TRUE

## 2023-03-02 SDOH — ECONOMIC STABILITY: INCOME INSECURITY: HOW HARD IS IT FOR YOU TO PAY FOR THE VERY BASICS LIKE FOOD, HOUSING, MEDICAL CARE, AND HEATING?: NOT HARD AT ALL

## 2023-03-02 SDOH — ECONOMIC STABILITY: HOUSING INSECURITY
IN THE LAST 12 MONTHS, WAS THERE A TIME WHEN YOU DID NOT HAVE A STEADY PLACE TO SLEEP OR SLEPT IN A SHELTER (INCLUDING NOW)?: NO

## 2023-03-02 ASSESSMENT — PATIENT HEALTH QUESTIONNAIRE - PHQ9
10. IF YOU CHECKED OFF ANY PROBLEMS, HOW DIFFICULT HAVE THESE PROBLEMS MADE IT FOR YOU TO DO YOUR WORK, TAKE CARE OF THINGS AT HOME, OR GET ALONG WITH OTHER PEOPLE: 0
SUM OF ALL RESPONSES TO PHQ9 QUESTIONS 1 & 2: 0
SUM OF ALL RESPONSES TO PHQ QUESTIONS 1-9: 0
SUM OF ALL RESPONSES TO PHQ QUESTIONS 1-9: 0
3. TROUBLE FALLING OR STAYING ASLEEP: 0
1. LITTLE INTEREST OR PLEASURE IN DOING THINGS: 0
5. POOR APPETITE OR OVEREATING: 0
9. THOUGHTS THAT YOU WOULD BE BETTER OFF DEAD, OR OF HURTING YOURSELF: 0
6. FEELING BAD ABOUT YOURSELF - OR THAT YOU ARE A FAILURE OR HAVE LET YOURSELF OR YOUR FAMILY DOWN: 0
8. MOVING OR SPEAKING SO SLOWLY THAT OTHER PEOPLE COULD HAVE NOTICED. OR THE OPPOSITE, BEING SO FIGETY OR RESTLESS THAT YOU HAVE BEEN MOVING AROUND A LOT MORE THAN USUAL: 0
7. TROUBLE CONCENTRATING ON THINGS, SUCH AS READING THE NEWSPAPER OR WATCHING TELEVISION: 0
4. FEELING TIRED OR HAVING LITTLE ENERGY: 0
SUM OF ALL RESPONSES TO PHQ QUESTIONS 1-9: 0
SUM OF ALL RESPONSES TO PHQ QUESTIONS 1-9: 0
2. FEELING DOWN, DEPRESSED OR HOPELESS: 0

## 2023-03-17 ENCOUNTER — HOSPITAL ENCOUNTER (OUTPATIENT)
Facility: HOSPITAL | Age: 73
Setting detail: RECURRING SERIES
Discharge: HOME | End: 2023-03-20
Payer: COMMERCIAL

## 2023-03-17 PROCEDURE — 97110 THERAPEUTIC EXERCISES: CPT

## 2023-03-17 PROCEDURE — 97162 PT EVAL MOD COMPLEX 30 MIN: CPT

## 2023-03-17 PROCEDURE — 97535 SELF CARE MNGMENT TRAINING: CPT

## 2023-03-17 NOTE — PROGRESS NOTES
PHYSICAL / OCCUPATIONAL THERAPY - DAILY TREATMENT NOTE (updated )    Patient Name: Tatiana Espana    Date: 3/17/2023    : 1950  Insurance: Payor: Michelle Thompson / Plan: Jorge Luis Lino / Product Type: *No Product type* /      Patient  verified Yes     Visit #   Current / Total 1 10   Time   In / Out 906 945   Pain   In / Out 6 6   Subjective Functional Status/Changes: See eval   Changes to:  Meds, Allergies, Med Hx, Sx Hx? If yes, update Summary List no       TREATMENT AREA =  Trochanteric bursitis of left hip [M70.62]    OBJECTIVE      Therapeutic Procedures: Tx Min Billable or 1:1 Min (if diff from Tx Min) Procedure, Rationale, Specifics   15  12635 Therapeutic Exercise (timed):  increase ROM, strength, coordination, balance, and proprioception to improve patient's ability to progress to PLOF and address remaining functional goals. (see flow sheet as applicable)     Details if applicable:       8  84866 Self Care/Home Management (timed):  improve patient knowledge and understanding of pain reducing techniques, diagnosis/prognosis, and physical therapy expectations, procedures and progression  to improve patient's ability to progress to PLOF and address remaining functional goals.   (see flow sheet as applicable)     Details if applicable:            Details if applicable:            Details if applicable:            Details if applicable:     21  [facility] Totals Reminder: bill using total billable min of TIMED therapeutic procedures (example: do not include dry needle or estim unattended, both untimed codes, in totals to left)  8-22 min = 1 unit; 23-37 min = 2 units; 38-52 min = 3 units; 53-67 min = 4 units; 68-82 min = 5 units   Total Total     TOTAL TREATMENT TIME:        39     [x]  Patient Education billed concurrently with other procedures   [x] Review HEP    [] Progressed/Changed HEP, detail:    [] Other detail:       Objective Information/Functional Measures/Assessment    See POC    Patient will
List: pain affecting function, decrease ROM, decrease strength, decrease ADL/functional abilities, and decrease flexibility/joint mobility   Treatment Plan may include any combination of the followin Therapeutic Exercise, 32466 Neuromuscular Re-Education, 31118 Manual Therapy, and 35129 Self Care/Home Management  Patient / Family readiness to learn indicated by: asking questions, trying to perform skills, interest, return verbalization , and return demonstration   Persons(s) to be included in education: patient (P)  Barriers to Learning/Limitations: none  Measures taken if barriers to learning present: none  Patient Goal (s): to find relief of pain  Patient Self Reported Health Status: excellent  Rehabilitation Potential: good    Short Term Goals: To be accomplished in 2 weeks  1. I and compliant with HEP for self management of symptoms. Long Term Goals: To be accomplished in 5 weeks  1. Improve FOTO to 70 to indicate improved function with daily activities. 2. Patient will report >=50% improvement with left hip pain to increase ease with driving. 3. Increase left hip abd and ext strength to 5/5 to improve stability for daily activities. Frequency / Duration: Patient to be seen 2 times per week for 5 weeks  Goals will be assigned and reassessed every 10 visits/ 30 days per guidelines . Patient/ Caregiver education and instruction: Diagnosis, prognosis, self care and exercises [x]  Plan of care has been reviewed with ABILIO Torres, PT       3/17/2023       1:44 AM    I certify that the above Therapy Services are being furnished while the patient is under my care. I agree with the treatment plan and certify that this therapy is necessary.     Insurance: Payor: Phyllis Areas / Plan: LUCIO NOWAK FEDERAL / Product Type: *No Product type* /      ** Signature, Date and Time must be completed for valid certification **  [facility] or you may fax the signed copy to

## 2023-03-20 ENCOUNTER — TELEPHONE (OUTPATIENT)
Age: 73
End: 2023-03-20

## 2023-03-20 NOTE — TELEPHONE ENCOUNTER
Please send refill to [facility] on [address].    metoprolol tartrate (LOPRESSOR) 25 mg tablet 180 Tablet 3 3/14/2022     Sig: TAKE 1 TABLET BY MOUTH TWICE DAILY    Sent to pharmacy as: metoprolol tartrate 25 mg tablet (LOPRESSOR)    E-Prescribing Status: Receipt confirmed by pharmacy (3/14/2022  5:09 PM EDT)

## 2023-03-24 ENCOUNTER — HOSPITAL ENCOUNTER (OUTPATIENT)
Facility: HOSPITAL | Age: 73
Setting detail: RECURRING SERIES
Discharge: HOME | End: 2023-03-27
Payer: COMMERCIAL

## 2023-03-24 PROCEDURE — 97112 NEUROMUSCULAR REEDUCATION: CPT

## 2023-03-24 PROCEDURE — 97530 THERAPEUTIC ACTIVITIES: CPT

## 2023-03-24 PROCEDURE — 97110 THERAPEUTIC EXERCISES: CPT

## 2023-03-24 NOTE — PROGRESS NOTES
PHYSICAL / OCCUPATIONAL THERAPY - DAILY TREATMENT NOTE (updated )    Patient Name: Mumtaz Moreno    Date: 3/24/2023    : 1950  Insurance: Payor: Lady Black / Plan: Jody Fish / Product Type: *No Product type* /      Patient  verified Yes     Visit #   Current / Total 2 10   Time   In / Out 1000 1038   Pain   In / Out 4-5 0/10   Subjective Functional Status/Changes: Pt reports pain and stiffness along the front of his hip. States difficulty with driving due to hip pain. Changes to:  Meds, Allergies, Med Hx, Sx Hx? If yes, update Summary List no       TREATMENT AREA =  Trochanteric bursitis of left hip [M70.62]    OBJECTIVE         Therapeutic Procedures: Tx Min Billable or 1:1 Min (if diff from Tx Min) Procedure, Rationale, Specifics   20  32089 Therapeutic Exercise (timed):  increase ROM, strength, coordination, balance, and proprioception to improve patient's ability to progress to PLOF and address remaining functional goals. (see flow sheet as applicable)     Details if applicable:       8  45705 Therapeutic Activity (timed):  use of dynamic activities replicating functional movements to increase ROM, strength, coordination, balance, and proprioception in order to improve patient's ability to progress to PLOF and address remaining functional goals. (see flow sheet as applicable)     Details if applicable:     10  09495 Neuromuscular Re-Education (timed):  improve balance, coordination, kinesthetic sense, posture, core stability and proprioception to improve patient's ability to develop conscious control of individual muscles and awareness of position of extremities in order to progress to PLOF and address remaining functional goals.  (see flow sheet as applicable)     Details if applicable:            Details if applicable:            Details if applicable:     45  [facility] Totals Reminder: bill using total billable min of TIMED therapeutic procedures (example: do not include dry needle or

## 2023-03-27 ENCOUNTER — HOSPITAL ENCOUNTER (OUTPATIENT)
Facility: HOSPITAL | Age: 73
Setting detail: RECURRING SERIES
Discharge: HOME | End: 2023-03-30
Payer: COMMERCIAL

## 2023-03-27 PROCEDURE — 97530 THERAPEUTIC ACTIVITIES: CPT

## 2023-03-27 PROCEDURE — 97110 THERAPEUTIC EXERCISES: CPT

## 2023-03-27 PROCEDURE — 97112 NEUROMUSCULAR REEDUCATION: CPT

## 2023-03-27 NOTE — PROGRESS NOTES
min = 1 unit; 23-37 min = 2 units; 38-52 min = 3 units; 53-67 min = 4 units; 68-82 min = 5 units   Total Total     [x]  Patient Education billed concurrently with other procedures   [x] Review HEP    [] Progressed/Changed HEP, detail:    [] Other detail:       Objective Information/Functional Measures/Assessment    Added lateral ambulation  Added clamshells  Added piriformis stretch  Added ball/band  Progressed therex as per flowsheet. Pt tolerated treatment session well without increased pain or discomfort. Pt required verbal cues for proper form and mechanics during therex to prevent compensatory movements. No pain noted post treatment session. Pt continues to report difficulty with driving due to increased left hip pain. Will continue to progress as tolerated to address remaining deficits. Patient will continue to benefit from skilled PT / OT services to modify and progress therapeutic interventions, analyze and address functional mobility deficits, analyze and address ROM deficits, analyze and address strength deficits, analyze and address soft tissue restrictions, and analyze and cue for proper movement patterns to address functional deficits and attain remaining goals. Progress toward goals / Updated goals:  []  See Progress Note/Recertification    Short Term Goals: To be accomplished in 2 weeks  1. I and compliant with HEP for self management of symptoms. Current: Progressing, reports initial compliance 3/24/23  Long Term Goals: To be accomplished in 5 weeks  1. Improve FOTO to 70 to indicate improved function with daily activities. 2. Patient will report >=50% improvement with left hip pain to increase ease with driving. 3. Increase left hip abd and ext strength to 5/5 to improve stability for daily activities.     PLAN  Yes  Continue plan of care  []  Upgrade activities as tolerated  []  Discharge due to :  []  Other:    Joseline Farmer PTA    3/27/2023    9:28 AM    Future Appointments   Date Time

## 2023-03-29 ENCOUNTER — HOSPITAL ENCOUNTER (OUTPATIENT)
Facility: HOSPITAL | Age: 73
Setting detail: RECURRING SERIES
Discharge: HOME | End: 2023-04-01
Payer: COMMERCIAL

## 2023-03-29 PROCEDURE — 97112 NEUROMUSCULAR REEDUCATION: CPT

## 2023-03-29 PROCEDURE — 97140 MANUAL THERAPY 1/> REGIONS: CPT

## 2023-03-29 PROCEDURE — 97110 THERAPEUTIC EXERCISES: CPT

## 2023-03-29 RX ORDER — TADALAFIL 5 MG/1
TABLET ORAL
Qty: 90 TABLET | Refills: 3 | Status: SHIPPED | OUTPATIENT
Start: 2023-03-29

## 2023-03-29 NOTE — PROGRESS NOTES
PHYSICAL / OCCUPATIONAL THERAPY - DAILY TREATMENT NOTE (updated )    Patient Name: Arjun Care    Date: 3/29/2023    : 1950  Insurance: Payor: Bert River / Plan: Clyde Arias / Product Type: *No Product type* /      Patient  verified Yes     Visit #   Current / Total 4 10   Time   In / Out 931 1019   Pain   In / Out 0 0   Subjective Functional Status/Changes: Pt denied pain upon arrival but noted some pain driving in car earlier in left lateral hip   Changes to:  Meds, Allergies, Med Hx, Sx Hx? If yes, update Summary List no       TREATMENT AREA =  Trochanteric bursitis of left hip [M70.62]    OBJECTIVE         Therapeutic Procedures: Tx Min Billable or 1:1 Min (if diff from Tx Min) Procedure, Rationale, Specifics   20 15 89775 Therapeutic Exercise (timed):  increase ROM, strength, coordination, balance, and proprioception to improve patient's ability to progress to PLOF and address remaining functional goals. (see flow sheet as applicable)     Details if applicable:       18  93961 Neuromuscular Re-Education (timed):  improve balance, coordination, kinesthetic sense, posture, core stability and proprioception to improve patient's ability to develop conscious control of individual muscles and awareness of position of extremities in order to progress to PLOF and address remaining functional goals. (see flow sheet as applicable)     Details if applicable:     10  60577 Manual Therapy (timed):  increase ROM and increase tissue extensibility to improve patient's ability to progress to PLOF and address remaining functional goals. The manual therapy interventions were performed at a separate and distinct time from the therapeutic activities interventions .  (see flow sheet as applicable)     Details if applicable:               48 37 [facility] Totals Reminder: bill using total billable min of TIMED therapeutic procedures (example: do not include dry needle or estim unattended, both untimed codes, in

## 2023-04-04 ENCOUNTER — HOSPITAL ENCOUNTER (OUTPATIENT)
Facility: HOSPITAL | Age: 73
Setting detail: RECURRING SERIES
Discharge: HOME | End: 2023-04-07
Payer: COMMERCIAL

## 2023-04-04 PROCEDURE — 97112 NEUROMUSCULAR REEDUCATION: CPT

## 2023-04-04 PROCEDURE — 97110 THERAPEUTIC EXERCISES: CPT

## 2023-04-04 NOTE — PROGRESS NOTES
PHYSICAL / OCCUPATIONAL THERAPY - DAILY TREATMENT NOTE (updated )    Patient Name: Estefany Ferro    Date: 2023    : 1950  Insurance: Payor: Apollo Dimas / Plan: Sushma [place] / Product Type: *No Product type* /      Patient  verified Yes     Visit #   Current / Total 5 10   Time   In / Out 1005 1042   Pain   In / Out 0/10 0/10   Subjective Functional Status/Changes: Pt states left hip feels sore today but starts to feel better once he starts moving   Changes to:  Meds, Allergies, Med Hx, Sx Hx? If yes, update Summary List no       TREATMENT AREA =  Trochanteric bursitis of left hip [M70.62]    OBJECTIVE         Therapeutic Procedures: Tx Min Billable or 1:1 Min (if diff from Tx Min) Procedure, Rationale, Specifics   20  58650 Therapeutic Exercise (timed):  increase ROM, strength, coordination, balance, and proprioception to improve patient's ability to progress to PLOF and address remaining functional goals. (see flow sheet as applicable)     Details if applicable:       12  35548 Neuromuscular Re-Education (timed):  improve balance, coordination, kinesthetic sense, posture, core stability and proprioception to improve patient's ability to develop conscious control of individual muscles and awareness of position of extremities in order to progress to PLOF and address remaining functional goals. (see flow sheet as applicable)     Details if applicable:     5  85011 Manual Therapy (timed):  decrease pain, increase ROM, and increase tissue extensibility to improve patient's ability to progress to PLOF and address remaining functional goals. The manual therapy interventions were performed at a separate and distinct time from the therapeutic activities interventions . (see flow sheet as applicable)     Details if applicable:   In prone-hip IR/ER PROM, STM/DTM piriformis/glut med          Details if applicable:            Details if applicable:     40  [facility] Totals Reminder: bill using total

## 2023-04-13 PROBLEM — M48.061 DEGENERATIVE LUMBAR SPINAL STENOSIS: Status: ACTIVE | Noted: 2017-11-06

## 2023-04-13 PROBLEM — R10.10 PAIN OF UPPER ABDOMEN: Status: ACTIVE | Noted: 2021-04-24

## 2023-04-13 PROBLEM — G89.4 CHRONIC PAIN DISORDER: Status: ACTIVE | Noted: 2022-05-05

## 2023-04-13 PROBLEM — I10 BENIGN HYPERTENSION: Status: ACTIVE | Noted: 2019-02-19

## 2023-04-13 PROBLEM — K80.20 SYMPTOMATIC CHOLELITHIASIS: Status: ACTIVE | Noted: 2021-08-02

## 2023-04-13 PROBLEM — N17.9 AKI (ACUTE KIDNEY INJURY) (HCC): Status: ACTIVE | Noted: 2021-04-24

## 2023-04-13 PROBLEM — M51.369 DEGENERATION OF LUMBAR INTERVERTEBRAL DISC: Status: ACTIVE | Noted: 2022-05-05

## 2023-04-13 PROBLEM — M41.9 SCOLIOSIS OF LUMBAR SPINE: Status: ACTIVE | Noted: 2022-05-05

## 2023-04-13 PROBLEM — R74.01 TRANSAMINITIS: Status: ACTIVE | Noted: 2021-04-23

## 2023-04-13 PROBLEM — M51.36 DEGENERATION OF LUMBAR INTERVERTEBRAL DISC: Status: ACTIVE | Noted: 2022-05-05

## 2023-04-13 PROBLEM — C61 PROSTATE CANCER (HCC): Status: ACTIVE | Noted: 2021-04-15

## 2023-04-13 PROBLEM — E80.6 HYPERBILIRUBINEMIA: Status: ACTIVE | Noted: 2021-04-24

## 2023-04-20 RX ORDER — ROSUVASTATIN CALCIUM 10 MG/1
10 TABLET, COATED ORAL DAILY
Qty: 90 TABLET | Refills: 3 | Status: SHIPPED | OUTPATIENT
Start: 2023-04-20

## 2023-06-05 ENCOUNTER — OFFICE VISIT (OUTPATIENT)
Age: 73
End: 2023-06-05
Payer: COMMERCIAL

## 2023-06-05 VITALS
HEIGHT: 75 IN | DIASTOLIC BLOOD PRESSURE: 66 MMHG | OXYGEN SATURATION: 96 % | WEIGHT: 176 LBS | HEART RATE: 57 BPM | SYSTOLIC BLOOD PRESSURE: 110 MMHG | BODY MASS INDEX: 21.88 KG/M2

## 2023-06-05 DIAGNOSIS — R10.32 LEFT GROIN PAIN: Primary | ICD-10-CM

## 2023-06-05 PROCEDURE — 1123F ACP DISCUSS/DSCN MKR DOCD: CPT | Performed by: SURGERY

## 2023-06-05 PROCEDURE — 3078F DIAST BP <80 MM HG: CPT | Performed by: SURGERY

## 2023-06-05 PROCEDURE — 3074F SYST BP LT 130 MM HG: CPT | Performed by: SURGERY

## 2023-06-05 PROCEDURE — 99214 OFFICE O/P EST MOD 30 MIN: CPT | Performed by: SURGERY

## 2023-06-05 NOTE — PROGRESS NOTES
Ajay King is a 72 y.o. male (: 1950) presenting to address:    Chief Complaint   Patient presents with    Follow-up     Left inguinal groin pain and bulge       Medication list and allergies have been reviewed with Ajay King and updated as of today's date. I have gone over all Medical, Surgical and Social History with Ajay King and updated/added the information accordingly. 1. Have you been to the ER, Urgent Care or Hospitalized since your last visit? No          2. Have you followed up with your PCP or any other Physicians since your procedure/ last office visit?    No
DEXA t score 4.2 spine, 0.2 hip ()    PROSTATECTOMY  2021    Dr Trisha Klein DVP      Social History     Tobacco Use    Smoking status: Former     Packs/day: 0.25     Types: Cigarettes     Quit date: 1972     Years since quittin.4    Smokeless tobacco: Never   Substance Use Topics    Alcohol use:  Yes     Alcohol/week: 1.0 standard drink      Social History     Tobacco Use   Smoking Status Former    Packs/day: 0.25    Types: Cigarettes    Quit date: 1972    Years since quittin.4   Smokeless Tobacco Never     Family History   Problem Relation Age of Onset    Cancer Mother         leukemia    Heart Disease Father     No Known Problems Sister     No Known Problems Brother     No Known Problems Maternal Aunt     No Known Problems Maternal Uncle     No Known Problems Paternal Aunt     No Known Problems Paternal Uncle     No Known Problems Maternal Grandmother     No Known Problems Maternal Grandfather     No Known Problems Paternal Grandmother     No Known Problems Paternal Grandfather     Psychiatric Disorder Daughter         depression    No Known Problems Other       Current Outpatient Medications   Medication Sig    lurasidone (LATUDA) 40 MG TABS tablet     rosuvastatin (CRESTOR) 10 MG tablet Take 1 tablet by mouth daily    Glucosamine-Chondroit-Vit C-Mn (GLUCOSAMINE 1500 COMPLEX PO) Take 1,500 mg by mouth in the morning and at bedtime    Omega-3 Fatty Acids (FISH OIL) 1000 MG capsule Take 1 capsule by mouth daily    tadalafil (CIALIS) 5 MG tablet TAKE ONE TABLET BY MOUTH DAILY    metoprolol tartrate (LOPRESSOR) 25 MG tablet TAKE 1 TABLET BY MOUTH TWICE DAILY    acetaminophen (TYLENOL) 325 MG tablet Take 1 tablet by mouth every 4 hours as needed    albuterol sulfate HFA (PROVENTIL;VENTOLIN;PROAIR) 108 (90 Base) MCG/ACT inhaler Inhale 2 puffs into the lungs every 4 hours as needed    ascorbic acid (VITAMIN C) 500 MG tablet Take 2 tablets by mouth daily    aspirin 81 MG EC tablet Take 1 tablet by

## 2023-06-19 ENCOUNTER — TELEPHONE (OUTPATIENT)
Age: 73
End: 2023-06-19

## 2023-06-19 NOTE — TELEPHONE ENCOUNTER
Pt called looking to get his results from his imaging. Patient stated he was told he'd receive a call for his results & hasn't yet. Offered to schedule appointment for patient & he was determined on a phone call before scheduling an appointment.

## 2023-07-06 ENCOUNTER — OFFICE VISIT (OUTPATIENT)
Age: 73
End: 2023-07-06
Payer: COMMERCIAL

## 2023-07-06 VITALS
OXYGEN SATURATION: 98 % | HEIGHT: 75 IN | HEART RATE: 61 BPM | WEIGHT: 175 LBS | TEMPERATURE: 96.9 F | BODY MASS INDEX: 21.76 KG/M2

## 2023-07-06 DIAGNOSIS — M54.16 RADICULOPATHY, LUMBAR REGION: ICD-10-CM

## 2023-07-06 DIAGNOSIS — M70.62 TROCHANTERIC BURSITIS OF LEFT HIP: ICD-10-CM

## 2023-07-06 DIAGNOSIS — M48.062 SPINAL STENOSIS, LUMBAR REGION WITH NEUROGENIC CLAUDICATION: Primary | ICD-10-CM

## 2023-07-06 DIAGNOSIS — M47.816 SPONDYLOSIS WITHOUT MYELOPATHY OR RADICULOPATHY, LUMBAR REGION: ICD-10-CM

## 2023-07-06 PROCEDURE — 1123F ACP DISCUSS/DSCN MKR DOCD: CPT | Performed by: NURSE PRACTITIONER

## 2023-07-06 PROCEDURE — 99214 OFFICE O/P EST MOD 30 MIN: CPT | Performed by: NURSE PRACTITIONER

## 2023-07-06 RX ORDER — ARIPIPRAZOLE 15 MG/1
TABLET ORAL
COMMUNITY
Start: 2023-05-10

## 2023-07-06 RX ORDER — GABAPENTIN 600 MG/1
600 TABLET ORAL 3 TIMES DAILY
Qty: 270 TABLET | Refills: 1 | Status: SHIPPED | OUTPATIENT
Start: 2023-08-15 | End: 2024-02-11

## 2023-07-06 NOTE — PROGRESS NOTES
Laila Mendoza presents today for   Chief Complaint   Patient presents with    Lower Back Pain       Is someone accompanying this pt? no    Is the patient using any DME equipment during OV? no    Coordination of Care:  1. Have you been to the ER, urgent care clinic since your last visit? no  Hospitalized since your last visit? no    2. Have you seen or consulted any other health care providers outside of the [address] [place] [address] since your last visit? Yes, multiple Include any pap smears or colon screening.  no

## 2023-07-06 NOTE — PROGRESS NOTES
Chief complaint   Chief Complaint   Patient presents with    Lower Back Pain       History of Present Illness:  Jace Nowak is a  72 y.o.  male  who comes in today for follow-up of his chronic low back pain. He does have scoliosis and continues to do Schroth therapy which she feels like does help. He continues take gabapentin 600 mg 3 times a day which he states does help his back pain. He does not have any radiating leg pain. He has left-sided bursitis and has had an injection in that in the past but it continues to bother him. He does go to the gym at the [facility] 3 times a week. He denies fever bowel bladder dysfunction. Physical Exam: The patient is a 79-year-old male well-developed well-nourished who is alert and oriented with a normal mood and affect. He has a full weightbearing nonantalgic mood. He does have a scoliosis that curves to the right. He has 4 out of 5 strength bilateral lower extremities. Negative straight leg raise. He has pain with hyperextension lumbar spine. He is mildly tender to palpation over the left greater trochanteric bursa. Assessment and Plan: This is a patient who has scoliosis and back pain. I have refilled his gabapentin. I will give him some diclofenac gel to rub into that bursa on the left. Hopefully that helps calm down some. We will see him back in 7 months sooner if needed. Sunni Greene was seen today for lower back pain. Diagnoses and all orders for this visit:    Spinal stenosis, lumbar region with neurogenic claudication  -     gabapentin (NEURONTIN) 600 MG tablet; Take 1 tablet by mouth 3 times daily for 180 days. Max Daily Amount: 1,800 mg    Spondylosis without myelopathy or radiculopathy, lumbar region  -     gabapentin (NEURONTIN) 600 MG tablet; Take 1 tablet by mouth 3 times daily for 180 days. Max Daily Amount: 1,800 mg    Radiculopathy, lumbar region  -     gabapentin (NEURONTIN) 600 MG tablet;  Take 1 tablet by mouth 3 times daily for

## 2023-08-09 ENCOUNTER — OFFICE VISIT (OUTPATIENT)
Age: 73
End: 2023-08-09
Payer: COMMERCIAL

## 2023-08-09 VITALS
OXYGEN SATURATION: 95 % | RESPIRATION RATE: 16 BRPM | HEART RATE: 55 BPM | WEIGHT: 168 LBS | HEIGHT: 75 IN | SYSTOLIC BLOOD PRESSURE: 96 MMHG | BODY MASS INDEX: 20.89 KG/M2 | DIASTOLIC BLOOD PRESSURE: 60 MMHG | TEMPERATURE: 98 F

## 2023-08-09 DIAGNOSIS — R19.04 LLQ ABDOMINAL MASS: Primary | ICD-10-CM

## 2023-08-09 PROBLEM — R10.10 PAIN OF UPPER ABDOMEN: Status: RESOLVED | Noted: 2021-04-24 | Resolved: 2023-08-09

## 2023-08-09 PROBLEM — R74.01 TRANSAMINITIS: Status: RESOLVED | Noted: 2021-04-23 | Resolved: 2023-08-09

## 2023-08-09 PROBLEM — K80.20 SYMPTOMATIC CHOLELITHIASIS: Status: RESOLVED | Noted: 2021-08-02 | Resolved: 2023-08-09

## 2023-08-09 PROBLEM — N17.9 AKI (ACUTE KIDNEY INJURY) (HCC): Status: RESOLVED | Noted: 2021-04-24 | Resolved: 2023-08-09

## 2023-08-09 PROBLEM — E80.6 HYPERBILIRUBINEMIA: Status: RESOLVED | Noted: 2021-04-24 | Resolved: 2023-08-09

## 2023-08-09 PROCEDURE — 3078F DIAST BP <80 MM HG: CPT | Performed by: INTERNAL MEDICINE

## 2023-08-09 PROCEDURE — 99214 OFFICE O/P EST MOD 30 MIN: CPT | Performed by: INTERNAL MEDICINE

## 2023-08-09 PROCEDURE — 1123F ACP DISCUSS/DSCN MKR DOCD: CPT | Performed by: INTERNAL MEDICINE

## 2023-08-09 PROCEDURE — 3074F SYST BP LT 130 MM HG: CPT | Performed by: INTERNAL MEDICINE

## 2023-08-09 SDOH — ECONOMIC STABILITY: FOOD INSECURITY: WITHIN THE PAST 12 MONTHS, THE FOOD YOU BOUGHT JUST DIDN'T LAST AND YOU DIDN'T HAVE MONEY TO GET MORE.: NEVER TRUE

## 2023-08-09 SDOH — ECONOMIC STABILITY: INCOME INSECURITY: HOW HARD IS IT FOR YOU TO PAY FOR THE VERY BASICS LIKE FOOD, HOUSING, MEDICAL CARE, AND HEATING?: NOT HARD AT ALL

## 2023-08-09 SDOH — ECONOMIC STABILITY: FOOD INSECURITY: WITHIN THE PAST 12 MONTHS, YOU WORRIED THAT YOUR FOOD WOULD RUN OUT BEFORE YOU GOT MONEY TO BUY MORE.: NEVER TRUE

## 2023-08-09 ASSESSMENT — PATIENT HEALTH QUESTIONNAIRE - PHQ9
SUM OF ALL RESPONSES TO PHQ QUESTIONS 1-9: 0
SUM OF ALL RESPONSES TO PHQ QUESTIONS 1-9: 0
SUM OF ALL RESPONSES TO PHQ9 QUESTIONS 1 & 2: 0
1. LITTLE INTEREST OR PLEASURE IN DOING THINGS: 0
SUM OF ALL RESPONSES TO PHQ QUESTIONS 1-9: 0
2. FEELING DOWN, DEPRESSED OR HOPELESS: 0
SUM OF ALL RESPONSES TO PHQ QUESTIONS 1-9: 0

## 2023-08-10 NOTE — PROGRESS NOTES
(obstructive sleep apnea)     intol cpap Dr Roni Baltazar 2015; using oral appliance Dr De Los Santos Notice; AHI 17.8 min desats 83    Overweight (BMI 25.0-29.9) 02/12/2018    Peripheral neuropathy     and B CTS on EMG Dr. Roni Baltazar    Prostate cancer [facility]) 04/16/2021    Dr Malika Son; yulisa 3+3; s/p RALP, BLND Dr Joseline Brito    Pulmonary nodules 2011    bilat upper lobes; no change 2018; noted again [facility] 6/19    Scoliosis     Dr Ashia Salguero 2014    Venous insufficiency 12/2021    Vitamin D deficiency      Past Surgical History:   Procedure Laterality Date    CARPAL TUNNEL RELEASE Bilateral 2007    CHOLECYSTECTOMY, LAPAROSCOPIC  08/2021    Dr Louis Rico mild diverticulosis 2000, 6/12; 9/21/22    INGUINAL HERNIA REPAIR Left 01/18/2023    Robotic repair of left inguinal hernia with placement of mesh Dr Santy Ibrahim t score 4.2 spine, 0.2 hip (2012)    PROSTATECTOMY  04/2021    Dr Joseline Brito DVP     Current Outpatient Medications   Medication Sig    ARIPiprazole (ABILIFY) 15 MG tablet     [START ON 8/15/2023] gabapentin (NEURONTIN) 600 MG tablet Take 1 tablet by mouth 3 times daily for 180 days.  Max Daily Amount: 1,800 mg    diclofenac sodium (VOLTAREN) 1 % GEL Apply 4 g topically 4 times daily    lurasidone (LATUDA) 40 MG TABS tablet     rosuvastatin (CRESTOR) 10 MG tablet Take 1 tablet by mouth daily    Glucosamine-Chondroit-Vit C-Mn (GLUCOSAMINE 1500 COMPLEX PO) Take 1,500 mg by mouth in the morning and at bedtime    Omega-3 Fatty Acids (FISH OIL) 1000 MG capsule Take 1 capsule by mouth daily    tadalafil (CIALIS) 5 MG tablet TAKE ONE TABLET BY MOUTH DAILY    metoprolol tartrate (LOPRESSOR) 25 MG tablet TAKE 1 TABLET BY MOUTH TWICE DAILY    acetaminophen (TYLENOL) 325 MG tablet Take 1 tablet by mouth every 4 hours as needed    albuterol sulfate HFA (PROVENTIL;VENTOLIN;PROAIR) 108 (90 Base) MCG/ACT inhaler Inhale 2 puffs into the lungs every 4 hours as needed    ascorbic acid (VITAMIN C) 500 MG tablet

## 2023-08-11 ENCOUNTER — HOSPITAL ENCOUNTER (OUTPATIENT)
Facility: HOSPITAL | Age: 73
Discharge: HOME | End: 2023-08-11
Attending: INTERNAL MEDICINE
Payer: COMMERCIAL

## 2023-08-11 DIAGNOSIS — R19.04 LLQ ABDOMINAL MASS: ICD-10-CM

## 2023-08-11 PROCEDURE — 6360000004 HC RX CONTRAST MEDICATION: Performed by: INTERNAL MEDICINE

## 2023-08-11 PROCEDURE — 74177 CT ABD & PELVIS W/CONTRAST: CPT

## 2023-08-11 RX ADMIN — IOPAMIDOL 80 ML: 612 INJECTION, SOLUTION INTRAVENOUS at 11:50

## 2023-08-14 ENCOUNTER — TELEPHONE (OUTPATIENT)
Age: 73
End: 2023-08-14

## 2023-08-15 ENCOUNTER — TELEPHONE (OUTPATIENT)
Age: 73
End: 2023-08-15

## 2023-08-15 NOTE — TELEPHONE ENCOUNTER
----- Message from Marisabel Armas sent at 8/15/2023 10:31 AM EDT -----  Subject: Message to Provider    QUESTIONS  Information for Provider? pt needs the records of his CT scan sent to his   specialist. Please fax the results [phone]  ---------------------------------------------------------------------------  --------------  [address]  [phone]; OK to leave message on voicemail  ---------------------------------------------------------------------------  --------------  SCRIPT ANSWERS  Relationship to Patient?  Self

## 2023-08-16 ENCOUNTER — TELEPHONE (OUTPATIENT)
Age: 73
End: 2023-08-16

## 2023-08-16 DIAGNOSIS — K40.90 LEFT INGUINAL HERNIA: Primary | ICD-10-CM

## 2023-08-16 NOTE — TELEPHONE ENCOUNTER
----- Message from Evonne Negron sent at 8/15/2023 10:31 AM EDT -----  Subject: Message to Provider     QUESTIONS  Information for Provider? pt needs the records of his CT scan sent to his   specialist. Please fax the results [phone]  ---------------------------------------------------------------------------  --------------  [address]  [phone]; OK to leave message on voicemail  ---------------------------------------------------------------------------  --------------  SCRIPT ANSWERS  Relationship to Patient?  Self
CT in process of being read  Would suggest going to ER to be evaluated
LMTCB advising patient that the radiologist has to read/release the results to the provider. Radiology is anywhere from 7-10 days on average with releasing the results to the provider. Once the results are available we will let him know, he should also get a ARX message at that time as well where he will be able to see the results as well.
Patient called to check on the status of his CT scan results. I advised him its still in process and results are not in yet. Patient did state that he went to [facility] ED yesterday for the lump in his groin area and they told him he needs hernia surgery. Patient states he needs his CT scan/results in order to schedule his surgery. Please advise.
Patient reached and given message, he will go to ER to be evaluated. No further questions at this time.
Patient transferred from Nurse Triage, states he recently had a CT Scan done due to some groin/pelvic pain he has been having as well as a lump in his groin. He states that he has had pain with it that Dr Keith Pfeiffer is aware of but today the pain is very severe and sharp. Patient was calling to see if the results of the CT scan have come back or if Dr Keith Pfeiffer recommends that he go to an urgent care/ED to be seen since his pain is more severe. Patient can be reached at [phone]. Please advise, thank you.
Pt returned call and given message below
We are awaiting the official read by radiology
PAST SURGICAL HISTORY:  H/O laminectomy     History of hernia repair     History of lumbosacral spine surgery     S/P hip replacement right hip    S/P knee replacement, bilateral

## 2023-08-16 NOTE — TELEPHONE ENCOUNTER
Pls call    IMPRESSION:  Prominent left inguinal hernia containing a loop of nonobstructed distal  descending and proximal sigmoid colon. The remainder of the bowel is unremarkable. Mild-to-moderate hiatus hernia. Cholecystectomy; mildly dilated intra and extrahepatic biliary ducts, most  likely reservoir effect. Multilevel spondylitic and disc degenerative changes with moderate scoliosis  convex to the right centered about L3. Possible significant canal stenosis at  L3-4.       He has recurrent hernia  Sched Dr Sultana mackay as symptomatic - referral sent

## 2023-08-17 ENCOUNTER — OFFICE VISIT (OUTPATIENT)
Age: 73
End: 2023-08-17
Payer: COMMERCIAL

## 2023-08-17 ENCOUNTER — TELEPHONE (OUTPATIENT)
Age: 73
End: 2023-08-17

## 2023-08-17 ENCOUNTER — ANESTHESIA EVENT (OUTPATIENT)
Facility: HOSPITAL | Age: 73
End: 2023-08-17
Payer: COMMERCIAL

## 2023-08-17 VITALS
BODY MASS INDEX: 21.14 KG/M2 | WEIGHT: 170 LBS | HEART RATE: 54 BPM | DIASTOLIC BLOOD PRESSURE: 76 MMHG | SYSTOLIC BLOOD PRESSURE: 128 MMHG | OXYGEN SATURATION: 98 % | HEIGHT: 75 IN

## 2023-08-17 DIAGNOSIS — K40.90 LEFT INGUINAL HERNIA: Primary | ICD-10-CM

## 2023-08-17 PROCEDURE — 3074F SYST BP LT 130 MM HG: CPT | Performed by: SURGERY

## 2023-08-17 PROCEDURE — 1123F ACP DISCUSS/DSCN MKR DOCD: CPT | Performed by: SURGERY

## 2023-08-17 PROCEDURE — 3078F DIAST BP <80 MM HG: CPT | Performed by: SURGERY

## 2023-08-17 PROCEDURE — 99214 OFFICE O/P EST MOD 30 MIN: CPT | Performed by: SURGERY

## 2023-08-17 NOTE — PROGRESS NOTES
Nima Whelan is a 72 y.o. male (: 1950) presenting to address:    Chief Complaint   Patient presents with    Follow-up     2 month follow up to discuss CT Abd Pelv for left groin pain done on 8/15/23       Medication list and allergies have been reviewed with Nima Whelan and updated as of today's date. I have gone over all Medical, Surgical and Social History with Nima Whelan and updated/added the information accordingly. 1. Have you been to the ER, Urgent Care or Hospitalized since your last visit? Yes. [facility] 23          2. Have you followed up with your PCP or any other Physicians since your procedure/ last office visit?    No

## 2023-08-17 NOTE — TELEPHONE ENCOUNTER
Left voicemail message for  Sweta Leyda to contact our office re: to schedule an appointment with Dr. Reece Knight.

## 2023-08-17 NOTE — PROGRESS NOTES
General Surgery Consult      Tom Durand  Admit date: (Not on file)    MRN: [id number]     : 1950     Age: 72 y.o. Attending Physician: Dom Gray MD, [facility]      History of Present Illness:     Tom Durand is a 72 y.o. male who is here for follow-up on his CT scan of abdomen and pelvis that showed a large left inguinal hernia. I note the patient very well and I did perform a robotic left inguinal hernia repair on him and he presented to me last month with left groin pain and discomfort. On exam I did not feel any hernia at that point so I ordered an ultrasound that was negative for any hernia as well. However the patient started having severe large bulge and pain and he had a CT scan that showed a large hernia containing part of the colon. He is complaining of significant pain but he does not have any nausea or vomiting.      Patient Active Problem List    Diagnosis Date Noted    Chronic pain disorder 2022    Scoliosis of lumbar spine 2022    Degeneration of lumbar intervertebral disc 2022    History of prostate cancer 2021    Prostate cancer ([address]) 04/15/2021    GERD (gastroesophageal reflux disease)     Anxiety     Asthma     Hypovitaminosis D     Essential (primary) hypertension 2019    MDD (major depressive disorder), recurrent episode, mild ([address]) 2018    Overweight (BMI 25.0-29.9) 2018    Degenerative lumbar spinal stenosis 2017    MICHELE (obstructive sleep apnea) 2015    Diverticulosis of large intestine without hemorrhage 10/29/2015    Hyperlipidemia 2015    BPH (benign prostatic hyperplasia) 2011    Neuropathy 2011    Erectile dysfunction 2011     Past Medical History:   Diagnosis Date    Allergic rhinitis     Dr Ofelia Broderick; never took immunotherapy    Anxiety     saw Dr Jaron De Paz     Asthma     Dr Irvin Mello; pfts show no obstruction but high RV; Dr Marya Pascual; FEV1 96, 5% imp postbd, ratio 104, , ,

## 2023-08-18 ENCOUNTER — HOSPITAL ENCOUNTER (OUTPATIENT)
Facility: HOSPITAL | Age: 73
Setting detail: OUTPATIENT SURGERY
Discharge: HOME | End: 2023-08-18
Attending: SURGERY | Admitting: SURGERY
Payer: COMMERCIAL

## 2023-08-18 ENCOUNTER — ANESTHESIA (OUTPATIENT)
Facility: HOSPITAL | Age: 73
End: 2023-08-18
Payer: COMMERCIAL

## 2023-08-18 VITALS
OXYGEN SATURATION: 100 % | HEIGHT: 75 IN | WEIGHT: 171 LBS | TEMPERATURE: 97.4 F | SYSTOLIC BLOOD PRESSURE: 134 MMHG | HEART RATE: 54 BPM | RESPIRATION RATE: 15 BRPM | DIASTOLIC BLOOD PRESSURE: 82 MMHG | BODY MASS INDEX: 21.26 KG/M2

## 2023-08-18 DIAGNOSIS — Z98.890 S/P HERNIA REPAIR: Primary | ICD-10-CM

## 2023-08-18 DIAGNOSIS — Z87.19 S/P HERNIA REPAIR: Primary | ICD-10-CM

## 2023-08-18 PROCEDURE — 7100000011 HC PHASE II RECOVERY - ADDTL 15 MIN: Performed by: SURGERY

## 2023-08-18 PROCEDURE — 3600000012 HC SURGERY LEVEL 2 ADDTL 15MIN: Performed by: SURGERY

## 2023-08-18 PROCEDURE — C9399 UNCLASSIFIED DRUGS OR BIOLOG: HCPCS | Performed by: NURSE ANESTHETIST, CERTIFIED REGISTERED

## 2023-08-18 PROCEDURE — 6360000002 HC RX W HCPCS: Performed by: NURSE ANESTHETIST, CERTIFIED REGISTERED

## 2023-08-18 PROCEDURE — 6370000000 HC RX 637 (ALT 250 FOR IP): Performed by: SURGERY

## 2023-08-18 PROCEDURE — 3700000001 HC ADD 15 MINUTES (ANESTHESIA): Performed by: SURGERY

## 2023-08-18 PROCEDURE — 6360000002 HC RX W HCPCS: Performed by: SURGERY

## 2023-08-18 PROCEDURE — 7100000001 HC PACU RECOVERY - ADDTL 15 MIN: Performed by: SURGERY

## 2023-08-18 PROCEDURE — 2500000003 HC RX 250 WO HCPCS: Performed by: NURSE ANESTHETIST, CERTIFIED REGISTERED

## 2023-08-18 PROCEDURE — 6370000000 HC RX 637 (ALT 250 FOR IP): Performed by: NURSE ANESTHETIST, CERTIFIED REGISTERED

## 2023-08-18 PROCEDURE — 3600000002 HC SURGERY LEVEL 2 BASE: Performed by: SURGERY

## 2023-08-18 PROCEDURE — 7100000000 HC PACU RECOVERY - FIRST 15 MIN: Performed by: SURGERY

## 2023-08-18 PROCEDURE — 49521 REREPAIR ING HERNIA BLOCKED: CPT | Performed by: SURGERY

## 2023-08-18 PROCEDURE — 7100000010 HC PHASE II RECOVERY - FIRST 15 MIN: Performed by: SURGERY

## 2023-08-18 PROCEDURE — 2580000003 HC RX 258: Performed by: NURSE ANESTHETIST, CERTIFIED REGISTERED

## 2023-08-18 PROCEDURE — 2580000003 HC RX 258: Performed by: SURGERY

## 2023-08-18 PROCEDURE — 3700000000 HC ANESTHESIA ATTENDED CARE: Performed by: SURGERY

## 2023-08-18 PROCEDURE — A4217 STERILE WATER/SALINE, 500 ML: HCPCS | Performed by: SURGERY

## 2023-08-18 PROCEDURE — C1781 MESH (IMPLANTABLE): HCPCS | Performed by: SURGERY

## 2023-08-18 PROCEDURE — 2709999900 HC NON-CHARGEABLE SUPPLY: Performed by: SURGERY

## 2023-08-18 DEVICE — MESH HERN XL W1.6XL2IN INGUINAL WHT POLYPR MFIL PLUG PTCH: Type: IMPLANTABLE DEVICE | Site: GROIN | Status: FUNCTIONAL

## 2023-08-18 RX ORDER — SODIUM CHLORIDE, SODIUM LACTATE, POTASSIUM CHLORIDE, CALCIUM CHLORIDE 600; 310; 30; 20 MG/100ML; MG/100ML; MG/100ML; MG/100ML
INJECTION, SOLUTION INTRAVENOUS CONTINUOUS
Status: DISCONTINUED | OUTPATIENT
Start: 2023-08-18 | End: 2023-08-18 | Stop reason: HOSPADM

## 2023-08-18 RX ORDER — FAMOTIDINE 20 MG/1
20 TABLET, FILM COATED ORAL ONCE
Status: COMPLETED | OUTPATIENT
Start: 2023-08-18 | End: 2023-08-18

## 2023-08-18 RX ORDER — ONDANSETRON 2 MG/ML
4 INJECTION INTRAMUSCULAR; INTRAVENOUS
Status: COMPLETED | OUTPATIENT
Start: 2023-08-18 | End: 2023-08-18

## 2023-08-18 RX ORDER — CLINDAMYCIN PHOSPHATE 900 MG/50ML
900 INJECTION, SOLUTION INTRAVENOUS EVERY 8 HOURS
Status: DISCONTINUED | OUTPATIENT
Start: 2023-08-18 | End: 2023-08-18 | Stop reason: HOSPADM

## 2023-08-18 RX ORDER — ROCURONIUM BROMIDE 10 MG/ML
INJECTION, SOLUTION INTRAVENOUS PRN
Status: DISCONTINUED | OUTPATIENT
Start: 2023-08-18 | End: 2023-08-18 | Stop reason: SDUPTHER

## 2023-08-18 RX ORDER — OXYCODONE HYDROCHLORIDE AND ACETAMINOPHEN 5; 325 MG/1; MG/1
1 TABLET ORAL ONCE
Status: DISCONTINUED | OUTPATIENT
Start: 2023-08-18 | End: 2023-08-18 | Stop reason: HOSPADM

## 2023-08-18 RX ORDER — SODIUM CHLORIDE 9 MG/ML
INJECTION, SOLUTION INTRAVENOUS PRN
Status: DISCONTINUED | OUTPATIENT
Start: 2023-08-18 | End: 2023-08-18 | Stop reason: HOSPADM

## 2023-08-18 RX ORDER — EPHEDRINE SULFATE/0.9% NACL/PF 50 MG/5 ML
SYRINGE (ML) INTRAVENOUS PRN
Status: DISCONTINUED | OUTPATIENT
Start: 2023-08-18 | End: 2023-08-18 | Stop reason: SDUPTHER

## 2023-08-18 RX ORDER — OXYCODONE HYDROCHLORIDE AND ACETAMINOPHEN 5; 325 MG/1; MG/1
1 TABLET ORAL EVERY 6 HOURS PRN
Qty: 12 TABLET | Refills: 0 | Status: SHIPPED | OUTPATIENT
Start: 2023-08-18 | End: 2023-08-21

## 2023-08-18 RX ORDER — SODIUM CHLORIDE 0.9 % (FLUSH) 0.9 %
5-40 SYRINGE (ML) INJECTION EVERY 12 HOURS SCHEDULED
Status: DISCONTINUED | OUTPATIENT
Start: 2023-08-18 | End: 2023-08-18 | Stop reason: HOSPADM

## 2023-08-18 RX ORDER — MIDAZOLAM HYDROCHLORIDE 1 MG/ML
INJECTION INTRAMUSCULAR; INTRAVENOUS PRN
Status: DISCONTINUED | OUTPATIENT
Start: 2023-08-18 | End: 2023-08-18 | Stop reason: SDUPTHER

## 2023-08-18 RX ORDER — SODIUM CHLORIDE 0.9 % (FLUSH) 0.9 %
5-40 SYRINGE (ML) INJECTION PRN
Status: DISCONTINUED | OUTPATIENT
Start: 2023-08-18 | End: 2023-08-18 | Stop reason: HOSPADM

## 2023-08-18 RX ORDER — ONDANSETRON 2 MG/ML
INJECTION INTRAMUSCULAR; INTRAVENOUS PRN
Status: DISCONTINUED | OUTPATIENT
Start: 2023-08-18 | End: 2023-08-18 | Stop reason: SDUPTHER

## 2023-08-18 RX ORDER — SUCCINYLCHOLINE/SOD CL,ISO/PF 100 MG/5ML
SYRINGE (ML) INTRAVENOUS PRN
Status: DISCONTINUED | OUTPATIENT
Start: 2023-08-18 | End: 2023-08-18 | Stop reason: SDUPTHER

## 2023-08-18 RX ORDER — OXYCODONE HYDROCHLORIDE AND ACETAMINOPHEN 5; 325 MG/1; MG/1
1 TABLET ORAL ONCE
Status: COMPLETED | OUTPATIENT
Start: 2023-08-18 | End: 2023-08-18

## 2023-08-18 RX ORDER — PROPOFOL 10 MG/ML
INJECTION, EMULSION INTRAVENOUS PRN
Status: DISCONTINUED | OUTPATIENT
Start: 2023-08-18 | End: 2023-08-18 | Stop reason: SDUPTHER

## 2023-08-18 RX ORDER — FENTANYL CITRATE 50 UG/ML
INJECTION, SOLUTION INTRAMUSCULAR; INTRAVENOUS PRN
Status: DISCONTINUED | OUTPATIENT
Start: 2023-08-18 | End: 2023-08-18 | Stop reason: SDUPTHER

## 2023-08-18 RX ORDER — BENZTROPINE MESYLATE 1 MG/1
1 TABLET ORAL 2 TIMES DAILY
COMMUNITY

## 2023-08-18 RX ORDER — LIDOCAINE HYDROCHLORIDE 10 MG/ML
1 INJECTION, SOLUTION EPIDURAL; INFILTRATION; INTRACAUDAL; PERINEURAL
Status: DISCONTINUED | OUTPATIENT
Start: 2023-08-18 | End: 2023-08-18 | Stop reason: HOSPADM

## 2023-08-18 RX ADMIN — SUGAMMADEX 200 MG: 100 INJECTION, SOLUTION INTRAVENOUS at 11:17

## 2023-08-18 RX ADMIN — HYDROMORPHONE HYDROCHLORIDE 0.5 MG: 1 INJECTION, SOLUTION INTRAMUSCULAR; INTRAVENOUS; SUBCUTANEOUS at 13:08

## 2023-08-18 RX ADMIN — FENTANYL CITRATE 50 MCG: 50 INJECTION INTRAMUSCULAR; INTRAVENOUS at 10:46

## 2023-08-18 RX ADMIN — MIDAZOLAM 2 MG: 1 INJECTION, SOLUTION INTRAMUSCULAR; INTRAVENOUS at 10:37

## 2023-08-18 RX ADMIN — ONDANSETRON 4 MG: 2 INJECTION INTRAMUSCULAR; INTRAVENOUS at 13:11

## 2023-08-18 RX ADMIN — SODIUM CHLORIDE, POTASSIUM CHLORIDE, SODIUM LACTATE AND CALCIUM CHLORIDE: 600; 310; 30; 20 INJECTION, SOLUTION INTRAVENOUS at 09:00

## 2023-08-18 RX ADMIN — ROCURONIUM BROMIDE 20 MG: 10 INJECTION, SOLUTION INTRAVENOUS at 10:48

## 2023-08-18 RX ADMIN — ONDANSETRON 4 MG: 2 INJECTION INTRAMUSCULAR; INTRAVENOUS at 11:04

## 2023-08-18 RX ADMIN — FENTANYL CITRATE 50 MCG: 50 INJECTION INTRAMUSCULAR; INTRAVENOUS at 10:37

## 2023-08-18 RX ADMIN — Medication 100 MG: at 10:44

## 2023-08-18 RX ADMIN — OXYCODONE HYDROCHLORIDE AND ACETAMINOPHEN 1 TABLET: 5; 325 TABLET ORAL at 16:07

## 2023-08-18 RX ADMIN — PROPOFOL 150 MG: 10 INJECTION, EMULSION INTRAVENOUS at 10:44

## 2023-08-18 RX ADMIN — CLINDAMYCIN PHOSPHATE 900 MG: 150 INJECTION, SOLUTION INTRAMUSCULAR; INTRAVENOUS at 10:37

## 2023-08-18 RX ADMIN — FAMOTIDINE 20 MG: 20 TABLET ORAL at 09:08

## 2023-08-18 RX ADMIN — ROCURONIUM BROMIDE 5 MG: 10 INJECTION, SOLUTION INTRAVENOUS at 10:44

## 2023-08-18 RX ADMIN — Medication 10 MG: at 10:56

## 2023-08-18 ASSESSMENT — PAIN - FUNCTIONAL ASSESSMENT
PAIN_FUNCTIONAL_ASSESSMENT: ACTIVITIES ARE NOT PREVENTED

## 2023-08-18 ASSESSMENT — PAIN DESCRIPTION - LOCATION
LOCATION: ABDOMEN

## 2023-08-18 ASSESSMENT — PAIN SCALES - GENERAL
PAINLEVEL_OUTOF10: 8
PAINLEVEL_OUTOF10: 5
PAINLEVEL_OUTOF10: 0
PAINLEVEL_OUTOF10: 5
PAINLEVEL_OUTOF10: 4
PAINLEVEL_OUTOF10: 4

## 2023-08-18 ASSESSMENT — PAIN SCALES - WONG BAKER
WONGBAKER_NUMERICALRESPONSE: 0
WONGBAKER_NUMERICALRESPONSE: 0

## 2023-08-18 ASSESSMENT — PAIN DESCRIPTION - ORIENTATION: ORIENTATION: LEFT;LOWER

## 2023-08-18 ASSESSMENT — PAIN DESCRIPTION - DESCRIPTORS
DESCRIPTORS: SPASM;SHARP
DESCRIPTORS: SORE
DESCRIPTORS: ACHING;SORE

## 2023-08-18 ASSESSMENT — PAIN DESCRIPTION - PAIN TYPE: TYPE: SURGICAL PAIN

## 2023-08-18 NOTE — DISCHARGE INSTRUCTIONS
Discharge Instructions Following Surgery    Patient: Craven Severin MRN: [id number]  SSN: [id number]    YOB: 1950  Age: 72 y.o. Sex: male      Activity  As tolerated, walking encourage, stairs are okay. Avoid strenuous activities - no lifting anything heavier than 15 pounds till seen in the clinic. You may shower at home after 24 hours. For the first 24 hours: do not Drive, Drink alcoholic beverages, or make important decisions. Be aware of dizziness following anesthesia and while taking pain medication. Diet and Medications  Regular diet after nausea from the anesthetic has passed. Take pain medication as directed by your doctor. Call your doctor if pain is NOT relieved by medication. Wound and Dressing Care  There is glue on the wounds. No need for any dressing care. Apply ice packs to the area of the surgery for the first 1 to 2 days  Apply warm compresses after 2 days for pain relieve if needed    Call your doctor if  Excessive bleeding that does not stop after holding pressure over the area. Temperature of 101 degrees F or above. Redness,excessive swelling or bruising, and/or green or yellow, smelly discharge from incision. If nausea and vomiting continues. Follow-Up    Call the office of Dr. Cecilio Gann at [phone] to make your follow-up appointment. Dr. Jana Hull cell phone number is [phone]. Please call me if you have any concerns or questions.

## 2023-08-18 NOTE — PROGRESS NOTES
Update History & Physical    The patient's History and Physical was reviewed with the patient and I examined the patient. There was no change. The surgical site was confirmed by the patient and me. Plan: The risks, benefits, expected outcome, and alternative to the recommended procedure have been discussed with the patient. Patient understands and wants to proceed with the procedure.  Will proceed with open repair of incarcerated left inguinal hernia with placement of mesh     Electronically signed by Byron Garrido MD on 8/18/2023 at 10:18 AM

## 2023-08-18 NOTE — ANESTHESIA POSTPROCEDURE EVALUATION
Department of Anesthesiology  Postprocedure Note    Patient: Jace Nowak  MRN: [id number]  YOB: 1950  Date of evaluation: 8/18/2023      Procedure Summary     Date: 08/18/23 Room / Location: [facility] MAIN 07 / [facility] MAIN OR    Anesthesia Start: 1037 Anesthesia Stop: 6456    Procedure: OPEN REPAIR OF INCARCERATED LEFT INGUINAL HERNIA WITH PLACEMENT OF MESH (Left: Groin) Diagnosis:       Left inguinal hernia      (Left inguinal hernia [K40.90])    Surgeons: Myke Velasquez MD Responsible Provider: Vignesh Reynoso MD    Anesthesia Type: general ASA Status: 3          Anesthesia Type: No value filed.     Mikki Phase I: Mikki Score: 9    Mikki Phase II:        Anesthesia Post Evaluation    Patient location during evaluation: PACU  Patient participation: complete - patient participated  Level of consciousness: awake and alert  Airway patency: patent  Nausea & Vomiting: no nausea and no vomiting  Complications: no  Cardiovascular status: hemodynamically stable  Respiratory status: acceptable  Hydration status: euvolemic  Multimodal analgesia pain management approach  Pain management: adequate

## 2023-08-19 NOTE — OP NOTE
[facility]  OPERATIVE REPORT    Name:  Wesley Lynne  MR#:   [id number]  :  1950  ACCOUNT #:  [de-identified]  DATE OF SERVICE:  2023    PREOPERATIVE DIAGNOSIS:  Recurrent left inguinal hernia. POSTOPERATIVE DIAGNOSIS:  Left direct inguinal hernia, recurrent. PROCEDURE PERFORMED:  Open repair of incarcerated recurrent left inguinal hernia with plug and patch extra large mesh. SURGEON:  Charlotte Ortez. Neyda Chen MD.    ASSISTANTSundeep Garrido CSA. ANESTHESIA:  General.    COMPLICATIONS:  None. SPECIMENS REMOVED:  None. IMPLANTS:  Plug and patch extra large mesh. ESTIMATED BLOOD LOSS:  Minimal.    DETAILS OF PROCEDURE:  The patient was brought to the operating room. Anesthesia was induced. Scrubbing and draping of the abdomen were done in the usual manner. A time-out was performed. I was able to reduce the content of the hernia after the anesthesia was induced and gentle pressure was applied. At this point, we made an incision in the left groin area between the anterosuperior iliac spine and the Rashaun ligament. This was deepened through the skin and subcutaneous tissue. Mami fascia was opened. External oblique aponeurosis was identified. It was opened with a 15 blade. Metzenbaum was used to open the external oblique aponeurosis. There was no evidence of an indirect hernia sac. We identified the cord structure, but there was a direct hernia sac medial toward the [employer] ligament. This was identified and dissected gently and at this point, I decided to put an extra large plug and patch mesh.   We placed the plug inside the direct defect and we sutured it to the edge with interrupted 2-0 Prolene suture and then we placed the flat mesh on top of the plug around the spermatic cord and we fixed it to the conjoined tendon and Rashaun ligament on the medial and upper part and to the inguinal ligament on the lower part and at this point, we closed the external oblique

## 2023-08-28 ENCOUNTER — OFFICE VISIT (OUTPATIENT)
Age: 73
End: 2023-08-28

## 2023-08-28 VITALS
HEART RATE: 61 BPM | BODY MASS INDEX: 21.14 KG/M2 | SYSTOLIC BLOOD PRESSURE: 122 MMHG | DIASTOLIC BLOOD PRESSURE: 76 MMHG | WEIGHT: 170 LBS | OXYGEN SATURATION: 99 % | HEIGHT: 75 IN

## 2023-08-28 DIAGNOSIS — Z09 POSTOPERATIVE FOLLOW-UP: Primary | ICD-10-CM

## 2023-08-28 PROCEDURE — 99024 POSTOP FOLLOW-UP VISIT: CPT | Performed by: SURGERY

## 2023-08-28 RX ORDER — SULFAMETHOXAZOLE AND TRIMETHOPRIM 800; 160 MG/1; MG/1
1 TABLET ORAL 2 TIMES DAILY
Qty: 14 TABLET | Refills: 0 | Status: SHIPPED | OUTPATIENT
Start: 2023-08-28 | End: 2023-09-04

## 2023-08-28 NOTE — PROGRESS NOTES
Patient seen and examined. He is doing very well. He stated he has no more pain at all. Unfortunately he fell last Monday and he was in the bathroom for 2 hours and had developed a large hematoma and bruising on the left flank area. He called me yesterday stating that he has been having skin rash on the left flank area that extend to the groin. He did have any fever or chills. On exam his abdomen is soft and nontender and his wounds are healing well. There is no evidence of recurrence of the hernia. There is ecchymosis and bruising on the left flank area that extended from the left flank all the way to the left groin and scrotal area. I told him that this is secondary to the fall that he had after the surgery but giving the fact that there is still some erythema I will prescribe him some antibiotics since we put a new mesh. He will follow-up with me in 1 to 2 weeks.

## 2023-08-28 NOTE — PROGRESS NOTES
Craven Severin is a 72 y.o. male (: 1950) presenting to address:    Chief Complaint   Patient presents with    Post-Op Check     Open repair of incarcerated recurrent left hernia with plug and patch extra large mesh 23       Medication list and allergies have been reviewed with Craven Severin and updated as of today's date. I have gone over all Medical, Surgical and Social History with [place] Severin and updated/added the information accordingly. 1. Have you been to the ER, Urgent Care or Hospitalized since your last visit? No          2. Have you followed up with your PCP or any other Physicians since your procedure/ last office visit?    No

## 2023-09-05 ENCOUNTER — OFFICE VISIT (OUTPATIENT)
Age: 73
End: 2023-09-05

## 2023-09-05 VITALS
TEMPERATURE: 97 F | WEIGHT: 163 LBS | DIASTOLIC BLOOD PRESSURE: 69 MMHG | OXYGEN SATURATION: 96 % | SYSTOLIC BLOOD PRESSURE: 110 MMHG | HEIGHT: 75 IN | HEART RATE: 61 BPM | BODY MASS INDEX: 20.27 KG/M2

## 2023-09-05 DIAGNOSIS — Z09 POSTOPERATIVE FOLLOW-UP: Primary | ICD-10-CM

## 2023-09-05 NOTE — PROGRESS NOTES
Patient seen and examined. He is doing much better. He denies any groin pain. The only thing he has is bruising on the left flank and left thigh area from his fall. On exam his abdomen is soft and nontender and his left groin wound is healing well.   Follow-up with me as needed  Follow-up with Dr. Deondre Duncan

## 2023-09-05 NOTE — PROGRESS NOTES
Eileen Rosa is a 72 y.o. male (: 1950) presenting to address:    Chief Complaint   Patient presents with    Follow-up     Hematoma and bruising at left flank area. S/P Open repair of incarcertaed left inguinal hernia with plug and patch extra large mesh 23. Medication list and allergies have been reviewed with Eileen Rosa and updated as of today's date. I have gone over all Medical, Surgical and Social History with Eileen Rosa and updated/added the information accordingly. 1. Have you been to the ER, Urgent Care or Hospitalized since your last visit? Yes. [facility]           2. Have you followed up with your PCP or any other Physicians since your procedure/ last office visit?    No

## 2023-09-12 ENCOUNTER — OFFICE VISIT (OUTPATIENT)
Age: 73
End: 2023-09-12
Payer: COMMERCIAL

## 2023-09-12 VITALS
HEART RATE: 63 BPM | RESPIRATION RATE: 16 BRPM | HEIGHT: 75 IN | TEMPERATURE: 98.4 F | WEIGHT: 164 LBS | SYSTOLIC BLOOD PRESSURE: 112 MMHG | DIASTOLIC BLOOD PRESSURE: 72 MMHG | BODY MASS INDEX: 20.39 KG/M2 | OXYGEN SATURATION: 98 %

## 2023-09-12 DIAGNOSIS — R11.0 NAUSEA: ICD-10-CM

## 2023-09-12 DIAGNOSIS — R63.4 WEIGHT LOSS: ICD-10-CM

## 2023-09-12 DIAGNOSIS — Z91.81 AT HIGH RISK FOR FALLS: ICD-10-CM

## 2023-09-12 DIAGNOSIS — R63.0 ANOREXIA: ICD-10-CM

## 2023-09-12 DIAGNOSIS — F41.9 ANXIETY: Primary | ICD-10-CM

## 2023-09-12 DIAGNOSIS — F33.1 MODERATE EPISODE OF RECURRENT MAJOR DEPRESSIVE DISORDER (HCC): ICD-10-CM

## 2023-09-12 PROCEDURE — 99215 OFFICE O/P EST HI 40 MIN: CPT | Performed by: INTERNAL MEDICINE

## 2023-09-12 PROCEDURE — 1123F ACP DISCUSS/DSCN MKR DOCD: CPT | Performed by: INTERNAL MEDICINE

## 2023-09-12 PROCEDURE — 3074F SYST BP LT 130 MM HG: CPT | Performed by: INTERNAL MEDICINE

## 2023-09-12 PROCEDURE — 3078F DIAST BP <80 MM HG: CPT | Performed by: INTERNAL MEDICINE

## 2023-09-12 SDOH — ECONOMIC STABILITY: FOOD INSECURITY: WITHIN THE PAST 12 MONTHS, THE FOOD YOU BOUGHT JUST DIDN'T LAST AND YOU DIDN'T HAVE MONEY TO GET MORE.: NEVER TRUE

## 2023-09-12 SDOH — ECONOMIC STABILITY: INCOME INSECURITY: HOW HARD IS IT FOR YOU TO PAY FOR THE VERY BASICS LIKE FOOD, HOUSING, MEDICAL CARE, AND HEATING?: NOT HARD AT ALL

## 2023-09-12 SDOH — ECONOMIC STABILITY: FOOD INSECURITY: WITHIN THE PAST 12 MONTHS, YOU WORRIED THAT YOUR FOOD WOULD RUN OUT BEFORE YOU GOT MONEY TO BUY MORE.: NEVER TRUE

## 2023-09-12 ASSESSMENT — PATIENT HEALTH QUESTIONNAIRE - PHQ9
4. FEELING TIRED OR HAVING LITTLE ENERGY: 3
1. LITTLE INTEREST OR PLEASURE IN DOING THINGS: 3
3. TROUBLE FALLING OR STAYING ASLEEP: 3
5. POOR APPETITE OR OVEREATING: 3
SUM OF ALL RESPONSES TO PHQ9 QUESTIONS 1 & 2: 6
SUM OF ALL RESPONSES TO PHQ QUESTIONS 1-9: 17
9. THOUGHTS THAT YOU WOULD BE BETTER OFF DEAD, OR OF HURTING YOURSELF: 0
2. FEELING DOWN, DEPRESSED OR HOPELESS: 3
7. TROUBLE CONCENTRATING ON THINGS, SUCH AS READING THE NEWSPAPER OR WATCHING TELEVISION: 0
8. MOVING OR SPEAKING SO SLOWLY THAT OTHER PEOPLE COULD HAVE NOTICED. OR THE OPPOSITE, BEING SO FIGETY OR RESTLESS THAT YOU HAVE BEEN MOVING AROUND A LOT MORE THAN USUAL: 0
6. FEELING BAD ABOUT YOURSELF - OR THAT YOU ARE A FAILURE OR HAVE LET YOURSELF OR YOUR FAMILY DOWN: 2
10. IF YOU CHECKED OFF ANY PROBLEMS, HOW DIFFICULT HAVE THESE PROBLEMS MADE IT FOR YOU TO DO YOUR WORK, TAKE CARE OF THINGS AT HOME, OR GET ALONG WITH OTHER PEOPLE: 1
SUM OF ALL RESPONSES TO PHQ QUESTIONS 1-9: 17

## 2023-09-14 ENCOUNTER — TELEPHONE (OUTPATIENT)
Age: 73
End: 2023-09-14

## 2023-09-14 DIAGNOSIS — R11.0 NAUSEA: Primary | ICD-10-CM

## 2023-09-14 RX ORDER — ONDANSETRON 4 MG/1
4 TABLET, FILM COATED ORAL 3 TIMES DAILY PRN
Qty: 30 TABLET | Refills: 0 | Status: SHIPPED | OUTPATIENT
Start: 2023-09-14

## 2023-09-14 NOTE — TELEPHONE ENCOUNTER
Advised patient RX was sent. Patient also stated there was another medication that was going to be sent in for his appetite? Please advise.

## 2023-09-14 NOTE — TELEPHONE ENCOUNTER
Patient came by and stated Dr. Rafia Lo was going to send in     [facility] 4 mg    He couldn't remember the name of the other medication but states it was something to help with his appetite. [facility] [address] [place] [address] [phone]    Please advise.

## 2023-09-15 ENCOUNTER — HOSPITAL ENCOUNTER (OUTPATIENT)
Facility: HOSPITAL | Age: 73
Setting detail: SPECIMEN
End: 2023-09-15
Payer: COMMERCIAL

## 2023-09-15 DIAGNOSIS — F33.1 MODERATE EPISODE OF RECURRENT MAJOR DEPRESSIVE DISORDER (HCC): ICD-10-CM

## 2023-09-15 DIAGNOSIS — R63.4 WEIGHT LOSS: ICD-10-CM

## 2023-09-15 DIAGNOSIS — R11.0 NAUSEA: ICD-10-CM

## 2023-09-15 DIAGNOSIS — F41.9 ANXIETY: ICD-10-CM

## 2023-09-15 LAB
ALBUMIN SERPL-MCNC: 3.6 G/DL (ref 3.4–5)
ALBUMIN/GLOB SERPL: 1.4 (ref 0.8–1.7)
ALP SERPL-CCNC: 86 U/L (ref 45–117)
ALT SERPL-CCNC: 21 U/L (ref 16–61)
ANION GAP SERPL CALC-SCNC: 3 MMOL/L (ref 3–18)
AST SERPL-CCNC: 20 U/L (ref 10–38)
BASOPHILS # BLD: 0 K/UL (ref 0–0.1)
BASOPHILS NFR BLD: 1 % (ref 0–2)
BILIRUB SERPL-MCNC: 0.7 MG/DL (ref 0.2–1)
BUN SERPL-MCNC: 14 MG/DL (ref 7–18)
BUN/CREAT SERPL: 15 (ref 12–20)
CALCIUM SERPL-MCNC: 9.3 MG/DL (ref 8.5–10.1)
CHLORIDE SERPL-SCNC: 106 MMOL/L (ref 100–111)
CHOLEST SERPL-MCNC: 126 MG/DL
CO2 SERPL-SCNC: 32 MMOL/L (ref 21–32)
CREAT SERPL-MCNC: 0.94 MG/DL (ref 0.6–1.3)
DIFFERENTIAL METHOD BLD: ABNORMAL
EOSINOPHIL # BLD: 0.2 K/UL (ref 0–0.4)
EOSINOPHIL NFR BLD: 4 % (ref 0–5)
ERYTHROCYTE [DISTWIDTH] IN BLOOD BY AUTOMATED COUNT: 13.1 % (ref 11.6–14.5)
GLOBULIN SER CALC-MCNC: 2.6 G/DL (ref 2–4)
GLUCOSE SERPL-MCNC: 89 MG/DL (ref 74–99)
HCT VFR BLD AUTO: 42.2 % (ref 36–48)
HDLC SERPL-MCNC: 57 MG/DL (ref 40–60)
HDLC SERPL: 2.2 (ref 0–5)
HGB BLD-MCNC: 13.9 G/DL (ref 13–16)
IMM GRANULOCYTES # BLD AUTO: 0 K/UL (ref 0–0.04)
IMM GRANULOCYTES NFR BLD AUTO: 0 % (ref 0–0.5)
LDLC SERPL CALC-MCNC: 57.8 MG/DL (ref 0–100)
LIPASE SERPL-CCNC: 200 U/L (ref 73–393)
LIPID PANEL: NORMAL
LYMPHOCYTES # BLD: 1.4 K/UL (ref 0.9–3.6)
LYMPHOCYTES NFR BLD: 30 % (ref 21–52)
MCH RBC QN AUTO: 31.7 PG (ref 24–34)
MCHC RBC AUTO-ENTMCNC: 32.9 G/DL (ref 31–37)
MCV RBC AUTO: 96.1 FL (ref 78–100)
MONOCYTES # BLD: 0.5 K/UL (ref 0.05–1.2)
MONOCYTES NFR BLD: 10 % (ref 3–10)
NEUTS SEG # BLD: 2.5 K/UL (ref 1.8–8)
NEUTS SEG NFR BLD: 55 % (ref 40–73)
NRBC # BLD: 0 K/UL (ref 0–0.01)
NRBC BLD-RTO: 0 PER 100 WBC
PLATELET # BLD AUTO: 196 K/UL (ref 135–420)
PMV BLD AUTO: 9.8 FL (ref 9.2–11.8)
POTASSIUM SERPL-SCNC: 4.4 MMOL/L (ref 3.5–5.5)
PROT SERPL-MCNC: 6.2 G/DL (ref 6.4–8.2)
RBC # BLD AUTO: 4.39 M/UL (ref 4.35–5.65)
SODIUM SERPL-SCNC: 141 MMOL/L (ref 136–145)
T4 FREE SERPL-MCNC: 0.9 NG/DL (ref 0.7–1.5)
TRIGL SERPL-MCNC: 56 MG/DL
TSH SERPL DL<=0.05 MIU/L-ACNC: 1.17 UIU/ML (ref 0.36–3.74)
VIT B12 SERPL-MCNC: 287 PG/ML (ref 211–911)
VLDLC SERPL CALC-MCNC: 11.2 MG/DL
WBC # BLD AUTO: 4.5 K/UL (ref 4.6–13.2)

## 2023-09-15 PROCEDURE — 80053 COMPREHEN METABOLIC PANEL: CPT

## 2023-09-15 PROCEDURE — 36415 COLL VENOUS BLD VENIPUNCTURE: CPT

## 2023-09-15 PROCEDURE — 84443 ASSAY THYROID STIM HORMONE: CPT

## 2023-09-15 PROCEDURE — 80061 LIPID PANEL: CPT

## 2023-09-15 PROCEDURE — 83690 ASSAY OF LIPASE: CPT

## 2023-09-15 PROCEDURE — 84439 ASSAY OF FREE THYROXINE: CPT

## 2023-09-15 PROCEDURE — 85025 COMPLETE CBC W/AUTO DIFF WBC: CPT

## 2023-09-15 PROCEDURE — 82607 VITAMIN B-12: CPT

## 2023-09-15 RX ORDER — ONDANSETRON 4 MG/1
4 TABLET, FILM COATED ORAL 3 TIMES DAILY PRN
Qty: 30 TABLET | Refills: 0
Start: 2023-09-15

## 2023-09-15 RX ORDER — MEGESTROL ACETATE 40 MG/ML
200 SUSPENSION ORAL DAILY
Qty: 240 ML | Refills: 3 | Status: SHIPPED | OUTPATIENT
Start: 2023-09-15

## 2023-11-06 NOTE — TELEPHONE ENCOUNTER
Last OV: 9/12/2023  Next OV: 3/5/2024  Last refill: 8/11/2022 Principal Discharge DX:	Acute on chronic congestive heart failure, unspecified heart failure type

## 2023-11-07 RX ORDER — TRIAMTERENE AND HYDROCHLOROTHIAZIDE 37.5; 25 MG/1; MG/1
1 CAPSULE ORAL DAILY
Qty: 90 CAPSULE | Refills: 3 | Status: SHIPPED | OUTPATIENT
Start: 2023-11-07

## 2024-01-04 ENCOUNTER — TELEPHONE (OUTPATIENT)
Age: 74
End: 2024-01-04

## 2024-01-04 NOTE — TELEPHONE ENCOUNTER
.Cardiac clearance faxed to [facility] per Dr. Brian Ramsey and confirmation was received.

## 2024-01-05 ENCOUNTER — TELEPHONE (OUTPATIENT)
Age: 74
End: 2024-01-05

## 2024-01-05 ENCOUNTER — OFFICE VISIT (OUTPATIENT)
Age: 74
End: 2024-01-05
Payer: COMMERCIAL

## 2024-01-05 DIAGNOSIS — R60.9 DEPENDENT EDEMA: ICD-10-CM

## 2024-01-05 DIAGNOSIS — K40.91 UNILATERAL RECURRENT INGUINAL HERNIA WITHOUT OBSTRUCTION OR GANGRENE: Primary | ICD-10-CM

## 2024-01-05 DIAGNOSIS — I10 ESSENTIAL (PRIMARY) HYPERTENSION: ICD-10-CM

## 2024-01-05 PROCEDURE — 99214 OFFICE O/P EST MOD 30 MIN: CPT | Performed by: INTERNAL MEDICINE

## 2024-01-05 PROCEDURE — 3078F DIAST BP <80 MM HG: CPT | Performed by: INTERNAL MEDICINE

## 2024-01-05 PROCEDURE — 1123F ACP DISCUSS/DSCN MKR DOCD: CPT | Performed by: INTERNAL MEDICINE

## 2024-01-05 PROCEDURE — 3074F SYST BP LT 130 MM HG: CPT | Performed by: INTERNAL MEDICINE

## 2024-01-05 RX ORDER — VENLAFAXINE HYDROCHLORIDE 150 MG/1
150 CAPSULE, EXTENDED RELEASE ORAL DAILY
COMMUNITY
Start: 2023-12-21

## 2024-01-05 ASSESSMENT — PATIENT HEALTH QUESTIONNAIRE - PHQ9
7. TROUBLE CONCENTRATING ON THINGS, SUCH AS READING THE NEWSPAPER OR WATCHING TELEVISION: 0
6. FEELING BAD ABOUT YOURSELF - OR THAT YOU ARE A FAILURE OR HAVE LET YOURSELF OR YOUR FAMILY DOWN: 2
3. TROUBLE FALLING OR STAYING ASLEEP: 0
10. IF YOU CHECKED OFF ANY PROBLEMS, HOW DIFFICULT HAVE THESE PROBLEMS MADE IT FOR YOU TO DO YOUR WORK, TAKE CARE OF THINGS AT HOME, OR GET ALONG WITH OTHER PEOPLE: 1
SUM OF ALL RESPONSES TO PHQ QUESTIONS 1-9: 5
1. LITTLE INTEREST OR PLEASURE IN DOING THINGS: 1
SUM OF ALL RESPONSES TO PHQ9 QUESTIONS 1 & 2: 2
SUM OF ALL RESPONSES TO PHQ QUESTIONS 1-9: 5
5. POOR APPETITE OR OVEREATING: 0
SUM OF ALL RESPONSES TO PHQ QUESTIONS 1-9: 5
9. THOUGHTS THAT YOU WOULD BE BETTER OFF DEAD, OR OF HURTING YOURSELF: 0
4. FEELING TIRED OR HAVING LITTLE ENERGY: 1
2. FEELING DOWN, DEPRESSED OR HOPELESS: 1
SUM OF ALL RESPONSES TO PHQ QUESTIONS 1-9: 5
8. MOVING OR SPEAKING SO SLOWLY THAT OTHER PEOPLE COULD HAVE NOTICED. OR THE OPPOSITE, BEING SO FIGETY OR RESTLESS THAT YOU HAVE BEEN MOVING AROUND A LOT MORE THAN USUAL: 0

## 2024-01-05 NOTE — PROGRESS NOTES
José Luis Awad presents today for   Chief Complaint   Patient presents with    Edema     Ankle    Hernia     Requesting check for hernia     Patient accompanied by wife.    1. \"Have you been to the ER, urgent care clinic since your last visit?  Hospitalized since your last visit?\" No    2. \"Have you seen or consulted any other health care providers outside of the [facility] since your last visit?\" Yes      3. For patients aged 45-75: Has the patient had a colonoscopy / FIT/ Cologuard? Yes - no Care Gap present      If the patient is female:    4. For patients aged 40-74: Has the patient had a mammogram within the past 2 years? NA - based on age or sex      5. For patients aged 21-65: Has the patient had a pap smear? NA - based on age or sex

## 2024-01-05 NOTE — TELEPHONE ENCOUNTER
Patient called back and verified that the surgeon he wanted to see is still in practice.  It is Dr. Knowles.

## 2024-01-06 VITALS
TEMPERATURE: 97.7 F | WEIGHT: 178 LBS | BODY MASS INDEX: 22.13 KG/M2 | SYSTOLIC BLOOD PRESSURE: 104 MMHG | HEART RATE: 62 BPM | HEIGHT: 75 IN | OXYGEN SATURATION: 99 % | DIASTOLIC BLOOD PRESSURE: 70 MMHG | RESPIRATION RATE: 16 BRPM

## 2024-01-06 NOTE — PROGRESS NOTES
73 y.o. male who presents for evaluation.  Wife was present for the evaluation    He has undergone LIH repair (1/23) and then redo repair w mesh (8/23) by Dr aMdrid.  He feels that the area is still bothering him and so is asking for referral to diff surgeon for another opinion, preferably in the [facility] area.  No swelling, bruising, urinary complaints    He reports that his psych regimen was changed around and is now receiving spravato through Dr Bruce which seems to be doing well    He has noted intermittent mostly left ankle dependent edema.  He does have varicose veins, no pnd or orthopnea, cp or sob.  Tends to be worse later in the afternoon.  He has not been following his bp outside, was dx'ed with htn by cardiology on ambulatory monitoring some years ago.  On low dose bb and dyazide    Past Medical History:   Diagnosis Date    Allergic rhinitis     Dr Pringle; never took immunotherapy    Anxiety     saw Dr Briones 2018    Asthma     Dr Lerma; pfts show no obstruction but high RV; Dr RICHARD Grover; FEV1 96, 5% imp postbd, ratio 104, , , DLCO 82 (7/18)    BCC (basal cell carcinoma of skin) 2013    Dr. Diaz s/p resection 2 spots    BPH (benign prostatic hyperplasia)     Dr. 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 lexapro and wellbutrin til 2018; tried prozac; saw Dr Barragan then Dr Reza; TMS worked about 6 mos; now Dr Bruce; Spravato (2023)    Diverticulosis 09/21/2022    Dr Mason    Dyslipidemia     ED (erectile dysfunction)     ICI with penile trauma; TRISTAN not helpful; peak performance unsuccessful    FHx: heart disease     GERD (gastroesophageal reflux disease)     s/p dilation 2003

## 2024-01-12 RX ORDER — FLUTICASONE PROPIONATE 50 MCG
2 SPRAY, SUSPENSION (ML) NASAL DAILY
Qty: 16 G | Refills: 11 | Status: SHIPPED | OUTPATIENT
Start: 2024-01-12

## 2024-02-01 ENCOUNTER — OFFICE VISIT (OUTPATIENT)
Age: 74
End: 2024-02-01
Payer: COMMERCIAL

## 2024-02-01 VITALS
DIASTOLIC BLOOD PRESSURE: 73 MMHG | OXYGEN SATURATION: 96 % | HEART RATE: 61 BPM | BODY MASS INDEX: 21.56 KG/M2 | WEIGHT: 173.4 LBS | TEMPERATURE: 99.1 F | HEIGHT: 75 IN | SYSTOLIC BLOOD PRESSURE: 108 MMHG

## 2024-02-01 DIAGNOSIS — M47.816 SPONDYLOSIS WITHOUT MYELOPATHY OR RADICULOPATHY, LUMBAR REGION: ICD-10-CM

## 2024-02-01 DIAGNOSIS — M48.062 SPINAL STENOSIS, LUMBAR REGION WITH NEUROGENIC CLAUDICATION: Primary | ICD-10-CM

## 2024-02-01 DIAGNOSIS — M25.552 LEFT HIP PAIN: ICD-10-CM

## 2024-02-01 DIAGNOSIS — M54.16 RADICULOPATHY, LUMBAR REGION: ICD-10-CM

## 2024-02-01 PROCEDURE — 1123F ACP DISCUSS/DSCN MKR DOCD: CPT | Performed by: NURSE PRACTITIONER

## 2024-02-01 PROCEDURE — 3074F SYST BP LT 130 MM HG: CPT | Performed by: NURSE PRACTITIONER

## 2024-02-01 PROCEDURE — 3078F DIAST BP <80 MM HG: CPT | Performed by: NURSE PRACTITIONER

## 2024-02-01 PROCEDURE — 99214 OFFICE O/P EST MOD 30 MIN: CPT | Performed by: NURSE PRACTITIONER

## 2024-02-01 RX ORDER — PREGABALIN 75 MG/1
75 CAPSULE ORAL 2 TIMES DAILY
Qty: 60 CAPSULE | Refills: 0 | Status: SHIPPED | OUTPATIENT
Start: 2024-02-01 | End: 2024-03-02

## 2024-02-01 RX ORDER — VALBENAZINE 60 MG/1
60 CAPSULE ORAL 2 TIMES DAILY
COMMUNITY
Start: 2024-01-30

## 2024-02-01 NOTE — PROGRESS NOTES
No Known Problems Paternal Uncle     No Known Problems Maternal Grandmother     No Known Problems Maternal Grandfather     No Known Problems Paternal Grandmother     No Known Problems Paternal Grandfather     Psychiatric Disorder Daughter         depression    No Known Problems Other        Physical Exam:  /73 (Site: Right Upper Arm, Position: Sitting, Cuff Size: Small Adult)   Pulse 61   Temp 99.1 °F (37.3 °C) (Tympanic)   Ht 1.892 m (6' 2.5\")   Wt 78.7 kg (173 lb 6.4 oz)   SpO2 96%   BMI 21.97 kg/m²   Pain Scale: 4/10      We have informed José Luis KULKARNI Ritesh to notify us for immediate appointment if he has any worsening neurogical symptoms or if an emergency situation presents, then call 911    Please note that this dictation was completed with [employer], the [employer] voice recognition software.  Quite often unanticipated grammatical, syntax, homophones, and other interpretive errors are inadvertently transcribed by the computer software.  Please disregard these errors.  Please excuse any errors that have escaped final proofreading.     JOSE M León - NP

## 2024-02-06 ENCOUNTER — TELEPHONE (OUTPATIENT)
Age: 74
End: 2024-02-06

## 2024-02-06 NOTE — TELEPHONE ENCOUNTER
----- Message from Connie Farah MA sent at 2/6/2024  9:23 AM EST -----  Subject: Message to Provider    QUESTIONS  Information for Provider? jessica calling in to get pt's 1/29/24 appt   notes faxed over to her, fax # [phone]. please advise and call if any   questions.  ---------------------------------------------------------------------------  --------------  CALL BACK INFO  [phone]; Do not leave any message, patient will call back for answer  ---------------------------------------------------------------------------  --------------  SCRIPT ANSWERS  Relationship to Patient? Covered Entity  Covered Entity Type? Home Health Care?  Representative Name? jessica

## 2024-02-09 ENCOUNTER — TELEPHONE (OUTPATIENT)
Age: 74
End: 2024-02-09

## 2024-02-09 NOTE — TELEPHONE ENCOUNTER
I dictated letter to [facility] saying he is cleared  Pls fax to [facility]

## 2024-02-09 NOTE — TELEPHONE ENCOUNTER
Ofe from [facility] called to get surgical clearance for cataract surgery on Monday.  Patient states he was cleared on 1/5/24 but do not see that stated in note.  Please addend note to say patient is or is not cleared.  Ofe asks that clearance be faxed to her today at [phone].  If there is an issue in getting it she ask that we call her at [phone] because surgery will need to be cancelled.

## 2024-02-19 ENCOUNTER — TELEPHONE (OUTPATIENT)
Age: 74
End: 2024-02-19

## 2024-02-19 NOTE — TELEPHONE ENCOUNTER
Patient called stating he had the xrays ordered done about two weeks ago at [facility]. He would like them reviewed if possible and a call back to discuss any results.    Patient can be reached at Tel. [phone]

## 2024-02-20 ENCOUNTER — TELEPHONE (OUTPATIENT)
Age: 74
End: 2024-02-20

## 2024-02-20 NOTE — TELEPHONE ENCOUNTER
.Cardiac clearance faxed  to [facility] per Dr. Brian Ramsey and confirmation was received.

## 2024-02-20 NOTE — TELEPHONE ENCOUNTER
The pt was contacted regarding his message. The pt was identified using 2 pt identifiers. He was notified of Evangelina Al's review of his recent hip x-ray. Mr. Awad verbalized understanding and has no questions or concerns at this time.

## 2024-02-25 NOTE — PROGRESS NOTES
74 y.o. male who presents for evaluation RPE.  Wife was present for the evaluation as per usual    He saw Dr Paredes for the left groin pain who felt there was nothing surgical and maybe just residual inflammation after the redo by Dr Madrid, sx much better.      Sees COURTNEY/spine and hip xray neg.  They are weaning down nico and transitioning him to lyrica    [place] the [place]3x[place] doing the treadmill about 20 min and then the cybex machines and no cp, sob, pnd, edema, palpitations.  Sees Dr Ramsey  yearly     He continues to see Dr Bruce/psych and getting spravato every week currently    He is followed by Dr Anthony/urol     The neuropathy is about the same    LAST MEDICARE WELLNESS EXAM: never as not medicare b    Past Medical History:   Diagnosis Date    Allergic rhinitis     Dr Pringle; never took immunotherapy    Anxiety     saw Dr Briones 2018    Asthma     Dr Lerma; pfts show no obstruction but high RV; Dr RICHARD Grover; FEV1 96, 5% imp postbd, ratio 104, , , DLCO 82 (7/18)    BCC (basal cell carcinoma of skin) 2013    Dr. Diaz s/p resection 2 spots    BPH (benign prostatic hyperplasia)     Dr. 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 lexapro and wellbutrin til 2018; tried prozac; saw Dr Barragan then Dr Reza; TMS worked about 6 mos; now Dr Bruce; Spravato (2023)    Diverticulosis 09/21/2022    Dr Mason    Dyslipidemia     ED (erectile dysfunction)     ICI with penile trauma; TRISTAN not helpful; peak performance unsuccessful    FHx: heart disease     GERD (gastroesophageal reflux disease)     s/p dilation 2003 Dr. Almanzar; egd 2015; Dr Almanzar egd 12/20 dilation, hiatal hernia    H/O

## 2024-02-27 ENCOUNTER — HOSPITAL ENCOUNTER (OUTPATIENT)
Facility: HOSPITAL | Age: 74
Setting detail: SPECIMEN
Discharge: HOME | End: 2024-03-01
Payer: COMMERCIAL

## 2024-02-27 DIAGNOSIS — E78.5 HYPERLIPIDEMIA, UNSPECIFIED HYPERLIPIDEMIA TYPE: ICD-10-CM

## 2024-02-27 DIAGNOSIS — I10 ESSENTIAL HYPERTENSION: ICD-10-CM

## 2024-02-27 LAB
ALBUMIN SERPL-MCNC: 3.8 G/DL (ref 3.4–5)
ALBUMIN/GLOB SERPL: 1.7 (ref 0.8–1.7)
ALP SERPL-CCNC: 76 U/L (ref 45–117)
ALT SERPL-CCNC: 61 U/L (ref 16–61)
ANION GAP SERPL CALC-SCNC: 3 MMOL/L (ref 3–18)
AST SERPL-CCNC: 31 U/L (ref 10–38)
BASOPHILS # BLD: 0 K/UL (ref 0–0.1)
BASOPHILS NFR BLD: 1 % (ref 0–2)
BILIRUB SERPL-MCNC: 0.5 MG/DL (ref 0.2–1)
BUN SERPL-MCNC: 17 MG/DL (ref 7–18)
BUN/CREAT SERPL: 16 (ref 12–20)
CALCIUM SERPL-MCNC: 9.9 MG/DL (ref 8.5–10.1)
CHLORIDE SERPL-SCNC: 109 MMOL/L (ref 100–111)
CHOLEST SERPL-MCNC: 107 MG/DL
CO2 SERPL-SCNC: 29 MMOL/L (ref 21–32)
CREAT SERPL-MCNC: 1.05 MG/DL (ref 0.6–1.3)
DIFFERENTIAL METHOD BLD: ABNORMAL
EOSINOPHIL # BLD: 0.2 K/UL (ref 0–0.4)
EOSINOPHIL NFR BLD: 5 % (ref 0–5)
ERYTHROCYTE [DISTWIDTH] IN BLOOD BY AUTOMATED COUNT: 12.5 % (ref 11.6–14.5)
GLOBULIN SER CALC-MCNC: 2.3 G/DL (ref 2–4)
GLUCOSE SERPL-MCNC: 82 MG/DL (ref 74–99)
HCT VFR BLD AUTO: 42.2 % (ref 36–48)
HDLC SERPL-MCNC: 48 MG/DL (ref 40–60)
HDLC SERPL: 2.2 (ref 0–5)
HGB BLD-MCNC: 13.9 G/DL (ref 13–16)
IMM GRANULOCYTES # BLD AUTO: 0 K/UL (ref 0–0.04)
IMM GRANULOCYTES NFR BLD AUTO: 0 % (ref 0–0.5)
LDLC SERPL CALC-MCNC: 49.6 MG/DL (ref 0–100)
LIPID PANEL: NORMAL
LYMPHOCYTES # BLD: 2.3 K/UL (ref 0.9–3.6)
LYMPHOCYTES NFR BLD: 43 % (ref 21–52)
MCH RBC QN AUTO: 32 PG (ref 24–34)
MCHC RBC AUTO-ENTMCNC: 32.9 G/DL (ref 31–37)
MCV RBC AUTO: 97.2 FL (ref 78–100)
MONOCYTES # BLD: 0.6 K/UL (ref 0.05–1.2)
MONOCYTES NFR BLD: 12 % (ref 3–10)
NEUTS SEG # BLD: 2.1 K/UL (ref 1.8–8)
NEUTS SEG NFR BLD: 39 % (ref 40–73)
NRBC # BLD: 0 K/UL (ref 0–0.01)
NRBC BLD-RTO: 0 PER 100 WBC
PLATELET # BLD AUTO: 205 K/UL (ref 135–420)
PMV BLD AUTO: 9.9 FL (ref 9.2–11.8)
POTASSIUM SERPL-SCNC: 4.5 MMOL/L (ref 3.5–5.5)
PROT SERPL-MCNC: 6.1 G/DL (ref 6.4–8.2)
RBC # BLD AUTO: 4.34 M/UL (ref 4.35–5.65)
SODIUM SERPL-SCNC: 141 MMOL/L (ref 136–145)
TRIGL SERPL-MCNC: 47 MG/DL
VLDLC SERPL CALC-MCNC: 9.4 MG/DL
WBC # BLD AUTO: 5.3 K/UL (ref 4.6–13.2)

## 2024-02-27 PROCEDURE — 80061 LIPID PANEL: CPT

## 2024-02-27 PROCEDURE — 80053 COMPREHEN METABOLIC PANEL: CPT

## 2024-02-27 PROCEDURE — 85025 COMPLETE CBC W/AUTO DIFF WBC: CPT

## 2024-02-27 PROCEDURE — 36415 COLL VENOUS BLD VENIPUNCTURE: CPT

## 2024-03-05 ENCOUNTER — OFFICE VISIT (OUTPATIENT)
Age: 74
End: 2024-03-05
Payer: COMMERCIAL

## 2024-03-05 VITALS
WEIGHT: 172 LBS | BODY MASS INDEX: 22.07 KG/M2 | HEART RATE: 83 BPM | HEIGHT: 74 IN | OXYGEN SATURATION: 98 % | SYSTOLIC BLOOD PRESSURE: 111 MMHG | DIASTOLIC BLOOD PRESSURE: 74 MMHG | TEMPERATURE: 98.2 F

## 2024-03-05 DIAGNOSIS — I10 ESSENTIAL (PRIMARY) HYPERTENSION: ICD-10-CM

## 2024-03-05 DIAGNOSIS — D72.829 LEUKOCYTOSIS, UNSPECIFIED TYPE: ICD-10-CM

## 2024-03-05 DIAGNOSIS — Z00.00 PHYSICAL EXAM: Primary | ICD-10-CM

## 2024-03-05 DIAGNOSIS — C61 PROSTATE CANCER (HCC): ICD-10-CM

## 2024-03-05 DIAGNOSIS — J45.20 MILD INTERMITTENT ASTHMA WITHOUT COMPLICATION: ICD-10-CM

## 2024-03-05 DIAGNOSIS — F41.9 ANXIETY: ICD-10-CM

## 2024-03-05 DIAGNOSIS — G47.33 OSA (OBSTRUCTIVE SLEEP APNEA): ICD-10-CM

## 2024-03-05 DIAGNOSIS — F33.0 MDD (MAJOR DEPRESSIVE DISORDER), RECURRENT EPISODE, MILD (HCC): ICD-10-CM

## 2024-03-05 DIAGNOSIS — E78.5 HYPERLIPIDEMIA, UNSPECIFIED HYPERLIPIDEMIA TYPE: ICD-10-CM

## 2024-03-05 DIAGNOSIS — K21.9 GASTROESOPHAGEAL REFLUX DISEASE WITHOUT ESOPHAGITIS: ICD-10-CM

## 2024-03-05 PROBLEM — Z85.46 HISTORY OF PROSTATE CANCER: Status: RESOLVED | Noted: 2021-06-09 | Resolved: 2024-03-05

## 2024-03-05 PROCEDURE — 99397 PER PM REEVAL EST PAT 65+ YR: CPT | Performed by: INTERNAL MEDICINE

## 2024-03-05 PROCEDURE — 3074F SYST BP LT 130 MM HG: CPT | Performed by: INTERNAL MEDICINE

## 2024-03-05 PROCEDURE — 3078F DIAST BP <80 MM HG: CPT | Performed by: INTERNAL MEDICINE

## 2024-03-05 ASSESSMENT — PATIENT HEALTH QUESTIONNAIRE - PHQ9
10. IF YOU CHECKED OFF ANY PROBLEMS, HOW DIFFICULT HAVE THESE PROBLEMS MADE IT FOR YOU TO DO YOUR WORK, TAKE CARE OF THINGS AT HOME, OR GET ALONG WITH OTHER PEOPLE: 0
2. FEELING DOWN, DEPRESSED OR HOPELESS: 3
SUM OF ALL RESPONSES TO PHQ QUESTIONS 1-9: 3
1. LITTLE INTEREST OR PLEASURE IN DOING THINGS: 0
4. FEELING TIRED OR HAVING LITTLE ENERGY: 0
SUM OF ALL RESPONSES TO PHQ QUESTIONS 1-9: 3
5. POOR APPETITE OR OVEREATING: 0
7. TROUBLE CONCENTRATING ON THINGS, SUCH AS READING THE NEWSPAPER OR WATCHING TELEVISION: 0
SUM OF ALL RESPONSES TO PHQ QUESTIONS 1-9: 3
8. MOVING OR SPEAKING SO SLOWLY THAT OTHER PEOPLE COULD HAVE NOTICED. OR THE OPPOSITE, BEING SO FIGETY OR RESTLESS THAT YOU HAVE BEEN MOVING AROUND A LOT MORE THAN USUAL: 0
SUM OF ALL RESPONSES TO PHQ9 QUESTIONS 1 & 2: 3
3. TROUBLE FALLING OR STAYING ASLEEP: 0
SUM OF ALL RESPONSES TO PHQ QUESTIONS 1-9: 3
9. THOUGHTS THAT YOU WOULD BE BETTER OFF DEAD, OR OF HURTING YOURSELF: 0
6. FEELING BAD ABOUT YOURSELF - OR THAT YOU ARE A FAILURE OR HAVE LET YOURSELF OR YOUR FAMILY DOWN: 0

## 2024-03-05 ASSESSMENT — LIFESTYLE VARIABLES
HOW OFTEN DO YOU HAVE A DRINK CONTAINING ALCOHOL: MONTHLY OR LESS
HOW MANY STANDARD DRINKS CONTAINING ALCOHOL DO YOU HAVE ON A TYPICAL DAY: 1 OR 2

## 2024-03-05 NOTE — PROGRESS NOTES
José Luis Awad presents today for   Chief Complaint   Patient presents with    Medicare AWV                 1. \"Have you been to the ER, urgent care clinic since your last visit?  Hospitalized since your last visit?\" no    2. \"Have you seen or consulted any other health care providers outside of the [facility] since your last visit?\" no     3. For patients aged 45-75: Has the patient had a colonoscopy / FIT/ Cologuard? Yes - no Care Gap present      If the patient is female:    4. For patients aged 40-74: Has the patient had a mammogram within the past 2 years? NA - based on age or sex      5. For patients aged 21-65: Has the patient had a pap smear? NA - based on age or sex

## 2024-03-28 ENCOUNTER — OFFICE VISIT (OUTPATIENT)
Age: 74
End: 2024-03-28
Payer: COMMERCIAL

## 2024-03-28 VITALS
OXYGEN SATURATION: 98 % | WEIGHT: 167 LBS | TEMPERATURE: 98.4 F | BODY MASS INDEX: 21.43 KG/M2 | SYSTOLIC BLOOD PRESSURE: 106 MMHG | DIASTOLIC BLOOD PRESSURE: 67 MMHG | HEIGHT: 74 IN | HEART RATE: 86 BPM

## 2024-03-28 DIAGNOSIS — M54.16 RADICULOPATHY, LUMBAR REGION: ICD-10-CM

## 2024-03-28 DIAGNOSIS — M47.816 SPONDYLOSIS WITHOUT MYELOPATHY OR RADICULOPATHY, LUMBAR REGION: ICD-10-CM

## 2024-03-28 DIAGNOSIS — M48.062 SPINAL STENOSIS, LUMBAR REGION WITH NEUROGENIC CLAUDICATION: Primary | ICD-10-CM

## 2024-03-28 PROCEDURE — 99214 OFFICE O/P EST MOD 30 MIN: CPT | Performed by: NURSE PRACTITIONER

## 2024-03-28 PROCEDURE — 3074F SYST BP LT 130 MM HG: CPT | Performed by: NURSE PRACTITIONER

## 2024-03-28 PROCEDURE — 1123F ACP DISCUSS/DSCN MKR DOCD: CPT | Performed by: NURSE PRACTITIONER

## 2024-03-28 PROCEDURE — 3078F DIAST BP <80 MM HG: CPT | Performed by: NURSE PRACTITIONER

## 2024-03-28 RX ORDER — PREGABALIN 150 MG/1
150 CAPSULE ORAL 2 TIMES DAILY
Qty: 60 CAPSULE | Refills: 2 | Status: SHIPPED | OUTPATIENT
Start: 2024-03-28 | End: 2024-06-26

## 2024-03-28 ASSESSMENT — PATIENT HEALTH QUESTIONNAIRE - PHQ9
SUM OF ALL RESPONSES TO PHQ QUESTIONS 1-9: 0
SUM OF ALL RESPONSES TO PHQ9 QUESTIONS 1 & 2: 0
2. FEELING DOWN, DEPRESSED OR HOPELESS: NOT AT ALL
1. LITTLE INTEREST OR PLEASURE IN DOING THINGS: NOT AT ALL

## 2024-03-28 NOTE — PROGRESS NOTES
Chief complaint   Chief Complaint   Patient presents with    Back Pain     Lower back pain     Hip Pain     Left hip pain        History of Present Illness:  José Luis Awad is a  74 y.o.  male  who comes in today for follow-up of his chronic low back pain.  He does have scoliosis and has finished doing Schroth therapy which he feels like did  help.  Last visit we weaned him off the gabapentin 600 mg 3 times a day because his therapist thought it might be contributing to his treatment resistant depression.  He did successfully wean off of that and we started him on Lyrica 75 mg twice a day which she states does help his back pain.  He states that does not fully help it but it does help some.  He does not have any radiating leg pain.  He has left-sided hip pain and x-ray did not show any significant abnormalities.  He does use Voltaren gel to rub into that.  He does go to the gym at the [facility] 3 times a week.  He denies fever bowel bladder dysfunction.         Physical Exam: The patient is a 74-year-old male well-developed well-nourished who is alert and oriented with a normal mood and affect.  He has a full weightbearing antalgic gait.  He does not use any assistive device.  He does have a scoliosis that curves to the right.  He has 4 out of 5 strength bilateral lower extremities.  Negative straight leg raise.  He did not have  pain with hyperextension lumbar spine.       Assessment and Plan: This is a patient who has scoliosis and back pain.  I increase the Lyrica to 150 mg twice a day.  He will let us know if he has any issues with that.  Will see him back in 3 months sooner if needed.      José Luis was seen today for back pain and hip pain.    Diagnoses and all orders for this visit:    Spinal stenosis, lumbar region with neurogenic claudication  -     pregabalin (LYRICA) 150 MG capsule; Take 1 capsule by mouth 2 times daily for 90 days. Max Daily Amount: 300 mg    Spondylosis without myelopathy or radiculopathy,

## 2024-03-28 NOTE — PROGRESS NOTES
José Luis Awad presents today for   Chief Complaint   Patient presents with    Back Pain     Lower back pain     Hip Pain     Left hip pain        Is someone accompanying this pt? Yes, wife    Is the patient using any DME equipment during OV? no    Coordination of Care:  1. Have you been to the ER, urgent care clinic since your last visit? no  Hospitalized since your last visit? no    2. Have you seen or consulted any other health care providers outside of the [facility] since your last visit? PCP Include any pap smears or colon screening. no

## 2024-04-22 RX ORDER — ROSUVASTATIN CALCIUM 10 MG/1
10 TABLET, COATED ORAL DAILY
Qty: 90 TABLET | Refills: 3 | Status: SHIPPED | OUTPATIENT
Start: 2024-04-22

## 2024-06-10 ENCOUNTER — HOSPITAL ENCOUNTER (OUTPATIENT)
Facility: HOSPITAL | Age: 74
Setting detail: SPECIMEN
Discharge: HOME | End: 2024-06-13
Payer: COMMERCIAL

## 2024-06-10 DIAGNOSIS — D72.829 LEUKOCYTOSIS, UNSPECIFIED TYPE: ICD-10-CM

## 2024-06-10 LAB
BASOPHILS # BLD: 0 K/UL (ref 0–0.1)
BASOPHILS NFR BLD: 1 % (ref 0–2)
DIFFERENTIAL METHOD BLD: ABNORMAL
EOSINOPHIL # BLD: 0.2 K/UL (ref 0–0.4)
EOSINOPHIL NFR BLD: 3 % (ref 0–5)
ERYTHROCYTE [DISTWIDTH] IN BLOOD BY AUTOMATED COUNT: 12.6 % (ref 11.6–14.5)
HCT VFR BLD AUTO: 44.3 % (ref 36–48)
HGB BLD-MCNC: 14.5 G/DL (ref 13–16)
IMM GRANULOCYTES # BLD AUTO: 0 K/UL (ref 0–0.04)
IMM GRANULOCYTES NFR BLD AUTO: 0 % (ref 0–0.5)
LYMPHOCYTES # BLD: 1.6 K/UL (ref 0.9–3.6)
LYMPHOCYTES NFR BLD: 29 % (ref 21–52)
MCH RBC QN AUTO: 31.5 PG (ref 24–34)
MCHC RBC AUTO-ENTMCNC: 32.7 G/DL (ref 31–37)
MCV RBC AUTO: 96.1 FL (ref 78–100)
MONOCYTES # BLD: 0.6 K/UL (ref 0.05–1.2)
MONOCYTES NFR BLD: 11 % (ref 3–10)
NEUTS SEG # BLD: 3.2 K/UL (ref 1.8–8)
NEUTS SEG NFR BLD: 56 % (ref 40–73)
NRBC # BLD: 0 K/UL (ref 0–0.01)
NRBC BLD-RTO: 0 PER 100 WBC
PLATELET # BLD AUTO: 171 K/UL (ref 135–420)
PMV BLD AUTO: 10.2 FL (ref 9.2–11.8)
RBC # BLD AUTO: 4.61 M/UL (ref 4.35–5.65)
WBC # BLD AUTO: 5.7 K/UL (ref 4.6–13.2)

## 2024-06-10 PROCEDURE — 36415 COLL VENOUS BLD VENIPUNCTURE: CPT

## 2024-06-10 PROCEDURE — 85025 COMPLETE CBC W/AUTO DIFF WBC: CPT

## 2024-06-27 DIAGNOSIS — M48.062 SPINAL STENOSIS, LUMBAR REGION WITH NEUROGENIC CLAUDICATION: ICD-10-CM

## 2024-06-27 DIAGNOSIS — M47.816 SPONDYLOSIS WITHOUT MYELOPATHY OR RADICULOPATHY, LUMBAR REGION: ICD-10-CM

## 2024-06-27 DIAGNOSIS — M54.16 RADICULOPATHY, LUMBAR REGION: ICD-10-CM

## 2024-06-27 RX ORDER — PREGABALIN 150 MG/1
CAPSULE ORAL
Qty: 60 CAPSULE | OUTPATIENT
Start: 2024-06-27

## 2024-06-28 ENCOUNTER — OFFICE VISIT (OUTPATIENT)
Age: 74
End: 2024-06-28
Payer: COMMERCIAL

## 2024-06-28 VITALS
DIASTOLIC BLOOD PRESSURE: 61 MMHG | HEART RATE: 60 BPM | BODY MASS INDEX: 22.77 KG/M2 | HEIGHT: 73 IN | RESPIRATION RATE: 18 BRPM | TEMPERATURE: 98.4 F | OXYGEN SATURATION: 97 % | WEIGHT: 171.8 LBS | SYSTOLIC BLOOD PRESSURE: 97 MMHG

## 2024-06-28 DIAGNOSIS — M48.062 SPINAL STENOSIS, LUMBAR REGION WITH NEUROGENIC CLAUDICATION: Primary | ICD-10-CM

## 2024-06-28 DIAGNOSIS — M47.816 SPONDYLOSIS WITHOUT MYELOPATHY OR RADICULOPATHY, LUMBAR REGION: ICD-10-CM

## 2024-06-28 DIAGNOSIS — G89.29 CHRONIC LEFT SHOULDER PAIN: ICD-10-CM

## 2024-06-28 DIAGNOSIS — M25.512 CHRONIC LEFT SHOULDER PAIN: ICD-10-CM

## 2024-06-28 DIAGNOSIS — M41.9 SCOLIOSIS OF THORACOLUMBAR SPINE, UNSPECIFIED SCOLIOSIS TYPE: ICD-10-CM

## 2024-06-28 PROCEDURE — 99214 OFFICE O/P EST MOD 30 MIN: CPT | Performed by: NURSE PRACTITIONER

## 2024-06-28 PROCEDURE — 3078F DIAST BP <80 MM HG: CPT | Performed by: NURSE PRACTITIONER

## 2024-06-28 PROCEDURE — 1123F ACP DISCUSS/DSCN MKR DOCD: CPT | Performed by: NURSE PRACTITIONER

## 2024-06-28 PROCEDURE — 3074F SYST BP LT 130 MM HG: CPT | Performed by: NURSE PRACTITIONER

## 2024-06-28 RX ORDER — PREGABALIN 150 MG/1
150 CAPSULE ORAL 2 TIMES DAILY
Qty: 60 CAPSULE | Refills: 5 | Status: SHIPPED | OUTPATIENT
Start: 2024-06-28 | End: 2024-12-25

## 2024-06-28 NOTE — PROGRESS NOTES
Chief complaint   Chief Complaint   Patient presents with    Back Problem    Pain    Back Pain       History of Present Illness:  José Luis Awad is a  74 y.o.  male   who comes in today for follow-up of his chronic low back pain.  He does have scoliosis and has finished doing Schroth therapy which he feels like did  help.  He states he walks awkwardly now and tipped off to the left because of the scoliosis.  Last visit we increased his Lyrica to 150 mg twice a day and he does feel like that helps his back pain more.  He had failed gabapentin in the past.  Because his therapist thought the gabapentin might be contributing to his depression. He does not have any radiating leg pain.  He has left-sided hip pain and x-ray did not show any significant abnormalities.  He does use Voltaren gel to rub into that and he states that is helpful.  He states he is having left shoulder pain and it feels like to bones get stuck over each other in the shoulder.  He would like to be referred to a specialist for that.  He does go to the gym at the [facility] 3 times a week.  He denies fever bowel bladder dysfunction.         Physical Exam: The patient is a 74-year-old male well-developed well-nourished who is alert and oriented with a normal mood and affect.  He has a full weightbearing antalgic gait.  He does not use any assistive device.  He does have a scoliosis that curves to the right.  He has 4 out of 5 strength bilateral lower extremities.  Negative straight leg raise.  He did not have  pain with hyperextension lumbar spine.        Assessment and Plan: This is a patient who has scoliosis and back pain.  I refilled his Lyrica 150 mg twice a day.  He does not need to refill the Voltaren gel.  I will put in referral to Dr. Rahman for his left shoulder.  We will see him back in 6 months sooner if needed.       José Luis was seen today for back problem, pain and back pain.    Diagnoses and all orders for this visit:    Spinal stenosis,

## 2024-06-28 NOTE — PROGRESS NOTES
José Luis Awad presents today for   Chief Complaint   Patient presents with    Back Problem    Pain    Back Pain       Is someone accompanying this pt? no    Is the patient using any DME equipment during OV? no    Depression Screening:       No data to display                Learning Assessment:  Failed to redirect to the Timeline version of the [employer] SmartLink.    Abuse Screening:       No data to display                Fall Risk  Failed to redirect to the Timeline version of the [employer] SmartLink.    OPIOID RISK TOOL  Failed to redirect to the Timeline version of the [employer] SmartLink.    Coordination of Care:  1. Have you been to the ER, urgent care clinic since your last visit? no  Hospitalized since your last visit? no    2. Have you seen or consulted any other health care providers outside of the [facility] since your last visit? no Include any pap smears or colon screening. no

## 2024-07-10 ENCOUNTER — OFFICE VISIT (OUTPATIENT)
Age: 74
End: 2024-07-10
Payer: COMMERCIAL

## 2024-07-10 VITALS — RESPIRATION RATE: 16 BRPM | WEIGHT: 170 LBS | HEIGHT: 73 IN | BODY MASS INDEX: 22.53 KG/M2

## 2024-07-10 DIAGNOSIS — G89.29 CHRONIC LEFT SHOULDER PAIN: Primary | ICD-10-CM

## 2024-07-10 DIAGNOSIS — M75.102 LEFT ROTATOR CUFF TEAR ARTHROPATHY: ICD-10-CM

## 2024-07-10 DIAGNOSIS — M25.512 CHRONIC LEFT SHOULDER PAIN: Primary | ICD-10-CM

## 2024-07-10 DIAGNOSIS — M12.812 LEFT ROTATOR CUFF TEAR ARTHROPATHY: ICD-10-CM

## 2024-07-10 PROCEDURE — 73030 X-RAY EXAM OF SHOULDER: CPT | Performed by: ORTHOPAEDIC SURGERY

## 2024-07-10 PROCEDURE — 99213 OFFICE O/P EST LOW 20 MIN: CPT | Performed by: ORTHOPAEDIC SURGERY

## 2024-07-10 PROCEDURE — 1123F ACP DISCUSS/DSCN MKR DOCD: CPT | Performed by: ORTHOPAEDIC SURGERY

## 2024-07-10 NOTE — PROGRESS NOTES
[facility] AMBULATORY OFFICE NOTE      Patient: José Luis Awad                MRN: [id number]       SSN: [id number]  YOB: 1950        AGE: 74 y.o.        SEX: male  Body mass index is 22.43 kg/m².    PCP: Blayne Ribera MD  07/10/24    CHIEF COMPLAINT: Left shoulder pain    HPI: José Luis Awad is a 74 y.o. male patient who complains of left shoulder pain intermittent for the past number of years.  He denies any injury or trauma.  He notices the pain when he brings his arm into abduction and internal rotation.  He says he notices a click that relieves his pain.  He also occasionally says the arm gets stuck in that position and then after the click releases and he is able to move it.  No injury.  No injections.  No advanced imaging.  He does workout at the [facility] occasionally notices shoulder pain while working out.    Past Medical History:   Diagnosis Date    Allergic rhinitis     Dr Pringle; never took immunotherapy    Anxiety     saw Dr Briones 2018    Asthma     Dr Lerma; pfts show no obstruction but high RV; Dr RICHARD Grover; FEV1 96, 5% imp postbd, ratio 104, , , DLCO 82 (7/18)    BCC (basal cell carcinoma of skin) 2013    Dr. Diaz s/p resection 2 spots    BPH (benign prostatic hyperplasia)     Dr. 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 lexapro and wellbutrin til 2018; tried prozac; saw Dr Barragan then Dr Reza; TMS worked about 6 mos; now Dr Bruce; Spravato (2023)    Diverticulosis 09/21/2022    Dr Mason    Dyslipidemia     ED (erectile dysfunction)     ICI with penile trauma; TRISTAN not helpful; peak performance unsuccessful    FHx: heart disease

## 2024-09-11 ENCOUNTER — OFFICE VISIT (OUTPATIENT)
Facility: CLINIC | Age: 74
End: 2024-09-11
Payer: COMMERCIAL

## 2024-09-11 VITALS
HEART RATE: 62 BPM | TEMPERATURE: 98.3 F | WEIGHT: 170 LBS | HEIGHT: 73 IN | DIASTOLIC BLOOD PRESSURE: 64 MMHG | SYSTOLIC BLOOD PRESSURE: 104 MMHG | OXYGEN SATURATION: 98 % | BODY MASS INDEX: 22.53 KG/M2 | RESPIRATION RATE: 16 BRPM

## 2024-09-11 DIAGNOSIS — J45.20 MILD INTERMITTENT ASTHMA WITHOUT COMPLICATION: ICD-10-CM

## 2024-09-11 DIAGNOSIS — E78.5 HYPERLIPIDEMIA, UNSPECIFIED HYPERLIPIDEMIA TYPE: ICD-10-CM

## 2024-09-11 DIAGNOSIS — I10 ESSENTIAL (PRIMARY) HYPERTENSION: Primary | ICD-10-CM

## 2024-09-11 DIAGNOSIS — G62.9 NEUROPATHY: ICD-10-CM

## 2024-09-11 DIAGNOSIS — Z12.5 SCREENING FOR MALIGNANT NEOPLASM OF PROSTATE: ICD-10-CM

## 2024-09-11 DIAGNOSIS — F33.0 MDD (MAJOR DEPRESSIVE DISORDER), RECURRENT EPISODE, MILD (HCC): ICD-10-CM

## 2024-09-11 DIAGNOSIS — F41.9 ANXIETY: ICD-10-CM

## 2024-09-11 PROCEDURE — 99214 OFFICE O/P EST MOD 30 MIN: CPT | Performed by: INTERNAL MEDICINE

## 2024-09-11 PROCEDURE — 3074F SYST BP LT 130 MM HG: CPT | Performed by: INTERNAL MEDICINE

## 2024-09-11 PROCEDURE — 1123F ACP DISCUSS/DSCN MKR DOCD: CPT | Performed by: INTERNAL MEDICINE

## 2024-09-11 PROCEDURE — 3078F DIAST BP <80 MM HG: CPT | Performed by: INTERNAL MEDICINE

## 2024-10-28 RX ORDER — TRIAMTERENE AND HYDROCHLOROTHIAZIDE 37.5; 25 MG/1; MG/1
1 CAPSULE ORAL DAILY
Qty: 90 CAPSULE | Refills: 3 | Status: SHIPPED | OUTPATIENT
Start: 2024-10-28

## 2024-11-18 NOTE — PERIOP NOTES
PRE-SURGICAL INSTRUCTIONS        Patient's Name:  Abhinav Link      ZJVPL'K Date:  1/13/2023            Covid Testing Date and Time:    Surgery Date:  1/18/2023                Do NOT eat or drink anything, including candy, gum, or ice chips after midnight on 1/18, unless you have specific instructions from your surgeon or anesthesia provider to do so. You may brush your teeth before coming to the hospital.  No smoking 24 hours prior to the day of surgery. No alcohol 24 hours prior to the day of surgery. No recreational drugs for one week prior to the day of surgery. Leave all valuables, including money/purse, at home. Remove all jewelry, nail polish, acrylic nails, and makeup (including mascara); no lotions powders, deodorant, or perfume/cologne/after shave on the skin. Follow instruction for Hibiclens washes and CHG wipes from surgeon's office. Glasses/contact lenses and dentures may be worn to the hospital.  They will be removed prior to surgery. Call your doctor if symptoms of a cold or illness develop within 24-48 hours prior to your surgery. 11.  If you are having an outpatient procedure, please make arrangements for a responsible ADULT TO [address] [place] [address] and stay with you for 24 hours after your surgery. 12. ONE VISITOR in the hospital at this time for outpatient procedures. Exceptions may be made for surgical admissions, per nursing unit guidelines      Special Instructions:      Bring list of CURRENT medications. Bring inhaler. Bring CPAP machine. Bring any pertinent legal medical records. Take these medications the morning of surgery with a sip of water:  per MD  Follow physician instructions about insulin. Follow physician instructions about stopping anticoagulants. Complete bowel prep per MD instructions. On the day of surgery, come in the main entrance of [facility]. Let the  at the desk know you are there for surgery.   A staff member will come escort you to the surgical area on the second floor. If you have any questions or concerns, please do not hesitate to call:     (Prior to the day of surgery) [facility] department:  [phone]   (Day of surgery) Pre-Op department:  [phone]    These surgical instructions were reviewed with Subhash Russell during the [facility] phone call. No

## 2024-12-09 ENCOUNTER — TELEPHONE (OUTPATIENT)
Facility: CLINIC | Age: 74
End: 2024-12-09

## 2024-12-09 NOTE — TELEPHONE ENCOUNTER
Patient called for a same day appointment complaining of very bad stomach pain for 6 days. He's not able to get in with Gastro until next year.   Please advise.

## 2024-12-20 ENCOUNTER — OFFICE VISIT (OUTPATIENT)
Age: 74
End: 2024-12-20
Payer: COMMERCIAL

## 2024-12-20 VITALS
SYSTOLIC BLOOD PRESSURE: 113 MMHG | RESPIRATION RATE: 18 BRPM | TEMPERATURE: 98.2 F | OXYGEN SATURATION: 98 % | BODY MASS INDEX: 23.17 KG/M2 | WEIGHT: 174.8 LBS | HEIGHT: 73 IN | HEART RATE: 72 BPM | DIASTOLIC BLOOD PRESSURE: 64 MMHG

## 2024-12-20 DIAGNOSIS — M48.062 SPINAL STENOSIS, LUMBAR REGION WITH NEUROGENIC CLAUDICATION: ICD-10-CM

## 2024-12-20 DIAGNOSIS — M47.816 SPONDYLOSIS WITHOUT MYELOPATHY OR RADICULOPATHY, LUMBAR REGION: ICD-10-CM

## 2024-12-20 DIAGNOSIS — M41.9 SCOLIOSIS OF THORACOLUMBAR SPINE, UNSPECIFIED SCOLIOSIS TYPE: Primary | ICD-10-CM

## 2024-12-20 PROCEDURE — 99213 OFFICE O/P EST LOW 20 MIN: CPT | Performed by: NURSE PRACTITIONER

## 2024-12-20 PROCEDURE — 3078F DIAST BP <80 MM HG: CPT | Performed by: NURSE PRACTITIONER

## 2024-12-20 PROCEDURE — 3074F SYST BP LT 130 MM HG: CPT | Performed by: NURSE PRACTITIONER

## 2024-12-20 PROCEDURE — 1123F ACP DISCUSS/DSCN MKR DOCD: CPT | Performed by: NURSE PRACTITIONER

## 2024-12-20 RX ORDER — ONDANSETRON 8 MG/1
TABLET, ORALLY DISINTEGRATING ORAL
COMMUNITY
Start: 2024-12-10

## 2024-12-20 RX ORDER — PREGABALIN 150 MG/1
150 CAPSULE ORAL 2 TIMES DAILY
Qty: 60 CAPSULE | Refills: 5 | Status: SHIPPED | OUTPATIENT
Start: 2025-01-04 | End: 2025-07-03

## 2024-12-20 NOTE — PROGRESS NOTES
José Luis Awad presents today for   Chief Complaint   Patient presents with    Back Problem    Back Pain    Pain       Is someone accompanying this pt? yes    Is the patient using any DME equipment during OV? Yes cane    Depression Screening:       No data to display                Learning Assessment:  Failed to redirect to the Timeline version of the [employer] SmartLink.    Abuse Screening:       No data to display                Fall Risk  Failed to redirect to the Timeline version of the [employer] SmartLink.    OPIOID RISK TOOL  Failed to redirect to the Timeline version of the [employer] SmartLink.    Coordination of Care:  1. Have you been to the ER, urgent care clinic since your last visit? no  Hospitalized since your last visit? no    2. Have you seen or consulted any other health care providers outside of the [facility] since your last visit? no Include any pap smears or colon screening. no      
EMG Dr. Veras    Prostate cancer (HCC) 2021    Dr Duong; yulisa 3+3; s/p RALP, BLND Dr Anthony    Pulmonary nodules     bilat upper lobes; no change ; noted again SOH     Scoliosis     Dr Geronimo 2014    Varicose vein of leg 2024    ankle edema    Venous insufficiency 2021    Vitamin D deficiency      Past Surgical History:   Procedure Laterality Date    CARPAL TUNNEL RELEASE Bilateral 2007    CATARACT REMOVAL Bilateral 2024    Dr Evangelista    CHOLECYSTECTOMY, LAPAROSCOPIC  2021    Dr Paredes    COLONOSCOPY      Dr. Almanzar mild diverticulosis , ; 22    HERNIA REPAIR Left 2023    OPEN REPAIR OF INCARCERATED LEFT INGUINAL HERNIA WITH PLACEMENT OF MESH performed by Reyna Madrid MD at [facility] MAIN OR    INGUINAL HERNIA REPAIR Left 2023    Robotic repair of left inguinal hernia with placement of mesh Dr Madrid    ORTHOPEDIC SURGERY      DEXA t score 4.2 spine, 0.2 hip ()    PROSTATECTOMY  2021    Dr Anthony DVP     Social History     Socioeconomic History    Marital status:      Spouse name: Not on file    Number of children: 2    Years of education: Not on file    Highest education level: Not on file   Occupational History    Occupation: retired HR PN   Tobacco Use    Smoking status: Former     Current packs/day: 0.00     Average packs/day: 0.3 packs/day for 1 year (0.3 ttl pk-yrs)     Types: Cigarettes     Quit date: 1972     Years since quittin.0     Passive exposure: Past    Smokeless tobacco: Never   Vaping Use    Vaping status: Never Used   Substance and Sexual Activity    Alcohol use: Yes     Alcohol/week: 1.0 standard drink of alcohol     Types: 1 Glasses of wine per week    Drug use: Never    Sexual activity: Not Currently     Partners: Female     Birth control/protection: None   Other Topics Concern    Not on file   Social History Narrative    Not on file     Social Determinants of Health     Financial Resource Strain: Low Risk

## 2025-01-06 DIAGNOSIS — M48.062 SPINAL STENOSIS, LUMBAR REGION WITH NEUROGENIC CLAUDICATION: ICD-10-CM

## 2025-01-06 DIAGNOSIS — M47.816 SPONDYLOSIS WITHOUT MYELOPATHY OR RADICULOPATHY, LUMBAR REGION: ICD-10-CM

## 2025-01-06 RX ORDER — PREGABALIN 150 MG/1
CAPSULE ORAL
Qty: 60 CAPSULE | OUTPATIENT
Start: 2025-01-06

## 2025-03-05 ENCOUNTER — HOSPITAL ENCOUNTER (OUTPATIENT)
Facility: HOSPITAL | Age: 75
Setting detail: SPECIMEN
Discharge: HOME | End: 2025-03-08
Payer: COMMERCIAL

## 2025-03-05 DIAGNOSIS — I10 ESSENTIAL (PRIMARY) HYPERTENSION: ICD-10-CM

## 2025-03-05 DIAGNOSIS — E78.5 HYPERLIPIDEMIA, UNSPECIFIED HYPERLIPIDEMIA TYPE: ICD-10-CM

## 2025-03-05 DIAGNOSIS — Z12.5 SCREENING FOR MALIGNANT NEOPLASM OF PROSTATE: ICD-10-CM

## 2025-03-05 LAB
ALBUMIN SERPL-MCNC: 3.6 G/DL (ref 3.4–5)
ALBUMIN/GLOB SERPL: 1.3 (ref 0.8–1.7)
ALP SERPL-CCNC: 98 U/L (ref 45–117)
ALT SERPL-CCNC: 28 U/L (ref 16–61)
ANION GAP SERPL CALC-SCNC: 5 MMOL/L (ref 3–18)
AST SERPL-CCNC: 29 U/L (ref 10–38)
BASOPHILS # BLD: 0.04 K/UL (ref 0–0.1)
BASOPHILS NFR BLD: 0.7 % (ref 0–2)
BILIRUB SERPL-MCNC: 0.8 MG/DL (ref 0.2–1)
BUN SERPL-MCNC: 18 MG/DL (ref 7–18)
BUN/CREAT SERPL: 19 (ref 12–20)
CALCIUM SERPL-MCNC: 9.1 MG/DL (ref 8.5–10.1)
CHLORIDE SERPL-SCNC: 106 MMOL/L (ref 100–111)
CHOLEST SERPL-MCNC: 132 MG/DL
CO2 SERPL-SCNC: 28 MMOL/L (ref 21–32)
CREAT SERPL-MCNC: 0.94 MG/DL (ref 0.6–1.3)
DIFFERENTIAL METHOD BLD: ABNORMAL
EOSINOPHIL # BLD: 0.25 K/UL (ref 0–0.4)
EOSINOPHIL NFR BLD: 4.7 % (ref 0–5)
ERYTHROCYTE [DISTWIDTH] IN BLOOD BY AUTOMATED COUNT: 13.4 % (ref 11.6–14.5)
GLOBULIN SER CALC-MCNC: 2.7 G/DL (ref 2–4)
GLUCOSE SERPL-MCNC: 82 MG/DL (ref 74–99)
HCT VFR BLD AUTO: 44.6 % (ref 36–48)
HDLC SERPL-MCNC: 56 MG/DL (ref 40–60)
HDLC SERPL: 2.4 (ref 0–5)
HGB BLD-MCNC: 14.6 G/DL (ref 13–16)
IMM GRANULOCYTES # BLD AUTO: 0.01 K/UL (ref 0–0.04)
IMM GRANULOCYTES NFR BLD AUTO: 0.2 % (ref 0–0.5)
LDLC SERPL CALC-MCNC: 61.6 MG/DL (ref 0–100)
LIPID PANEL: NORMAL
LYMPHOCYTES # BLD: 1.34 K/UL (ref 0.9–3.6)
LYMPHOCYTES NFR BLD: 25 % (ref 21–52)
MCH RBC QN AUTO: 30.8 PG (ref 24–34)
MCHC RBC AUTO-ENTMCNC: 32.7 G/DL (ref 31–37)
MCV RBC AUTO: 94.1 FL (ref 78–100)
MONOCYTES # BLD: 0.65 K/UL (ref 0.05–1.2)
MONOCYTES NFR BLD: 12.1 % (ref 3–10)
NEUTS SEG # BLD: 3.07 K/UL (ref 1.8–8)
NEUTS SEG NFR BLD: 57.3 % (ref 40–73)
NRBC # BLD: 0 K/UL (ref 0–0.01)
NRBC BLD-RTO: 0 PER 100 WBC
PLATELET # BLD AUTO: 191 K/UL (ref 135–420)
PMV BLD AUTO: 10.5 FL (ref 9.2–11.8)
POTASSIUM SERPL-SCNC: 3.8 MMOL/L (ref 3.5–5.5)
PROT SERPL-MCNC: 6.3 G/DL (ref 6.4–8.2)
PSA SERPL-MCNC: 0.1 NG/ML (ref 0–4)
RBC # BLD AUTO: 4.74 M/UL (ref 4.35–5.65)
SODIUM SERPL-SCNC: 139 MMOL/L (ref 136–145)
TRIGL SERPL-MCNC: 72 MG/DL
VLDLC SERPL CALC-MCNC: 14.4 MG/DL
WBC # BLD AUTO: 5.4 K/UL (ref 4.6–13.2)

## 2025-03-05 PROCEDURE — 85025 COMPLETE CBC W/AUTO DIFF WBC: CPT

## 2025-03-05 PROCEDURE — 36415 COLL VENOUS BLD VENIPUNCTURE: CPT

## 2025-03-05 PROCEDURE — 80053 COMPREHEN METABOLIC PANEL: CPT

## 2025-03-05 PROCEDURE — 80061 LIPID PANEL: CPT

## 2025-03-05 PROCEDURE — G0103 PSA SCREENING: HCPCS

## 2025-03-17 ENCOUNTER — OFFICE VISIT (OUTPATIENT)
Facility: CLINIC | Age: 75
End: 2025-03-17
Payer: COMMERCIAL

## 2025-03-17 VITALS
TEMPERATURE: 98.4 F | SYSTOLIC BLOOD PRESSURE: 100 MMHG | BODY MASS INDEX: 21.47 KG/M2 | OXYGEN SATURATION: 98 % | WEIGHT: 162 LBS | HEART RATE: 68 BPM | DIASTOLIC BLOOD PRESSURE: 69 MMHG | RESPIRATION RATE: 16 BRPM | HEIGHT: 73 IN

## 2025-03-17 DIAGNOSIS — E78.5 HYPERLIPIDEMIA, UNSPECIFIED HYPERLIPIDEMIA TYPE: ICD-10-CM

## 2025-03-17 DIAGNOSIS — I10 ESSENTIAL (PRIMARY) HYPERTENSION: ICD-10-CM

## 2025-03-17 DIAGNOSIS — J45.20 MILD INTERMITTENT ASTHMA WITHOUT COMPLICATION: ICD-10-CM

## 2025-03-17 DIAGNOSIS — F41.9 ANXIETY: ICD-10-CM

## 2025-03-17 DIAGNOSIS — R42 DIZZINESS: Primary | ICD-10-CM

## 2025-03-17 DIAGNOSIS — F33.0 MDD (MAJOR DEPRESSIVE DISORDER), RECURRENT EPISODE, MILD: ICD-10-CM

## 2025-03-17 DIAGNOSIS — C61 PROSTATE CANCER (HCC): ICD-10-CM

## 2025-03-17 DIAGNOSIS — K21.9 GASTROESOPHAGEAL REFLUX DISEASE WITHOUT ESOPHAGITIS: ICD-10-CM

## 2025-03-17 DIAGNOSIS — G47.33 OSA (OBSTRUCTIVE SLEEP APNEA): ICD-10-CM

## 2025-03-17 PROCEDURE — 99214 OFFICE O/P EST MOD 30 MIN: CPT | Performed by: INTERNAL MEDICINE

## 2025-03-17 PROCEDURE — 3074F SYST BP LT 130 MM HG: CPT | Performed by: INTERNAL MEDICINE

## 2025-03-17 PROCEDURE — 1123F ACP DISCUSS/DSCN MKR DOCD: CPT | Performed by: INTERNAL MEDICINE

## 2025-03-17 PROCEDURE — 3078F DIAST BP <80 MM HG: CPT | Performed by: INTERNAL MEDICINE

## 2025-03-17 SDOH — ECONOMIC STABILITY: FOOD INSECURITY: WITHIN THE PAST 12 MONTHS, YOU WORRIED THAT YOUR FOOD WOULD RUN OUT BEFORE YOU GOT MONEY TO BUY MORE.: NEVER TRUE

## 2025-03-17 SDOH — ECONOMIC STABILITY: FOOD INSECURITY: WITHIN THE PAST 12 MONTHS, THE FOOD YOU BOUGHT JUST DIDN'T LAST AND YOU DIDN'T HAVE MONEY TO GET MORE.: NEVER TRUE

## 2025-03-17 ASSESSMENT — PATIENT HEALTH QUESTIONNAIRE - PHQ9
7. TROUBLE CONCENTRATING ON THINGS, SUCH AS READING THE NEWSPAPER OR WATCHING TELEVISION: SEVERAL DAYS
9. THOUGHTS THAT YOU WOULD BE BETTER OFF DEAD, OR OF HURTING YOURSELF: NOT AT ALL
10. IF YOU CHECKED OFF ANY PROBLEMS, HOW DIFFICULT HAVE THESE PROBLEMS MADE IT FOR YOU TO DO YOUR WORK, TAKE CARE OF THINGS AT HOME, OR GET ALONG WITH OTHER PEOPLE: SOMEWHAT DIFFICULT
8. MOVING OR SPEAKING SO SLOWLY THAT OTHER PEOPLE COULD HAVE NOTICED. OR THE OPPOSITE, BEING SO FIGETY OR RESTLESS THAT YOU HAVE BEEN MOVING AROUND A LOT MORE THAN USUAL: NOT AT ALL
SUM OF ALL RESPONSES TO PHQ QUESTIONS 1-9: 7
2. FEELING DOWN, DEPRESSED OR HOPELESS: SEVERAL DAYS
3. TROUBLE FALLING OR STAYING ASLEEP: MORE THAN HALF THE DAYS
1. LITTLE INTEREST OR PLEASURE IN DOING THINGS: NOT AT ALL
6. FEELING BAD ABOUT YOURSELF - OR THAT YOU ARE A FAILURE OR HAVE LET YOURSELF OR YOUR FAMILY DOWN: SEVERAL DAYS
4. FEELING TIRED OR HAVING LITTLE ENERGY: SEVERAL DAYS

## 2025-03-18 RX ORDER — MECLIZINE HYDROCHLORIDE 25 MG/1
25 TABLET ORAL 3 TIMES DAILY PRN
Qty: 30 TABLET | Refills: 0 | Status: SHIPPED | OUTPATIENT
Start: 2025-03-18 | End: 2025-03-28

## 2025-03-18 NOTE — PROGRESS NOTES
José Luis Awad presents today for   Chief Complaint   Patient presents with    6 Month Follow-Up    Hypertension       \"Have you been to the ER, urgent care clinic since your last visit?  Hospitalized since your last visit?\"    YES - When: approximately 1  weeks ago.  Where and Why: Urgent Care, URI.    “Have you seen or consulted any other health care providers outside of [facility] since your last visit?”    NO             
packs/day for 1 year (0.3 ttl pk-yrs)     Types: Cigarettes     Quit date: 1972     Years since quittin.2     Passive exposure: Past    Smokeless tobacco: Never   Vaping Use    Vaping status: Never Used   Substance and Sexual Activity    Alcohol use: Yes     Alcohol/week: 1.0 standard drink of alcohol     Types: 1 Glasses of wine per week    Drug use: Never    Sexual activity: Not Currently     Partners: Female     Birth control/protection: None   Other Topics Concern    Not on file   Social History Narrative    Not on file     Social Drivers of Health     Financial Resource Strain: Low Risk  (2023)    Overall Financial Resource Strain (CARDIA)     Difficulty of Paying Living Expenses: Not hard at all   Food Insecurity: No Food Insecurity (3/17/2025)    Hunger Vital Sign     Worried About Running Out of Food in the Last Year: Never true     Ran Out of Food in the Last Year: Never true   Transportation Needs: No Transportation Needs (3/17/2025)    PRAPARE - Transportation     Lack of Transportation (Medical): No     Lack of Transportation (Non-Medical): No   Physical Activity: Sufficiently Active (3/5/2024)    Exercise Vital Sign     Days of Exercise per Week: 3 days     Minutes of Exercise per Session: 60 min   Stress: Not on file   Social Connections: Not on file   Intimate Partner Violence: Not on file   Housing Stability: Low Risk  (3/17/2025)    Housing Stability Vital Sign     Unable to Pay for Housing in the Last Year: No     Number of Times Moved in the Last Year: 0     Homeless in the Last Year: No     .  Current Outpatient Medications   Medication Sig    meclizine (ANTIVERT) 25 MG tablet Take 1 tablet by mouth 3 times daily as needed for Dizziness    pregabalin (LYRICA) 150 MG capsule Take 1 capsule by mouth 2 times daily for 180 days. Max Daily Amount: 300 mg    triamterene-hydroCHLOROthiazide (DYAZIDE) 37.5-25 MG per capsule TAKE 1 CAPSULE BY MOUTH DAILY    dorzolamide (TRUSOPT) 2 %

## 2025-03-19 ENCOUNTER — TELEPHONE (OUTPATIENT)
Facility: CLINIC | Age: 75
End: 2025-03-19

## 2025-03-19 NOTE — TELEPHONE ENCOUNTER
Luis with [facility] contacted the office, patient has to have urgent glaucoma medical procedure on 4/1/2025 and they want to know if Dr Ribera can clear him based on the appt with us 03/17/2025 or if patient needs another appointment, they will be faxing pre op clearance form today. Please advise     Luis: [phone] ext [phone]

## 2025-03-20 ENCOUNTER — TELEPHONE (OUTPATIENT)
Age: 75
End: 2025-03-20

## 2025-03-25 ENCOUNTER — OFFICE VISIT (OUTPATIENT)
Age: 75
End: 2025-03-25
Payer: COMMERCIAL

## 2025-03-25 VITALS
WEIGHT: 167 LBS | OXYGEN SATURATION: 98 % | DIASTOLIC BLOOD PRESSURE: 82 MMHG | BODY MASS INDEX: 21.43 KG/M2 | SYSTOLIC BLOOD PRESSURE: 128 MMHG | HEART RATE: 65 BPM | HEIGHT: 74 IN

## 2025-03-25 DIAGNOSIS — I10 ESSENTIAL (PRIMARY) HYPERTENSION: Primary | ICD-10-CM

## 2025-03-25 DIAGNOSIS — E78.5 HYPERLIPIDEMIA, UNSPECIFIED HYPERLIPIDEMIA TYPE: ICD-10-CM

## 2025-03-25 DIAGNOSIS — G47.33 OBSTRUCTIVE SLEEP APNEA (ADULT) (PEDIATRIC): ICD-10-CM

## 2025-03-25 PROCEDURE — 1123F ACP DISCUSS/DSCN MKR DOCD: CPT | Performed by: INTERNAL MEDICINE

## 2025-03-25 PROCEDURE — 3079F DIAST BP 80-89 MM HG: CPT | Performed by: INTERNAL MEDICINE

## 2025-03-25 PROCEDURE — 3074F SYST BP LT 130 MM HG: CPT | Performed by: INTERNAL MEDICINE

## 2025-03-25 PROCEDURE — 99214 OFFICE O/P EST MOD 30 MIN: CPT | Performed by: INTERNAL MEDICINE

## 2025-03-25 PROCEDURE — 93000 ELECTROCARDIOGRAM COMPLETE: CPT | Performed by: INTERNAL MEDICINE

## 2025-03-25 ASSESSMENT — PATIENT HEALTH QUESTIONNAIRE - PHQ9
2. FEELING DOWN, DEPRESSED OR HOPELESS: NOT AT ALL
3. TROUBLE FALLING OR STAYING ASLEEP: NOT AT ALL
4. FEELING TIRED OR HAVING LITTLE ENERGY: NOT AT ALL
5. POOR APPETITE OR OVEREATING: NOT AT ALL
9. THOUGHTS THAT YOU WOULD BE BETTER OFF DEAD, OR OF HURTING YOURSELF: NOT AT ALL
6. FEELING BAD ABOUT YOURSELF - OR THAT YOU ARE A FAILURE OR HAVE LET YOURSELF OR YOUR FAMILY DOWN: NOT AT ALL
7. TROUBLE CONCENTRATING ON THINGS, SUCH AS READING THE NEWSPAPER OR WATCHING TELEVISION: NOT AT ALL
SUM OF ALL RESPONSES TO PHQ QUESTIONS 1-9: 0
SUM OF ALL RESPONSES TO PHQ QUESTIONS 1-9: 0
8. MOVING OR SPEAKING SO SLOWLY THAT OTHER PEOPLE COULD HAVE NOTICED. OR THE OPPOSITE, BEING SO FIGETY OR RESTLESS THAT YOU HAVE BEEN MOVING AROUND A LOT MORE THAN USUAL: NOT AT ALL
10. IF YOU CHECKED OFF ANY PROBLEMS, HOW DIFFICULT HAVE THESE PROBLEMS MADE IT FOR YOU TO DO YOUR WORK, TAKE CARE OF THINGS AT HOME, OR GET ALONG WITH OTHER PEOPLE: NOT DIFFICULT AT ALL
1. LITTLE INTEREST OR PLEASURE IN DOING THINGS: NOT AT ALL
SUM OF ALL RESPONSES TO PHQ QUESTIONS 1-9: 0
SUM OF ALL RESPONSES TO PHQ QUESTIONS 1-9: 0

## 2025-03-25 ASSESSMENT — ENCOUNTER SYMPTOMS
SORE THROAT: 0
COUGH: 0
ABDOMINAL DISTENTION: 0
VOMITING: 0
ABDOMINAL PAIN: 0
NAUSEA: 0
SHORTNESS OF BREATH: 0

## 2025-03-25 ASSESSMENT — ANXIETY QUESTIONNAIRES
2. NOT BEING ABLE TO STOP OR CONTROL WORRYING: NOT AT ALL
4. TROUBLE RELAXING: NOT AT ALL
1. FEELING NERVOUS, ANXIOUS, OR ON EDGE: NOT AT ALL
5. BEING SO RESTLESS THAT IT IS HARD TO SIT STILL: NOT AT ALL
GAD7 TOTAL SCORE: 0
3. WORRYING TOO MUCH ABOUT DIFFERENT THINGS: NOT AT ALL
6. BECOMING EASILY ANNOYED OR IRRITABLE: NOT AT ALL
7. FEELING AFRAID AS IF SOMETHING AWFUL MIGHT HAPPEN: NOT AT ALL

## 2025-03-25 NOTE — PROGRESS NOTES
José Luis Awad presents today for   Chief Complaint   Patient presents with    Follow-up     overdue    Cardiac Clearance     4/1/25: Tube and shunt and patch graft with mitomycin in right eye with Dr. Cyr at [facility]       José Luis Awad preferred language for health care discussion is english/other.    Is someone accompanying this pt? no    Is the patient using any DME equipment during OV? no    Depression Screening:  Depression: Not at risk (3/25/2025)    PHQ-2     PHQ-2 Score: 0   Recent Concern: Depression - At risk (3/17/2025)    PHQ-2     PHQ-2 Score: 7        Learning Assessment:  Who is the primary learner? Patient    What is the preferred language for health care of the primary learner? ENGLISH    How does the primary learner prefer to learn new concepts? DEMONSTRATION    Answered By patient    Relationship to Learner SELF           Pt currently taking Anticoagulant therapy? no    Pt currently taking Antiplatelet therapy ? Aspirin 81 mg daily      Coordination of Care:  1. Have you been to the ER, urgent care clinic since your last visit? Hospitalized since your last visit? no    2. Have you seen or consulted any other health care providers outside of the [facility] since your last visit? Include any pap smears or colon screening. no

## 2025-03-25 NOTE — PROGRESS NOTES
03/25/25     José Luis Awad  is a 75 y.o. male     Chief Complaint   Patient presents with    Follow-up     overdue    Cardiac Clearance     4/1/25: Tube and shunt and patch graft with mitomycin in right eye with Dr. Cyr at [facility]       HPI    Patient presents for an overdue follow-up office visit.  Patient has a past medical history significant for dyslipidemia, more recently hypertension, and a very strong family for early coronary disease. The patient was seen in the ER at [facility] at the beginning of July 2019 for somewhat atypical chest pain.  His initial EKG was normal.  He was ruled out for myocardial infarction and underwent a exercise stress echocardiogram the following day which was a normal and low risk study.  He exercised for 10 minutes using the Timothy protocol achieving a total workload of 12.8 METS.  Patient's chest pain was ultimately determined to be likely musculoskeletal in nature.    In September 2019, he was found to have new EKG changes in our office with anterolateral T wave inversions, but no anginal type symptoms.  He did mention intermittent pleuritic chest pain which has been ongoing for several months.  As a result, he underwent an echocardiogram at the end of September 2019, demonstrating preserved LV systolic function, EF 55 to 60% with no valvular heart disease, and a mildly dilated aortic root, and normal PA pressures.      Patient was last seen in the office approximately 2 years ago.  Since last visit he has been having issues with recurrent glaucoma and now is going to require eye surgery which is scheduled for next week.  He denies any new chest pain, shortness of breath or leg swelling.  No major change in his activity tolerance.    Past Medical History:   Diagnosis Date    Allergic rhinitis     Dr Pringle; never took immunotherapy    Anxiety     saw Dr Briones 2018    Asthma     Dr Lerma; pfts show no obstruction but high RV; Dr RICHARD Grover; FEV1 96, 5% imp

## 2025-04-17 RX ORDER — ROSUVASTATIN CALCIUM 10 MG/1
10 TABLET, COATED ORAL DAILY
Qty: 90 TABLET | Refills: 3 | Status: SHIPPED | OUTPATIENT
Start: 2025-04-17

## 2025-06-26 ENCOUNTER — OFFICE VISIT (OUTPATIENT)
Age: 75
End: 2025-06-26
Payer: COMMERCIAL

## 2025-06-26 VITALS
RESPIRATION RATE: 18 BRPM | TEMPERATURE: 98.6 F | DIASTOLIC BLOOD PRESSURE: 63 MMHG | OXYGEN SATURATION: 97 % | HEIGHT: 74 IN | HEART RATE: 62 BPM | WEIGHT: 169.8 LBS | BODY MASS INDEX: 21.79 KG/M2 | SYSTOLIC BLOOD PRESSURE: 100 MMHG

## 2025-06-26 DIAGNOSIS — M48.062 SPINAL STENOSIS, LUMBAR REGION WITH NEUROGENIC CLAUDICATION: ICD-10-CM

## 2025-06-26 DIAGNOSIS — M47.816 SPONDYLOSIS WITHOUT MYELOPATHY OR RADICULOPATHY, LUMBAR REGION: ICD-10-CM

## 2025-06-26 PROCEDURE — 1123F ACP DISCUSS/DSCN MKR DOCD: CPT | Performed by: NURSE PRACTITIONER

## 2025-06-26 PROCEDURE — 99213 OFFICE O/P EST LOW 20 MIN: CPT | Performed by: NURSE PRACTITIONER

## 2025-06-26 PROCEDURE — 3074F SYST BP LT 130 MM HG: CPT | Performed by: NURSE PRACTITIONER

## 2025-06-26 PROCEDURE — 3078F DIAST BP <80 MM HG: CPT | Performed by: NURSE PRACTITIONER

## 2025-06-26 RX ORDER — PREGABALIN 150 MG/1
150 CAPSULE ORAL 2 TIMES DAILY
Qty: 60 CAPSULE | Refills: 5 | Status: SHIPPED | OUTPATIENT
Start: 2025-07-17 | End: 2026-01-13

## 2025-06-26 ASSESSMENT — PATIENT HEALTH QUESTIONNAIRE - PHQ9
SUM OF ALL RESPONSES TO PHQ QUESTIONS 1-9: 0
1. LITTLE INTEREST OR PLEASURE IN DOING THINGS: NOT AT ALL
2. FEELING DOWN, DEPRESSED OR HOPELESS: NOT AT ALL

## 2025-06-26 NOTE — PROGRESS NOTES
José Luis Awad presents today for   Chief Complaint   Patient presents with    Back Problem    Pain    Back Pain       Is someone accompanying this pt? no    Is the patient using any DME equipment during OV? Yes cane    Depression Screening:       No data to display                Learning Assessment:  Failed to redirect to the Timeline version of the [employer] SmartLink.    Abuse Screening:       No data to display                Fall Risk  Failed to redirect to the Timeline version of the [employer] SmartLink.    OPIOID RISK TOOL  Failed to redirect to the Timeline version of the [employer] SmartLink.    Coordination of Care:  1. Have you been to the ER, urgent care clinic since your last visit? no  Hospitalized since your last visit? no    2. Have you seen or consulted any other health care providers outside of the [facility] since your last visit? no Include any pap smears or colon screening. no

## 2025-06-26 NOTE — PROGRESS NOTES
Chief complaint   Chief Complaint   Patient presents with    Back Problem    Pain    Back Pain       History of Present Illness:  José Luis Awad is a  75 y.o.  male  who comes in today for follow-up of his chronic low back pain.  He does have scoliosis and he feels like he is choking off more to the left.  He has family use a walking stick now more consistently.  He has done physical therapy for balance.  He felt like that helped but he is  continuing to do the home exercise program.  The Lyrica 150 mg twice a day does seem to help. He had failed gabapentin in the past.  His therapist also felt like the gabapentin might have been contributing to his depression.  He does not have any radiating leg pain.    He does use Voltaren gel primarily on his left shoulder where he has a rotator cuff tear and is now using it some on his left knee. He does go to the gym at the [facility] 3 times a week and does crosstraining along with the bitmovin weightlifting machines.  He denies fever bowel bladder dysfunction.         Physical Exam: The patient is a 75 year-old male well-developed well-nourished who is alert and oriented with a normal mood and affect.  He has a full weightbearing antalgic gait.  He does not use any assistive device.  He does have a scoliosis that curves to the right.  He has 4 out of 5 strength bilateral lower extremities.  Negative straight leg raise.  He did not have  pain with hyperextension lumbar spine.        Assessment and Plan: This is a patient who has scoliosis and back pain.  I refilled his Lyrica 150 mg twice a day.  We will see him back in 6 months sooner if needed.      José Luis was seen today for back problem, pain and back pain.    Diagnoses and all orders for this visit:    Spinal stenosis, lumbar region with neurogenic claudication  -     pregabalin (LYRICA) 150 MG capsule; Take 1 capsule by mouth 2 times daily for 180 days. Max Daily Amount: 300 mg    Spondylosis without myelopathy or radiculopathy,

## 2025-07-28 ENCOUNTER — OFFICE VISIT (OUTPATIENT)
Facility: CLINIC | Age: 75
End: 2025-07-28
Payer: COMMERCIAL

## 2025-07-28 VITALS
DIASTOLIC BLOOD PRESSURE: 74 MMHG | WEIGHT: 159 LBS | SYSTOLIC BLOOD PRESSURE: 104 MMHG | HEIGHT: 74 IN | BODY MASS INDEX: 20.41 KG/M2 | TEMPERATURE: 98.5 F | HEART RATE: 74 BPM | RESPIRATION RATE: 16 BRPM

## 2025-07-28 DIAGNOSIS — Z23 ENCOUNTER FOR IMMUNIZATION: ICD-10-CM

## 2025-07-28 DIAGNOSIS — R42 DIZZINESS: ICD-10-CM

## 2025-07-28 DIAGNOSIS — S01.01XD LACERATION OF SCALP, SUBSEQUENT ENCOUNTER: Primary | ICD-10-CM

## 2025-07-28 PROCEDURE — 90471 IMMUNIZATION ADMIN: CPT | Performed by: INTERNAL MEDICINE

## 2025-07-28 PROCEDURE — 1123F ACP DISCUSS/DSCN MKR DOCD: CPT | Performed by: INTERNAL MEDICINE

## 2025-07-28 PROCEDURE — 99214 OFFICE O/P EST MOD 30 MIN: CPT | Performed by: INTERNAL MEDICINE

## 2025-07-28 PROCEDURE — 3078F DIAST BP <80 MM HG: CPT | Performed by: INTERNAL MEDICINE

## 2025-07-28 PROCEDURE — 90715 TDAP VACCINE 7 YRS/> IM: CPT | Performed by: INTERNAL MEDICINE

## 2025-07-28 PROCEDURE — 3074F SYST BP LT 130 MM HG: CPT | Performed by: INTERNAL MEDICINE

## 2025-07-28 RX ORDER — DIAZEPAM 5 MG/1
5 TABLET ORAL EVERY 8 HOURS PRN
Qty: 20 TABLET | Refills: 0 | Status: SHIPPED | OUTPATIENT
Start: 2025-07-28 | End: 2025-08-07

## 2025-08-12 ENCOUNTER — HOSPITAL ENCOUNTER (OUTPATIENT)
Age: 75
Discharge: HOME | End: 2025-08-12
Payer: COMMERCIAL

## 2025-08-12 LAB
ERYTHROCYTE [SEDIMENTATION RATE] IN BLOOD: 13 MM/HR (ref 0–20)
FOLATE SERPL-MCNC: 19.4 NG/ML (ref 4.6–34.8)
HBA1C MFR BLD: 5.5 % (ref 4.2–5.6)
T4 FREE SERPL-MCNC: 0.9 NG/DL (ref 0.9–1.7)
TSH, 3RD GENERATION: 1.1 UIU/ML (ref 0.27–4.2)
VIT B12 SERPL-MCNC: 903 PG/ML (ref 211–911)

## 2025-08-12 PROCEDURE — 84165 PROTEIN E-PHORESIS SERUM: CPT

## 2025-08-12 PROCEDURE — 84155 ASSAY OF PROTEIN SERUM: CPT

## 2025-08-12 PROCEDURE — 84443 ASSAY THYROID STIM HORMONE: CPT

## 2025-08-12 PROCEDURE — 84207 ASSAY OF VITAMIN B-6: CPT

## 2025-08-12 PROCEDURE — 84439 ASSAY OF FREE THYROXINE: CPT

## 2025-08-12 PROCEDURE — 86334 IMMUNOFIX E-PHORESIS SERUM: CPT

## 2025-08-12 PROCEDURE — 82607 VITAMIN B-12: CPT

## 2025-08-12 PROCEDURE — 85652 RBC SED RATE AUTOMATED: CPT

## 2025-08-12 PROCEDURE — 82784 ASSAY IGA/IGD/IGG/IGM EACH: CPT

## 2025-08-12 PROCEDURE — 83036 HEMOGLOBIN GLYCOSYLATED A1C: CPT

## 2025-08-12 PROCEDURE — 82746 ASSAY OF FOLIC ACID SERUM: CPT

## 2025-08-12 PROCEDURE — 36415 COLL VENOUS BLD VENIPUNCTURE: CPT

## 2025-08-15 LAB
ALBUMIN SERPL ELPH-MCNC: 3.9 G/DL (ref 2.9–4.4)
ALBUMIN/GLOB SERPL: 1.5 (ref 0.7–1.7)
ALPHA1 GLOB SERPL ELPH-MCNC: 0.3 G/DL (ref 0–0.4)
ALPHA2 GLOB SERPL ELPH-MCNC: 0.6 G/DL (ref 0.4–1)
B-GLOBULIN SERPL ELPH-MCNC: 1.1 G/DL (ref 0.7–1.3)
GAMMA GLOB SERPL ELPH-MCNC: 0.6 G/DL (ref 0.4–1.8)
GLOBULIN SER CALC-MCNC: 2.6 G/DL (ref 2.2–3.9)
M PROTEIN SERPL ELPH-MCNC: NORMAL G/DL
PROT SERPL-MCNC: 6.5 G/DL (ref 6–8.5)

## 2025-08-16 LAB — VIT B6 SERPL-MCNC: 10.4 UG/L (ref 3.4–65.2)

## 2025-08-18 ENCOUNTER — TELEPHONE (OUTPATIENT)
Facility: HOSPITAL | Age: 75
End: 2025-08-18

## 2025-08-18 LAB
ALBUMIN SERPL ELPH-MCNC: 3.9 G/DL (ref 2.9–4.4)
ALBUMIN/GLOB SERPL: 1.7 (ref 0.7–1.7)
ALPHA1 GLOB SERPL ELPH-MCNC: 0.2 G/DL (ref 0–0.4)
ALPHA2 GLOB SERPL ELPH-MCNC: 0.6 G/DL (ref 0.4–1)
B-GLOBULIN SERPL ELPH-MCNC: 1.1 G/DL (ref 0.7–1.3)
GAMMA GLOB SERPL ELPH-MCNC: 0.5 G/DL (ref 0.4–1.8)
GLOBULIN SER-MCNC: 2.4 G/DL (ref 2.2–3.9)
IGA SERPL-MCNC: 297 MG/DL (ref 61–437)
IGG SERPL-MCNC: 736 MG/DL (ref 603–1613)
IGM SERPL-MCNC: 71 MG/DL (ref 15–143)
INTERPRETATION SERPL IEP-IMP: NORMAL
M PROTEIN SERPL ELPH-MCNC: NORMAL G/DL
PROT SERPL-MCNC: 6.3 G/DL (ref 6–8.5)

## (undated) DEVICE — OBTRTR BLDELSS OPT 8MM DISP -- DA VINICI XI - SNGL USE

## (undated) DEVICE — SPONGE SURG SM 3/8IN WHT PNUT DISECT RADPQ ST

## (undated) DEVICE — TRAY PREP DRY W/ PREM GLV 2 APPL 6 SPNG 2 UNDPD 1 OVERWRAP

## (undated) DEVICE — NEEDLE SYR 18GA L1.5IN RED PLAS HUB S STL BLNT FILL W/O

## (undated) DEVICE — STERILE POLYISOPRENE POWDER-FREE SURGICAL GLOVES: Brand: PROTEXIS

## (undated) DEVICE — DRAPE TOWEL: Brand: CONVERTORS

## (undated) DEVICE — GARMENT,MEDLINE,DVT,INT,CALF,MED, GEN2: Brand: MEDLINE

## (undated) DEVICE — SYRINGE MED 10ML LUERLOCK TIP W/O SFTY DISP

## (undated) DEVICE — SUTURE VCRL SZ 2-0 L27IN ABSRB UD L26MM SH 1/2 CIR J417H

## (undated) DEVICE — SYRINGE MED 30ML STD CLR PLAS LUERLOCK TIP N CTRL DISP

## (undated) DEVICE — SUTURE MCRYL SZ 4-0 L18IN ABSRB UD L19MM PS-2 3/8 CIR PRIM Y496G

## (undated) DEVICE — ELECTRO LUBE IS A SINGLE PATIENT USE DEVICE THAT IS INTENDED TO BE USED ON ELECTROSURGICAL ELECTRODES TO REDUCE STICKING.: Brand: KEY SURGICAL ELECTRO LUBE

## (undated) DEVICE — BLADE CLIPPER GEN PURP NS

## (undated) DEVICE — SUTURE VCRL SZ 3-0 L27IN ABSRB UD L26MM SH 1/2 CIR J416H

## (undated) DEVICE — APPLICATOR MEDICATED 26 CC SOLUTION HI LT ORNG CHLORAPREP

## (undated) DEVICE — ELECTRODE PT RET AD L9FT HI MOIST COND ADH HYDRGEL CORDED

## (undated) DEVICE — SOLUTION IRRIGATION SODIUM CHL 0.9% 100 ML BTL

## (undated) DEVICE — SHEET, T, LAPAROTOMY, STERILE: Brand: MEDLINE

## (undated) DEVICE — KIT OR TURNOVER

## (undated) DEVICE — COVER MPLR TIP CRV SCIS ACC DA VINCI

## (undated) DEVICE — PENROSE TUBING RADIOPAQUE: Brand: ARGYLE

## (undated) DEVICE — ARM DRAPE

## (undated) DEVICE — Device

## (undated) DEVICE — BLANKET WRM AD W50XL85.8IN PACU FULL BODY FORC AIR

## (undated) DEVICE — SOLUTION IV 1000ML 0.9% SOD CHL

## (undated) DEVICE — SEAL UNIV 5-8MM DISP BX/10 -- DA VINCI XI - SNGL USE

## (undated) DEVICE — STRIP SKIN CLSR W0.25XL4IN WHT SPUNBOUND FBR NYL HI ADH

## (undated) DEVICE — PACK PROCEDURE SURG MAJ W/ BASIN LF

## (undated) DEVICE — ADHESIVE SKIN CLSR 0.7ML TOP DERMBND ADV

## (undated) DEVICE — 3M(TM) MEDIPORE(TM) +PAD SOFT CLOTH ADHESIVE WOUND DRESSING 3566: Brand: 3M™ MEDIPORE™

## (undated) DEVICE — THREE-QUARTER SHEET: Brand: CONVERTORS

## (undated) DEVICE — 3M™ STERI-STRIP™ COMPOUND BENZOIN TINCTURE 40 BAGS/CARTON 4 CARTONS/CASE C1544: Brand: 3M™ STERI-STRIP™

## (undated) DEVICE — COLUMN DRAPE

## (undated) DEVICE — LIGHT HANDLE: Brand: DEVON

## (undated) DEVICE — TAPE ADH CLTH SILK H2O REPELLENT CURAD

## (undated) DEVICE — SUTURE VLOC 90 2/0 VL 6 GS-22 VLOCM2105

## (undated) DEVICE — SUTURE PDS II SZ 2-0 L27IN ABSRB VLT L36MM CT-1 1/2 CIR Z339H

## (undated) DEVICE — INTENDED FOR TISSUE SEPARATION, AND OTHER PROCEDURES THAT REQUIRE A SHARP SURGICAL BLADE TO PUNCTURE OR CUT.: Brand: BARD-PARKER ®  SAFETY SCALPED